# Patient Record
Sex: FEMALE | Race: BLACK OR AFRICAN AMERICAN | NOT HISPANIC OR LATINO | URBAN - METROPOLITAN AREA
[De-identification: names, ages, dates, MRNs, and addresses within clinical notes are randomized per-mention and may not be internally consistent; named-entity substitution may affect disease eponyms.]

---

## 2019-05-13 ENCOUNTER — EMERGENCY (EMERGENCY)
Facility: HOSPITAL | Age: 49
LOS: 1 days | Discharge: LEFT BEFORE DISCHARGE | End: 2019-05-13
Attending: EMERGENCY MEDICINE | Admitting: EMERGENCY MEDICINE
Payer: MEDICAID

## 2019-05-13 VITALS
HEART RATE: 92 BPM | TEMPERATURE: 99 F | DIASTOLIC BLOOD PRESSURE: 84 MMHG | OXYGEN SATURATION: 100 % | SYSTOLIC BLOOD PRESSURE: 134 MMHG | RESPIRATION RATE: 18 BRPM

## 2019-05-13 DIAGNOSIS — R10.9 UNSPECIFIED ABDOMINAL PAIN: ICD-10-CM

## 2019-05-13 DIAGNOSIS — Z88.8 ALLERGY STATUS TO OTHER DRUGS, MEDICAMENTS AND BIOLOGICAL SUBSTANCES: ICD-10-CM

## 2019-05-13 DIAGNOSIS — R10.13 EPIGASTRIC PAIN: ICD-10-CM

## 2019-05-13 LAB
ALBUMIN SERPL ELPH-MCNC: 3.3 G/DL — LOW (ref 3.4–5)
ALP SERPL-CCNC: 93 U/L — SIGNIFICANT CHANGE UP (ref 40–120)
ALT FLD-CCNC: SIGNIFICANT CHANGE UP U/L (ref 12–42)
ANION GAP SERPL CALC-SCNC: 17 MMOL/L — HIGH (ref 9–16)
APPEARANCE UR: CLEAR — SIGNIFICANT CHANGE UP
APTT BLD: 41.5 SEC — HIGH (ref 27.5–36.3)
AST SERPL-CCNC: SIGNIFICANT CHANGE UP U/L (ref 15–37)
BASOPHILS NFR BLD AUTO: 0.1 % — SIGNIFICANT CHANGE UP (ref 0–2)
BILIRUB SERPL-MCNC: 1 MG/DL — SIGNIFICANT CHANGE UP (ref 0.2–1.2)
BILIRUB UR-MCNC: NEGATIVE — SIGNIFICANT CHANGE UP
BUN SERPL-MCNC: 9 MG/DL — SIGNIFICANT CHANGE UP (ref 7–23)
CALCIUM SERPL-MCNC: 8.5 MG/DL — SIGNIFICANT CHANGE UP (ref 8.5–10.5)
CHLORIDE SERPL-SCNC: 100 MMOL/L — SIGNIFICANT CHANGE UP (ref 96–108)
CO2 SERPL-SCNC: 18 MMOL/L — LOW (ref 22–31)
COLOR SPEC: YELLOW — SIGNIFICANT CHANGE UP
CREAT SERPL-MCNC: 1.11 MG/DL — SIGNIFICANT CHANGE UP (ref 0.5–1.3)
DIFF PNL FLD: ABNORMAL
EOSINOPHIL NFR BLD AUTO: 1.3 % — SIGNIFICANT CHANGE UP (ref 0–6)
GLUCOSE SERPL-MCNC: 170 MG/DL — HIGH (ref 70–99)
GLUCOSE UR QL: NEGATIVE — SIGNIFICANT CHANGE UP
HCG SERPL-ACNC: 1 MIU/ML — SIGNIFICANT CHANGE UP
HCT VFR BLD CALC: 40.3 % — SIGNIFICANT CHANGE UP (ref 34.5–45)
HGB BLD-MCNC: 13.8 G/DL — SIGNIFICANT CHANGE UP (ref 11.5–15.5)
IMM GRANULOCYTES NFR BLD AUTO: 0.6 % — SIGNIFICANT CHANGE UP (ref 0–1.5)
INR BLD: 2.82 — HIGH (ref 0.88–1.16)
KETONES UR-MCNC: NEGATIVE — SIGNIFICANT CHANGE UP
LACTATE SERPL-SCNC: 0.6 MMOL/L — SIGNIFICANT CHANGE UP (ref 0.4–2)
LEUKOCYTE ESTERASE UR-ACNC: NEGATIVE — SIGNIFICANT CHANGE UP
LIDOCAIN IGE QN: 941 U/L — HIGH (ref 73–393)
LYMPHOCYTES # BLD AUTO: 12.5 % — LOW (ref 13–44)
MCHC RBC-ENTMCNC: 31 PG — SIGNIFICANT CHANGE UP (ref 27–34)
MCHC RBC-ENTMCNC: 34.2 G/DL — SIGNIFICANT CHANGE UP (ref 32–36)
MCV RBC AUTO: 90.6 FL — SIGNIFICANT CHANGE UP (ref 80–100)
MONOCYTES NFR BLD AUTO: 6.7 % — SIGNIFICANT CHANGE UP (ref 2–14)
NEUTROPHILS NFR BLD AUTO: 78.8 % — HIGH (ref 43–77)
NITRITE UR-MCNC: NEGATIVE — SIGNIFICANT CHANGE UP
PH UR: 6 — SIGNIFICANT CHANGE UP (ref 5–8)
PLATELET # BLD AUTO: 212 K/UL — SIGNIFICANT CHANGE UP (ref 150–400)
POTASSIUM SERPL-MCNC: 3.8 MMOL/L — SIGNIFICANT CHANGE UP (ref 3.5–5.3)
POTASSIUM SERPL-SCNC: 3.8 MMOL/L — SIGNIFICANT CHANGE UP (ref 3.5–5.3)
PROT SERPL-MCNC: 7.7 G/DL — SIGNIFICANT CHANGE UP (ref 6.4–8.2)
PROT UR-MCNC: ABNORMAL MG/DL
PROTHROM AB SERPL-ACNC: 32 SEC — HIGH (ref 10–12.9)
RBC # BLD: 4.45 M/UL — SIGNIFICANT CHANGE UP (ref 3.8–5.2)
RBC # FLD: 13.6 % — SIGNIFICANT CHANGE UP (ref 10.3–14.5)
SODIUM SERPL-SCNC: 135 MMOL/L — SIGNIFICANT CHANGE UP (ref 132–145)
SP GR SPEC: 1.02 — SIGNIFICANT CHANGE UP (ref 1–1.03)
UROBILINOGEN FLD QL: 0.2 E.U./DL — SIGNIFICANT CHANGE UP
WBC # BLD: 8.6 K/UL — SIGNIFICANT CHANGE UP (ref 3.8–10.5)
WBC # FLD AUTO: 8.6 K/UL — SIGNIFICANT CHANGE UP (ref 3.8–10.5)

## 2019-05-13 PROCEDURE — 93010 ELECTROCARDIOGRAM REPORT: CPT

## 2019-05-13 PROCEDURE — 99285 EMERGENCY DEPT VISIT HI MDM: CPT | Mod: 25

## 2019-05-13 RX ORDER — OXYCODONE HYDROCHLORIDE 5 MG/1
5 TABLET ORAL ONCE
Refills: 0 | Status: DISCONTINUED | OUTPATIENT
Start: 2019-05-13 | End: 2019-05-13

## 2019-05-13 RX ORDER — IOHEXOL 300 MG/ML
30 INJECTION, SOLUTION INTRAVENOUS ONCE
Refills: 0 | Status: COMPLETED | OUTPATIENT
Start: 2019-05-13 | End: 2019-05-13

## 2019-05-13 RX ORDER — FAMOTIDINE 10 MG/ML
20 INJECTION INTRAVENOUS ONCE
Refills: 0 | Status: COMPLETED | OUTPATIENT
Start: 2019-05-13 | End: 2019-05-13

## 2019-05-13 RX ORDER — MORPHINE SULFATE 50 MG/1
4 CAPSULE, EXTENDED RELEASE ORAL ONCE
Refills: 0 | Status: DISCONTINUED | OUTPATIENT
Start: 2019-05-13 | End: 2019-05-13

## 2019-05-13 RX ORDER — SODIUM CHLORIDE 9 MG/ML
1000 INJECTION INTRAMUSCULAR; INTRAVENOUS; SUBCUTANEOUS ONCE
Refills: 0 | Status: COMPLETED | OUTPATIENT
Start: 2019-05-13 | End: 2019-05-13

## 2019-05-13 RX ADMIN — SODIUM CHLORIDE 2000 MILLILITER(S): 9 INJECTION INTRAMUSCULAR; INTRAVENOUS; SUBCUTANEOUS at 21:53

## 2019-05-13 RX ADMIN — OXYCODONE HYDROCHLORIDE 5 MILLIGRAM(S): 5 TABLET ORAL at 21:53

## 2019-05-13 RX ADMIN — FAMOTIDINE 20 MILLIGRAM(S): 10 INJECTION INTRAVENOUS at 21:53

## 2019-05-13 RX ADMIN — Medication 30 MILLILITER(S): at 21:53

## 2019-05-13 RX ADMIN — IOHEXOL 30 MILLILITER(S): 300 INJECTION, SOLUTION INTRAVENOUS at 21:54

## 2019-05-13 RX ADMIN — MORPHINE SULFATE 4 MILLIGRAM(S): 50 CAPSULE, EXTENDED RELEASE ORAL at 23:02

## 2019-05-13 NOTE — ED PROVIDER NOTE - ATTENDING CONTRIBUTION TO CARE
48F hx of gastric sleeve (2014, [facility]), hx of abd hernia repair with a mesh (2018, [facility]), hx of colon resection due to recurrent diverticulitis (2018, [facility]) and hx of cholecystectomy presenting today with abd pain and acid reflex symptoms. Hx of daily etoh use.    afebrile, vss    awake/alert  lungs: clear  sclerae anicteric  cor: rrr s mcr  abd: soft, mild epigastric ttp, no pulsatile mass, equal femoral pulses, neg HSM, no r/r/g, nabs throughout.  neuro: aao x 3    data: lipase 700's  ct a/p oral/iv contrast pending    imp: gastritis/pancreatitis/internal hernia/sbo/ intussusception 48F hx of gastric sleeve (2014, [facility]), hx of abd hernia repair with a mesh (2018, [facility]), hx of colon resection due to recurrent diverticulitis (2018, [facility]) and hx of cholecystectomy presenting today with abd pain and acid reflex symptoms. Hx of daily etoh use.    pshx: as above, cholecystectomy    afebrile, vss    awake/alert  lungs: clear  sclerae anicteric  cor: rrr s mcr  abd: soft, mild epigastric ttp, no pulsatile mass, equal femoral pulses, neg HSM, no r/r/g, nabs throughout.  neuro: aao x 3    data: lipase 941  ct a/p oral/iv contrast pending    imp: gastritis/pancreatitis/internal hernia/sbo/ intussusception

## 2019-05-13 NOTE — ED PROVIDER NOTE - OBJECTIVE STATEMENT
48F hx of gastric sleeve (2014, [facility]), hx of abd hernia repair with a mesh (2018, [facility]), hx of colon resection due to recurrent diverticulitis (2018, [facility]) presenting today with abd pain and acid reflex symptoms. Pt was having BM today, normal, and felt that something popped in her abdomen, where the abd hernia was repaired. Pain is constant in nature and worse with eating. Pt endorsed to having abd pain x 3d, lower abd. No nausea, vomiting, diarrhea. Hx of acid reflex, on Omeprazole and Karafate. 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 48F hx of gastric sleeve (2014, [facility]), hx of abd hernia repair with a mesh (2018, [facility]), hx of colon resection due to recurrent diverticulitis (2018, [facility]) presenting today with abd pain and acid reflex symptoms. Pt was having BM today, normal, and felt that something popped in her abdomen, where the abd hernia was repaired. Pain is constant in nature and worse with eating. Pt endorsed to having abd pain x 3d, epigastric in nature. BM today, normal. No nausea, vomiting, diarrhea. Hx of acid reflex, on Omeprazole and Karafate. 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 48F hx of gastric sleeve (2014, [facility]), hx of abd hernia repair with a mesh (2018, [facility]), hx of colon resection due to recurrent diverticulitis (2018, [facility]) presenting today with abd pain and acid reflex symptoms. Pt was having BM today, normal, and felt that something popped in her abdomen, where the abd hernia was repaired. Pain is constant in nature for the past 3 days and worse with eating. Pain is located at the epigastric region. No nausea, vomiting, diarrhea. Hx of acid reflex, on Omeprazole and Carafate. Endorsed that she has "funny taste" in her mouth, with heart burn sensation. Hx of DVTs and PE on Xarelto. 48F hx of gastric sleeve (2014, [facility]), hx of abd hernia repair with a mesh (2018, [facility]), hx of colon resection due to recurrent diverticulitis (2018, [facility]) and hx of cholecystectomy presenting today with abd pain and acid reflex symptoms. Pt was having BM today, described as normal, and felt that something popped in her abdomen, where the abd hernia was repaired. Pain is constant in nature for the past 3 days and worse with eating. Pain is located at the epigastric region. No nausea, vomiting, diarrhea. Hx of acid reflex, on Omeprazole and Carafate. Endorsed that she has "funny taste" in her mouth, with heart burn sensation. Hx of DVTs and PE on Xarelto. Otherwise, no chest pain or back pain endorsed. Also denies any fever, chills, nausea or vomiting. 48F hx of gastric sleeve (2014, [facility]), hx of abd hernia repair with a mesh (2018, [facility]), hx of colon resection due to recurrent diverticulitis (2018, [facility]) and hx of cholecystectomy presenting today with abd pain and acid reflex symptoms x 4d. Pt was having BM today, described as normal, and felt that something popped in her abdomen, where the abd hernia was repaired. Pain is constant in nature following EtOH use at the party and now worse with eating. Pain is located at the epigastric region. No nausea, vomiting, diarrhea. Hx of acid reflex, on Omeprazole and Carafate. Endorsed that she has "funny taste" in her mouth, with heart burn sensation. Hx of DVTs and PE on Xarelto. Otherwise, no chest pain or back pain endorsed. Also denies any fever, chills, nausea or vomiting.

## 2019-05-13 NOTE — ED ADULT TRIAGE NOTE - CHIEF COMPLAINT QUOTE
Pt complaining of abdominal pain associated gastric reflux. Pt states that she has a history of gastric reflux and bariatric sleeve in 2014. Pt denies nausea, vomiting, chest pain, sob.

## 2019-05-13 NOTE — ED ADULT NURSE REASSESSMENT NOTE - NS ED NURSE REASSESS COMMENT FT1
pt comes to the nurses station expressing wishes to leave ed apt. unwilling to wait for ct results, although strongly advised to by MD and this writer. pt is alert and oriented x 3, ambulates in steady gait. leaving  ED with friend, in no distress.

## 2019-05-13 NOTE — ED ADULT NURSE NOTE - OBJECTIVE STATEMENT
Patient to ED with complaint of abd pain and acid reflex symptoms. Pt was having BM today, described as normal, and felt that something popped in her abdomen, where the abd hernia was repaired. Pain is constant in nature for the past 3 days and worse with eating. Pain is located at the epigastric region.

## 2019-05-13 NOTE — ED PROVIDER NOTE - PROGRESS NOTE DETAILS
pt does not wish to wait for final CT result, understands diagnosis and workup is not complete at this point and that we may miss potentially life threatening conditions.  pt is ao x 4, no apparent distress, and appears to have capacity to make medical decisions.  return precautions given but pt did not wait for written dc instructions

## 2019-05-13 NOTE — ED PROVIDER NOTE - CLINICAL SUMMARY MEDICAL DECISION MAKING FREE TEXT BOX
hx of multiple abd sx, c/o of abd pain, and worse with eating - will r/o sbo vs. internal hernia vs. colitis vs. appendicitis. Basics with lipase. IVF and pain control. hx of multiple abd sx, c/o of abd pain and exacerbation of acid reflex sxs, + normal BM, will r/o sbo vs. internal hernia vs. colitis vs. appendicitis. Basics with lipase. IVF and pain control. High BMI - will get CTAP with IV and PO C to r/o hernia, appendicitis, sbo or mass.

## 2019-05-13 NOTE — ED PROVIDER NOTE - PHYSICAL EXAMINATION
Abd pain, epigastric abd, with palpation.   No guarding or rebound tenderness Abd pain, epigastric abd, with palpation.   Palpable nodules on abdomen.   No guarding or rebound tenderness

## 2019-05-14 VITALS
OXYGEN SATURATION: 98 % | TEMPERATURE: 99 F | DIASTOLIC BLOOD PRESSURE: 93 MMHG | RESPIRATION RATE: 18 BRPM | HEART RATE: 79 BPM | SYSTOLIC BLOOD PRESSURE: 138 MMHG

## 2019-05-14 PROCEDURE — 74177 CT ABD & PELVIS W/CONTRAST: CPT | Mod: 26

## 2019-05-23 ENCOUNTER — APPOINTMENT (OUTPATIENT)
Dept: INTERNAL MEDICINE | Facility: CLINIC | Age: 49
End: 2019-05-23
Payer: MEDICAID

## 2019-05-23 VITALS
TEMPERATURE: 98.8 F | HEART RATE: 78 BPM | SYSTOLIC BLOOD PRESSURE: 144 MMHG | OXYGEN SATURATION: 99 % | BODY MASS INDEX: 32.43 KG/M2 | DIASTOLIC BLOOD PRESSURE: 86 MMHG | HEIGHT: 68 IN | WEIGHT: 214 LBS

## 2019-05-23 PROCEDURE — 99205 OFFICE O/P NEW HI 60 MIN: CPT

## 2019-05-24 ENCOUNTER — EMERGENCY (EMERGENCY)
Facility: HOSPITAL | Age: 49
LOS: 1 days | Discharge: HOME | End: 2019-05-24
Attending: EMERGENCY MEDICINE | Admitting: EMERGENCY MEDICINE
Payer: MEDICAID

## 2019-05-24 VITALS
HEART RATE: 75 BPM | TEMPERATURE: 98 F | SYSTOLIC BLOOD PRESSURE: 157 MMHG | RESPIRATION RATE: 18 BRPM | OXYGEN SATURATION: 99 % | DIASTOLIC BLOOD PRESSURE: 107 MMHG

## 2019-05-24 VITALS
HEART RATE: 77 BPM | SYSTOLIC BLOOD PRESSURE: 161 MMHG | TEMPERATURE: 97 F | RESPIRATION RATE: 20 BRPM | DIASTOLIC BLOOD PRESSURE: 92 MMHG | OXYGEN SATURATION: 99 %

## 2019-05-24 LAB
ALBUMIN SERPL ELPH-MCNC: 3.4 G/DL — SIGNIFICANT CHANGE UP (ref 3.4–5)
ALP SERPL-CCNC: 88 U/L — SIGNIFICANT CHANGE UP (ref 40–120)
ALT FLD-CCNC: 21 U/L — SIGNIFICANT CHANGE UP (ref 12–42)
ANION GAP SERPL CALC-SCNC: 8 MMOL/L — LOW (ref 9–16)
APPEARANCE UR: CLEAR — SIGNIFICANT CHANGE UP
AST SERPL-CCNC: 30 U/L — SIGNIFICANT CHANGE UP (ref 15–37)
BASOPHILS NFR BLD AUTO: 0.8 % — SIGNIFICANT CHANGE UP (ref 0–2)
BILIRUB SERPL-MCNC: 0.2 MG/DL — SIGNIFICANT CHANGE UP (ref 0.2–1.2)
BILIRUB UR-MCNC: NEGATIVE — SIGNIFICANT CHANGE UP
BUN SERPL-MCNC: 8 MG/DL — SIGNIFICANT CHANGE UP (ref 7–23)
CALCIUM SERPL-MCNC: 9.2 MG/DL — SIGNIFICANT CHANGE UP (ref 8.5–10.5)
CHLORIDE SERPL-SCNC: 105 MMOL/L — SIGNIFICANT CHANGE UP (ref 96–108)
CO2 SERPL-SCNC: 29 MMOL/L — SIGNIFICANT CHANGE UP (ref 22–31)
COLOR SPEC: YELLOW — SIGNIFICANT CHANGE UP
CREAT SERPL-MCNC: 1 MG/DL — SIGNIFICANT CHANGE UP (ref 0.5–1.3)
DIFF PNL FLD: ABNORMAL
EOSINOPHIL NFR BLD AUTO: 2.7 % — SIGNIFICANT CHANGE UP (ref 0–6)
GLUCOSE SERPL-MCNC: 118 MG/DL — HIGH (ref 70–99)
GLUCOSE UR QL: NEGATIVE — SIGNIFICANT CHANGE UP
HCG UR QL: NEGATIVE — SIGNIFICANT CHANGE UP
HCT VFR BLD CALC: 37 % — SIGNIFICANT CHANGE UP (ref 34.5–45)
HGB BLD-MCNC: 11.8 G/DL — SIGNIFICANT CHANGE UP (ref 11.5–15.5)
IMM GRANULOCYTES NFR BLD AUTO: 1.3 % — SIGNIFICANT CHANGE UP (ref 0–1.5)
KETONES UR-MCNC: NEGATIVE — SIGNIFICANT CHANGE UP
LEUKOCYTE ESTERASE UR-ACNC: NEGATIVE — SIGNIFICANT CHANGE UP
LIDOCAIN IGE QN: 438 U/L — HIGH (ref 73–393)
LYMPHOCYTES # BLD AUTO: 34.3 % — SIGNIFICANT CHANGE UP (ref 13–44)
MCHC RBC-ENTMCNC: 30.3 PG — SIGNIFICANT CHANGE UP (ref 27–34)
MCHC RBC-ENTMCNC: 31.9 G/DL — LOW (ref 32–36)
MCV RBC AUTO: 95.1 FL — SIGNIFICANT CHANGE UP (ref 80–100)
MONOCYTES NFR BLD AUTO: 11.2 % — SIGNIFICANT CHANGE UP (ref 2–14)
NEUTROPHILS NFR BLD AUTO: 49.7 % — SIGNIFICANT CHANGE UP (ref 43–77)
NITRITE UR-MCNC: NEGATIVE — SIGNIFICANT CHANGE UP
PH UR: 6 — SIGNIFICANT CHANGE UP (ref 5–8)
PLATELET # BLD AUTO: 303 K/UL — SIGNIFICANT CHANGE UP (ref 150–400)
POTASSIUM SERPL-MCNC: 3.5 MMOL/L — SIGNIFICANT CHANGE UP (ref 3.5–5.3)
POTASSIUM SERPL-SCNC: 3.5 MMOL/L — SIGNIFICANT CHANGE UP (ref 3.5–5.3)
PROT SERPL-MCNC: 8 G/DL — SIGNIFICANT CHANGE UP (ref 6.4–8.2)
PROT UR-MCNC: NEGATIVE MG/DL — SIGNIFICANT CHANGE UP
RBC # BLD: 3.89 M/UL — SIGNIFICANT CHANGE UP (ref 3.8–5.2)
RBC # FLD: 14 % — SIGNIFICANT CHANGE UP (ref 10.3–14.5)
SODIUM SERPL-SCNC: 142 MMOL/L — SIGNIFICANT CHANGE UP (ref 132–145)
SP GR SPEC: 1.02 — SIGNIFICANT CHANGE UP (ref 1–1.03)
UROBILINOGEN FLD QL: 0.2 E.U./DL — SIGNIFICANT CHANGE UP
WBC # BLD: 6.3 K/UL — SIGNIFICANT CHANGE UP (ref 3.8–10.5)
WBC # FLD AUTO: 6.3 K/UL — SIGNIFICANT CHANGE UP (ref 3.8–10.5)

## 2019-05-24 PROCEDURE — 99284 EMERGENCY DEPT VISIT MOD MDM: CPT

## 2019-05-24 PROCEDURE — 74177 CT ABD & PELVIS W/CONTRAST: CPT | Mod: 26

## 2019-05-24 NOTE — ED PROVIDER NOTE - NSFOLLOWUPINSTRUCTIONS_ED_ALL_ED_FT
follow up with Dr. Melissa next week    Return to ER for fever, vomiting. worsening abdominal pain or any other concern

## 2019-05-24 NOTE — ED PROVIDER NOTE - GASTROINTESTINAL, MLM
Abdomen soft, non-tender, no guarding. Abdomen soft. +Right sided abdominal tenderness. No rebound, no guarding. Abdomen soft. + mild - mod central abdominal tenderness. No rebound, no guarding. no distention

## 2019-05-24 NOTE — ED PROVIDER NOTE - CLINICAL SUMMARY MEDICAL DECISION MAKING FREE TEXT BOX
pt comfortable throughout ED stay. repeat abdominal exam unchanged. comparing ct from 1.5 weeks ago to today's ct show marked improvement of pancreatitis. CT findings dw Dr. Mckeon - no surgical intervention to drain fluid collection. As patient clinical condition is stable over last two weeks with improving labs and CT will Dc home to f/u with Dr. Melissa. lab, ct results and rationale for DC planning dw the patient who verbally expressed understanding. all questions answered    I called Dr. Melissa office at  [phone] - no answer

## 2019-05-24 NOTE — ED PROVIDER NOTE - OBJECTIVE STATEMENT
Nurse Triage Note: Pt c/o ruq pain x 3 weeks. Nauseated +. Was here last week - left ED before CT results came out.  Individuals present during HPI: Dr. Castorena, Dena Banuelos (Scribe), Yana Marcelino (Pt)  Vital Signs: HR 75, /107, Resp 18, Temp(F) 98.5, SpO2 99    49 y/o female with PMHx of HTN, gastric sleeve (2014, [facility]), hx of abd hernia repair with a mesh (2018, [facility]), hx of colon resection due to recurrent diverticulitis (2018, [facility]) hx of cholecystectomy, hx of DVT, and hx of PE (in 2002, taking Xarelto) presents to the ED with complaints of right sided abdominal pain and nausea x 3 weeks. Pt states she was seen in current ED 1.5 weeks ago for same complaint and had an abd CT scan done but left before she received the results. She reports seeing her PCP yesterday who suggested that she come back to ED as her CT scan results "showed something". (Pt is unaware of results). She endorses that pain has been constant, non-radiating, and non-worsening. Denies fever, chills, chest pain, SOB, vomiting, diarrhea. Nurse Triage Note: Pt c/o ruq pain x 3 weeks. Nauseated +. Was here last week - left ED before CT results came out.  Individuals present during HPI: Dr. Castorena, Dena Banuelos (Scribe), Yana Marcelino (Pt)  Vital Signs: HR 75, /107, Resp 18, Temp(F) 98.5, SpO2 99    47 y/o female with PMHx of HTN, gastric sleeve (2014, [facility]), hx of abd hernia repair with a mesh (2018, [facility]), hx of colon resection due to recurrent diverticulitis (2018, [facility]) hx of cholecystectomy, hx of DVT, and hx of PE (in 2002, taking Xarelto) presents to the ED with complaints of right sided abdominal pain and nausea x 3 weeks. Pt states she was seen in current ED 1.5 weeks ago for same complaint and had an abd CT scan done but left before she received the results. She reports seeing her PCP yesterday who suggested that she come back to ED as her CT scan results were abnormal (Pt is unaware of results). She endorses that pain has been constant, non-radiating, and non-worsening. Denies fever, chills, chest pain, SOB, vomiting, diarrhea. Nurse Triage Note: Pt c/o ruq pain x 3 weeks. Nauseated +. Was here last week - left ED before CT results came out.  Individuals present during HPI: Dr. Castorena, Dena Banuelos (Scribe), Yana Marcelino (Pt)  Vital Signs: HR 75, /107, Resp 18, Temp(F) 98.5, SpO2 99    47 y/o female with PMHx of HTN, gastric sleeve (2014, [facility]), hx of abd hernia repair with a mesh (2018, [facility]), hx of colon resection due to recurrent diverticulitis (2018, [facility]) hx of cholecystectomy, hx of DVT, and hx of PE (in 2002, taking Xarelto) presents to the ED with complaints of right sided abdominal pain and nausea x 3 weeks. Pt states she was seen in current ED 1.5 weeks ago for same complaint and had an abd CT scan done but left before she received the results. She reports seeing her PCP yesterday who suggested that she come back to ED as her CT scan results were abnormal (Pt is unaware of results). She endorses that pain has been constant, non-radiating, and non-worsening. Denies fever, chills, chest pain, SOB, vomiting, diarrhea.    PCP: Abbie Melissa [phone] Nurse Triage Note: Pt c/o ruq pain x 3 weeks. Nauseated +. Was here last week - left ED before CT results came out.  Individuals present during HPI: Dr. Castorena, Dena Banuelos (Scribe), Yana Marcelino (Pt)  Vital Signs: HR 75, /107, Resp 18, Temp(F) 98.5, SpO2 99    49 y/o female with PMHx of HTN, gastric sleeve (2014, [facility]), hx of abd hernia repair with a mesh (2018, [facility]), hx of colon resection due to recurrent diverticulitis (2018, [facility]) hx of cholecystectomy, hx of DVT, and hx of PE (in 2002, taking Xarelto) presents to the ED with complaints of right sided abdominal pain and nausea x 3 weeks. Pt states she was seen in current ED 1.5 weeks ago for same complaint and had an abd CT scan done but left before she received the results. She reports seeing her PCP yesterday who suggested that she come back to ED as her CT scan results were abnormal (Pt is unaware of results). She endorses that pain has been constant, non-radiating, and non-worsening. Denies fever, chills, chest pain, SOB, vomiting, diarrhea. eating and drinking without pain    PCP: Abbie Melissa [phone]

## 2019-05-28 DIAGNOSIS — R10.11 RIGHT UPPER QUADRANT PAIN: ICD-10-CM

## 2019-05-28 DIAGNOSIS — K85.90 ACUTE PANCREATITIS WITHOUT NECROSIS OR INFECTION, UNSPECIFIED: ICD-10-CM

## 2019-05-28 DIAGNOSIS — I10 ESSENTIAL (PRIMARY) HYPERTENSION: ICD-10-CM

## 2019-05-28 DIAGNOSIS — Z88.8 ALLERGY STATUS TO OTHER DRUGS, MEDICAMENTS AND BIOLOGICAL SUBSTANCES: ICD-10-CM

## 2019-05-28 PROBLEM — I26.99 OTHER PULMONARY EMBOLISM WITHOUT ACUTE COR PULMONALE: Chronic | Status: ACTIVE | Noted: 2019-05-24

## 2019-05-28 NOTE — REVIEW OF SYSTEMS
[Nausea] : nausea [Abdominal Pain] : abdominal pain [Negative] : Heme/Lymph [Heartburn] : no heartburn

## 2019-05-28 NOTE — PHYSICAL EXAM
[No Acute Distress] : no acute distress [Well Nourished] : well nourished [Well Developed] : well developed [Normal Sclera/Conjunctiva] : normal sclera/conjunctiva [Well-Appearing] : well-appearing [Normal Outer Ear/Nose] : the outer ears and nose were normal in appearance [Normal Oropharynx] : the oropharynx was normal [Supple] : supple [No JVD] : no jugular venous distention [Thyroid Normal, No Nodules] : the thyroid was normal and there were no nodules present [No Lymphadenopathy] : no lymphadenopathy [No Respiratory Distress] : no respiratory distress  [Clear to Auscultation] : lungs were clear to auscultation bilaterally [No Accessory Muscle Use] : no accessory muscle use [Normal Rate] : normal rate  [No Murmur] : no murmur heard [Normal S1, S2] : normal S1 and S2 [Regular Rhythm] : with a regular rhythm [No Edema] : there was no peripheral edema [No Extremity Clubbing/Cyanosis] : no extremity clubbing/cyanosis [Soft] : abdomen soft [Non-distended] : non-distended [No Masses] : no abdominal mass palpated [Normal Bowel Sounds] : normal bowel sounds [No HSM] : no HSM [Normal Anterior Cervical Nodes] : no anterior cervical lymphadenopathy [Normal Posterior Cervical Nodes] : no posterior cervical lymphadenopathy [No CVA Tenderness] : no CVA  tenderness [No Joint Swelling] : no joint swelling [No Spinal Tenderness] : no spinal tenderness [Grossly Normal Strength/Tone] : grossly normal strength/tone [No Rash] : no rash [Normal Gait] : normal gait [No Focal Deficits] : no focal deficits [Coordination Grossly Intact] : coordination grossly intact [Alert and Oriented x3] : oriented to person, place, and time [Normal Affect] : the affect was normal [de-identified] : RUQ tenderness [Normal Insight/Judgement] : insight and judgment were intact

## 2019-05-28 NOTE — HISTORY OF PRESENT ILLNESS
[FreeTextEntry1] : [facility], left [facility].  Still having RUQ pain.  Establish care.

## 2019-06-03 ENCOUNTER — APPOINTMENT (OUTPATIENT)
Dept: INTERNAL MEDICINE | Facility: CLINIC | Age: 49
End: 2019-06-03
Payer: MEDICAID

## 2019-06-03 VITALS
SYSTOLIC BLOOD PRESSURE: 117 MMHG | TEMPERATURE: 98.2 F | OXYGEN SATURATION: 98 % | BODY MASS INDEX: 32.28 KG/M2 | WEIGHT: 213 LBS | HEIGHT: 68 IN | HEART RATE: 81 BPM | DIASTOLIC BLOOD PRESSURE: 74 MMHG

## 2019-06-03 DIAGNOSIS — N28.89 OTHER SPECIFIED DISORDERS OF KIDNEY AND URETER: ICD-10-CM

## 2019-06-03 DIAGNOSIS — R09.89 OTHER SPECIFIED SYMPTOMS AND SIGNS INVOLVING THE CIRCULATORY AND RESPIRATORY SYSTEMS: ICD-10-CM

## 2019-06-03 DIAGNOSIS — R18.8 OTHER ASCITES: ICD-10-CM

## 2019-06-03 DIAGNOSIS — Z83.2 FAMILY HISTORY OF DISEASES OF THE BLOOD AND BLOOD-FORMING ORGANS AND CERTAIN DISORDERS INVOLVING THE IMMUNE MECHANISM: ICD-10-CM

## 2019-06-03 PROCEDURE — 99215 OFFICE O/P EST HI 40 MIN: CPT

## 2019-06-04 PROBLEM — R09.89 SENSATION OF FOREIGN BODY IN THROAT: Status: ACTIVE | Noted: 2019-06-04

## 2019-06-05 NOTE — PHYSICAL EXAM
[No Acute Distress] : no acute distress [Well Nourished] : well nourished [Well Developed] : well developed [Well-Appearing] : well-appearing [Normal Sclera/Conjunctiva] : normal sclera/conjunctiva [Normal Oropharynx] : the oropharynx was normal [Normal Outer Ear/Nose] : the outer ears and nose were normal in appearance [No JVD] : no jugular venous distention [Supple] : supple [No Lymphadenopathy] : no lymphadenopathy [Thyroid Normal, No Nodules] : the thyroid was normal and there were no nodules present [No Respiratory Distress] : no respiratory distress  [Clear to Auscultation] : lungs were clear to auscultation bilaterally [No Accessory Muscle Use] : no accessory muscle use [Normal Rate] : normal rate  [Normal S1, S2] : normal S1 and S2 [Regular Rhythm] : with a regular rhythm [No Murmur] : no murmur heard [No Edema] : there was no peripheral edema [No Extremity Clubbing/Cyanosis] : no extremity clubbing/cyanosis [Soft] : abdomen soft [Non-distended] : non-distended [No Masses] : no abdominal mass palpated [Normal Bowel Sounds] : normal bowel sounds [No HSM] : no HSM [Normal Posterior Cervical Nodes] : no posterior cervical lymphadenopathy [Normal Anterior Cervical Nodes] : no anterior cervical lymphadenopathy [No CVA Tenderness] : no CVA  tenderness [No Spinal Tenderness] : no spinal tenderness [No Joint Swelling] : no joint swelling [No Rash] : no rash [Grossly Normal Strength/Tone] : grossly normal strength/tone [Coordination Grossly Intact] : coordination grossly intact [Normal Gait] : normal gait [Normal Affect] : the affect was normal [No Focal Deficits] : no focal deficits [Normal Insight/Judgement] : insight and judgment were intact [Alert and Oriented x3] : oriented to person, place, and time [de-identified] : RUQ tenderness

## 2019-06-05 NOTE — REVIEW OF SYSTEMS
[Nausea] : nausea [Abdominal Pain] : abdominal pain [Heartburn] : no heartburn [Negative] : Heme/Lymph

## 2019-06-10 ENCOUNTER — APPOINTMENT (OUTPATIENT)
Dept: INTERNAL MEDICINE | Facility: CLINIC | Age: 49
End: 2019-06-10
Payer: MEDICAID

## 2019-06-10 DIAGNOSIS — G62.9 POLYNEUROPATHY, UNSPECIFIED: ICD-10-CM

## 2019-06-10 LAB
APPEARANCE: CLEAR
BACTERIA UR CULT: NORMAL
BACTERIA: NEGATIVE
BILIRUBIN URINE: NEGATIVE
BLOOD URINE: NEGATIVE
COLOR: NORMAL
GLUCOSE QUALITATIVE U: NEGATIVE
HYALINE CASTS: 2 /LPF
KETONES URINE: NEGATIVE
LEUKOCYTE ESTERASE URINE: NEGATIVE
MICROSCOPIC-UA: NORMAL
NITRITE URINE: NEGATIVE
PH URINE: 6
PROTEIN URINE: NEGATIVE
RED BLOOD CELLS URINE: 2 /HPF
SPECIFIC GRAVITY URINE: 1.02
SQUAMOUS EPITHELIAL CELLS: 1 /HPF
UROBILINOGEN URINE: NORMAL
WHITE BLOOD CELLS URINE: 0 /HPF

## 2019-06-10 PROCEDURE — 36415 COLL VENOUS BLD VENIPUNCTURE: CPT

## 2019-06-17 ENCOUNTER — TRANSCRIPTION ENCOUNTER (OUTPATIENT)
Age: 49
End: 2019-06-17

## 2019-06-25 ENCOUNTER — APPOINTMENT (OUTPATIENT)
Dept: VASCULAR SURGERY | Facility: CLINIC | Age: 49
End: 2019-06-25
Payer: MEDICAID

## 2019-06-25 PROBLEM — R18.8 ABDOMINAL FLUID COLLECTION: Status: RESOLVED | Noted: 2019-05-23 | Resolved: 2019-06-25

## 2019-06-25 LAB
25(OH)D3 SERPL-MCNC: 37.9 NG/ML
ALBUMIN SERPL ELPH-MCNC: 4.3 G/DL
ALP BLD-CCNC: 77 U/L
ALT SERPL-CCNC: 11 U/L
AMYLASE/CREAT SERPL: 72 U/L
APTT BLD: 31.2 SEC
AST SERPL-CCNC: 22 U/L
BASOPHILS # BLD AUTO: 0.04 K/UL
BASOPHILS NFR BLD AUTO: 0.7 %
BILIRUB DIRECT SERPL-MCNC: 0.1 MG/DL
BILIRUB INDIRECT SERPL-MCNC: 0.1 MG/DL
BILIRUB SERPL-MCNC: 0.2 MG/DL
CHOLEST SERPL-MCNC: 187 MG/DL
CHOLEST/HDLC SERPL: 2.8 RATIO
EOSINOPHIL # BLD AUTO: 0.13 K/UL
EOSINOPHIL NFR BLD AUTO: 2.2 %
ESTIMATED AVERAGE GLUCOSE: 134 MG/DL
FERRITIN SERPL-MCNC: 96 NG/ML
FOLATE SERPL-MCNC: 7.4 NG/ML
HBA1C MFR BLD HPLC: 6.3 %
HCT VFR BLD CALC: 40.1 %
HDLC SERPL-MCNC: 67 MG/DL
HGB BLD-MCNC: 12.3 G/DL
IMM GRANULOCYTES NFR BLD AUTO: 0.5 %
INR PPP: 0.99 RATIO
IRON SATN MFR SERPL: 39 %
IRON SERPL-MCNC: 131 UG/DL
LDLC SERPL CALC-MCNC: 47 MG/DL
LPL SERPL-CCNC: 52 U/L
LYMPHOCYTES # BLD AUTO: 1.36 K/UL
LYMPHOCYTES NFR BLD AUTO: 22.6 %
MAGNESIUM SERPL-MCNC: 1.4 MG/DL
MAN DIFF?: NORMAL
MCHC RBC-ENTMCNC: 30.4 PG
MCHC RBC-ENTMCNC: 30.7 GM/DL
MCV RBC AUTO: 99 FL
MEV IGG FLD QL IA: >300 AU/ML
MEV IGG+IGM SER-IMP: POSITIVE
MONOCYTES # BLD AUTO: 0.5 K/UL
MONOCYTES NFR BLD AUTO: 8.3 %
MUV AB SER-ACNC: POSITIVE
MUV IGG SER QL IA: 62.3 AU/ML
NEUTROPHILS # BLD AUTO: 3.97 K/UL
NEUTROPHILS NFR BLD AUTO: 65.7 %
PHOSPHATE SERPL-MCNC: 3.6 MG/DL
PLATELET # BLD AUTO: 273 K/UL
PROT SERPL-MCNC: 7.2 G/DL
PT BLD: 11.2 SEC
RBC # BLD: 4.05 M/UL
RBC # FLD: 14 %
RUBV IGG FLD-ACNC: 3.6 INDEX
RUBV IGG SER-IMP: POSITIVE
TIBC SERPL-MCNC: 340 UG/DL
TRIGL SERPL-MCNC: 365 MG/DL
TSH SERPL-ACNC: 0.6 UIU/ML
UIBC SERPL-MCNC: 209 UG/DL
VIT B12 SERPL-MCNC: 227 PG/ML
VZV AB TITR SER: POSITIVE
VZV IGG SER IF-ACNC: 589.8 INDEX
WBC # FLD AUTO: 6.03 K/UL

## 2019-06-25 PROCEDURE — 99203 OFFICE O/P NEW LOW 30 MIN: CPT

## 2019-06-26 NOTE — PHYSICAL EXAM
[Normal Breath Sounds] : Normal breath sounds [Normal Heart Sounds] : normal heart sounds [2+] : right 2+ [No Rash or Lesion] : No rash or lesion [Alert] : alert [Calm] : calm [Ankle Swelling (On Exam)] : not present [] : not present [Varicose Veins Of Lower Extremities] : not present [de-identified] : WN/WD, NAD [de-identified] : NC/AT [Abdomen Tenderness] : ~T ~M No abdominal tenderness [de-identified] : supple [de-identified] : +FROM b/l

## 2019-06-26 NOTE — HISTORY OF PRESENT ILLNESS
[FreeTextEntry1] : 50 yo F with PMHx of HTN, HLD, pre-diabetic, hx of bilateral LEs DVT/PE on long term anticoagulation with Xarelto was referred for initial evaluation. Patient was seen in ED a month ago due to abdominal pain. CT Scan was performed and it demonstrated fluid collection around pancreas and partial splenic vein thrombosis. Patient was diagnosed with pancreatitis and has been following with GI. She states that her symptoms improved and she has occasional postprandial RUQ discomfort.\par Patient was evaluated by a hematologist for coagulopathy in the past and pt states that work-up was negative.\par She is scheduled for EGD and she is doing well otherwise.

## 2019-06-26 NOTE — REVIEW OF SYSTEMS
[As Noted in HPI] : as noted in HPI [Abdominal Pain] : abdominal pain [Negative] : Heme/Lymph [Leg Claudication] : no intermittent leg claudication [Shortness Of Breath] : no shortness of breath [Lower Ext Edema] : no lower extremity edema

## 2019-06-26 NOTE — ASSESSMENT
[Arterial/Venous Disease] : arterial/venous disease [FreeTextEntry1] : 48 yo F with hx of DVT/PE on Xarelto was found to have splenic vein thrombosis during a recent work up for abdominal pain. Likely as a result of inflammation from peewee-pancreatic process.\par CT scan was reviewed - small area of partial splenic vein thrombus, non-occlusive.\par Patient was explained that no intervention is necessary at this time.\par All questions were answered.\par Patient was recommended to stay on Xarelto.\par Patient may interrupt AC for EGD at GI discretion.\par F/u prn.

## 2019-06-27 ENCOUNTER — APPOINTMENT (OUTPATIENT)
Dept: INTERNAL MEDICINE | Facility: CLINIC | Age: 49
End: 2019-06-27
Payer: MEDICAID

## 2019-06-27 VITALS
HEART RATE: 68 BPM | TEMPERATURE: 98.8 F | OXYGEN SATURATION: 99 % | HEIGHT: 68 IN | SYSTOLIC BLOOD PRESSURE: 139 MMHG | DIASTOLIC BLOOD PRESSURE: 83 MMHG | BODY MASS INDEX: 32.74 KG/M2 | WEIGHT: 216 LBS

## 2019-06-27 VITALS — SYSTOLIC BLOOD PRESSURE: 132 MMHG | DIASTOLIC BLOOD PRESSURE: 86 MMHG

## 2019-06-27 DIAGNOSIS — L70.9 ACNE, UNSPECIFIED: ICD-10-CM

## 2019-06-27 DIAGNOSIS — D49.0 NEOPLASM OF UNSPECIFIED BEHAVIOR OF DIGESTIVE SYSTEM: ICD-10-CM

## 2019-06-27 DIAGNOSIS — Z91.09 OTHER ALLERGY STATUS, OTHER THAN TO DRUGS AND BIOLOGICAL SUBSTANCES: ICD-10-CM

## 2019-06-27 DIAGNOSIS — Z87.891 PERSONAL HISTORY OF NICOTINE DEPENDENCE: ICD-10-CM

## 2019-06-27 PROCEDURE — 99214 OFFICE O/P EST MOD 30 MIN: CPT | Mod: 25

## 2019-06-27 PROCEDURE — 96372 THER/PROPH/DIAG INJ SC/IM: CPT

## 2019-06-27 RX ORDER — CYANOCOBALAMIN 1000 UG/ML
1000 INJECTION INTRAMUSCULAR; SUBCUTANEOUS
Qty: 0 | Refills: 0 | Status: COMPLETED | OUTPATIENT
Start: 2019-06-27

## 2019-06-27 RX ADMIN — CYANOCOBALAMIN 1000 MCG/ML: 1000 INJECTION, SOLUTION INTRAMUSCULAR at 00:00

## 2019-06-28 PROBLEM — Z87.891 FORMER SMOKER: Status: ACTIVE | Noted: 2019-06-28

## 2019-06-28 PROBLEM — Z91.09 ENVIRONMENTAL ALLERGIES: Status: ACTIVE | Noted: 2019-06-28

## 2019-06-28 RX ORDER — THIAMINE HCL 100 MG
100 TABLET ORAL DAILY
Qty: 90 | Refills: 1 | Status: COMPLETED | COMMUNITY
Start: 2019-06-04 | End: 2019-06-28

## 2019-06-29 PROBLEM — D49.0 IPMN (INTRADUCTAL PAPILLARY MUCINOUS NEOPLASM): Status: ACTIVE | Noted: 2019-06-25

## 2019-07-03 NOTE — HISTORY OF PRESENT ILLNESS
[FreeTextEntry1] : f/u portal vein thrombosis, pre-op for EGD with duodenal nodule biopsy.  B12 deficiency. [de-identified] : Have obtained some of patient's previous medical records including prior imaging.\par \par October 2018 MRI cholangiogram: 8 mm cystic lesion in pancreatic body c/w IPMN.  Normal caliber pancreatic duct.\par \par 11/2/18 EGD: duodenal submucosal lesion, 0.8cm in 2nd part of duodenum.  Planned EGD for resection of the nodule on 7/8/19 with Dr. Apodaca.\par \par She wishes to transfer all care including specialists from [facility].\par \par [facility] ER visit 5/14/19 for RUQ abdominal pain, CT showed large fluid collection surrounding pancreas, labs c/w acute pancreatitis.  She did not stay to complete medical care.\par \par Returned on 5/24/19, near complete resolution of fluid.  Improved lipase/amylase that normalized as of 6/10/19.  Pain also almost resolved.   New partial splenic vein thrombosis.  She has seen vascular surgery, no need to change anticoagulation (xarelto) and okay to hold for 3 days prior to biopsy.  Still needs to establish care with new hematologist, had trouble getting appointment before the EGD.

## 2019-07-03 NOTE — PHYSICAL EXAM
[No Acute Distress] : no acute distress [Well Nourished] : well nourished [Well Developed] : well developed [Well-Appearing] : well-appearing [Normal Sclera/Conjunctiva] : normal sclera/conjunctiva [Normal Outer Ear/Nose] : the outer ears and nose were normal in appearance [Normal Oropharynx] : the oropharynx was normal [No JVD] : no jugular venous distention [Supple] : supple [No Lymphadenopathy] : no lymphadenopathy [Thyroid Normal, No Nodules] : the thyroid was normal and there were no nodules present [No Respiratory Distress] : no respiratory distress  [Clear to Auscultation] : lungs were clear to auscultation bilaterally [No Accessory Muscle Use] : no accessory muscle use [Normal Rate] : normal rate  [Regular Rhythm] : with a regular rhythm [Normal S1, S2] : normal S1 and S2 [No Murmur] : no murmur heard [No Edema] : there was no peripheral edema [No Extremity Clubbing/Cyanosis] : no extremity clubbing/cyanosis [Soft] : abdomen soft [Non Tender] : non-tender [Non-distended] : non-distended [No Masses] : no abdominal mass palpated [No HSM] : no HSM [Normal Bowel Sounds] : normal bowel sounds [No CVA Tenderness] : no CVA  tenderness [No Spinal Tenderness] : no spinal tenderness [No Rash] : no rash [Normal Gait] : normal gait [Coordination Grossly Intact] : coordination grossly intact [No Focal Deficits] : no focal deficits [Normal Affect] : the affect was normal [Alert and Oriented x3] : oriented to person, place, and time [Normal Insight/Judgement] : insight and judgment were intact

## 2019-07-03 NOTE — REVIEW OF SYSTEMS
[Easy Bruising] : easy bruising [Negative] : Psychiatric [Itching] : Itching [Skin Rash] : no skin rash

## 2019-07-10 ENCOUNTER — APPOINTMENT (OUTPATIENT)
Dept: HEMATOLOGY ONCOLOGY | Facility: CLINIC | Age: 49
End: 2019-07-10

## 2019-07-11 ENCOUNTER — APPOINTMENT (OUTPATIENT)
Dept: HEMATOLOGY ONCOLOGY | Facility: CLINIC | Age: 49
End: 2019-07-11
Payer: MEDICAID

## 2019-07-11 ENCOUNTER — RX RENEWAL (OUTPATIENT)
Age: 49
End: 2019-07-11

## 2019-07-11 VITALS
OXYGEN SATURATION: 99 % | BODY MASS INDEX: 32.89 KG/M2 | HEIGHT: 68 IN | TEMPERATURE: 98.6 F | DIASTOLIC BLOOD PRESSURE: 74 MMHG | SYSTOLIC BLOOD PRESSURE: 116 MMHG | WEIGHT: 217 LBS | HEART RATE: 67 BPM

## 2019-07-11 DIAGNOSIS — Z87.19 PERSONAL HISTORY OF OTHER DISEASES OF THE DIGESTIVE SYSTEM: ICD-10-CM

## 2019-07-11 DIAGNOSIS — Z80.3 FAMILY HISTORY OF MALIGNANT NEOPLASM OF BREAST: ICD-10-CM

## 2019-07-11 DIAGNOSIS — Z80.0 FAMILY HISTORY OF MALIGNANT NEOPLASM OF DIGESTIVE ORGANS: ICD-10-CM

## 2019-07-11 DIAGNOSIS — Z80.49 FAMILY HISTORY OF MALIGNANT NEOPLASM OF OTHER GENITAL ORGANS: ICD-10-CM

## 2019-07-11 DIAGNOSIS — Z80.1 FAMILY HISTORY OF MALIGNANT NEOPLASM OF TRACHEA, BRONCHUS AND LUNG: ICD-10-CM

## 2019-07-11 LAB
BASOPHILS # BLD AUTO: 0.02 K/UL
BASOPHILS NFR BLD AUTO: 0.3 %
EOSINOPHIL # BLD AUTO: 0.2 K/UL
EOSINOPHIL NFR BLD AUTO: 3.5 %
HCT VFR BLD CALC: 39.8 %
HGB BLD-MCNC: 12.4 G/DL
IMM GRANULOCYTES NFR BLD AUTO: 0.3 %
LYMPHOCYTES # BLD AUTO: 1.88 K/UL
LYMPHOCYTES NFR BLD AUTO: 32.7 %
MAN DIFF?: NORMAL
MCHC RBC-ENTMCNC: 29.7 PG
MCHC RBC-ENTMCNC: 31.2 GM/DL
MCV RBC AUTO: 95.2 FL
MONOCYTES # BLD AUTO: 0.61 K/UL
MONOCYTES NFR BLD AUTO: 10.6 %
NEUTROPHILS # BLD AUTO: 3.02 K/UL
NEUTROPHILS NFR BLD AUTO: 52.6 %
PLATELET # BLD AUTO: 349 K/UL
RBC # BLD: 4.18 M/UL
RBC # FLD: 12.6 %
WBC # FLD AUTO: 5.75 K/UL

## 2019-07-11 PROCEDURE — 96372 THER/PROPH/DIAG INJ SC/IM: CPT

## 2019-07-11 PROCEDURE — 99205 OFFICE O/P NEW HI 60 MIN: CPT | Mod: 25

## 2019-07-11 PROCEDURE — 36415 COLL VENOUS BLD VENIPUNCTURE: CPT

## 2019-07-11 RX ORDER — TIZANIDINE 2 MG/1
2 TABLET ORAL 3 TIMES DAILY
Qty: 30 | Refills: 0 | Status: DISCONTINUED | COMMUNITY
Start: 2019-06-28 | End: 2019-07-11

## 2019-07-11 RX ORDER — CYANOCOBALAMIN 1000 UG/ML
1000 INJECTION INTRAMUSCULAR; SUBCUTANEOUS
Qty: 0 | Refills: 0 | Status: COMPLETED | OUTPATIENT
Start: 2019-07-11

## 2019-07-11 RX ADMIN — CYANOCOBALAMIN 0 MCG/ML: 1000 INJECTION INTRAMUSCULAR; SUBCUTANEOUS at 00:00

## 2019-07-11 NOTE — CONSULT LETTER
[Dear  ___] : Dear  [unfilled], [Consult Letter:] : I had the pleasure of evaluating your patient, [unfilled]. [( Thank you for referring [unfilled] for consultation for _____ )] : Thank you for referring [unfilled] for consultation for [unfilled] [Please see my note below.] : Please see my note below. [Consult Closing:] : Thank you very much for allowing me to participate in the care of this patient.  If you have any questions, please do not hesitate to contact me. [Sincerely,] : Sincerely, [FreeTextEntry3] : Monica Vigil

## 2019-07-11 NOTE — REASON FOR VISIT
[Initial Consultation] : an initial consultation for [FreeTextEntry2] : hypercoagulable state, recurrent DVT, Pulmonary Embolism, splenic vein thrombosis

## 2019-07-11 NOTE — ASSESSMENT
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  \par Because of the patient's extensive history of thrombotic events and significant family history of thrombotic events, life long anticoagulation would be warranted regardless of the results of thrombophilia studies. The fact that the patient developed splenic vein thrombosis while taking Xarelto 20mg daily may suggest that there is suboptimal absorption, possibly due to the fact that the patient had prior bariatric surgery.  Better options for anticoagulation would be Warfarin with monitoring and dose adjustment to maintain an INR within therapeutic range or Lovenox injections. This was discussed in detail with the patient and her PCP, Dr. Abbie Melissa, today.The patient will discuss further with her PCP and decide which treatment would be best for her.  \par  Have ordered CBC, prothrombin gene mutation, B12 and folate. Have administered 1,000 micrograms of B12 intramuscularly without incident. The patient will call office to discuss results and management going forward.

## 2019-07-11 NOTE — ADDENDUM
[FreeTextEntry1] : I, Radhika Barreto, acted solely as a scribe for Dr. Monica Vigil on 07/11/2019.

## 2019-07-11 NOTE — END OF VISIT
[FreeTextEntry3] : All medical record entries made by the Scribe were at my, Dr. Monica Vigil, direction and personally dictated by me on 07/11/2019. I have reviewed the chart and agree that the record accurately reflects my personal performance of the history, physical exam, assessment and plan. I have also personally directed, reviewed, and agreed with the chart.

## 2019-07-11 NOTE — HISTORY OF PRESENT ILLNESS
[de-identified] : Patient is a 49 year old female with a history of arthritis, pancreatitis, hypertension, diet controlled diabetes, intraductal papillary mucinous neoplasm (IPMN of the pancreas), gastric sleeve bariatric surgery, pulmonary embolism, recurrent DVT, and most recently, splenic vein thrombosis, who is referred for evaluation of  hypercoagulable state. The patient's pulmonary embolism was in 2002. She had a left DVT in 2012, and a splenic artery thrombosis that was discovered in May of 2019 while the patient was taking Xarelto.. The patient states that she has been evaluated at prior hospitals for thrombophilic state and the results were negative ([facility], [facility], and [facility]). \par She had been on Warfarin for 18 months after the PE; she was then on Warfarin for the DVT and then switched to Xarelto until January of 2018. She then discontinued anticoagulation in the spring of 2018. Later in 2018, she had recurrent DVT and went back on Xarelto until present. \par In January of 2019, the patient had a negative lupus anticoagulant, negative cardiolipin antibody panel, and a negative beta-2-glycoprotein 1 panel. The functional protein C, protein S activity, and antithrombin 3 level were all normal, and the factor V Leiden mutation was negative. In the spring of 2019, while being evaluated for abdominal pain and pancreatitis, she was found to have partial splenic vein thrombosis near the area of pancreatic inflammation. There is also an extensive family history of thrombotic events.\par Patient also has been B12 deficient, likely as a result of the bariatric surgery, and recently started replacement by injection. She complains of fatigue, abdominal pain after eating, and chronic pain in the outer side of her left lower left extremity. Denies fever, chills, or sweats.

## 2019-07-11 NOTE — PHYSICAL EXAM
[Obese] : obese [Normal] : affect appropriate [de-identified] : distal lower extremity edema [de-identified] : obese, normoactive bowel sounds, soft, nontender, without hepatosplenomegaly or masses, striae [de-identified] : ankle edema, right greater than left

## 2019-07-11 NOTE — REVIEW OF SYSTEMS
[Patient Intake Form Reviewed] : Patient intake form was reviewed [Fatigue] : fatigue [Negative] : Allergic/Immunologic [FreeTextEntry9] : chronic LLE pain distally and laterally

## 2019-07-12 ENCOUNTER — LABORATORY RESULT (OUTPATIENT)
Age: 49
End: 2019-07-12

## 2019-07-12 LAB
FOLATE SERPL-MCNC: >20 NG/ML
VIT B12 SERPL-MCNC: 554 PG/ML

## 2019-07-17 LAB
DNA PLOIDY SPEC FC-IMP: NORMAL
PTR INTERP: NORMAL

## 2019-07-25 ENCOUNTER — RX RENEWAL (OUTPATIENT)
Age: 49
End: 2019-07-25

## 2019-07-26 ENCOUNTER — APPOINTMENT (OUTPATIENT)
Dept: INTERNAL MEDICINE | Facility: CLINIC | Age: 49
End: 2019-07-26
Payer: MEDICAID

## 2019-07-26 VITALS
SYSTOLIC BLOOD PRESSURE: 136 MMHG | HEART RATE: 69 BPM | DIASTOLIC BLOOD PRESSURE: 86 MMHG | HEIGHT: 68 IN | TEMPERATURE: 99.1 F | WEIGHT: 217 LBS | OXYGEN SATURATION: 97 % | BODY MASS INDEX: 32.89 KG/M2

## 2019-07-26 DIAGNOSIS — Z87.19 PERSONAL HISTORY OF OTHER DISEASES OF THE DIGESTIVE SYSTEM: ICD-10-CM

## 2019-07-26 DIAGNOSIS — K31.89 OTHER DISEASES OF STOMACH AND DUODENUM: ICD-10-CM

## 2019-07-26 PROCEDURE — 96372 THER/PROPH/DIAG INJ SC/IM: CPT

## 2019-07-26 PROCEDURE — 99214 OFFICE O/P EST MOD 30 MIN: CPT | Mod: 25

## 2019-07-26 RX ORDER — CYANOCOBALAMIN 1000 UG/ML
1000 INJECTION INTRAMUSCULAR; SUBCUTANEOUS
Qty: 0 | Refills: 0 | Status: COMPLETED | OUTPATIENT
Start: 2019-07-26

## 2019-07-26 RX ADMIN — CYANOCOBALAMIN 1000 MCG/ML: 1000 INJECTION, SOLUTION INTRAMUSCULAR at 00:00

## 2019-07-27 PROBLEM — K31.89 DUODENAL NODULE: Status: ACTIVE | Noted: 2019-06-28

## 2019-07-27 PROBLEM — Z87.19 HISTORY OF ACUTE PANCREATITIS: Status: RESOLVED | Noted: 2019-05-26 | Resolved: 2019-07-27

## 2019-07-29 ENCOUNTER — FORM ENCOUNTER (OUTPATIENT)
Age: 49
End: 2019-07-29

## 2019-07-30 ENCOUNTER — APPOINTMENT (OUTPATIENT)
Dept: ORTHOPEDIC SURGERY | Facility: CLINIC | Age: 49
End: 2019-07-30
Payer: MEDICAID

## 2019-07-30 ENCOUNTER — APPOINTMENT (OUTPATIENT)
Dept: ORTHOPEDIC SURGERY | Facility: CLINIC | Age: 49
End: 2019-07-30

## 2019-07-30 ENCOUNTER — OUTPATIENT (OUTPATIENT)
Dept: OUTPATIENT SERVICES | Facility: HOSPITAL | Age: 49
LOS: 1 days | End: 2019-07-30
Payer: COMMERCIAL

## 2019-07-30 VITALS — WEIGHT: 218 LBS | HEIGHT: 68 IN | BODY MASS INDEX: 33.04 KG/M2

## 2019-07-30 PROCEDURE — 73521 X-RAY EXAM HIPS BI 2 VIEWS: CPT

## 2019-07-30 PROCEDURE — 99203 OFFICE O/P NEW LOW 30 MIN: CPT

## 2019-07-30 PROCEDURE — 73521 X-RAY EXAM HIPS BI 2 VIEWS: CPT | Mod: 26

## 2019-08-02 NOTE — REVIEW OF SYSTEMS
[Joint Stiffness] : joint stiffness [Joint Pain] : joint pain [Negative] : Heme/Lymph [FreeTextEntry9] : right hip

## 2019-08-02 NOTE — ASSESSMENT
[FreeTextEntry1] : plan:\par -proceed with right SANDY planning\par -confer with hematologist as to best plan to proceed with anticoagulant management\par \par \par All medical record entries made by the PA/Elsy/Fellow are at my, Dr. Bolivar Ballard's direction and personally dictated by me on 07/30/2019]. I have reviewed the chart and agree that the record accurately reflects my personal performance of the history, physical exam, assessment, and plan. I have also personally directed reviewed, and agreed with the chart.\par

## 2019-08-02 NOTE — PHYSICAL EXAM
[de-identified] : right hip:\par painful ROM\par 15 internal\par 45 external\par painful flexion\par 5/5 motor\par silt distally\par \par Left hip:\par painless ROM\par FROM\par 5/5 motor [de-identified] : right hip:\par severe OA with developing protrusio\par \par Left hip:\par mild joint space narrowing and mild lateral osteophytes

## 2019-08-02 NOTE — REASON FOR VISIT
[Initial Visit] : an initial visit for [Osteoarthritis, Hip] : osteoarthritis, hip [Hip Pain] : hip pain [FreeTextEntry2] : right hip

## 2019-08-02 NOTE — HISTORY OF PRESENT ILLNESS
[de-identified] : 49 female with >5 years of developing right hip pain that is a daily functional disability. she takes aleve when it gets worse with some relief. her left hip also causes mild pain rarely. she has a history of DVT and is currently being treated with lovenox for a splenic vein thrombosis. she would like to proceed with total hip when able.

## 2019-08-06 ENCOUNTER — RX RENEWAL (OUTPATIENT)
Age: 49
End: 2019-08-06

## 2019-08-07 ENCOUNTER — MEDICATION RENEWAL (OUTPATIENT)
Age: 49
End: 2019-08-07

## 2019-08-21 ENCOUNTER — APPOINTMENT (OUTPATIENT)
Dept: HEMATOLOGY ONCOLOGY | Facility: CLINIC | Age: 49
End: 2019-08-21
Payer: MEDICAID

## 2019-08-21 VITALS
WEIGHT: 218 LBS | HEIGHT: 68 IN | SYSTOLIC BLOOD PRESSURE: 118 MMHG | TEMPERATURE: 98.1 F | BODY MASS INDEX: 33.04 KG/M2 | OXYGEN SATURATION: 95 % | HEART RATE: 90 BPM | DIASTOLIC BLOOD PRESSURE: 80 MMHG

## 2019-08-21 LAB
APTT BLD: 34.7 SEC
BASOPHILS # BLD AUTO: 0.02 K/UL
BASOPHILS NFR BLD AUTO: 0.4 %
EOSINOPHIL # BLD AUTO: 0.12 K/UL
EOSINOPHIL NFR BLD AUTO: 2.2 %
HCT VFR BLD CALC: 38 %
HGB BLD-MCNC: 11.9 G/DL
IMM GRANULOCYTES NFR BLD AUTO: 0.4 %
INR PPP: 0.98
LYMPHOCYTES # BLD AUTO: 2.05 K/UL
LYMPHOCYTES NFR BLD AUTO: 37.1 %
MAN DIFF?: NORMAL
MCHC RBC-ENTMCNC: 29.2 PG
MCHC RBC-ENTMCNC: 31.3 GM/DL
MCV RBC AUTO: 93.1 FL
MONOCYTES # BLD AUTO: 0.7 K/UL
MONOCYTES NFR BLD AUTO: 12.7 %
NEUTROPHILS # BLD AUTO: 2.62 K/UL
NEUTROPHILS NFR BLD AUTO: 47.2 %
PLATELET # BLD AUTO: 275 K/UL
PT BLD: 11.1 SEC
RBC # BLD: 4.08 M/UL
RBC # FLD: 13.4 %
WBC # FLD AUTO: 5.53 K/UL

## 2019-08-21 PROCEDURE — 36415 COLL VENOUS BLD VENIPUNCTURE: CPT

## 2019-08-21 PROCEDURE — 99214 OFFICE O/P EST MOD 30 MIN: CPT | Mod: 25

## 2019-08-21 RX ORDER — CYANOCOBALAMIN (VITAMIN B-12) 1000 MCG
100 TABLET, EXTENDED RELEASE ORAL DAILY
Refills: 0 | Status: DISCONTINUED | COMMUNITY
Start: 2019-06-28 | End: 2019-08-21

## 2019-08-21 RX ORDER — RIVAROXABAN 20 MG/1
20 TABLET, FILM COATED ORAL
Qty: 90 | Refills: 1 | Status: DISCONTINUED | COMMUNITY
Start: 2019-06-03 | End: 2019-08-21

## 2019-08-22 LAB
ALBUMIN SERPL ELPH-MCNC: 3.9 G/DL
ALP BLD-CCNC: 66 U/L
ALT SERPL-CCNC: 11 U/L
ANION GAP SERPL CALC-SCNC: 14 MMOL/L
AST SERPL-CCNC: 14 U/L
BILIRUB SERPL-MCNC: <0.2 MG/DL
BUN SERPL-MCNC: 10 MG/DL
CALCIUM SERPL-MCNC: 9 MG/DL
CHLORIDE SERPL-SCNC: 100 MMOL/L
CO2 SERPL-SCNC: 23 MMOL/L
CREAT SERPL-MCNC: 0.9 MG/DL
FERRITIN SERPL-MCNC: 94 NG/ML
FOLATE SERPL-MCNC: >20 NG/ML
GLUCOSE SERPL-MCNC: 110 MG/DL
IRON SATN MFR SERPL: 13 %
IRON SERPL-MCNC: 43 UG/DL
POTASSIUM SERPL-SCNC: 4.3 MMOL/L
PROT SERPL-MCNC: 6.8 G/DL
SODIUM SERPL-SCNC: 137 MMOL/L
TIBC SERPL-MCNC: 326 UG/DL
UIBC SERPL-MCNC: 283 UG/DL
VIT B12 SERPL-MCNC: 333 PG/ML

## 2019-08-27 ENCOUNTER — APPOINTMENT (OUTPATIENT)
Dept: INTERNAL MEDICINE | Facility: CLINIC | Age: 49
End: 2019-08-27
Payer: MEDICAID

## 2019-08-27 VITALS
DIASTOLIC BLOOD PRESSURE: 77 MMHG | TEMPERATURE: 98.5 F | HEIGHT: 68 IN | WEIGHT: 216 LBS | BODY MASS INDEX: 32.74 KG/M2 | SYSTOLIC BLOOD PRESSURE: 118 MMHG | HEART RATE: 91 BPM | OXYGEN SATURATION: 96 %

## 2019-08-27 DIAGNOSIS — Z11.59 ENCOUNTER FOR SCREENING FOR OTHER VIRAL DISEASES: ICD-10-CM

## 2019-08-27 DIAGNOSIS — N95.1 MENOPAUSAL AND FEMALE CLIMACTERIC STATES: ICD-10-CM

## 2019-08-27 PROCEDURE — 81025 URINE PREGNANCY TEST: CPT

## 2019-08-27 PROCEDURE — 99214 OFFICE O/P EST MOD 30 MIN: CPT | Mod: 25

## 2019-08-27 PROCEDURE — 96372 THER/PROPH/DIAG INJ SC/IM: CPT

## 2019-08-27 RX ORDER — CYANOCOBALAMIN 1000 UG/ML
1000 INJECTION INTRAMUSCULAR; SUBCUTANEOUS
Qty: 0 | Refills: 0 | Status: COMPLETED | OUTPATIENT
Start: 2019-08-27

## 2019-08-27 RX ADMIN — CYANOCOBALAMIN 1000 MCG/ML: 1000 INJECTION, SOLUTION INTRAMUSCULAR at 00:00

## 2019-08-29 LAB
APPEARANCE: CLEAR
BACTERIA UR CULT: NORMAL
BACTERIA: NEGATIVE
BILIRUBIN URINE: NEGATIVE
BLOOD URINE: NEGATIVE
COLOR: NORMAL
GLUCOSE QUALITATIVE U: NEGATIVE
HCG UR QL: NEGATIVE
HYALINE CASTS: 1 /LPF
KETONES URINE: NEGATIVE
LEUKOCYTE ESTERASE URINE: NEGATIVE
MICROSCOPIC-UA: NORMAL
NITRITE URINE: NEGATIVE
PH URINE: 6
PROTEIN URINE: NEGATIVE
QUALITY CONTROL: YES
RED BLOOD CELLS URINE: 1 /HPF
SPECIFIC GRAVITY URINE: 1.02
SQUAMOUS EPITHELIAL CELLS: 0 /HPF
UROBILINOGEN URINE: NORMAL
WHITE BLOOD CELLS URINE: 0 /HPF

## 2019-09-03 ENCOUNTER — FORM ENCOUNTER (OUTPATIENT)
Age: 49
End: 2019-09-03

## 2019-09-03 ENCOUNTER — RX RENEWAL (OUTPATIENT)
Age: 49
End: 2019-09-03

## 2019-09-04 ENCOUNTER — OUTPATIENT (OUTPATIENT)
Dept: OUTPATIENT SERVICES | Facility: HOSPITAL | Age: 49
LOS: 1 days | End: 2019-09-04
Payer: COMMERCIAL

## 2019-09-04 ENCOUNTER — EMERGENCY (EMERGENCY)
Facility: HOSPITAL | Age: 49
LOS: 1 days | Discharge: HOME | End: 2019-09-04
Attending: EMERGENCY MEDICINE | Admitting: EMERGENCY MEDICINE
Payer: COMMERCIAL

## 2019-09-04 VITALS
HEART RATE: 92 BPM | WEIGHT: 215.17 LBS | RESPIRATION RATE: 16 BRPM | OXYGEN SATURATION: 100 % | SYSTOLIC BLOOD PRESSURE: 145 MMHG | TEMPERATURE: 99 F | DIASTOLIC BLOOD PRESSURE: 102 MMHG

## 2019-09-04 VITALS
OXYGEN SATURATION: 97 % | TEMPERATURE: 98 F | SYSTOLIC BLOOD PRESSURE: 136 MMHG | DIASTOLIC BLOOD PRESSURE: 90 MMHG | RESPIRATION RATE: 14 BRPM | HEART RATE: 88 BPM

## 2019-09-04 DIAGNOSIS — K46.9 UNSPECIFIED ABDOMINAL HERNIA WITHOUT OBSTRUCTION OR GANGRENE: Chronic | ICD-10-CM

## 2019-09-04 DIAGNOSIS — Z90.49 ACQUIRED ABSENCE OF OTHER SPECIFIED PARTS OF DIGESTIVE TRACT: Chronic | ICD-10-CM

## 2019-09-04 DIAGNOSIS — Z90.3 ACQUIRED ABSENCE OF STOMACH [PART OF]: Chronic | ICD-10-CM

## 2019-09-04 LAB
ALBUMIN SERPL ELPH-MCNC: 4 G/DL — SIGNIFICANT CHANGE UP (ref 3.3–5)
ALP SERPL-CCNC: 61 U/L — SIGNIFICANT CHANGE UP (ref 40–120)
ALT FLD-CCNC: 10 U/L — SIGNIFICANT CHANGE UP (ref 10–45)
ANION GAP SERPL CALC-SCNC: 11 MMOL/L — SIGNIFICANT CHANGE UP (ref 5–17)
APPEARANCE UR: CLEAR — SIGNIFICANT CHANGE UP
APTT BLD: 30.7 SEC — SIGNIFICANT CHANGE UP (ref 27.5–36.3)
AST SERPL-CCNC: 17 U/L — SIGNIFICANT CHANGE UP (ref 10–40)
BASOPHILS # BLD AUTO: 0.01 K/UL — SIGNIFICANT CHANGE UP (ref 0–0.2)
BASOPHILS NFR BLD AUTO: 0.2 % — SIGNIFICANT CHANGE UP (ref 0–2)
BILIRUB SERPL-MCNC: 0.5 MG/DL — SIGNIFICANT CHANGE UP (ref 0.2–1.2)
BILIRUB UR-MCNC: NEGATIVE — SIGNIFICANT CHANGE UP
BUN SERPL-MCNC: 9 MG/DL — SIGNIFICANT CHANGE UP (ref 7–23)
CALCIUM SERPL-MCNC: 9.2 MG/DL — SIGNIFICANT CHANGE UP (ref 8.4–10.5)
CHLORIDE SERPL-SCNC: 104 MMOL/L — SIGNIFICANT CHANGE UP (ref 96–108)
CO2 SERPL-SCNC: 23 MMOL/L — SIGNIFICANT CHANGE UP (ref 22–31)
COLOR SPEC: YELLOW — SIGNIFICANT CHANGE UP
CREAT SERPL-MCNC: 0.91 MG/DL — SIGNIFICANT CHANGE UP (ref 0.5–1.3)
DIFF PNL FLD: NEGATIVE — SIGNIFICANT CHANGE UP
EOSINOPHIL # BLD AUTO: 0.07 K/UL — SIGNIFICANT CHANGE UP (ref 0–0.5)
EOSINOPHIL NFR BLD AUTO: 1.5 % — SIGNIFICANT CHANGE UP (ref 0–6)
GLUCOSE SERPL-MCNC: 138 MG/DL — HIGH (ref 70–99)
GLUCOSE UR QL: NEGATIVE — SIGNIFICANT CHANGE UP
HCT VFR BLD CALC: 39.5 % — SIGNIFICANT CHANGE UP (ref 34.5–45)
HGB BLD-MCNC: 12.6 G/DL — SIGNIFICANT CHANGE UP (ref 11.5–15.5)
IMM GRANULOCYTES NFR BLD AUTO: 0.6 % — SIGNIFICANT CHANGE UP (ref 0–1.5)
INR BLD: 0.98 — SIGNIFICANT CHANGE UP (ref 0.88–1.16)
KETONES UR-MCNC: NEGATIVE — SIGNIFICANT CHANGE UP
LEUKOCYTE ESTERASE UR-ACNC: NEGATIVE — SIGNIFICANT CHANGE UP
LIDOCAIN IGE QN: 23 U/L — SIGNIFICANT CHANGE UP (ref 7–60)
LYMPHOCYTES # BLD AUTO: 1.61 K/UL — SIGNIFICANT CHANGE UP (ref 1–3.3)
LYMPHOCYTES # BLD AUTO: 33.5 % — SIGNIFICANT CHANGE UP (ref 13–44)
MCHC RBC-ENTMCNC: 28.7 PG — SIGNIFICANT CHANGE UP (ref 27–34)
MCHC RBC-ENTMCNC: 31.9 GM/DL — LOW (ref 32–36)
MCV RBC AUTO: 90 FL — SIGNIFICANT CHANGE UP (ref 80–100)
MONOCYTES # BLD AUTO: 0.5 K/UL — SIGNIFICANT CHANGE UP (ref 0–0.9)
MONOCYTES NFR BLD AUTO: 10.4 % — SIGNIFICANT CHANGE UP (ref 2–14)
NEUTROPHILS # BLD AUTO: 2.59 K/UL — SIGNIFICANT CHANGE UP (ref 1.8–7.4)
NEUTROPHILS NFR BLD AUTO: 53.8 % — SIGNIFICANT CHANGE UP (ref 43–77)
NITRITE UR-MCNC: NEGATIVE — SIGNIFICANT CHANGE UP
NRBC # BLD: 0 /100 WBCS — SIGNIFICANT CHANGE UP (ref 0–0)
PH UR: 6 — SIGNIFICANT CHANGE UP (ref 5–8)
PLATELET # BLD AUTO: 271 K/UL — SIGNIFICANT CHANGE UP (ref 150–400)
POTASSIUM SERPL-MCNC: 3.1 MMOL/L — LOW (ref 3.5–5.3)
POTASSIUM SERPL-SCNC: 3.1 MMOL/L — LOW (ref 3.5–5.3)
PROT SERPL-MCNC: 7.6 G/DL — SIGNIFICANT CHANGE UP (ref 6–8.3)
PROT UR-MCNC: NEGATIVE MG/DL — SIGNIFICANT CHANGE UP
PROTHROM AB SERPL-ACNC: 11.1 SEC — SIGNIFICANT CHANGE UP (ref 10–12.9)
RBC # BLD: 4.39 M/UL — SIGNIFICANT CHANGE UP (ref 3.8–5.2)
RBC # FLD: 13.2 % — SIGNIFICANT CHANGE UP (ref 10.3–14.5)
SODIUM SERPL-SCNC: 138 MMOL/L — SIGNIFICANT CHANGE UP (ref 135–145)
SP GR SPEC: 1.02 — SIGNIFICANT CHANGE UP (ref 1–1.03)
UROBILINOGEN FLD QL: 0.2 E.U./DL — SIGNIFICANT CHANGE UP
WBC # BLD: 4.81 K/UL — SIGNIFICANT CHANGE UP (ref 3.8–10.5)
WBC # FLD AUTO: 4.81 K/UL — SIGNIFICANT CHANGE UP (ref 3.8–10.5)

## 2019-09-04 PROCEDURE — 93005 ELECTROCARDIOGRAM TRACING: CPT

## 2019-09-04 PROCEDURE — 96361 HYDRATE IV INFUSION ADD-ON: CPT

## 2019-09-04 PROCEDURE — 96374 THER/PROPH/DIAG INJ IV PUSH: CPT | Mod: XU

## 2019-09-04 PROCEDURE — 71046 X-RAY EXAM CHEST 2 VIEWS: CPT | Mod: 26

## 2019-09-04 PROCEDURE — 93010 ELECTROCARDIOGRAM REPORT: CPT

## 2019-09-04 PROCEDURE — 85025 COMPLETE CBC W/AUTO DIFF WBC: CPT

## 2019-09-04 PROCEDURE — 85730 THROMBOPLASTIN TIME PARTIAL: CPT

## 2019-09-04 PROCEDURE — 99285 EMERGENCY DEPT VISIT HI MDM: CPT

## 2019-09-04 PROCEDURE — 99284 EMERGENCY DEPT VISIT MOD MDM: CPT | Mod: 25

## 2019-09-04 PROCEDURE — 80053 COMPREHEN METABOLIC PANEL: CPT

## 2019-09-04 PROCEDURE — 87086 URINE CULTURE/COLONY COUNT: CPT

## 2019-09-04 PROCEDURE — 74177 CT ABD & PELVIS W/CONTRAST: CPT | Mod: 26

## 2019-09-04 PROCEDURE — 74177 CT ABD & PELVIS W/CONTRAST: CPT

## 2019-09-04 PROCEDURE — 81003 URINALYSIS AUTO W/O SCOPE: CPT

## 2019-09-04 PROCEDURE — 36415 COLL VENOUS BLD VENIPUNCTURE: CPT

## 2019-09-04 PROCEDURE — 96375 TX/PRO/DX INJ NEW DRUG ADDON: CPT

## 2019-09-04 PROCEDURE — 83690 ASSAY OF LIPASE: CPT

## 2019-09-04 PROCEDURE — 71046 X-RAY EXAM CHEST 2 VIEWS: CPT

## 2019-09-04 PROCEDURE — 85610 PROTHROMBIN TIME: CPT

## 2019-09-04 RX ORDER — SODIUM CHLORIDE 9 MG/ML
1000 INJECTION INTRAMUSCULAR; INTRAVENOUS; SUBCUTANEOUS ONCE
Refills: 0 | Status: COMPLETED | OUTPATIENT
Start: 2019-09-04 | End: 2019-09-04

## 2019-09-04 RX ORDER — ONDANSETRON 8 MG/1
1 TABLET, FILM COATED ORAL
Qty: 12 | Refills: 0
Start: 2019-09-04 | End: 2019-09-06

## 2019-09-04 RX ORDER — ONDANSETRON 8 MG/1
4 TABLET, FILM COATED ORAL ONCE
Refills: 0 | Status: COMPLETED | OUTPATIENT
Start: 2019-09-04 | End: 2019-09-04

## 2019-09-04 RX ORDER — POTASSIUM CHLORIDE 20 MEQ
40 PACKET (EA) ORAL ONCE
Refills: 0 | Status: COMPLETED | OUTPATIENT
Start: 2019-09-04 | End: 2019-09-04

## 2019-09-04 RX ORDER — MORPHINE SULFATE 50 MG/1
4 CAPSULE, EXTENDED RELEASE ORAL ONCE
Refills: 0 | Status: DISCONTINUED | OUTPATIENT
Start: 2019-09-04 | End: 2019-09-04

## 2019-09-04 RX ORDER — IOHEXOL 300 MG/ML
30 INJECTION, SOLUTION INTRAVENOUS ONCE
Refills: 0 | Status: COMPLETED | OUTPATIENT
Start: 2019-09-04 | End: 2019-09-04

## 2019-09-04 RX ADMIN — SODIUM CHLORIDE 1000 MILLILITER(S): 9 INJECTION INTRAMUSCULAR; INTRAVENOUS; SUBCUTANEOUS at 11:37

## 2019-09-04 RX ADMIN — SODIUM CHLORIDE 1000 MILLILITER(S): 9 INJECTION INTRAMUSCULAR; INTRAVENOUS; SUBCUTANEOUS at 11:32

## 2019-09-04 RX ADMIN — Medication 40 MILLIEQUIVALENT(S): at 11:32

## 2019-09-04 RX ADMIN — SODIUM CHLORIDE 1000 MILLILITER(S): 9 INJECTION INTRAMUSCULAR; INTRAVENOUS; SUBCUTANEOUS at 10:23

## 2019-09-04 RX ADMIN — MORPHINE SULFATE 4 MILLIGRAM(S): 50 CAPSULE, EXTENDED RELEASE ORAL at 11:47

## 2019-09-04 RX ADMIN — MORPHINE SULFATE 4 MILLIGRAM(S): 50 CAPSULE, EXTENDED RELEASE ORAL at 11:00

## 2019-09-04 RX ADMIN — IOHEXOL 30 MILLILITER(S): 300 INJECTION, SOLUTION INTRAVENOUS at 12:13

## 2019-09-04 RX ADMIN — ONDANSETRON 4 MILLIGRAM(S): 8 TABLET, FILM COATED ORAL at 10:59

## 2019-09-04 NOTE — ED PROVIDER NOTE - OBJECTIVE STATEMENT
48yo hx of gastric sleeve 2014 at [facility], colonic resection 2/2 diverticulitis (rectosigmoid anastomosis), abdominal hernia repair ?umbilical, cholecystectomy, pancreatitis here earlier this year, DVT/PE on lovenox, here w/ 2 weeks watery diarrhea, 4 days of L sided abdominal pain radiating to L flank, worse w/ eating, movement, N/V. no melena, no brbpr, no hematemesis, or bile. no fever,

## 2019-09-04 NOTE — ED PROVIDER NOTE - NSFOLLOWUPINSTRUCTIONS_ED_ALL_ED_FT
Abdominal Pain - possible colitis. Please follow up with your PCP/ a gastroenterologist about your symptoms.    Many things can cause abdominal pain. Many times, abdominal pain is not caused by a disease and will improve without treatment. Your health care provider will do a physical exam to determine if there is a dangerous cause of your pain; blood tests and imaging may help determine the cause of your pain. However, in many cases, no cause may be found and you may need further testing as an outpatient. Monitor your abdominal pain for any changes.     SEEK IMMEDIATE MEDICAL CARE IF YOU HAVE ANY OF THE FOLLOWING SYMPTOMS: worsening abdominal pain, uncontrollable vomiting, profuse diarrhea, inability to have bowel movements or pass gas, black or bloody stools, fever accompanying chest pain or back pain, or fainting. These symptoms may represent a serious problem that is an emergency. Do not wait to see if the symptoms will go away. Get medical help right away. Call 911 and do not drive yourself to the hospital.

## 2019-09-04 NOTE — ED ADULT TRIAGE NOTE - CHIEF COMPLAINT QUOTE
Pt c/o L abdominal pain radiating to L flank, intermittent nausea/vomiting/diarrhea since Sunday. Denies chest pain, sob.

## 2019-09-04 NOTE — ED PROVIDER NOTE - PHYSICAL EXAMINATION
VITAL SIGNS: I have reviewed nursing notes and confirm.  CONSTITUTIONAL: Well-developed; well-nourished; in no acute distress.   SKIN:  warm and dry, no acute rash.   HEAD:  normocephalic, atraumatic.  EYES: EOM intact; conjunctiva and sclera clear.  ENT: No nasal discharge; airway clear.   NECK: Supple; non tender.  CARD: S1, S2 normal; no murmurs, gallops, or rubs. Regular rate and rhythm.   RESP:  Clear to auscultation b/l, no wheezes, rales or rhonchi.  ABD: Normal bowel sounds; soft; non-distended; bilateral upper abdominal tenderness L > R, +guarding, - rebound  Back : no CVA tenderness  EXT: Normal ROM. No clubbing, cyanosis or edema. 2+ pulses to b/l ue/le, no calf swelling or tenderness  NEURO: Alert, oriented, grossly unremarkable  PSYCH: Cooperative, mood and affect appropriate.

## 2019-09-04 NOTE — ED PROVIDER NOTE - PMH
DVT (deep venous thrombosis)    HTN (hypertension)    Pulmonary embolism DVT (deep venous thrombosis)    HTN (hypertension)    Neuropathy    Pancreatitis    Pulmonary embolism

## 2019-09-04 NOTE — ED PROVIDER NOTE - PSH
Abdominal hernia    History of cholecystectomy    History of colon resection    History of sleeve gastrectomy

## 2019-09-04 NOTE — ED PROVIDER NOTE - PATIENT PORTAL LINK FT
You can access the FollowMyHealth Patient Portal offered by [facility] by registering at the following website: [email or web address]. By joining "[employer]’s FollowMyHealth portal, you will also be able to view your health information using other applications (apps) compatible with our system.

## 2019-09-04 NOTE — ED ADULT NURSE NOTE - OBJECTIVE STATEMENT
48 y/o F a&ox4 walked in from front triage c/o LLQ pain radiating to L flank. 10/10 sharp stabbing abd pain.  reports + nausea and multiple episodes of vomiting since Sunday. no hemiparesis. no CVA tenderness, no itching, burning or frequency upon urination. EKG complete on triage. no pain upon palpation of abdomen. LMP March. PMH of clot in spleen 3 mo ago, 2 DVTs, PE 2002, HTN, diverticulitis, hernia repair 2018, diverticulitis. denies chest pain, SOB, fevers, chills, change in bowel patterns.

## 2019-09-04 NOTE — ED PROVIDER NOTE - ATTENDING CONTRIBUTION TO CARE
50yo hx of gastric sleeve 2014 at [facility], colonic resection 2/2 diverticulitis (rectosigmoid anastomosis), abdominal hernia repair ?umbilical, cholecystectomy, pancreatitis here earlier this year, DVT/PE on lovenox, here w/ 2 weeks watery diarrhea, L sided abdominal pain radiating to L flank, worse w/ eating, movement, N/V. no melena, no brbpr, no hematemesis, or bile. VSS, bilateral upper quadrant tenderness L>R, no CVA tenderness, no peritoneal signs. labs wnl except for hypokalemia. will plan for advanced imaging to further assess given pts significant surgical hx, at risk for complications, could also have recurrent diverticulitis. anticipate dc if workup neg.

## 2019-09-04 NOTE — ED PROVIDER NOTE - CLINICAL SUMMARY MEDICAL DECISION MAKING FREE TEXT BOX
49 year old patient with complicated medical and surgical history, presenting with diarrhea vomitting and upper abdominal pain. no BRBPR or melana, no hematemesis, no fevers. Hypertensive, Upper abdominal tenderness without rigidity or peritoneal signs, Hypokalemia consistent with vomitting and diarrhea, normal lipase, normal UA, labs otherwise unrevealing. Considering partial SBO due to hx of multiple abdominal surgeries vs recurrence of pancreatitis vs diverticulitis. CT A/P with oral and w IV contrast. Morphine 4mg IV for pain and Omeprazole for nausea 49 year old patient with complicated medical and surgical history, presenting with diarrhea vomitting and upper abdominal pain. no BRBPR or melana, no hematemesis, no fevers. Hypertensive, Upper abdominal tenderness without rigidity or peritoneal signs, Hypokalemia consistent with vomitting and diarrhea, normal lipase, normal UA, labs otherwise unrevealing. Considering partial SBO due to hx of multiple abdominal surgeries vs recurrence of pancreatitis vs diverticulitis. CT A/P with oral and w IV contrast. Morphine 4mg IV for pain and ondansetron 4mg for nausea

## 2019-09-05 LAB
CULTURE RESULTS: NO GROWTH — SIGNIFICANT CHANGE UP
SPECIMEN SOURCE: SIGNIFICANT CHANGE UP

## 2019-09-05 NOTE — REVIEW OF SYSTEMS
[Diarrhea] : diarrhea [Joint Pain] : joint pain [Joint Stiffness] : joint stiffness [Negative] : Allergic/Immunologic [FreeTextEntry9] : bilateral hip and extremity pain

## 2019-09-05 NOTE — PHYSICAL EXAM
[Obese] : obese [Normal] : affect appropriate [de-identified] : distal lower extremity edema [de-identified] : ankle edema, right greater than left [de-identified] : obese, normoactive bowel sounds, soft,  mildly tender in the RUQ, without hepatosplenomegaly or masses, striae, eccyhmoses of abdominal wall [de-identified] : ecchymoses at injection sites of abdominal wall

## 2019-09-05 NOTE — ASSESSMENT
[FreeTextEntry1] : \par Patient is a 49 year old female with a history of arthritis, pancreatitis, hypertension, diet controlled diabetes, intraductal papillary mucinous neoplasm (IPMN of the pancreas), gastric sleeve bariatric surgery, pulmonary embolism, recurrent DVT, and  splenic vein thrombosis, who was initially referred for evaluation of hypercoagulable state and is now planning hip replacement surgery.. An extensive thrombophilic workup at three different hospitals and at the time of the consultation was negative. The splenic vein thrombosis was discovered in May of 2019 while the patient was taking Xarelto, perhaps as a result of incomplete absorption after bariatric surgery.  There is also an extensive family history of thrombotic events. Based on this history, the patient was advised to continue on lifelong anticoagulation. She remains on Lovenox at therapeutic doses.\par Patient also has been B12 deficient, likely as a result of the bariatric surgery, and recently started replacement by injection. \par Have ordered CBC, INR/PTT, CMP, B12, folate, Iron panel and ferritin. Pre-operative hematologic assessment and recommendations pending the above results. Medical clearance to be provided by patient's  PCP, Dr. Melissa.\par \par \par

## 2019-09-05 NOTE — REASON FOR VISIT
[Follow-Up Visit] : a follow-up visit for [FreeTextEntry2] : History of DVT, History of Pulmonary embolism, History of Splenic Vein Thrombosis, hypercoagulable state. Hematology pre-operative recommendations.

## 2019-09-06 PROBLEM — G62.9 POLYNEUROPATHY, UNSPECIFIED: Chronic | Status: ACTIVE | Noted: 2019-09-04

## 2019-09-06 PROBLEM — K85.90 ACUTE PANCREATITIS WITHOUT NECROSIS OR INFECTION, UNSPECIFIED: Chronic | Status: ACTIVE | Noted: 2019-09-04

## 2019-09-09 ENCOUNTER — APPOINTMENT (OUTPATIENT)
Dept: INTERNAL MEDICINE | Facility: CLINIC | Age: 49
End: 2019-09-09
Payer: MEDICAID

## 2019-09-09 PROCEDURE — 36415 COLL VENOUS BLD VENIPUNCTURE: CPT

## 2019-09-10 DIAGNOSIS — R19.7 DIARRHEA, UNSPECIFIED: ICD-10-CM

## 2019-09-10 DIAGNOSIS — R10.9 UNSPECIFIED ABDOMINAL PAIN: ICD-10-CM

## 2019-09-10 DIAGNOSIS — R10.11 RIGHT UPPER QUADRANT PAIN: ICD-10-CM

## 2019-09-10 DIAGNOSIS — R11.2 NAUSEA WITH VOMITING, UNSPECIFIED: ICD-10-CM

## 2019-09-10 DIAGNOSIS — Z88.8 ALLERGY STATUS TO OTHER DRUGS, MEDICAMENTS AND BIOLOGICAL SUBSTANCES STATUS: ICD-10-CM

## 2019-09-10 DIAGNOSIS — Z79.899 OTHER LONG TERM (CURRENT) DRUG THERAPY: ICD-10-CM

## 2019-09-10 DIAGNOSIS — R10.12 LEFT UPPER QUADRANT PAIN: ICD-10-CM

## 2019-09-10 DIAGNOSIS — I10 ESSENTIAL (PRIMARY) HYPERTENSION: ICD-10-CM

## 2019-09-10 PROBLEM — Z11.59 MEASLES SCREENING: Status: RESOLVED | Noted: 2019-06-10 | Resolved: 2019-09-10

## 2019-09-10 LAB
ANION GAP SERPL CALC-SCNC: 17 MMOL/L
BUN SERPL-MCNC: 13 MG/DL
CALCIUM SERPL-MCNC: 9.8 MG/DL
CHLORIDE SERPL-SCNC: 99 MMOL/L
CO2 SERPL-SCNC: 22 MMOL/L
CREAT SERPL-MCNC: 1.07 MG/DL
GLUCOSE SERPL-MCNC: 149 MG/DL
MAGNESIUM SERPL-MCNC: 1.4 MG/DL
POTASSIUM SERPL-SCNC: 4.4 MMOL/L
SODIUM SERPL-SCNC: 138 MMOL/L

## 2019-09-10 NOTE — PHYSICAL EXAM
[Normal Sclera/Conjunctiva] : normal sclera/conjunctiva [No Edema] : there was no peripheral edema [No Extremity Clubbing/Cyanosis] : no extremity clubbing/cyanosis [Coordination Grossly Intact] : coordination grossly intact [No Focal Deficits] : no focal deficits [Normal Gait] : normal gait [Alert and Oriented x3] : oriented to person, place, and time [Normal] : affect was normal and insight and judgment were intact [No CVA Tenderness] : no CVA  tenderness [No Spinal Tenderness] : no spinal tenderness [de-identified] : multiple ecchymoses, subcutaneous nodules at lovenox injection sites [de-identified] : antalgic gait

## 2019-09-10 NOTE — REVIEW OF SYSTEMS
[Itching] : Itching [Easy Bruising] : easy bruising [Joint Pain] : joint pain [Joint Stiffness] : joint stiffness [Negative] : Neurological [Skin Rash] : no skin rash

## 2019-09-10 NOTE — ASSESSMENT
[High Risk Surgery - Intraperitoneal, Intrathoracic or Supringuinal Vascular Procedures] : High Risk Surgery - Intraperitoneal, Intrathoracic or Supringuinal Vascular Procedures - No (0) [Ischemic Heart Disease] : Ischemic Heart Disease - No (0) [Congestive Heart Failure] : Congestive Heart Failure - No (0) [Prior Cerebrovascular Accident or TIA] : Prior Cerebrovascular Accident or TIA - No (0) [Creatinine >= 2mg/dL (1 Point)] : Creatinine >= 2mg/dL - No (0) [Insulin-dependent Diabetic (1 Point)] : Insulin-dependent Diabetic - No (0) [0] : 0 , RCRI Class: I, Risk of Post-Op Cardiac Complications: 0.4%, Procedure Risk: Low-Risk [Patient Optimized for Surgery] : Patient optimized for surgery [No Further Testing Recommended] : no further testing recommended [Modify anticoagulant treatment prior to procedure] : Modify anticoagulant treatment prior to procedure [FreeTextEntry4] : 50 yo woman with htn, hypercoagulable state (hx of DVT/PE) on lovenox, pancreatic IPMN, chronic GERD, hip OA presenting for pre-operative evaluation prior to planned R total hip replacement on 9/12/19.  She had an ER visit on 9/4 and was treated for diarrhea, hypokalemia at that time but resolved on repeat.  She is at elevated cardiovascular risk for an intermediate risk procedure, but is medically optimized.  No further evaluation necessary prior to procedure.  She will continue anti-hypertensives peewee-operatively.  She will continue lovenox, the last dose will be the night before procedure and she will hold lovenox the morning of the procedure. [Modify medications prior to procedure] : Modify medications prior to procedure [FreeTextEntry7] : continue anti-hypertensives.  Hold all as needed medications. [FreeTextEntry5] : hold lovenox the morning of procedure.

## 2019-09-10 NOTE — RESULTS/DATA
[] : results reviewed [ECG Reviewed] : reviewed [NSR] : normal sinus rhythm [No Acute Ischemia] : No acute ischemia [LVH] : left ventricular hypertrophy [de-identified] : unremarkable [de-identified] : unremarkable [de-identified] : clear lungs [de-identified] : unremarkable.  9/4 ER labs: hypokalemia, resolved on repeat 9/9. [de-identified] : NSR, LVH

## 2019-09-10 NOTE — HISTORY OF PRESENT ILLNESS
[Chronic Anticoagulation] : chronic anticoagulation [(Patient denies any chest pain, claudication, dyspnea on exertion, orthopnea, palpitations or syncope)] : Patient denies any chest pain, claudication, dyspnea on exertion, orthopnea, palpitations or syncope [No Pertinent Pulmonary History] : no history of asthma, COPD, sleep apnea, or smoking [No Adverse Anesthesia Reaction] : no adverse anesthesia reaction in self or family member [Moderate (4-6 METs)] : Moderate (4-6 METs) [Aortic Stenosis] : no aortic stenosis [Atrial Fibrillation] : no atrial fibrillation [Coronary Artery Disease] : no coronary artery disease [Implantable Device/Pacemaker] : no implantable device/pacemaker [Recent Myocardial Infarction] : no recent myocardial infarction [Chronic Kidney Disease] : no chronic kidney disease [Diabetes] : no diabetes [FreeTextEntry1] : MEREDITH DLEGADO [FreeTextEntry2] : 9/12/19 [FreeTextEntry3] : Dr. Bolivar Ballard [FreeTextEntry4] : 50 yo woman with htn, hypercoagulable state (hx of DVT/PE) on lovenox, pancreatic IPMN, chronic GERD, hip OA presenting for pre-operative evaluation prior to planned R total hip replacement on 9/12/19.  Her exercise tolerance is 4 METS, limited by pain in hips, back, and legs.  No prior history of anesthesia reaction.  She was at [facility] ER on 9/4/19 for acute gastroenteritis and had hypokalemia to 3.1.  She was given oral potassium supplementation in the ER with resolution of diarrhea.  Repeat K normalized. [FreeTextEntry5] : Hypertension.  On twice daily lovenox for chronic anticoagulation. [FreeTextEntry8] : exercise tolerance limited by musculoskeletal issues, has to stop for pain at 1 block.

## 2019-09-11 RX ORDER — METOPROLOL TARTRATE 50 MG
0 TABLET ORAL
Qty: 0 | Refills: 0 | DISCHARGE

## 2019-09-11 RX ORDER — ENOXAPARIN SODIUM 100 MG/ML
0 INJECTION SUBCUTANEOUS
Qty: 0 | Refills: 0 | DISCHARGE

## 2019-09-12 ENCOUNTER — INPATIENT (INPATIENT)
Facility: HOSPITAL | Age: 49
LOS: 2 days | Discharge: HOME | DRG: 470 | End: 2019-09-15
Attending: ORTHOPAEDIC SURGERY | Admitting: ORTHOPAEDIC SURGERY
Payer: COMMERCIAL

## 2019-09-12 ENCOUNTER — APPOINTMENT (OUTPATIENT)
Dept: ORTHOPEDIC SURGERY | Facility: HOSPITAL | Age: 49
End: 2019-09-12
Payer: MEDICAID

## 2019-09-12 VITALS
WEIGHT: 207.9 LBS | OXYGEN SATURATION: 98 % | HEIGHT: 68 IN | DIASTOLIC BLOOD PRESSURE: 70 MMHG | TEMPERATURE: 98 F | SYSTOLIC BLOOD PRESSURE: 100 MMHG | RESPIRATION RATE: 18 BRPM | HEART RATE: 71 BPM

## 2019-09-12 DIAGNOSIS — Z90.49 ACQUIRED ABSENCE OF OTHER SPECIFIED PARTS OF DIGESTIVE TRACT: Chronic | ICD-10-CM

## 2019-09-12 DIAGNOSIS — M19.90 UNSPECIFIED OSTEOARTHRITIS, UNSPECIFIED SITE: ICD-10-CM

## 2019-09-12 DIAGNOSIS — I26.99 OTHER PULMONARY EMBOLISM WITHOUT ACUTE COR PULMONALE: ICD-10-CM

## 2019-09-12 DIAGNOSIS — Z90.3 ACQUIRED ABSENCE OF STOMACH [PART OF]: Chronic | ICD-10-CM

## 2019-09-12 DIAGNOSIS — I82.409 ACUTE EMBOLISM AND THROMBOSIS OF UNSPECIFIED DEEP VEINS OF UNSPECIFIED LOWER EXTREMITY: ICD-10-CM

## 2019-09-12 DIAGNOSIS — K46.9 UNSPECIFIED ABDOMINAL HERNIA WITHOUT OBSTRUCTION OR GANGRENE: Chronic | ICD-10-CM

## 2019-09-12 DIAGNOSIS — I10 ESSENTIAL (PRIMARY) HYPERTENSION: ICD-10-CM

## 2019-09-12 LAB
GLUCOSE BLDC GLUCOMTR-MCNC: 129 MG/DL — HIGH (ref 70–99)
MRSA PCR RESULT.: NEGATIVE — SIGNIFICANT CHANGE UP
S AUREUS DNA NOSE QL NAA+PROBE: POSITIVE

## 2019-09-12 PROCEDURE — 28130 REMOVAL OF ANKLE BONE: CPT | Mod: AS,RT

## 2019-09-12 PROCEDURE — 72170 X-RAY EXAM OF PELVIS: CPT | Mod: 26

## 2019-09-12 PROCEDURE — 20610 DRAIN/INJ JOINT/BURSA W/O US: CPT | Mod: 59,LT

## 2019-09-12 PROCEDURE — 28130 REMOVAL OF ANKLE BONE: CPT | Mod: RT

## 2019-09-12 RX ORDER — HYDROCHLOROTHIAZIDE 25 MG
12.5 TABLET ORAL DAILY
Refills: 0 | Status: DISCONTINUED | OUTPATIENT
Start: 2019-09-12 | End: 2019-09-15

## 2019-09-12 RX ORDER — ATORVASTATIN CALCIUM 80 MG/1
0 TABLET, FILM COATED ORAL
Qty: 0 | Refills: 0 | DISCHARGE

## 2019-09-12 RX ORDER — PROCHLORPERAZINE MALEATE 5 MG
5 TABLET ORAL ONCE
Refills: 0 | Status: DISCONTINUED | OUTPATIENT
Start: 2019-09-12 | End: 2019-09-15

## 2019-09-12 RX ORDER — POVIDONE-IODINE 5 %
1 AEROSOL (ML) TOPICAL ONCE
Refills: 0 | Status: DISCONTINUED | OUTPATIENT
Start: 2019-09-12 | End: 2019-09-15

## 2019-09-12 RX ORDER — SENNA PLUS 8.6 MG/1
2 TABLET ORAL AT BEDTIME
Refills: 0 | Status: DISCONTINUED | OUTPATIENT
Start: 2019-09-12 | End: 2019-09-15

## 2019-09-12 RX ORDER — CEFAZOLIN SODIUM 1 G
2000 VIAL (EA) INJECTION EVERY 8 HOURS
Refills: 0 | Status: DISCONTINUED | OUTPATIENT
Start: 2019-09-12 | End: 2019-09-12

## 2019-09-12 RX ORDER — OXYCODONE HYDROCHLORIDE 5 MG/1
5 TABLET ORAL EVERY 4 HOURS
Refills: 0 | Status: DISCONTINUED | OUTPATIENT
Start: 2019-09-12 | End: 2019-09-14

## 2019-09-12 RX ORDER — MORPHINE SULFATE 50 MG/1
2 CAPSULE, EXTENDED RELEASE ORAL
Refills: 0 | Status: DISCONTINUED | OUTPATIENT
Start: 2019-09-12 | End: 2019-09-12

## 2019-09-12 RX ORDER — OXYCODONE HYDROCHLORIDE 5 MG/1
10 TABLET ORAL EVERY 4 HOURS
Refills: 0 | Status: DISCONTINUED | OUTPATIENT
Start: 2019-09-12 | End: 2019-09-14

## 2019-09-12 RX ORDER — CEFAZOLIN SODIUM 1 G
2000 VIAL (EA) INJECTION EVERY 8 HOURS
Refills: 0 | Status: COMPLETED | OUTPATIENT
Start: 2019-09-12 | End: 2019-09-13

## 2019-09-12 RX ORDER — ENOXAPARIN SODIUM 100 MG/ML
100 INJECTION SUBCUTANEOUS
Refills: 0 | Status: DISCONTINUED | OUTPATIENT
Start: 2019-09-13 | End: 2019-09-15

## 2019-09-12 RX ORDER — ACETAMINOPHEN 500 MG
975 TABLET ORAL EVERY 8 HOURS
Refills: 0 | Status: DISCONTINUED | OUTPATIENT
Start: 2019-09-12 | End: 2019-09-15

## 2019-09-12 RX ORDER — MORPHINE SULFATE 50 MG/1
2 CAPSULE, EXTENDED RELEASE ORAL EVERY 4 HOURS
Refills: 0 | Status: DISCONTINUED | OUTPATIENT
Start: 2019-09-12 | End: 2019-09-12

## 2019-09-12 RX ORDER — SODIUM CHLORIDE 9 MG/ML
1000 INJECTION, SOLUTION INTRAVENOUS
Refills: 0 | Status: DISCONTINUED | OUTPATIENT
Start: 2019-09-12 | End: 2019-09-13

## 2019-09-12 RX ORDER — CELECOXIB 200 MG/1
200 CAPSULE ORAL
Refills: 0 | Status: DISCONTINUED | OUTPATIENT
Start: 2019-09-14 | End: 2019-09-15

## 2019-09-12 RX ORDER — CHLORHEXIDINE GLUCONATE 213 G/1000ML
1 SOLUTION TOPICAL ONCE
Refills: 0 | Status: DISCONTINUED | OUTPATIENT
Start: 2019-09-12 | End: 2019-09-15

## 2019-09-12 RX ORDER — LOSARTAN POTASSIUM 100 MG/1
50 TABLET, FILM COATED ORAL DAILY
Refills: 0 | Status: DISCONTINUED | OUTPATIENT
Start: 2019-09-12 | End: 2019-09-15

## 2019-09-12 RX ORDER — MAGNESIUM HYDROXIDE 400 MG/1
30 TABLET, CHEWABLE ORAL DAILY
Refills: 0 | Status: DISCONTINUED | OUTPATIENT
Start: 2019-09-12 | End: 2019-09-15

## 2019-09-12 RX ORDER — PANTOPRAZOLE SODIUM 20 MG/1
40 TABLET, DELAYED RELEASE ORAL
Refills: 0 | Status: DISCONTINUED | OUTPATIENT
Start: 2019-09-12 | End: 2019-09-15

## 2019-09-12 RX ORDER — HYDROMORPHONE HYDROCHLORIDE 2 MG/ML
0.5 INJECTION INTRAMUSCULAR; INTRAVENOUS; SUBCUTANEOUS EVERY 4 HOURS
Refills: 0 | Status: DISCONTINUED | OUTPATIENT
Start: 2019-09-12 | End: 2019-09-15

## 2019-09-12 RX ORDER — METOPROLOL TARTRATE 50 MG
50 TABLET ORAL
Refills: 0 | Status: DISCONTINUED | OUTPATIENT
Start: 2019-09-12 | End: 2019-09-15

## 2019-09-12 RX ORDER — HYDROMORPHONE HYDROCHLORIDE 2 MG/ML
0.5 INJECTION INTRAMUSCULAR; INTRAVENOUS; SUBCUTANEOUS
Refills: 0 | Status: DISCONTINUED | OUTPATIENT
Start: 2019-09-12 | End: 2019-09-14

## 2019-09-12 RX ORDER — AMLODIPINE BESYLATE 2.5 MG/1
2.5 TABLET ORAL DAILY
Refills: 0 | Status: DISCONTINUED | OUTPATIENT
Start: 2019-09-12 | End: 2019-09-15

## 2019-09-12 RX ORDER — KETOROLAC TROMETHAMINE 30 MG/ML
30 SYRINGE (ML) INJECTION ONCE
Refills: 0 | Status: DISCONTINUED | OUTPATIENT
Start: 2019-09-12 | End: 2019-09-15

## 2019-09-12 RX ADMIN — Medication 975 MILLIGRAM(S): at 21:02

## 2019-09-12 RX ADMIN — Medication 2000 MILLIGRAM(S): at 21:02

## 2019-09-12 RX ADMIN — MORPHINE SULFATE 2 MILLIGRAM(S): 50 CAPSULE, EXTENDED RELEASE ORAL at 15:50

## 2019-09-12 RX ADMIN — HYDROMORPHONE HYDROCHLORIDE 0.5 MILLIGRAM(S): 2 INJECTION INTRAMUSCULAR; INTRAVENOUS; SUBCUTANEOUS at 23:15

## 2019-09-12 RX ADMIN — OXYCODONE HYDROCHLORIDE 10 MILLIGRAM(S): 5 TABLET ORAL at 15:50

## 2019-09-12 RX ADMIN — HYDROMORPHONE HYDROCHLORIDE 0.5 MILLIGRAM(S): 2 INJECTION INTRAMUSCULAR; INTRAVENOUS; SUBCUTANEOUS at 17:45

## 2019-09-12 RX ADMIN — OXYCODONE HYDROCHLORIDE 5 MILLIGRAM(S): 5 TABLET ORAL at 21:02

## 2019-09-12 RX ADMIN — OXYCODONE HYDROCHLORIDE 5 MILLIGRAM(S): 5 TABLET ORAL at 22:00

## 2019-09-12 RX ADMIN — HYDROMORPHONE HYDROCHLORIDE 0.5 MILLIGRAM(S): 2 INJECTION INTRAMUSCULAR; INTRAVENOUS; SUBCUTANEOUS at 22:51

## 2019-09-12 RX ADMIN — Medication 975 MILLIGRAM(S): at 22:00

## 2019-09-12 RX ADMIN — HYDROMORPHONE HYDROCHLORIDE 0.5 MILLIGRAM(S): 2 INJECTION INTRAMUSCULAR; INTRAVENOUS; SUBCUTANEOUS at 16:34

## 2019-09-12 RX ADMIN — MORPHINE SULFATE 2 MILLIGRAM(S): 50 CAPSULE, EXTENDED RELEASE ORAL at 16:09

## 2019-09-12 NOTE — H&P ADULT - NSICDXPASTSURGICALHX_GEN_ALL_CORE_FT
PAST SURGICAL HISTORY:  Abdominal hernia     History of cholecystectomy     History of colon resection diverticulitis    History of sleeve gastrectomy

## 2019-09-12 NOTE — H&P ADULT - NSICDXPASTMEDICALHX_GEN_ALL_CORE_FT
PAST MEDICAL HISTORY:  DVT (deep venous thrombosis) left leg    HTN (hypertension)     Neuropathy     Pancreatitis     Pulmonary embolism

## 2019-09-12 NOTE — H&P ADULT - NSHPLABSRESULTS_GEN_ALL_CORE
preop cbc/bmp/coags/ua wnl per medical clearance (elevated coags pt. on lovenox)  EKG- NSR, possibly LAE, LVH  CXR wnl per medical clearance

## 2019-09-12 NOTE — H&P ADULT - PROBLEM SELECTOR PLAN 1
Admit to Orthopaedic Service.  Presents today for elective   Pt medically stable and cleared for procedure today by Dr. Vigil ([facility]) and Dr. Melissa.

## 2019-09-12 NOTE — H&P ADULT - HISTORY OF PRESENT ILLNESS
48yo f c/o right hip pain for over 10years. Pt. reports she started to have increased hip pain after each pregnancy (has 8 kids). Pt. c/o pain in right hip with radiation down leg. Pt. has been using a can x 1y with hx of falls. Pt. has been doing physical therapy for her low back pain, no injections. Pt. has increased pain with walking and laying down. Pt. will take Alleve for pain relief but doesn't really work. Presents today for elective RIGHT THR, LEFT HIP STEROID INJECTION.

## 2019-09-12 NOTE — PHYSICAL THERAPY INITIAL EVALUATION ADULT - PLANNED THERAPY INTERVENTIONS, PT EVAL
transfer training/balance training/bed mobility training/gait training/postural re-education/strengthening/neuromuscular re-education

## 2019-09-12 NOTE — H&P ADULT - NSHPPHYSICALEXAM_GEN_ALL_CORE
GENERAL: NAD, A&O x 3  PE: RIGHT LE skin intact, no erythema/ecchymosis/sts.  SLT INTACT, DP/PT 2+, EHL/TA/GS 5/5. Decreased ROM secondary to pain. Rest of PE per medical clearance.

## 2019-09-12 NOTE — PHYSICAL THERAPY INITIAL EVALUATION ADULT - CRITERIA FOR SKILLED THERAPEUTIC INTERVENTIONS
therapy frequency/anticipated equipment needs at discharge/predicted duration of therapy intervention/functional limitations in following categories/risk reduction/prevention/anticipated discharge recommendation/impairments found

## 2019-09-12 NOTE — BRIEF OPERATIVE NOTE - NSICDXBRIEFPROCEDURE_GEN_ALL_CORE_FT
PROCEDURES:  Injection, hip 12-Sep-2019 15:00:42 LEFT Andre Whyte  Total hip arthroplasty 12-Sep-2019 15:00:00 RIGHT Andre Whyte

## 2019-09-12 NOTE — PHYSICAL THERAPY INITIAL EVALUATION ADULT - GENERAL OBSERVATIONS, REHAB EVAL
Patient received semi-leyva in bed  in NAD on RA, +A-line, +Telemetry, +SCDs, +Ice pack, +IV, +O2. Cleared by FELICITY Cool . Agreeable to PT.

## 2019-09-13 ENCOUNTER — TRANSCRIPTION ENCOUNTER (OUTPATIENT)
Age: 49
End: 2019-09-13

## 2019-09-13 PROCEDURE — 99223 1ST HOSP IP/OBS HIGH 75: CPT

## 2019-09-13 RX ORDER — GABAPENTIN 400 MG/1
0 CAPSULE ORAL
Qty: 0 | Refills: 0 | DISCHARGE

## 2019-09-13 RX ORDER — POLYETHYLENE GLYCOL 3350 17 G/17G
17 POWDER, FOR SOLUTION ORAL DAILY
Refills: 0 | Status: DISCONTINUED | OUTPATIENT
Start: 2019-09-13 | End: 2019-09-15

## 2019-09-13 RX ORDER — GABAPENTIN 400 MG/1
300 CAPSULE ORAL DAILY
Refills: 0 | Status: DISCONTINUED | OUTPATIENT
Start: 2019-09-13 | End: 2019-09-15

## 2019-09-13 RX ORDER — CELECOXIB 200 MG/1
1 CAPSULE ORAL
Qty: 60 | Refills: 0
Start: 2019-09-13 | End: 2019-10-12

## 2019-09-13 RX ORDER — SENNA PLUS 8.6 MG/1
2 TABLET ORAL
Qty: 0 | Refills: 0 | DISCHARGE
Start: 2019-09-13

## 2019-09-13 RX ORDER — ENOXAPARIN SODIUM 100 MG/ML
0 INJECTION SUBCUTANEOUS
Qty: 0 | Refills: 0 | DISCHARGE

## 2019-09-13 RX ORDER — ONDANSETRON 8 MG/1
4 TABLET, FILM COATED ORAL EVERY 6 HOURS
Refills: 0 | Status: DISCONTINUED | OUTPATIENT
Start: 2019-09-13 | End: 2019-09-15

## 2019-09-13 RX ORDER — POLYETHYLENE GLYCOL 3350 17 G/17G
17 POWDER, FOR SOLUTION ORAL
Qty: 0 | Refills: 0 | DISCHARGE
Start: 2019-09-13

## 2019-09-13 RX ADMIN — OXYCODONE HYDROCHLORIDE 10 MILLIGRAM(S): 5 TABLET ORAL at 17:19

## 2019-09-13 RX ADMIN — Medication 50 MILLIGRAM(S): at 05:59

## 2019-09-13 RX ADMIN — ENOXAPARIN SODIUM 100 MILLIGRAM(S): 100 INJECTION SUBCUTANEOUS at 07:09

## 2019-09-13 RX ADMIN — HYDROMORPHONE HYDROCHLORIDE 0.5 MILLIGRAM(S): 2 INJECTION INTRAMUSCULAR; INTRAVENOUS; SUBCUTANEOUS at 18:52

## 2019-09-13 RX ADMIN — OXYCODONE HYDROCHLORIDE 10 MILLIGRAM(S): 5 TABLET ORAL at 16:19

## 2019-09-13 RX ADMIN — Medication 2000 MILLIGRAM(S): at 05:58

## 2019-09-13 RX ADMIN — Medication 50 MILLIGRAM(S): at 18:52

## 2019-09-13 RX ADMIN — Medication 975 MILLIGRAM(S): at 05:59

## 2019-09-13 RX ADMIN — HYDROMORPHONE HYDROCHLORIDE 0.5 MILLIGRAM(S): 2 INJECTION INTRAMUSCULAR; INTRAVENOUS; SUBCUTANEOUS at 19:52

## 2019-09-13 RX ADMIN — LOSARTAN POTASSIUM 50 MILLIGRAM(S): 100 TABLET, FILM COATED ORAL at 05:59

## 2019-09-13 RX ADMIN — HYDROMORPHONE HYDROCHLORIDE 0.5 MILLIGRAM(S): 2 INJECTION INTRAMUSCULAR; INTRAVENOUS; SUBCUTANEOUS at 09:34

## 2019-09-13 RX ADMIN — Medication 975 MILLIGRAM(S): at 06:58

## 2019-09-13 RX ADMIN — OXYCODONE HYDROCHLORIDE 10 MILLIGRAM(S): 5 TABLET ORAL at 07:25

## 2019-09-13 RX ADMIN — Medication 975 MILLIGRAM(S): at 21:20

## 2019-09-13 RX ADMIN — ONDANSETRON 4 MILLIGRAM(S): 8 TABLET, FILM COATED ORAL at 09:35

## 2019-09-13 RX ADMIN — OXYCODONE HYDROCHLORIDE 10 MILLIGRAM(S): 5 TABLET ORAL at 13:20

## 2019-09-13 RX ADMIN — Medication 975 MILLIGRAM(S): at 22:20

## 2019-09-13 RX ADMIN — HYDROMORPHONE HYDROCHLORIDE 0.5 MILLIGRAM(S): 2 INJECTION INTRAMUSCULAR; INTRAVENOUS; SUBCUTANEOUS at 10:34

## 2019-09-13 RX ADMIN — OXYCODONE HYDROCHLORIDE 10 MILLIGRAM(S): 5 TABLET ORAL at 08:30

## 2019-09-13 RX ADMIN — OXYCODONE HYDROCHLORIDE 10 MILLIGRAM(S): 5 TABLET ORAL at 21:20

## 2019-09-13 RX ADMIN — HYDROMORPHONE HYDROCHLORIDE 0.5 MILLIGRAM(S): 2 INJECTION INTRAMUSCULAR; INTRAVENOUS; SUBCUTANEOUS at 23:45

## 2019-09-13 RX ADMIN — GABAPENTIN 300 MILLIGRAM(S): 400 CAPSULE ORAL at 14:20

## 2019-09-13 RX ADMIN — AMLODIPINE BESYLATE 2.5 MILLIGRAM(S): 2.5 TABLET ORAL at 05:59

## 2019-09-13 RX ADMIN — ENOXAPARIN SODIUM 100 MILLIGRAM(S): 100 INJECTION SUBCUTANEOUS at 18:52

## 2019-09-13 RX ADMIN — Medication 12.5 MILLIGRAM(S): at 05:59

## 2019-09-13 RX ADMIN — OXYCODONE HYDROCHLORIDE 10 MILLIGRAM(S): 5 TABLET ORAL at 12:20

## 2019-09-13 RX ADMIN — PANTOPRAZOLE SODIUM 40 MILLIGRAM(S): 20 TABLET, DELAYED RELEASE ORAL at 06:00

## 2019-09-13 RX ADMIN — OXYCODONE HYDROCHLORIDE 10 MILLIGRAM(S): 5 TABLET ORAL at 20:45

## 2019-09-13 NOTE — DISCHARGE NOTE PROVIDER - CARE PROVIDER_API CALL
Bolivar Ballard)  Orthopaedic Surgery  [address]  [place], [place] [address]  Phone: [phone]  Fax: [phone]  Follow Up Time: Bolivar Ballard)  Orthopaedic Surgery  [address]  [place], [place] [address]  Phone: [phone]  Fax: [phone]  Follow Up Time: 2 weeks

## 2019-09-13 NOTE — DISCHARGE NOTE PROVIDER - NSDCFUSCHEDAPPT_GEN_ALL_CORE_FT
INDIRA VELÁSQUEZ ; 09/17/2019 ; [facility] [address] [facility] [place] [facility]  INDIRA VELÁSQUEZ ; 09/24/2019 ; NPP Intmed [address] INDIRA VELÁSQUEZ ; 09/17/2019 ; [facility] [address] [facility] [place]  INDIRA VELÁSQUEZ ; 09/24/2019 ; NPP Intmed [address] INDIRA VELÁSQUEZ ; 09/17/2019 ; [facility] [address] [facility]  INDIRA VELÁSQUEZ ; 09/24/2019 ; NPP Intmed [address] INDIRA VELÁSQUEZ ; 09/17/2019 ; [facility] [address] [facility]  INDIRA VELÁSQUEZ ; 09/24/2019 ; NPP Intmed [address]

## 2019-09-13 NOTE — PROGRESS NOTE ADULT - SUBJECTIVE AND OBJECTIVE BOX
ORTHO NOTE    [x ] Pt seen/examined.  [ ] Pt without any complaints/in NAD.    [x ] Pt complains of: nausea this morning      ROS: [ ] Fever  [ ] Chills  [ ] CP [ ] SOB [ ] Dysnea  [ ] Palpitations [ ] Cough [ ] N/V/C/D [ ] Paresthia [ ] Other     [x ] ROS  otherwise negative    .    PHYSICAL EXAM:    Vital Signs Last 24 Hrs  T(C): 36.4 (13 Sep 2019 08:28), Max: 36.9 (13 Sep 2019 00:01)  T(F): 97.6 (13 Sep 2019 08:28), Max: 98.4 (13 Sep 2019 00:01)  HR: 66 (13 Sep 2019 08:28) (56 - 82)  BP: 112/54 (13 Sep 2019 11:52) (96/50 - 150/69)  BP(mean): 77 (12 Sep 2019 18:05) (76 - 99)  RR: 17 (13 Sep 2019 08:28) (8 - 17)  SpO2: 96% (13 Sep 2019 08:28) (96% - 100%)    I&O's Detail    12 Sep 2019 07:01  -  13 Sep 2019 07:00  --------------------------------------------------------  IN:    lactated ringers.: 1080 mL  Total IN: 1080 mL    OUT:    Voided: 350 mL  Total OUT: 350 mL    Total NET: 730 mL           CAPILLARY BLOOD GLUCOSE                      Neuro: AAOx3    Lungs: nonlabored, Spo2 wnl on RA, IS demonstrated    CV:    ABD: soft, nontender    Ext:  Right anterior lateral hip gauze/abd pad tegaderm DSG C/D/I, left hip bandaid covering steroid injection site  b/l LE sensation: silt  b/l LE vascular: warm, Pulses: 2+ DP/PT  b/l LE   b/l Motor: EHL/FHL/TA/GS 5/5    LABS                                       [ ] Other Labs  [ ] None ordered            Please check or [place] when present:  •  Heart Failure:    [ ] Acute        [ ]  Acute on Chronic        [ ] Chronic         [ ] Diastolic     [ ]  Combined    •  GAUDENCIO:     [ ] ATN        [ ]  Renal medullary necrosis       [ ]  Renal cortical necrosis                  [ ] Other pathological Lesion:  •  CKD:  [ ] Stage I   [ ] Stage II  [ ] Stage III    [ ]Stage IV   [ ]  CKD V   [ ]  Other/Unspecified:    •  Abdominal Nutritional Status:   [ ] Malnutrition-See Nutrition note    [ ] Cachexia   [ ]  Other        [ ] Supplement ordered:            [ ] Morbid Obesity: BMI >=40         ASSESSMENT/PLAN:      STATUS POST: pod1 right total hip replacement (anterior lateral approach), left hip steroid injection  pain control- acetaminophen 975mg q 8 standing, celebrex 200mg po bid, oxycodone 5-10mg q 4 prn moderate to severe pain  nausea- prn antiemetics, encouraged to eat prior to administration of medication  bowel regimen  history of dvt/PE- continue lovenox 100mg bid, early ambulation     CONTINUE:          [ ] PT/OT- protective wb R LE with assistive device    [ ] DVT PPX- scd, toño stockings, lovenox 100mg subq bid    [ ] Pain Mgt- po meds    [ ] Dispo plan- home

## 2019-09-13 NOTE — CONSULT NOTE ADULT - SUBJECTIVE AND OBJECTIVE BOX
CC: No overnight events, no complaints.   Feeling well, pain is overall controlled.  Tolerates PO diet (+); Urination (+); Walked with PT (+)  Denies cp, sob, dizziness, HA, abdominal pain, n/v.  Rest of ROS negative.     Vital Signs Last 24 Hrs  T(C): 36.6 (13 Sep 2019 14:19), Max: 36.9 (13 Sep 2019 00:01)  T(F): 97.8 (13 Sep 2019 14:19), Max: 98.4 (13 Sep 2019 00:01)  HR: 68 (13 Sep 2019 14:19) (56 - 82)  BP: 94/59 (13 Sep 2019 14:19) (94/59 - 150/69)  BP(mean): 77 (12 Sep 2019 18:05) (76 - 99)  RR: 18 (13 Sep 2019 14:19) (8 - 18)  SpO2: 97% (13 Sep 2019 14:19) (96% - 100%)    PHYSICAL EXAMINATION  * General: Not in acute distress. Awake and alert. Lying comfortably in bed.  * Head: Normocephalic, atraumatic.  * HEENT: ears no discharge, eyes PERRLA, nose no discharge, throat no exudates, normal tonsils.  * Neck: no JVD, supple.  * Lungs: Clear to auscultation, no rales, no wheezes.  * Cardio: Regular rate and rhythm, no murmurs, no rubs, no gallops. Good peripheral pulses.  * Abdomen: Soft, non-tender, non-distended, tympanic to percussion, no rebound, no guarding, no rigidity. Bowel sounds present. No suprapubic or CVA tenderness.  * : Deferred.  * Extremities: Acyanotic, no edema.  * Skin: Warm and dry.  * Neuro: Alert and oriented x 3. No focal deficits. Motor strength is 5/5 throughout. Sensation intact. Cranial nerves II-XII grossly intact.     MEDICATIONS  (STANDING):  acetaminophen   Tablet .. 975 milliGRAM(s) Oral every 8 hours  amLODIPine   Tablet 2.5 milliGRAM(s) Oral daily  chlorhexidine 2% Cloths 1 Application(s) Topical once  enoxaparin Injectable 100 milliGRAM(s) SubCutaneous two times a day  gabapentin 300 milliGRAM(s) Oral daily  hydrochlorothiazide 12.5 milliGRAM(s) Oral daily  ketorolac   Injectable 30 milliGRAM(s) IV Push once  losartan 50 milliGRAM(s) Oral daily  metoprolol tartrate 50 milliGRAM(s) Oral two times a day  pantoprazole    Tablet 40 milliGRAM(s) Oral before breakfast  polyethylene glycol 3350 17 Gram(s) Oral daily  povidone iodine 5% Nasal Swab 1 Application(s) Both Nostrils once    MEDICATIONS  (PRN):  aluminum hydroxide/magnesium hydroxide/simethicone Suspension 30 milliLiter(s) Oral four times a day PRN Indigestion  bisacodyl 5 milliGRAM(s) Oral every 12 hours PRN Constipation  HYDROmorphone  Injectable 0.5 milliGRAM(s) IV Push every 4 hours PRN breakthrough pain  HYDROmorphone  Injectable 0.5 milliGRAM(s) IV Push every 15 minutes PRN breakthrough pain  magnesium hydroxide Suspension 30 milliLiter(s) Oral daily PRN Constipation  ondansetron Injectable 4 milliGRAM(s) IV Push every 6 hours PRN Nausea and/or Vomiting  oxyCODONE    IR 5 milliGRAM(s) Oral every 4 hours PRN Mild Pain (1 - 3)  oxyCODONE    IR 10 milliGRAM(s) Oral every 4 hours PRN Moderate Pain (4 - 6)  prochlorperazine   Injectable 5 milliGRAM(s) IV Push once PRN Nausea and/or Vomiting  senna 2 Tablet(s) Oral at bedtime PRN Constipation

## 2019-09-13 NOTE — OCCUPATIONAL THERAPY INITIAL EVALUATION ADULT - PERTINENT HX OF CURRENT PROBLEM, REHAB EVAL
48yo f c/o right hip pain for over 10years. Pt. reports she started to have increased hip pain after each pregnancy (has 8 kids). Pt. c/o pain in right hip with radiation down leg. Pt. has been using a can x 1y with hx of falls. Pt. has been doing physical therapy for her low back pain, no injections. Pt. has increased pain with walking and laying down. Pt. will take Alleve for pain relief but doesn't really work. RIGHT THR (), LEFT HIP STEROID INJECTION. 9/12/19.

## 2019-09-13 NOTE — CONSULT NOTE ADULT - ASSESSMENT
50yo F, PMH of HTN, PE, DVT, neuropathy. C/o right hip pain for over 10years. Pt. has been doing physical therapy for her low back pain, no injections. Admitted for elective RIGHT THR, LEFT HIP STEROID INJECTION with Dr. Ballard 9/12. Medicine consulted for comanagement.    1) Post-op state  * OOB-C  * Physical therapy evaluation  * Aggressive incentive spirometry  * Pain control and bowel regimen  * Mechanical LE ppx     2) HTN  * c/w home losartan 50mg po daily.   * c/w hctz 12.5 mg po daily.   * c/w amlodipine 2.5mg po daily.  * c/w metoprolol 50 mg bid     3) VTE ppx  * c/w Lovenox 100 bid.

## 2019-09-13 NOTE — CONSULT NOTE ADULT - CONSULT REASON
Co-management    50yo F, PMH of HTN, PE, DVT, neuropathy. C/o right hip pain for over 10years. Pt. has been doing physical therapy for her low back pain, no injections. Admitted for elective RIGHT THR, LEFT HIP STEROID INJECTION with Dr. Ballard 9/12. Medicine consulted for comanagement.    PAST MEDICAL & SURGICAL HISTORY:  Neuropathy  Pancreatitis  HTN (hypertension)  Pulmonary embolism  DVT (deep venous thrombosis): left leg  History of cholecystectomy  History of colon resection: diverticulitis  Abdominal hernia  History of sleeve gastrectomy    Social: Denies etoh, smoking, drugs    Allergies: lisinopril and verapamil    Home Medications:  amLODIPine 2.5 mg oral tablet: 1 tab(s) orally once a day (12 Sep 2019 11:11)  bisacodyl 10 mg rectal suppository: 1 suppository(ies) rectal once a day, As needed, If no bowel movement by postoperative day #2 (13 Sep 2019 13:15)  gabapentin 300 mg oral tablet: 1 tab(s) orally once a day (13 Sep 2019 14:50)  losartan-hydrochlorothiazide 50mg-12.5mg oral tablet: 1 tab(s) orally once a day (12 Sep 2019 11:11)  Lovenox 100 mg/mL injectable solution: injectable every 12 hours  prescription provided outpatient by your primary care provider (13 Sep 2019 14:50)  metoprolol tartrate 50 mg oral tablet: 1 tab(s) orally 2 times a day (12 Sep 2019 11:11)  omeprazole 40 mg oral delayed release capsule: 1 cap(s) orally once a day (12 Sep 2019 11:11)  polyethylene glycol 3350 oral powder for reconstitution: 17 gram(s) orally once a day (13 Sep 2019 14:50)  senna oral tablet: 2 tab(s) orally once a day (at bedtime), As needed, Constipation (13 Sep 2019 13:15)

## 2019-09-13 NOTE — DISCHARGE NOTE PROVIDER - NSDCFUADDINST_GEN_ALL_CORE_FT
50% protected weight on right leg using assistive device   Anterior hip precautions to prevent hip dislocation  No crossing your legs. Do not bend past your waist.  Use long-handled reach to pick things up if purchased.  Sit in a high chair.  If you do not have a high chair, use cushions on the chair  No strenuous activity, heavy lifting, driving or returning to work until cleared by MD.  Keep right hip dressing on until follow up appointment.  Dr. Ballard will remove dressing at the appointment  You may shower - dressing is water-resistant, no soaking in bathtubs.  Try to have regular bowel movements, take stool softener or laxative if necessary.  May take Pepcid or Zantac for upset stomach.  Swelling may travel all the way down leg to foot, this is normal and will subside in a few weeks.  Call to schedule an appt with Dr. Ballard for follow up.    Contact your doctor if you experience: fever greater than 101.5, chills, chest pain, difficulty breathing, redness or excessive drainage around the incision, other concerns.  Follow up with your primary care provider.

## 2019-09-13 NOTE — OCCUPATIONAL THERAPY INITIAL EVALUATION ADULT - GENERAL OBSERVATIONS, REHAB EVAL
Patient cleared for OT evaluation by FELICITY Terrell. Patient received ambulating with RN Rushain to bathroom, NAD, +IV heplock.

## 2019-09-13 NOTE — PROGRESS NOTE ADULT - SUBJECTIVE AND OBJECTIVE BOX
Ortho Post Op Check    Procedure: R SANDY and L hip injection   Surgeon: Elio    Pt comfortable without complaints, pain controlled  Denies CP, SOB, N/V, numbness/tingling     Vital Signs Last 24 Hrs  T(C): 36.9 (09-13-19 @ 00:01), Max: 36.9 (09-13-19 @ 00:01)  T(F): 98.4 (09-13-19 @ 00:01), Max: 98.4 (09-13-19 @ 00:01)  HR: 75 (09-13-19 @ 00:01) (65 - 75)  BP: 107/59 (09-13-19 @ 00:01) (99/60 - 120/58)  BP(mean): --  RR: 16 (09-13-19 @ 00:01) (16 - 17)  SpO2: 99% (09-13-19 @ 00:01) (97% - 99%)  AVSS    General: Pt Alert and oriented, NAD  R hip DSG C/D/I  Pulses: 2+ DP/PT  Sensation: s/s/sp/dp/t intact   Motor: EHL/FHL/TA/GS 5/5      Post-op X-Ray: Hardware in place with good alignment     A/P: 49yFemale POD#0 s/p R SANDY and L hip injection   - Stable  - Pain Control  - DVT ppx: Lovenox   - Post op abx: ancef periop  - PT, WBS: PWB  - dispo: pending PT eval     Ortho Pager [phone]

## 2019-09-13 NOTE — PROGRESS NOTE ADULT - SUBJECTIVE AND OBJECTIVE BOX
S: No events overnight    O:   Vital Signs Last 24 Hrs  T(C): 36.4 (13 Sep 2019 08:28), Max: 36.9 (13 Sep 2019 00:01)  T(F): 97.6 (13 Sep 2019 08:28), Max: 98.4 (13 Sep 2019 00:01)  HR: 66 (13 Sep 2019 10:30) (56 - 82)  BP: 112/54 (13 Sep 2019 11:52) (96/50 - 150/69)  BP(mean): 77 (12 Sep 2019 18:05) (76 - 99)  RR: 17 (13 Sep 2019 08:28) (8 - 17)  SpO2: 98% (13 Sep 2019 10:00) (96% - 100%)    09-12 @ 07:01  -  09-13 @ 07:00  --------------------------------------------------------  IN:    lactated ringers.: 1080 mL  Total IN: 1080 mL    OUT:    Voided: 350 mL  Total OUT: 350 mL    Total NET: 730 mL    PE:  RLE  DSG CDI  Sensation to light touch intact S/S/DP/SP/Tib, Strength EHL/FHL/TA/GS 5/5, DP 2+, Ext WWP

## 2019-09-13 NOTE — PROGRESS NOTE ADULT - ASSESSMENT
49y f s/p R SANDY + L steroid injection 9/12    -PT PWB 50%  -Pain control  -DVT PPX: LVX  -h/o DVT/PE/Splenic vein thrombosis  -Dispo Planning: Home

## 2019-09-13 NOTE — DISCHARGE NOTE PROVIDER - CARE PROVIDERS DIRECT ADDRESSES
[email or web address]

## 2019-09-13 NOTE — DISCHARGE NOTE PROVIDER - NSDCCPTREATMENT_GEN_ALL_CORE_FT
PRINCIPAL PROCEDURE  Procedure: Total hip arthroplasty  Findings and Treatment: RIGHT      SECONDARY PROCEDURE  Procedure: Injection, hip  Findings and Treatment: LEFT

## 2019-09-14 LAB
ANION GAP SERPL CALC-SCNC: 10 MMOL/L — SIGNIFICANT CHANGE UP (ref 5–17)
BUN SERPL-MCNC: 11 MG/DL — SIGNIFICANT CHANGE UP (ref 7–23)
CALCIUM SERPL-MCNC: 9.2 MG/DL — SIGNIFICANT CHANGE UP (ref 8.4–10.5)
CHLORIDE SERPL-SCNC: 101 MMOL/L — SIGNIFICANT CHANGE UP (ref 96–108)
CO2 SERPL-SCNC: 28 MMOL/L — SIGNIFICANT CHANGE UP (ref 22–31)
CREAT SERPL-MCNC: 0.88 MG/DL — SIGNIFICANT CHANGE UP (ref 0.5–1.3)
GLUCOSE SERPL-MCNC: 134 MG/DL — HIGH (ref 70–99)
HCT VFR BLD CALC: 34 % — LOW (ref 34.5–45)
HGB BLD-MCNC: 10.5 G/DL — LOW (ref 11.5–15.5)
MCHC RBC-ENTMCNC: 28.8 PG — SIGNIFICANT CHANGE UP (ref 27–34)
MCHC RBC-ENTMCNC: 30.9 GM/DL — LOW (ref 32–36)
MCV RBC AUTO: 93.2 FL — SIGNIFICANT CHANGE UP (ref 80–100)
NRBC # BLD: 0 /100 WBCS — SIGNIFICANT CHANGE UP (ref 0–0)
PLATELET # BLD AUTO: 229 K/UL — SIGNIFICANT CHANGE UP (ref 150–400)
POTASSIUM SERPL-MCNC: 3.9 MMOL/L — SIGNIFICANT CHANGE UP (ref 3.5–5.3)
POTASSIUM SERPL-SCNC: 3.9 MMOL/L — SIGNIFICANT CHANGE UP (ref 3.5–5.3)
RBC # BLD: 3.65 M/UL — LOW (ref 3.8–5.2)
RBC # FLD: 14.1 % — SIGNIFICANT CHANGE UP (ref 10.3–14.5)
SODIUM SERPL-SCNC: 139 MMOL/L — SIGNIFICANT CHANGE UP (ref 135–145)
WBC # BLD: 8.91 K/UL — SIGNIFICANT CHANGE UP (ref 3.8–10.5)
WBC # FLD AUTO: 8.91 K/UL — SIGNIFICANT CHANGE UP (ref 3.8–10.5)

## 2019-09-14 RX ORDER — OXYCODONE HYDROCHLORIDE 5 MG/1
5 TABLET ORAL EVERY 4 HOURS
Refills: 0 | Status: DISCONTINUED | OUTPATIENT
Start: 2019-09-14 | End: 2019-09-15

## 2019-09-14 RX ORDER — OXYCODONE HYDROCHLORIDE 5 MG/1
10 TABLET ORAL EVERY 4 HOURS
Refills: 0 | Status: DISCONTINUED | OUTPATIENT
Start: 2019-09-14 | End: 2019-09-15

## 2019-09-14 RX ORDER — METOCLOPRAMIDE HCL 10 MG
10 TABLET ORAL ONCE
Refills: 0 | Status: COMPLETED | OUTPATIENT
Start: 2019-09-14 | End: 2019-09-14

## 2019-09-14 RX ADMIN — CELECOXIB 200 MILLIGRAM(S): 200 CAPSULE ORAL at 18:00

## 2019-09-14 RX ADMIN — Medication 975 MILLIGRAM(S): at 14:00

## 2019-09-14 RX ADMIN — Medication 50 MILLIGRAM(S): at 06:22

## 2019-09-14 RX ADMIN — OXYCODONE HYDROCHLORIDE 10 MILLIGRAM(S): 5 TABLET ORAL at 00:45

## 2019-09-14 RX ADMIN — CELECOXIB 200 MILLIGRAM(S): 200 CAPSULE ORAL at 07:20

## 2019-09-14 RX ADMIN — Medication 975 MILLIGRAM(S): at 21:24

## 2019-09-14 RX ADMIN — CELECOXIB 200 MILLIGRAM(S): 200 CAPSULE ORAL at 19:00

## 2019-09-14 RX ADMIN — PANTOPRAZOLE SODIUM 40 MILLIGRAM(S): 20 TABLET, DELAYED RELEASE ORAL at 06:21

## 2019-09-14 RX ADMIN — OXYCODONE HYDROCHLORIDE 10 MILLIGRAM(S): 5 TABLET ORAL at 01:45

## 2019-09-14 RX ADMIN — Medication 10 MILLIGRAM(S): at 17:18

## 2019-09-14 RX ADMIN — OXYCODONE HYDROCHLORIDE 10 MILLIGRAM(S): 5 TABLET ORAL at 07:20

## 2019-09-14 RX ADMIN — Medication 975 MILLIGRAM(S): at 06:23

## 2019-09-14 RX ADMIN — AMLODIPINE BESYLATE 2.5 MILLIGRAM(S): 2.5 TABLET ORAL at 06:21

## 2019-09-14 RX ADMIN — ENOXAPARIN SODIUM 100 MILLIGRAM(S): 100 INJECTION SUBCUTANEOUS at 18:00

## 2019-09-14 RX ADMIN — OXYCODONE HYDROCHLORIDE 10 MILLIGRAM(S): 5 TABLET ORAL at 06:23

## 2019-09-14 RX ADMIN — LOSARTAN POTASSIUM 50 MILLIGRAM(S): 100 TABLET, FILM COATED ORAL at 06:22

## 2019-09-14 RX ADMIN — Medication 12.5 MILLIGRAM(S): at 06:21

## 2019-09-14 RX ADMIN — ONDANSETRON 4 MILLIGRAM(S): 8 TABLET, FILM COATED ORAL at 10:37

## 2019-09-14 RX ADMIN — Medication 975 MILLIGRAM(S): at 21:22

## 2019-09-14 RX ADMIN — CELECOXIB 200 MILLIGRAM(S): 200 CAPSULE ORAL at 06:23

## 2019-09-14 RX ADMIN — OXYCODONE HYDROCHLORIDE 10 MILLIGRAM(S): 5 TABLET ORAL at 11:58

## 2019-09-14 RX ADMIN — Medication 975 MILLIGRAM(S): at 14:51

## 2019-09-14 RX ADMIN — OXYCODONE HYDROCHLORIDE 10 MILLIGRAM(S): 5 TABLET ORAL at 12:55

## 2019-09-14 RX ADMIN — Medication 975 MILLIGRAM(S): at 07:20

## 2019-09-14 RX ADMIN — POLYETHYLENE GLYCOL 3350 17 GRAM(S): 17 POWDER, FOR SOLUTION ORAL at 12:01

## 2019-09-14 RX ADMIN — ENOXAPARIN SODIUM 100 MILLIGRAM(S): 100 INJECTION SUBCUTANEOUS at 06:22

## 2019-09-14 RX ADMIN — GABAPENTIN 300 MILLIGRAM(S): 400 CAPSULE ORAL at 11:58

## 2019-09-14 RX ADMIN — HYDROMORPHONE HYDROCHLORIDE 0.5 MILLIGRAM(S): 2 INJECTION INTRAMUSCULAR; INTRAVENOUS; SUBCUTANEOUS at 00:00

## 2019-09-14 NOTE — PROGRESS NOTE ADULT - ASSESSMENT
50yo F, PMH of HTN, PE, DVT, neuropathy. C/o right hip pain for over 10years. Pt. has been doing physical therapy for her low back pain, no injections. Admitted for elective RIGHT THR, LEFT HIP STEROID INJECTION with Dr. Ballard 9/12. Medicine consulted for comanagement.    1) Post-op state  * OOB-C  * Physical therapy evaluation  * Aggressive incentive spirometry  * Pain control and bowel regimen  * Mechanical LE ppx     2) HTN  * c/w home losartan 50mg po daily.   * c/w hctz 12.5 mg po daily.   * c/w amlodipine 2.5mg po daily.  * c/w metoprolol 50 mg bid     3) VTE ppx  * c/w Lovenox 100 bid that she is on for DVT/ PE hx.

## 2019-09-14 NOTE — PROGRESS NOTE ADULT - SUBJECTIVE AND OBJECTIVE BOX
Ortho AM check    S: No events overnight. Denies CP/N/V/SOB.     O:  Vital Signs Last 24 Hrs  T(C): 36.9 (14 Sep 2019 05:01), Max: 37.2 (13 Sep 2019 20:35)  T(F): 98.5 (14 Sep 2019 05:01), Max: 98.9 (13 Sep 2019 20:35)  HR: 59 (14 Sep 2019 05:01) (59 - 72)  BP: 104/70 (14 Sep 2019 05:01) (94/59 - 122/71)  BP(mean): --  RR: 15 (14 Sep 2019 05:01) (15 - 18)  SpO2: 99% (14 Sep 2019 05:01) (97% - 99%)    PE:  RLE  DSG CDI  Sensation to light touch intact S/S/DP/SP/Tib,  Strength EHL/FHL/TA/GS 5/5  Ext WWP 70 yo male PMH CAD c/b CABG, A fib (on coumadin), CKD stage 3, PAD s/p stents, PEG tube, in-dwelling Hoskins who presents to the nursing home for increased confusion and elevated WBC. History was taken from wife at bedside.	Per wife, patient had been fully functional up until November. He had a hypoglycemic episode, fell, and broke his hip in November. He was admitted to the hospital for surgery, which was uneventful and sent to rehab (in early December).   During the rehab stay, he developed numerous ulcers, including a decubitus ulcer. He was sent back to [facility] for sepsis from the decubitus ulcer. He was treated with abx and returned to rehab for a second time. This second hospital course was complicated by acute renal failure requiring dialysis, IV abx, and "surgery" to clean the ulcer. In the ED, he received Vanc/Zosyn. 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   CT chest shows no pneumonia, bilateral effusions. PEG feeds stopped working, now on hold. PEG site looks necrotic. Leakage around the site and redness around PEG sie. Chronic hoskins changed this admission. Agree with ID. Had bone biopsy done today which now shows GNR. CT of lumbar spine confirms sacral OM was started on vanco/ zosyn.   CKD III stable, creatinine 1.81. Renal sono confirms MRD. Free water replacement on hold without  PEG.   Had episodes of hypoglycemia off insulin. Stopped D5W in IVF given elevated blood sugars. Endo consult requested. PEG feeds on hold given leaking. NPH on hold. Glucose elevated 316. Discussed with palliative care, will stop   IVF. BS at goal 192, off NPH.          PEG feeds were on hold; family refused NGT as the pt was made comfort care. IV Abx and IVF were stopped as pt was awaiting palliative care placement.     Notified by RN for absent radial pulse and no chest rise. On examination pupils dilated non reactive to light, no chest rise observed; absent heart sounds, no palpable carotid pulses. No lung sounds heard on auscultation. Time of death 8:41pm. Re Castle (wife) was notified. Dr. Gilliland was notified.

## 2019-09-14 NOTE — PROGRESS NOTE ADULT - SUBJECTIVE AND OBJECTIVE BOX
Interval HPI  Patient seen and examined at bedside. Still with discomfort around her right hip but was able to participate in physical therapy yesterday (stairs). She still has some nausea which has suppressed her appetite, but that is improving per her.     HPI:  48yo f c/o right hip pain for over 10years. Pt. reports she started to have increased hip pain after each pregnancy (has 8 kids). Pt. c/o pain in right hip with radiation down leg. Pt. has been using a can x 1y with hx of falls. Pt. has been doing physical therapy for her low back pain, no injections. Pt. has increased pain with walking and laying down. Pt. will take Alleve for pain relief but doesn't really work. Presents today for elective RIGHT THR, LEFT HIP STEROID INJECTION. (12 Sep 2019 14:24)      REVIEW OF SYSTEMS:   Otherwise negative except as specified in HPI      MEDICATIONS:  MEDICATIONS  (STANDING):  acetaminophen   Tablet .. 975 milliGRAM(s) Oral every 8 hours  amLODIPine   Tablet 2.5 milliGRAM(s) Oral daily  celecoxib 200 milliGRAM(s) Oral two times a day  chlorhexidine 2% Cloths 1 Application(s) Topical once  enoxaparin Injectable 100 milliGRAM(s) SubCutaneous two times a day  gabapentin 300 milliGRAM(s) Oral daily  hydrochlorothiazide 12.5 milliGRAM(s) Oral daily  ketorolac   Injectable 30 milliGRAM(s) IV Push once  losartan 50 milliGRAM(s) Oral daily  metoprolol tartrate 50 milliGRAM(s) Oral two times a day  pantoprazole    Tablet 40 milliGRAM(s) Oral before breakfast  polyethylene glycol 3350 17 Gram(s) Oral daily  povidone iodine 5% Nasal Swab 1 Application(s) Both Nostrils once    MEDICATIONS  (PRN):  aluminum hydroxide/magnesium hydroxide/simethicone Suspension 30 milliLiter(s) Oral four times a day PRN Indigestion  bisacodyl 5 milliGRAM(s) Oral every 12 hours PRN Constipation  bisacodyl Suppository 10 milliGRAM(s) Rectal daily PRN If no bowel movement by postoperative day #2  HYDROmorphone  Injectable 0.5 milliGRAM(s) IV Push every 4 hours PRN breakthrough pain  magnesium hydroxide Suspension 30 milliLiter(s) Oral daily PRN Constipation  ondansetron Injectable 4 milliGRAM(s) IV Push every 6 hours PRN Nausea and/or Vomiting  oxyCODONE    IR 5 milliGRAM(s) Oral every 4 hours PRN Moderate Pain (4 - 6)  oxyCODONE    IR 10 milliGRAM(s) Oral every 4 hours PRN Severe Pain (7 - 10)  prochlorperazine   Injectable 5 milliGRAM(s) IV Push once PRN Nausea and/or Vomiting  senna 2 Tablet(s) Oral at bedtime PRN Constipation      ALLERGIES:  Allergies    lisinopril (Other)  verapamil (Other)    Intolerances        VITAL SIGNS:  Vital Signs Last 24 Hrs  T(C): 36.8 (14 Sep 2019 10:32), Max: 37.2 (13 Sep 2019 20:35)  T(F): 98.2 (14 Sep 2019 10:32), Max: 98.9 (13 Sep 2019 20:35)  HR: 62 (14 Sep 2019 10:32) (59 - 72)  BP: 96/59 (14 Sep 2019 10:32) (94/59 - 122/71)  BP(mean): --  RR: 14 (14 Sep 2019 10:32) (14 - 18)  SpO2: 98% (14 Sep 2019 10:32) (97% - 99%)      PHYSICAL EXAM:  Constitutional: WDWN resting comfortably in bed; NAD  Head: NC/AT  Eyes: PERRL, EOMI, anicteric sclera  ENT: no nasal discharge; uvula midline, no oropharyngeal erythema or exudates; MMM  Neck: supple; no JVD  Respiratory: CTA B/L; no W/R/R,  Cardiac: +S1/S2; RRR; no M/R/G; PMI non-displaced  Gastrointestinal: abdomen soft, NT/ND; no rebound or guarding; +BSx4  Genitourinary: deferred  Extremities: WWP, no clubbing or cyanosis; no peripheral edema; tenderness of the right hip around operative site  Musculoskeletal: NROM x4; no joint swelling, tenderness or erythema  Vascular: 2+ radial, femoral, DP/PT pulses B/L  Dermatologic: skin warm, dry and intact; no rashes, wounds, or scars  Neurologic: AAOx3; CNII-XII grossly intact; no focal deficits  Psychiatric: affect and characteristics of appearance, verbalizations, behaviors are appropriate    LABS:                        10.5   8.91  )-----------( 229      ( 14 Sep 2019 07:08 )             34.0     09-14    139  |  101  |  11  ----------------------------<  134<H>  3.9   |  28  |  0.88    Ca    9.2      14 Sep 2019 07:08              CAPILLARY BLOOD GLUCOSE              RADIOLOGY & ADDITIONAL TESTS: Reviewed.

## 2019-09-15 ENCOUNTER — TRANSCRIPTION ENCOUNTER (OUTPATIENT)
Age: 49
End: 2019-09-15

## 2019-09-15 VITALS
DIASTOLIC BLOOD PRESSURE: 59 MMHG | TEMPERATURE: 98 F | OXYGEN SATURATION: 95 % | SYSTOLIC BLOOD PRESSURE: 112 MMHG | RESPIRATION RATE: 16 BRPM

## 2019-09-15 LAB
ANION GAP SERPL CALC-SCNC: 7 MMOL/L — SIGNIFICANT CHANGE UP (ref 5–17)
BUN SERPL-MCNC: 13 MG/DL — SIGNIFICANT CHANGE UP (ref 7–23)
CALCIUM SERPL-MCNC: 9 MG/DL — SIGNIFICANT CHANGE UP (ref 8.4–10.5)
CHLORIDE SERPL-SCNC: 101 MMOL/L — SIGNIFICANT CHANGE UP (ref 96–108)
CO2 SERPL-SCNC: 29 MMOL/L — SIGNIFICANT CHANGE UP (ref 22–31)
CREAT SERPL-MCNC: 0.92 MG/DL — SIGNIFICANT CHANGE UP (ref 0.5–1.3)
GLUCOSE SERPL-MCNC: 120 MG/DL — HIGH (ref 70–99)
HCT VFR BLD CALC: 30.1 % — LOW (ref 34.5–45)
HGB BLD-MCNC: 9.3 G/DL — LOW (ref 11.5–15.5)
MCHC RBC-ENTMCNC: 28.4 PG — SIGNIFICANT CHANGE UP (ref 27–34)
MCHC RBC-ENTMCNC: 30.9 GM/DL — LOW (ref 32–36)
MCV RBC AUTO: 92 FL — SIGNIFICANT CHANGE UP (ref 80–100)
NRBC # BLD: 0 /100 WBCS — SIGNIFICANT CHANGE UP (ref 0–0)
PLATELET # BLD AUTO: 179 K/UL — SIGNIFICANT CHANGE UP (ref 150–400)
POTASSIUM SERPL-MCNC: 3.8 MMOL/L — SIGNIFICANT CHANGE UP (ref 3.5–5.3)
POTASSIUM SERPL-SCNC: 3.8 MMOL/L — SIGNIFICANT CHANGE UP (ref 3.5–5.3)
RBC # BLD: 3.27 M/UL — LOW (ref 3.8–5.2)
RBC # FLD: 13.8 % — SIGNIFICANT CHANGE UP (ref 10.3–14.5)
SODIUM SERPL-SCNC: 137 MMOL/L — SIGNIFICANT CHANGE UP (ref 135–145)
WBC # BLD: 7.97 K/UL — SIGNIFICANT CHANGE UP (ref 3.8–10.5)
WBC # FLD AUTO: 7.97 K/UL — SIGNIFICANT CHANGE UP (ref 3.8–10.5)

## 2019-09-15 RX ORDER — POTASSIUM CHLORIDE 20 MEQ
20 PACKET (EA) ORAL ONCE
Refills: 0 | Status: COMPLETED | OUTPATIENT
Start: 2019-09-15 | End: 2019-09-15

## 2019-09-15 RX ADMIN — CELECOXIB 200 MILLIGRAM(S): 200 CAPSULE ORAL at 07:24

## 2019-09-15 RX ADMIN — CELECOXIB 200 MILLIGRAM(S): 200 CAPSULE ORAL at 06:07

## 2019-09-15 RX ADMIN — Medication 975 MILLIGRAM(S): at 06:07

## 2019-09-15 RX ADMIN — LOSARTAN POTASSIUM 50 MILLIGRAM(S): 100 TABLET, FILM COATED ORAL at 06:08

## 2019-09-15 RX ADMIN — Medication 12.5 MILLIGRAM(S): at 06:07

## 2019-09-15 RX ADMIN — Medication 975 MILLIGRAM(S): at 07:24

## 2019-09-15 RX ADMIN — AMLODIPINE BESYLATE 2.5 MILLIGRAM(S): 2.5 TABLET ORAL at 06:07

## 2019-09-15 RX ADMIN — OXYCODONE HYDROCHLORIDE 5 MILLIGRAM(S): 5 TABLET ORAL at 07:24

## 2019-09-15 RX ADMIN — ENOXAPARIN SODIUM 100 MILLIGRAM(S): 100 INJECTION SUBCUTANEOUS at 06:07

## 2019-09-15 RX ADMIN — PANTOPRAZOLE SODIUM 40 MILLIGRAM(S): 20 TABLET, DELAYED RELEASE ORAL at 06:08

## 2019-09-15 RX ADMIN — OXYCODONE HYDROCHLORIDE 5 MILLIGRAM(S): 5 TABLET ORAL at 06:08

## 2019-09-15 RX ADMIN — Medication 50 MILLIGRAM(S): at 06:07

## 2019-09-15 NOTE — DISCHARGE NOTE NURSING/CASE MANAGEMENT/SOCIAL WORK - PATIENT PORTAL LINK FT
You can access the FollowMyHealth Patient Portal offered by  by registering at the following website: [email or web address]. By joining [employer]’s FollowMyHealth portal, you will also be able to view your health information using other applications (apps) compatible with our system.

## 2019-09-15 NOTE — PROGRESS NOTE ADULT - SUBJECTIVE AND OBJECTIVE BOX
Ortho AM check    S: No events overnight. Denies CP/N/V/SOB.     O:  Vital Signs Last 24 Hrs  T(C): 36.7 (15 Sep 2019 05:14), Max: 37.1 (14 Sep 2019 20:32)  T(F): 98 (15 Sep 2019 05:14), Max: 98.8 (14 Sep 2019 20:32)  HR: 59 (15 Sep 2019 05:14) (56 - 67)  BP: 134/85 (15 Sep 2019 05:14) (95/64 - 134/85)  BP(mean): --  RR: 15 (15 Sep 2019 05:14) (14 - 16)  SpO2: 98% (15 Sep 2019 05:14) (96% - 99%)    PE:  RLE  DSG CDI  Sensation to light touch intact S/S/DP/SP/Tib,  Strength EHL/FHL/TA/GS 5/5  Ext WWP

## 2019-09-16 PROBLEM — I82.409 ACUTE EMBOLISM AND THROMBOSIS OF UNSPECIFIED DEEP VEINS OF UNSPECIFIED LOWER EXTREMITY: Chronic | Status: ACTIVE | Noted: 2019-05-24

## 2019-09-20 PROCEDURE — 76000 FLUOROSCOPY <1 HR PHYS/QHP: CPT

## 2019-09-20 PROCEDURE — C1776: CPT

## 2019-09-20 PROCEDURE — 87641 MR-STAPH DNA AMP PROBE: CPT

## 2019-09-20 PROCEDURE — 85027 COMPLETE CBC AUTOMATED: CPT

## 2019-09-20 PROCEDURE — 72170 X-RAY EXAM OF PELVIS: CPT

## 2019-09-20 PROCEDURE — 97161 PT EVAL LOW COMPLEX 20 MIN: CPT

## 2019-09-20 PROCEDURE — 97530 THERAPEUTIC ACTIVITIES: CPT

## 2019-09-20 PROCEDURE — 80048 BASIC METABOLIC PNL TOTAL CA: CPT

## 2019-09-20 PROCEDURE — 36415 COLL VENOUS BLD VENIPUNCTURE: CPT

## 2019-09-20 PROCEDURE — 82962 GLUCOSE BLOOD TEST: CPT

## 2019-09-20 PROCEDURE — 97116 GAIT TRAINING THERAPY: CPT

## 2019-09-24 ENCOUNTER — RX RENEWAL (OUTPATIENT)
Age: 49
End: 2019-09-24

## 2019-09-24 DIAGNOSIS — M16.0 BILATERAL PRIMARY OSTEOARTHRITIS OF HIP: ICD-10-CM

## 2019-09-24 DIAGNOSIS — N32.81 OVERACTIVE BLADDER: ICD-10-CM

## 2019-09-24 DIAGNOSIS — Z79.01 LONG TERM (CURRENT) USE OF ANTICOAGULANTS: ICD-10-CM

## 2019-09-24 DIAGNOSIS — K21.9 GASTRO-ESOPHAGEAL REFLUX DISEASE WITHOUT ESOPHAGITIS: ICD-10-CM

## 2019-09-24 DIAGNOSIS — G62.9 POLYNEUROPATHY, UNSPECIFIED: ICD-10-CM

## 2019-09-24 DIAGNOSIS — E53.8 DEFICIENCY OF OTHER SPECIFIED B GROUP VITAMINS: ICD-10-CM

## 2019-09-24 DIAGNOSIS — Z90.49 ACQUIRED ABSENCE OF OTHER SPECIFIED PARTS OF DIGESTIVE TRACT: ICD-10-CM

## 2019-09-24 DIAGNOSIS — Z90.3 ACQUIRED ABSENCE OF STOMACH [PART OF]: ICD-10-CM

## 2019-09-24 DIAGNOSIS — Z86.718 PERSONAL HISTORY OF OTHER VENOUS THROMBOSIS AND EMBOLISM: ICD-10-CM

## 2019-09-24 DIAGNOSIS — I10 ESSENTIAL (PRIMARY) HYPERTENSION: ICD-10-CM

## 2019-09-30 ENCOUNTER — APPOINTMENT (OUTPATIENT)
Dept: INTERNAL MEDICINE | Facility: CLINIC | Age: 49
End: 2019-09-30
Payer: MEDICAID

## 2019-09-30 VITALS
WEIGHT: 221 LBS | HEART RATE: 91 BPM | DIASTOLIC BLOOD PRESSURE: 77 MMHG | SYSTOLIC BLOOD PRESSURE: 128 MMHG | TEMPERATURE: 98.5 F | OXYGEN SATURATION: 98 % | HEIGHT: 68 IN | BODY MASS INDEX: 33.49 KG/M2

## 2019-09-30 PROCEDURE — 96372 THER/PROPH/DIAG INJ SC/IM: CPT

## 2019-09-30 PROCEDURE — 36415 COLL VENOUS BLD VENIPUNCTURE: CPT

## 2019-09-30 PROCEDURE — 99495 TRANSJ CARE MGMT MOD F2F 14D: CPT | Mod: 25

## 2019-09-30 RX ORDER — CYANOCOBALAMIN 1000 UG/ML
1000 INJECTION INTRAMUSCULAR; SUBCUTANEOUS
Qty: 0 | Refills: 0 | Status: COMPLETED | OUTPATIENT
Start: 2019-09-30

## 2019-09-30 RX ADMIN — CYANOCOBALAMIN 1000 MCG/ML: 1000 INJECTION INTRAMUSCULAR; SUBCUTANEOUS at 00:00

## 2019-10-03 ENCOUNTER — FORM ENCOUNTER (OUTPATIENT)
Age: 49
End: 2019-10-03

## 2019-10-04 ENCOUNTER — OUTPATIENT (OUTPATIENT)
Dept: OUTPATIENT SERVICES | Facility: HOSPITAL | Age: 49
LOS: 1 days | End: 2019-10-04
Payer: COMMERCIAL

## 2019-10-04 ENCOUNTER — APPOINTMENT (OUTPATIENT)
Dept: ORTHOPEDIC SURGERY | Facility: CLINIC | Age: 49
End: 2019-10-04
Payer: MEDICAID

## 2019-10-04 DIAGNOSIS — Z90.49 ACQUIRED ABSENCE OF OTHER SPECIFIED PARTS OF DIGESTIVE TRACT: Chronic | ICD-10-CM

## 2019-10-04 DIAGNOSIS — Z90.3 ACQUIRED ABSENCE OF STOMACH [PART OF]: Chronic | ICD-10-CM

## 2019-10-04 DIAGNOSIS — K46.9 UNSPECIFIED ABDOMINAL HERNIA WITHOUT OBSTRUCTION OR GANGRENE: Chronic | ICD-10-CM

## 2019-10-04 PROCEDURE — 73503 X-RAY EXAM HIP UNI 4/> VIEWS: CPT | Mod: 26,RT

## 2019-10-04 PROCEDURE — 99024 POSTOP FOLLOW-UP VISIT: CPT

## 2019-10-04 PROCEDURE — 73503 X-RAY EXAM HIP UNI 4/> VIEWS: CPT

## 2019-10-17 ENCOUNTER — RX RENEWAL (OUTPATIENT)
Age: 49
End: 2019-10-17

## 2019-10-17 LAB
ANION GAP SERPL CALC-SCNC: 11 MMOL/L
BASOPHILS # BLD AUTO: 0.02 K/UL
BASOPHILS NFR BLD AUTO: 0.3 %
BUN SERPL-MCNC: 15 MG/DL
CALCIUM SERPL-MCNC: 9.5 MG/DL
CHLORIDE SERPL-SCNC: 107 MMOL/L
CO2 SERPL-SCNC: 24 MMOL/L
CREAT SERPL-MCNC: 1.01 MG/DL
EOSINOPHIL # BLD AUTO: 0.19 K/UL
EOSINOPHIL NFR BLD AUTO: 3 %
GLUCOSE SERPL-MCNC: 102 MG/DL
HCT VFR BLD CALC: 29.5 %
HGB BLD-MCNC: 8.4 G/DL
IMM GRANULOCYTES NFR BLD AUTO: 0.6 %
LYMPHOCYTES # BLD AUTO: 1.3 K/UL
LYMPHOCYTES NFR BLD AUTO: 20.6 %
MAN DIFF?: NORMAL
MCHC RBC-ENTMCNC: 28.5 GM/DL
MCHC RBC-ENTMCNC: 29.4 PG
MCV RBC AUTO: 103.1 FL
MONOCYTES # BLD AUTO: 0.58 K/UL
MONOCYTES NFR BLD AUTO: 9.2 %
NEUTROPHILS # BLD AUTO: 4.18 K/UL
NEUTROPHILS NFR BLD AUTO: 66.3 %
PLATELET # BLD AUTO: 340 K/UL
POTASSIUM SERPL-SCNC: 4.6 MMOL/L
RBC # BLD: 2.86 M/UL
RBC # FLD: 19.6 %
SODIUM SERPL-SCNC: 142 MMOL/L
WBC # FLD AUTO: 6.31 K/UL

## 2019-10-22 NOTE — HISTORY OF PRESENT ILLNESS
[de-identified] : Yana is a 49 year old female with a SHx of right SANDY on 09/12/2019 who presents today for post operative follow up. Patient is feeling great and has no complaints.

## 2019-10-22 NOTE — PHYSICAL EXAM
[de-identified] : General: Not in acute distress, dressed appropriately, sitting on examination table\par Skin: Warm and dry, normal turgor, no rashes\par Neurological: AOx3, Cranial nerves grossly in tact\par Psych: Mood and affect appropriate\par Right Hip: Well healed surgical incision. No swelling edema erythema redness or drainage. Nontender.. ROM: Not assessed. 5/5 strength. Normal gait.  [de-identified] : No imaging today.

## 2019-10-22 NOTE — ASSESSMENT
[FreeTextEntry1] : Assessment/Plan\par \par Right THR post op\par \par Refill Percocet. Post op PT in 2 weeks. F/U.\par \par All medical record entries made by the PA/Tiffanyiblona/Fellow are at my, Dr. Bolivar Ballard's direction and personally dictated by me on 10/04/2019]. I have reviewed the chart and agree that the record accurately reflects my personal performance of the history, physical exam, assessment, and plan. I have also personally directed reviewed, and agreed with the chart.\par

## 2019-10-24 ENCOUNTER — LABORATORY RESULT (OUTPATIENT)
Age: 49
End: 2019-10-24

## 2019-10-25 ENCOUNTER — APPOINTMENT (OUTPATIENT)
Dept: INTERNAL MEDICINE | Facility: CLINIC | Age: 49
End: 2019-10-25
Payer: MEDICAID

## 2019-10-25 VITALS
WEIGHT: 212 LBS | TEMPERATURE: 98.1 F | DIASTOLIC BLOOD PRESSURE: 85 MMHG | HEART RATE: 76 BPM | OXYGEN SATURATION: 97 % | BODY MASS INDEX: 32.13 KG/M2 | SYSTOLIC BLOOD PRESSURE: 138 MMHG | HEIGHT: 68 IN

## 2019-10-25 DIAGNOSIS — D50.0 IRON DEFICIENCY ANEMIA SECONDARY TO BLOOD LOSS (CHRONIC): ICD-10-CM

## 2019-10-25 DIAGNOSIS — M51.9 UNSPECIFIED THORACIC, THORACOLUMBAR AND LUMBOSACRAL INTERVERTEBRAL DISC DISORDER: ICD-10-CM

## 2019-10-25 DIAGNOSIS — R25.2 CRAMP AND SPASM: ICD-10-CM

## 2019-10-25 PROCEDURE — 96372 THER/PROPH/DIAG INJ SC/IM: CPT

## 2019-10-25 PROCEDURE — 99214 OFFICE O/P EST MOD 30 MIN: CPT | Mod: 25

## 2019-10-25 RX ORDER — CYANOCOBALAMIN 1000 UG/ML
1000 INJECTION INTRAMUSCULAR; SUBCUTANEOUS
Qty: 0 | Refills: 0 | Status: COMPLETED | OUTPATIENT
Start: 2019-10-25

## 2019-10-25 RX ADMIN — CYANOCOBALAMIN 1000 MCG/ML: 1000 INJECTION, SOLUTION INTRAMUSCULAR; SUBCUTANEOUS at 00:00

## 2019-10-29 NOTE — HISTORY OF PRESENT ILLNESS
[FreeTextEntry1] : further information for disability forms [de-identified] : R hip: home PT finished about 2 weeks ago.  Followed up with Dr. Ballard, told swelling is to be expected.  Still swollen and painful at incision sites.  Still feels heavy.  Starting to have some L sided hip pain because she feels she's transferring weight over.\par \par Lower back pain: now more on L>R.  March 2019: L spine degenerative disc disease with T11-12 spinal cord contact and L5-S1 face inflammation.  Before surgery would be able to walk 1 block before stopping due to pain.  Problem with large steps, can go a few small steps.  1 flight of stairs causes significant pain, has to stop.  If she bends down to pick something off the floor, unable to do more that day because of pain.  Was previously taking aleve without any relief.  Has a grabber now to get objects off the floor.\par \par Can sit for 1/2 hr, then has to get up because of pain.  \par \par Arms: no strength issues\par \par Hands: sometimes numb and clumsy.\par \par Abdominal pain: pain daily with nausea triggered every time she smells food \par \par

## 2019-11-11 ENCOUNTER — LABORATORY RESULT (OUTPATIENT)
Age: 49
End: 2019-11-11

## 2019-11-11 ENCOUNTER — APPOINTMENT (OUTPATIENT)
Dept: GASTROENTEROLOGY | Facility: CLINIC | Age: 49
End: 2019-11-11
Payer: MEDICAID

## 2019-11-11 VITALS
OXYGEN SATURATION: 96 % | SYSTOLIC BLOOD PRESSURE: 130 MMHG | WEIGHT: 212 LBS | DIASTOLIC BLOOD PRESSURE: 90 MMHG | HEART RATE: 83 BPM | RESPIRATION RATE: 14 BRPM | HEIGHT: 68 IN | BODY MASS INDEX: 32.13 KG/M2

## 2019-11-11 DIAGNOSIS — K86.2 CYST OF PANCREAS: ICD-10-CM

## 2019-11-11 PROCEDURE — 36415 COLL VENOUS BLD VENIPUNCTURE: CPT

## 2019-11-11 PROCEDURE — 99204 OFFICE O/P NEW MOD 45 MIN: CPT | Mod: 25

## 2019-11-13 PROBLEM — K86.2 PANCREAS CYST: Status: ACTIVE | Noted: 2019-11-13

## 2019-11-14 LAB
BARLEY IGE QN: <0.1 KUA/L
CHERRY IGE QN: <0.1 KUA/L
COW MILK IGE QN: <0.1 KUA/L
CRAB IGE QN: <0.1 KUA/L
DEPRECATED BARLEY IGE RAST QL: 0
DEPRECATED CHERRY IGE RAST QL: 0
DEPRECATED COW MILK IGE RAST QL: 0
DEPRECATED CRAB IGE RAST QL: 0
DEPRECATED EGG WHITE IGE RAST QL: 0
DEPRECATED OAT IGE RAST QL: 0
DEPRECATED PEANUT IGE RAST QL: 0
DEPRECATED RYE IGE RAST QL: 0
DEPRECATED SOYBEAN IGE RAST QL: 0
DEPRECATED WHEAT IGE RAST QL: 0
EGG WHITE IGE QN: <0.1 KUA/L
OAT IGE QN: <0.1 KUA/L
PEANUT IGE QN: <0.1 KUA/L
RYE IGE QN: <0.1 KUA/L
SOYBEAN IGE QN: <0.1 KUA/L
TOTAL IGE SMQN RAST: 59 KU/L
WHEAT IGE QN: <0.1 KUA/L

## 2019-11-21 ENCOUNTER — APPOINTMENT (OUTPATIENT)
Dept: RHEUMATOLOGY | Facility: CLINIC | Age: 49
End: 2019-11-21
Payer: MEDICAID

## 2019-11-21 VITALS
HEIGHT: 68 IN | HEART RATE: 80 BPM | BODY MASS INDEX: 32.04 KG/M2 | DIASTOLIC BLOOD PRESSURE: 73 MMHG | TEMPERATURE: 98.1 F | SYSTOLIC BLOOD PRESSURE: 102 MMHG | WEIGHT: 211.38 LBS | OXYGEN SATURATION: 97 %

## 2019-11-21 DIAGNOSIS — M06.00 RHEUMATOID ARTHRITIS W/OUT RHEUMATOID FACTOR, UNSPECIFIED SITE: ICD-10-CM

## 2019-11-21 DIAGNOSIS — Z00.00 ENCOUNTER FOR GENERAL ADULT MEDICAL EXAMINATION W/OUT ABNORMAL FINDINGS: ICD-10-CM

## 2019-11-21 DIAGNOSIS — M85.80 OTHER SPECIFIED DISORDERS OF BONE DENSITY AND STRUCTURE, UNSPECIFIED SITE: ICD-10-CM

## 2019-11-21 DIAGNOSIS — R05 COUGH: ICD-10-CM

## 2019-11-21 PROCEDURE — 36415 COLL VENOUS BLD VENIPUNCTURE: CPT

## 2019-11-21 PROCEDURE — 99204 OFFICE O/P NEW MOD 45 MIN: CPT | Mod: 25

## 2019-11-21 RX ORDER — ONDANSETRON 4 MG/1
4 TABLET ORAL EVERY 6 HOURS
Qty: 30 | Refills: 2 | Status: DISCONTINUED | COMMUNITY
Start: 2019-09-30 | End: 2019-11-21

## 2019-11-21 RX ORDER — HYDROXYZINE HYDROCHLORIDE 10 MG/1
10 TABLET ORAL
Qty: 60 | Refills: 1 | Status: DISCONTINUED | COMMUNITY
Start: 2019-06-27 | End: 2019-11-21

## 2019-11-21 NOTE — REVIEW OF SYSTEMS
[Feeling Tired] : feeling tired [Recent Weight Gain (___ Lbs)] : recent [unfilled] ~Ulb weight gain [Nosebleeds] : nosebleeds [Lower Ext Edema] : lower extremity edema [Abdominal Pain] : abdominal pain [Vomiting] : vomiting [Diarrhea] : diarrhea [Heartburn] : heartburn [Arthralgias] : arthralgias [Joint Pain] : joint pain [Joint Stiffness] : joint stiffness [Easy Bleeding] : a tendency for easy bleeding [Easy Bruising] : a tendency for easy bruising [Fever] : no fever [Chills] : no chills [Feeling Poorly] : not feeling poorly [Eye Pain] : no eye pain [Red Eyes] : eyes not red [Eyesight Problems] : no eyesight problems [Discharge From Eyes] : no purulent discharge from the eyes [Dry Eyes] : no dryness of the eyes [Eyes Itch] : no itching of the eyes [Earache] : no earache [Heart Rate Is Slow] : the heart rate was not slow [Chest Pain] : no chest pain [Leg Claudication] : no intermittent leg claudication [Shortness Of Breath] : no shortness of breath [Wheezing] : no wheezing [Cough] : no cough [SOB on Exertion] : no shortness of breath during exertion [Constipation] : no constipation [Joint Swelling] : no joint swelling [Dizziness] : no dizziness [Fainting] : no fainting [Anxiety] : no anxiety [Depression] : no depression [Swollen Glands] : no swollen glands [Swollen Glands In The Neck] : no swollen glands in the neck

## 2019-11-21 NOTE — PHYSICAL EXAM
[General Appearance - Alert] : alert [General Appearance - In No Acute Distress] : in no acute distress [General Appearance - Well Nourished] : well nourished [General Appearance - Well Developed] : well developed [Sclera] : the sclera and conjunctiva were normal [PERRL With Normal Accommodation] : pupils were equal in size, round, and reactive to light [Examination Of The Oral Cavity] : the lips and gums were normal [Outer Ear] : the ears and nose were normal in appearance [Oropharynx] : the oropharynx was normal [Neck Appearance] : the appearance of the neck was normal [Neck Cervical Mass (___cm)] : no neck mass was observed [Jugular Venous Distention Increased] : there was no jugular-venous distention [Thyroid Diffuse Enlargement] : the thyroid was not enlarged [Thyroid Nodule] : there were no palpable thyroid nodules [Auscultation Breath Sounds / Voice Sounds] : lungs were clear to auscultation bilaterally [Heart Rate And Rhythm] : heart rate was normal and rhythm regular [Heart Sounds] : normal S1 and S2 [Heart Sounds Gallop] : no gallops [Murmurs] : no murmurs [Heart Sounds Pericardial Friction Rub] : no pericardial rub [Edema] : there was no peripheral edema [Veins - Varicosity Changes] : there were no varicosital changes [Abdomen Soft] : soft [Abdomen Mass (___ Cm)] : no abdominal mass palpated [Cervical Lymph Nodes Enlarged Posterior Bilaterally] : posterior cervical [Cervical Lymph Nodes Enlarged Anterior Bilaterally] : anterior cervical [Supraclavicular Lymph Nodes Enlarged Bilaterally] : supraclavicular [Abnormal Walk] : normal gait [Nail Clubbing] : no clubbing  or cyanosis of the fingernails [Musculoskeletal - Swelling] : no joint swelling seen [] : no rash [Skin Lesions] : no skin lesions [Sensation] : the sensory exam was normal to light touch and pinprick [Motor Exam] : the motor exam was normal [Oriented To Time, Place, And Person] : oriented to person, place, and time [Impaired Insight] : insight and judgment were intact [Affect] : the affect was normal [Mood] : the mood was normal [FreeTextEntry1] : b/l SI joint tenderness, + Schober test at 13cm

## 2019-11-21 NOTE — HISTORY OF PRESENT ILLNESS
[Fatigue] : fatigue [Arthralgias] : arthralgias [Myalgias] : myalgias [Muscle Weakness] : muscle weakness [Muscle Cramping] : muscle cramping [FreeTextEntry1] : Referred by Dr. Melissa  for Rheumatology consultation \par \par 48y/o F with PMHx significant for Morbid Obesity s/p Sleeve gastrectomy 2014, IPMN , GERD, Duodenal nodule, Recurrent Diverticulitis leading to partial colon resection at [facility] 12/2017, Umbilical Hernia s/p repair 2018, HTN, PE/DVT was on xarelto and then developed splenic vein thrombus in setting of pancreatitis and was switched to lovenox, Overactive bladder, R hip replacement 09/23/19. \par Pt has abdominal pain and evaluated by GI Dr. Rivera for nause and vomiting, pending abd MRI. \par She was referred by her PCP Dr Melissa for evaluation of possible diagnoses of early RA. \par Pt states [facility]  [facility] Dr. Loree Martinez twice her majority of Rheum labs were negative and pt pulled labs from portal in front of me:  reviewed negative CCP/RF  \par SSa/SSb: WNL\par IZA and ds-DNA, normal  C3/C4\par ESR 44, normal CRP\par Nuno :negative\par RPR non reactive\par Negative Lupus AC:  cardiolipin IgG, IgA and IgM\par Beta 2GP  IgG and M negative, LA negative. \par hand Xray: erosions along the left pisiform bone, no other erosions, mild DJD right basal carpal joint. \par She was started sulfasalazine 500mg BID for possible early RA, no MSK US or hand MRI done. \par Pt reports since summer has progressive hand cramps, pain in her palmar aspect\par Has trouble opening the jars, drops things, does not think SSZ ever helped with her symptoms. \par Denies finger.wrist pain or swelling of any joints, no AM stiffness. \par Has knee OA and reports pain.   \par Low back pain: states started before age 39, wakes up with back pain around 4-6AM and pain progressively gets worse around lunch time.  Walking and exercise makes it worse. \par Reviewed  Spine MRI:  mild multi level disc disease without cord compression in Thoracic spine. \par  Lumbgar soine: trace grade I degenerative anteriolisthesis at L5-S1 with mild foraminal stenosis but no cental canal stenosis, b/l L5-S1 facet joint effusions with mild surrounding inflammatory changes. \par Large hepatic hemangioma and R sided hydronephrosis unchanged. \par Mother with OA\par nephew with Lupus \par No h/o morning stiffness, memory loss, patchy hair loss, sicca symptoms, photosensitivity, HA,  Raynaud's, oral ulcers, nasal ulcers. \par \par \par  [Anorexia] : no anorexia [Weight Loss] : no weight loss [Malaise] : no malaise [Fever] : no fever [Chills] : no chills [Depression] : no depression [Malar Facial Rash] : no malar facial rash [Skin Lesions] : no lesions [Skin Nodules] : no skin nodules [Oral Ulcers] : no oral ulcers [Dry Mouth] : no dry mouth [Dysphonia] : no dysphonia [Dysphagia] : no dysphagia [Shortness of Breath] : no shortness of breath [Chest Pain] : no chest pain [Joint Swelling] : no joint swelling [Joint Warmth] : no joint warmth [Joint Deformity] : no joint deformity [Morning Stiffness] : no morning stiffness [Falls] : no falls [Difficulty Walking] : no difficulty walking [Dyspnea] : no dyspnea [Visual Changes] : no visual changes [Eye Pain] : no eye pain [Eye Redness] : no eye redness [Dry Eyes] : no dry eyes

## 2019-11-21 NOTE — ASSESSMENT
[FreeTextEntry1] : 48y/o F with PMHx significant for Morbid Obesity s/p Sleeve gastrectomy 2014, IPMN , GERD, Duodenal nodule, Recurrent Diverticulitis leading to partial colon resection at [facility] 12/2017, Umbilical Hernia s/p repair 2018, HTN, PE/DVT was on xarelto and then developed splenic vein thrombus in setting of pancreatitis and was switched to lovenox, Overactive bladder, R hip replacement 09/23/19 by Dr. Ballard, walks with a cane. \par Had extensive Rheum work up in 01/19, negative labs including Lupus AC. \par She was diagnosed with early RA based perhaps based on single erosive change seen on wrist Xray ( have a reports from pt) and started SSZ 500mg BID. \par Pt also has chronic low back pain/somewhat inflammatory origin, but never had SI joint Xray or MRI. \par She continues to experience hand cramping without pain or swelling. \par \par \par 1. Repeat hand and wrist XRay and US for justification of previously seen erosions, and eveluate for progressive disease and active synovitis/tenosynovitis. \par 2. repeat RF/CCP, c/w sulfasalazine 500mng BID for now and if diagnoses of erosive RA will advance therapy. \par Obtain records from previous Rheumatologist  Dr. Loree Martinez\par Also check Hep panel and Tb quant\par hydronephrosis reported on MRI and with chronic abd/back pain: rule out IgG4 related diseases. \par 3. Low back pain: r/u SpA check HLA B27 and SI joint Xray. \par \par \par f/u in 1 month

## 2019-11-26 LAB
ALDOLASE SERPL-CCNC: 6.5 U/L
CCP AB SER IA-ACNC: <8 UNITS
CK SERPL-CCNC: 74 U/L
CRP SERPL-MCNC: 0.51 MG/DL
ERYTHROCYTE [SEDIMENTATION RATE] IN BLOOD BY WESTERGREN METHOD: 40 MM/HR
HBV CORE IGG+IGM SER QL: NONREACTIVE
HBV SURFACE AB SER QL: NONREACTIVE
HBV SURFACE AG SER QL: NONREACTIVE
HCV AB SER QL: NONREACTIVE
HCV S/CO RATIO: 0.12 S/CO
HLA-B27 RELATED AG QL: NORMAL
IGG4 SER-MCNC: 19.1 MG/DL
M TB IFN-G BLD-IMP: ABNORMAL
MAGNESIUM SERPL-MCNC: 1.7 MG/DL
QUANTIFERON TB PLUS MITOGEN MINUS NIL: 0.12 IU/ML
QUANTIFERON TB PLUS NIL: 0.01 IU/ML
QUANTIFERON TB PLUS TB1 MINUS NIL: 0.01 IU/ML
QUANTIFERON TB PLUS TB2 MINUS NIL: 0 IU/ML
RF+CCP IGG SER-IMP: NEGATIVE
RHEUMATOID FACT SER QL: 11 IU/ML

## 2019-11-27 ENCOUNTER — EMERGENCY (EMERGENCY)
Facility: HOSPITAL | Age: 49
LOS: 1 days | Discharge: HOME | End: 2019-11-27
Attending: EMERGENCY MEDICINE | Admitting: EMERGENCY MEDICINE
Payer: COMMERCIAL

## 2019-11-27 VITALS
HEART RATE: 82 BPM | TEMPERATURE: 97 F | WEIGHT: 214.07 LBS | SYSTOLIC BLOOD PRESSURE: 111 MMHG | OXYGEN SATURATION: 100 % | DIASTOLIC BLOOD PRESSURE: 76 MMHG | RESPIRATION RATE: 18 BRPM | HEIGHT: 68 IN

## 2019-11-27 VITALS
SYSTOLIC BLOOD PRESSURE: 118 MMHG | RESPIRATION RATE: 18 BRPM | HEART RATE: 78 BPM | DIASTOLIC BLOOD PRESSURE: 76 MMHG | TEMPERATURE: 98 F | OXYGEN SATURATION: 99 %

## 2019-11-27 DIAGNOSIS — Z90.49 ACQUIRED ABSENCE OF OTHER SPECIFIED PARTS OF DIGESTIVE TRACT: Chronic | ICD-10-CM

## 2019-11-27 DIAGNOSIS — Z79.899 OTHER LONG TERM (CURRENT) DRUG THERAPY: ICD-10-CM

## 2019-11-27 DIAGNOSIS — R10.32 LEFT LOWER QUADRANT PAIN: ICD-10-CM

## 2019-11-27 DIAGNOSIS — M79.81 NONTRAUMATIC HEMATOMA OF SOFT TISSUE: ICD-10-CM

## 2019-11-27 DIAGNOSIS — I10 ESSENTIAL (PRIMARY) HYPERTENSION: ICD-10-CM

## 2019-11-27 DIAGNOSIS — Z90.3 ACQUIRED ABSENCE OF STOMACH [PART OF]: Chronic | ICD-10-CM

## 2019-11-27 DIAGNOSIS — K46.9 UNSPECIFIED ABDOMINAL HERNIA WITHOUT OBSTRUCTION OR GANGRENE: Chronic | ICD-10-CM

## 2019-11-27 LAB
ALBUMIN SERPL ELPH-MCNC: 3 G/DL — LOW (ref 3.3–5)
ALBUMIN SERPL ELPH-MCNC: 3.3 G/DL — SIGNIFICANT CHANGE UP (ref 3.3–5)
ALP SERPL-CCNC: 65 U/L — SIGNIFICANT CHANGE UP (ref 40–120)
ALP SERPL-CCNC: 73 U/L — SIGNIFICANT CHANGE UP (ref 40–120)
ALT FLD-CCNC: 21 U/L — SIGNIFICANT CHANGE UP (ref 10–45)
ALT FLD-CCNC: SIGNIFICANT CHANGE UP U/L (ref 10–45)
ANION GAP SERPL CALC-SCNC: 10 MMOL/L — SIGNIFICANT CHANGE UP (ref 5–17)
ANION GAP SERPL CALC-SCNC: 10 MMOL/L — SIGNIFICANT CHANGE UP (ref 5–17)
APPEARANCE UR: CLEAR — SIGNIFICANT CHANGE UP
APTT BLD: 28.5 SEC — SIGNIFICANT CHANGE UP (ref 27.5–36.3)
AST SERPL-CCNC: 27 U/L — SIGNIFICANT CHANGE UP (ref 10–40)
AST SERPL-CCNC: SIGNIFICANT CHANGE UP U/L (ref 10–40)
BASOPHILS # BLD AUTO: 0.03 K/UL — SIGNIFICANT CHANGE UP (ref 0–0.2)
BASOPHILS NFR BLD AUTO: 0.4 % — SIGNIFICANT CHANGE UP (ref 0–2)
BILIRUB SERPL-MCNC: 0.2 MG/DL — SIGNIFICANT CHANGE UP (ref 0.2–1.2)
BILIRUB SERPL-MCNC: 0.3 MG/DL — SIGNIFICANT CHANGE UP (ref 0.2–1.2)
BILIRUB UR-MCNC: NEGATIVE — SIGNIFICANT CHANGE UP
BUN SERPL-MCNC: 5 MG/DL — LOW (ref 7–23)
BUN SERPL-MCNC: 5 MG/DL — LOW (ref 7–23)
CALCIUM SERPL-MCNC: 8.6 MG/DL — SIGNIFICANT CHANGE UP (ref 8.4–10.5)
CALCIUM SERPL-MCNC: 9.4 MG/DL — SIGNIFICANT CHANGE UP (ref 8.4–10.5)
CHLORIDE SERPL-SCNC: 104 MMOL/L — SIGNIFICANT CHANGE UP (ref 96–108)
CHLORIDE SERPL-SCNC: 105 MMOL/L — SIGNIFICANT CHANGE UP (ref 96–108)
CO2 SERPL-SCNC: 23 MMOL/L — SIGNIFICANT CHANGE UP (ref 22–31)
CO2 SERPL-SCNC: 24 MMOL/L — SIGNIFICANT CHANGE UP (ref 22–31)
COLOR SPEC: YELLOW — SIGNIFICANT CHANGE UP
CREAT SERPL-MCNC: 0.84 MG/DL — SIGNIFICANT CHANGE UP (ref 0.5–1.3)
CREAT SERPL-MCNC: 0.86 MG/DL — SIGNIFICANT CHANGE UP (ref 0.5–1.3)
DIFF PNL FLD: NEGATIVE — SIGNIFICANT CHANGE UP
EOSINOPHIL # BLD AUTO: 0.09 K/UL — SIGNIFICANT CHANGE UP (ref 0–0.5)
EOSINOPHIL NFR BLD AUTO: 1.2 % — SIGNIFICANT CHANGE UP (ref 0–6)
GLUCOSE SERPL-MCNC: 127 MG/DL — HIGH (ref 70–99)
GLUCOSE SERPL-MCNC: 152 MG/DL — HIGH (ref 70–99)
GLUCOSE UR QL: NEGATIVE — SIGNIFICANT CHANGE UP
HCT VFR BLD CALC: 38.2 % — SIGNIFICANT CHANGE UP (ref 34.5–45)
HGB BLD-MCNC: 12.2 G/DL — SIGNIFICANT CHANGE UP (ref 11.5–15.5)
IMM GRANULOCYTES NFR BLD AUTO: 1.1 % — SIGNIFICANT CHANGE UP (ref 0–1.5)
INR BLD: 1.01 — SIGNIFICANT CHANGE UP (ref 0.88–1.16)
KETONES UR-MCNC: NEGATIVE — SIGNIFICANT CHANGE UP
LEUKOCYTE ESTERASE UR-ACNC: NEGATIVE — SIGNIFICANT CHANGE UP
LIDOCAIN IGE QN: 24 U/L — SIGNIFICANT CHANGE UP (ref 7–60)
LYMPHOCYTES # BLD AUTO: 2.29 K/UL — SIGNIFICANT CHANGE UP (ref 1–3.3)
LYMPHOCYTES # BLD AUTO: 31 % — SIGNIFICANT CHANGE UP (ref 13–44)
MCHC RBC-ENTMCNC: 30 PG — SIGNIFICANT CHANGE UP (ref 27–34)
MCHC RBC-ENTMCNC: 31.9 GM/DL — LOW (ref 32–36)
MCV RBC AUTO: 93.9 FL — SIGNIFICANT CHANGE UP (ref 80–100)
MONOCYTES # BLD AUTO: 0.53 K/UL — SIGNIFICANT CHANGE UP (ref 0–0.9)
MONOCYTES NFR BLD AUTO: 7.2 % — SIGNIFICANT CHANGE UP (ref 2–14)
NEUTROPHILS # BLD AUTO: 4.36 K/UL — SIGNIFICANT CHANGE UP (ref 1.8–7.4)
NEUTROPHILS NFR BLD AUTO: 59.1 % — SIGNIFICANT CHANGE UP (ref 43–77)
NITRITE UR-MCNC: NEGATIVE — SIGNIFICANT CHANGE UP
NRBC # BLD: 0 /100 WBCS — SIGNIFICANT CHANGE UP (ref 0–0)
PH UR: 6.5 — SIGNIFICANT CHANGE UP (ref 5–8)
PLATELET # BLD AUTO: 302 K/UL — SIGNIFICANT CHANGE UP (ref 150–400)
POTASSIUM SERPL-MCNC: 3.4 MMOL/L — LOW (ref 3.5–5.3)
POTASSIUM SERPL-MCNC: 4.6 MMOL/L — SIGNIFICANT CHANGE UP (ref 3.5–5.3)
POTASSIUM SERPL-SCNC: 3.4 MMOL/L — LOW (ref 3.5–5.3)
POTASSIUM SERPL-SCNC: 4.6 MMOL/L — SIGNIFICANT CHANGE UP (ref 3.5–5.3)
PROT SERPL-MCNC: 6.1 G/DL — SIGNIFICANT CHANGE UP (ref 6–8.3)
PROT SERPL-MCNC: 6.9 G/DL — SIGNIFICANT CHANGE UP (ref 6–8.3)
PROT UR-MCNC: NEGATIVE MG/DL — SIGNIFICANT CHANGE UP
PROTHROM AB SERPL-ACNC: 11.4 SEC — SIGNIFICANT CHANGE UP (ref 10–12.9)
RBC # BLD: 4.07 M/UL — SIGNIFICANT CHANGE UP (ref 3.8–5.2)
RBC # FLD: 14.6 % — HIGH (ref 10.3–14.5)
SODIUM SERPL-SCNC: 137 MMOL/L — SIGNIFICANT CHANGE UP (ref 135–145)
SODIUM SERPL-SCNC: 139 MMOL/L — SIGNIFICANT CHANGE UP (ref 135–145)
SP GR SPEC: 1.02 — SIGNIFICANT CHANGE UP (ref 1–1.03)
UROBILINOGEN FLD QL: 0.2 E.U./DL — SIGNIFICANT CHANGE UP
WBC # BLD: 7.38 K/UL — SIGNIFICANT CHANGE UP (ref 3.8–10.5)
WBC # FLD AUTO: 7.38 K/UL — SIGNIFICANT CHANGE UP (ref 3.8–10.5)

## 2019-11-27 PROCEDURE — 96374 THER/PROPH/DIAG INJ IV PUSH: CPT | Mod: XU

## 2019-11-27 PROCEDURE — 80053 COMPREHEN METABOLIC PANEL: CPT

## 2019-11-27 PROCEDURE — 74177 CT ABD & PELVIS W/CONTRAST: CPT

## 2019-11-27 PROCEDURE — 99285 EMERGENCY DEPT VISIT HI MDM: CPT

## 2019-11-27 PROCEDURE — 85730 THROMBOPLASTIN TIME PARTIAL: CPT

## 2019-11-27 PROCEDURE — 96375 TX/PRO/DX INJ NEW DRUG ADDON: CPT

## 2019-11-27 PROCEDURE — 96361 HYDRATE IV INFUSION ADD-ON: CPT

## 2019-11-27 PROCEDURE — 85025 COMPLETE CBC W/AUTO DIFF WBC: CPT

## 2019-11-27 PROCEDURE — 99284 EMERGENCY DEPT VISIT MOD MDM: CPT | Mod: 25

## 2019-11-27 PROCEDURE — 85610 PROTHROMBIN TIME: CPT

## 2019-11-27 PROCEDURE — 81003 URINALYSIS AUTO W/O SCOPE: CPT

## 2019-11-27 PROCEDURE — 83690 ASSAY OF LIPASE: CPT

## 2019-11-27 PROCEDURE — 86140 C-REACTIVE PROTEIN: CPT

## 2019-11-27 PROCEDURE — 74177 CT ABD & PELVIS W/CONTRAST: CPT | Mod: 26

## 2019-11-27 PROCEDURE — 36415 COLL VENOUS BLD VENIPUNCTURE: CPT

## 2019-11-27 PROCEDURE — 85652 RBC SED RATE AUTOMATED: CPT

## 2019-11-27 RX ORDER — ONDANSETRON 8 MG/1
4 TABLET, FILM COATED ORAL ONCE
Refills: 0 | Status: COMPLETED | OUTPATIENT
Start: 2019-11-27 | End: 2019-11-27

## 2019-11-27 RX ORDER — SODIUM CHLORIDE 9 MG/ML
1000 INJECTION INTRAMUSCULAR; INTRAVENOUS; SUBCUTANEOUS ONCE
Refills: 0 | Status: COMPLETED | OUTPATIENT
Start: 2019-11-27 | End: 2019-11-27

## 2019-11-27 RX ORDER — MORPHINE SULFATE 50 MG/1
4 CAPSULE, EXTENDED RELEASE ORAL ONCE
Refills: 0 | Status: DISCONTINUED | OUTPATIENT
Start: 2019-11-27 | End: 2019-11-27

## 2019-11-27 RX ADMIN — SODIUM CHLORIDE 1000 MILLILITER(S): 9 INJECTION INTRAMUSCULAR; INTRAVENOUS; SUBCUTANEOUS at 21:34

## 2019-11-27 RX ADMIN — MORPHINE SULFATE 4 MILLIGRAM(S): 50 CAPSULE, EXTENDED RELEASE ORAL at 16:01

## 2019-11-27 RX ADMIN — ONDANSETRON 4 MILLIGRAM(S): 8 TABLET, FILM COATED ORAL at 16:01

## 2019-11-27 RX ADMIN — SODIUM CHLORIDE 1000 MILLILITER(S): 9 INJECTION INTRAMUSCULAR; INTRAVENOUS; SUBCUTANEOUS at 16:01

## 2019-11-27 RX ADMIN — MORPHINE SULFATE 4 MILLIGRAM(S): 50 CAPSULE, EXTENDED RELEASE ORAL at 17:00

## 2019-11-27 NOTE — ED PROVIDER NOTE - GASTROINTESTINAL, MLM
Extensive hematoma present to lower abdomen, more present in the LLQ with 4cm firm area, no erythema, no increased warmth, no streaking. Remainder of abdomen is soft, non-tender. + hematoma present to lower abdomen from LLQ  to midline with 4cm firm area to center, no erythema, no increased warmth, no streaking. Remainder of abdomen is soft, non-tender.

## 2019-11-27 NOTE — ED ADULT NURSE NOTE - CHPI ED NUR SYMPTOMS NEG
no vomiting/no chills/no rectal pain/no blood in mucus/no bleeding at site/no purulent drainage/no fever/no drainage

## 2019-11-27 NOTE — ED PROVIDER NOTE - PATIENT PORTAL LINK FT
You can access the FollowMyHealth Patient Portal offered by [facility] by registering at the following website: [email or web address]. By joining [facility] [employer]’s FollowMyHealth portal, you will also be able to view your health information using other applications (apps) compatible with our system.

## 2019-11-27 NOTE — CONSULT NOTE ADULT - SUBJECTIVE AND OBJECTIVE BOX
Pt Name: INDIRA VELÁSQUEZ  MRN: [id number]    The patient was seen and examined. 49F with R SANDY in 09/2019 by Dr. Ballard, evaluated in ED for abdominal pain. Workup was negative, however during CT scan incidental finding of fluid colletion in R hip noted by radiology. Ortho consulted for clinical correlation. Patient denies any worsening pain, able to ambulate on RLE. Uses cane after surgery. Denies fevers/chills. Patient last saw Dr. Richter in 1st post op appointment 2 weeks after surge[place].     ROS is otherwise negative.  PAST MEDICAL & SURGICAL HISTORY:  Neuropathy  Pancreatitis  HTN (hypertension)  Pulmonary embolism  DVT (deep venous thrombosis): left leg  History of cholecystectomy  History of colon resection: diverticulitis  Abdominal hernia  History of sleeve gastrectomy      Allergies: NKDA    Medications:      Social History:  Ambulation: Walking independently    PHYSICAL EXAM:    T(C): 36.2 (11-27-19 @ 13:57), Max: 36.2 (11-27-19 @ 13:57)  HR: 82 (11-27-19 @ 13:57) (82 - 82)  BP: 111/76 (11-27-19 @ 13:57) (111/76 - 111/76)  RR: 18 (11-27-19 @ 13:57) (18 - 18)  SpO2: 100% (11-27-19 @ 13:57) (100% - 100%)  Wt(kg): --    Gen: well developed, well nourished, comfortable  Affected extremity: RLE  hip incision well healed, no skin breakdown or dehiscence  Hip flexion 110  IR 20  ER 40       Motor: 5/5 GS/TA/EHL      Sensation: SILT sp/dp/carty/saph/t      wwp <2 sec cap refill     Labs:                        12.2   7.38  )-----------( 302      ( 27 Nov 2019 15:48 )             38.2     11-27    139  |  105  |  5<L>  ----------------------------<  127<H>  3.4<L>   |  24  |  0.84    Ca    8.6      27 Nov 2019 17:34    TPro  6.1  /  Alb  3.0<L>  /  TBili  0.2  /  DBili  x   /  AST  27  /  ALT  21  /  AlkPhos  65  11-27    < from: CT Abdomen and Pelvis w/ IV Cont (11.27.19 @ 18:08) >    Impression: 1. Since 9/4/2019, there has been right total hip   replacement. A fluid collection in the lateral right hip could be sterile   or infected postoperative seroma or hematoma.    2. There are 4.4 cm and 1.5 cm hematomas within subcutaneous fat of left   lower quadrant. Skin thickening and infiltration of the overlying fat in   the left lower abdominal wall could also represent hematoma and/or   cellulitis.    < end of copied text >      A/P  Pt is a 50yo Female s/p R SANDY 09/2019 presenting to ED for workup of abdominal pain, incidental finding of R hip fluid collection. Low suspiscion for septic joint  -WBAT RLE  -Please return to ED immediately if hip pain worsens, develop fevers/chills, signs of infection  -Please make follow up appointment with Dr. Ballard  d/w attending Dr. Ballard

## 2019-11-27 NOTE — ED PROVIDER NOTE - CLINICAL SUMMARY MEDICAL DECISION MAKING FREE TEXT BOX
lower abd pain likley hematoma from injection of lovenox. less likley abscess. no increase warmth or fever. labs noted. ct scan done and + hematoma to lower abd tissue. Also notes fluid to right hip. pt reports right hip pain s/p surgery in 9/2019 by Dr Ballard. will consult ortho.

## 2019-11-27 NOTE — ED PROVIDER NOTE - NSFOLLOWUPINSTRUCTIONS_ED_ALL_ED_FT
Please follow up with your Primary Care Physician on Friday.  Please follow up with Dr Ballard on Friday or after the weekend.  Continue your usual medications.  Return to the Emergency Department if you have any new or worsening symptoms, or if you have any concerns.  __________________________________________________________________    HEMATOMA - AfterCare(R) Instructions(ER/ED)     Hematoma    WHAT YOU NEED TO KNOW:    A hematoma is a collection of blood. A bruise is a type of hematoma. A hematoma may form in a muscle or in the tissues just under the skin. A hematoma that forms under the skin will feel like a bump or hard mass. Hematomas can happen anywhere in your body, including in your brain. Your body may break down and absorb a mild hematoma on its own. A more serious hematoma may need treatment.     DISCHARGE INSTRUCTIONS:    Medicines: You may need any of the following:     Prescription pain medicine may be given. Ask how to take this medicine safely.      NSAIDs, such as ibuprofen, help decrease swelling, pain, and fever. This medicine is available with or without a doctor's order. NSAIDs can cause stomach bleeding or kidney problems in certain people. If you take blood thinner medicine, always ask your healthcare provider if NSAIDs are safe for you. Always read the medicine label and follow directions.      Antibiotics prevent or treat a bacterial infection.      Take your medicine as directed. Contact your healthcare provider if you think your medicine is not helping or if you have side effects. Tell him of her if you are allergic to any medicine. Keep a list of the medicines, vitamins, and herbs you take. Include the amounts, and when and why you take them. Bring the list or the pill bottles to follow-up visits. Carry your medicine list with you in case of an emergency.    Return to the emergency department if:     You have new or worsening pain, or pain that does not get better with medicine.      You have a fever.      You have trouble moving the body part that has the hematoma.    Contact your healthcare provider if:     You have questions or concerns about your condition or care.        Follow up with your healthcare provider as directed: You may need to have surgery if your hematoma is severe. You may also need other tests to make sure there is no other damage that needs to be treated. Write down your questions so you remember to ask them during your visits.    Self-care:     Rest the area. Rest will help your body heal and will also help prevent more damage.      Apply ice as directed. Ice helps reduce swelling. Ice may also help prevent tissue damage. Use an ice pack, or put crushed ice in a bag. Cover it with a towel. Place it on your hematoma for 20 minutes every hour, or as directed. Ask how many times each day to apply ice, and for how many days.      Compress the injury if possible. Lightly wrap the injury with an elastic or soft bandage. This may help control swelling. Ask your healthcare provider how to wrap your injury properly.       Elevate the area as directed. If possible, raise the area above the level of your heart as often as you can. This will help decrease swelling.       Keep the hematoma covered with a bandage. This will help protect the area while it heals.

## 2019-11-27 NOTE — ED PROVIDER NOTE - PROGRESS NOTE DETAILS
Director - pt signed out to Dr Mello pending ct results.  KAVITA Daniel will cont to follow. [facility] signout from KAVITA Lainez; abdominal pain suspected to be due to small injection site hematoma, no evidence of infection - incidental finding of right hip fluid on CT scan; s/p THR and on LMWH.  Has had mild persistent right hip pain and swelling since the surgery, but no acute worsening; no  erythema/warmth/swelling on exam. and ROM is full.  CRP 0.47  Seen by ortho, cleared for DC to follow up with Dr Ballard in the office.

## 2019-11-27 NOTE — ED ADULT NURSE NOTE - OBJECTIVE STATEMENT
Patient aox3 and ambulatory. Patient c/o extensive bruising to lower abdomen where patient gives herself Lovenox injections x4days. Patient states "I have been on Lovenox for 4 months and have been on blood thinners since 2002." Patient states "I have DVTs and a PE since 2002." Patient states "the bruising got really bad and I feel like I have knots in my stomach too." PMH: htn, gastric bypass, knee replacement, DVT, PE  Patient denies CO, SOB, N/V/D, fall, LOC, dizziness, weakness. Patient has clear bilateral lung sounds. Bruising noted to lower abdomen with hard knots in medial lower area.

## 2019-11-27 NOTE — ED ADULT NURSE NOTE - PSH
Abdominal hernia    History of cholecystectomy    History of colon resection  diverticulitis  History of sleeve gastrectomy

## 2019-11-27 NOTE — ED PROVIDER NOTE - ATTENDING CONTRIBUTION TO CARE
50 yo female h/o HTN, gastric bypass surgery, DVT and PE (on Lovenox) c/o 3 days tender mass llq and increased bruising at prior lovenox injection sites.  + n w/o v/d, no other bleeding/bruising, no dysuria, no cp, palpitations, sob, dizziness/lh, weakness.  Well appearing, nad, nc/at, heart reg, abd soft, mild ttp over hematoma llq and extensive ecchymosis (old) L lower abd, ext no gross deformity, no gross neuro deficits   Suspect pt w hematoma rather than abscess or more sig intra-abd process; plan ct abd, labs.  Likely dc home.

## 2019-11-27 NOTE — ED PROVIDER NOTE - OBJECTIVE STATEMENT
50 y/o F with h/o HTN, gastric bypass surgery, DVT and PE (on Lovenox) presents to the ED complaining of 3 days of lower abdominal pain with increased bruising and masses. Pt reports injecting her lower abdomen with Lovenox for 4 months and has tried switching the site of injection, but mainly injects it to her lower abdomen. Pt endorses nausea, but denies fever, chills, diarrhea, constipation, urinary symptoms or back pain. 50 y/o F with h/o HTN, gastric bypass surgery, DVT and PE (on Lovenox) presents to the ED complaining of 3 days of lower abdominal pain with increased bruising and pt reports she feels a mass to area. Pt reports she has been injecting her lower abdomen with Lovenox for 4 months and has tried switching the site of injection, but mainly injects it to her lower abdomen. Pt endorses nausea, but denies fever, chills, diarrhea, constipation, urinary symptoms or back pain. no ha or dizziness. no weakness or fatigue. no cp ro sob.

## 2019-11-27 NOTE — ED ADULT NURSE NOTE - PMH
DVT (deep venous thrombosis)  left leg  HTN (hypertension)    Neuropathy    Pancreatitis    Pulmonary embolism

## 2019-12-01 ENCOUNTER — FORM ENCOUNTER (OUTPATIENT)
Age: 49
End: 2019-12-01

## 2019-12-02 ENCOUNTER — APPOINTMENT (OUTPATIENT)
Dept: RADIOLOGY | Facility: CLINIC | Age: 49
End: 2019-12-02
Payer: MEDICAID

## 2019-12-02 ENCOUNTER — APPOINTMENT (OUTPATIENT)
Dept: ULTRASOUND IMAGING | Facility: CLINIC | Age: 49
End: 2019-12-02
Payer: MEDICAID

## 2019-12-02 ENCOUNTER — APPOINTMENT (OUTPATIENT)
Dept: MRI IMAGING | Facility: CLINIC | Age: 49
End: 2019-12-02
Payer: MEDICAID

## 2019-12-02 ENCOUNTER — OUTPATIENT (OUTPATIENT)
Dept: OUTPATIENT SERVICES | Facility: HOSPITAL | Age: 49
LOS: 1 days | End: 2019-12-02

## 2019-12-02 DIAGNOSIS — Z90.3 ACQUIRED ABSENCE OF STOMACH [PART OF]: Chronic | ICD-10-CM

## 2019-12-02 DIAGNOSIS — Z90.49 ACQUIRED ABSENCE OF OTHER SPECIFIED PARTS OF DIGESTIVE TRACT: Chronic | ICD-10-CM

## 2019-12-02 DIAGNOSIS — K46.9 UNSPECIFIED ABDOMINAL HERNIA WITHOUT OBSTRUCTION OR GANGRENE: Chronic | ICD-10-CM

## 2019-12-02 LAB
BASOPHILS # BLD AUTO: 0.1 K/UL
BASOPHILS NFR BLD AUTO: 2.6 %
EOSINOPHIL # BLD AUTO: 0.03 K/UL
EOSINOPHIL NFR BLD AUTO: 0.9 %
FOLATE SERPL-MCNC: 12.5 NG/ML
HCT VFR BLD CALC: 37.8 %
HGB BLD-MCNC: 11.2 G/DL
LYMPHOCYTES # BLD AUTO: 1.37 K/UL
LYMPHOCYTES NFR BLD AUTO: 35.3 %
MAN DIFF?: NORMAL
MCHC RBC-ENTMCNC: 29.3 PG
MCHC RBC-ENTMCNC: 29.6 GM/DL
MCV RBC AUTO: 99 FL
MONOCYTES # BLD AUTO: 0.3 K/UL
MONOCYTES NFR BLD AUTO: 7.8 %
NEUTROPHILS # BLD AUTO: 2.01 K/UL
NEUTROPHILS NFR BLD AUTO: 51.7 %
PLATELET # BLD AUTO: 265 K/UL
RBC # BLD: 3.82 M/UL
RBC # FLD: 15.1 %
VIT B12 SERPL-MCNC: 390 PG/ML
WBC # FLD AUTO: 3.88 K/UL

## 2019-12-02 PROCEDURE — 93970 EXTREMITY STUDY: CPT | Mod: 26

## 2019-12-02 PROCEDURE — 72202 X-RAY EXAM SI JOINTS 3/> VWS: CPT | Mod: 26

## 2019-12-02 PROCEDURE — 73110 X-RAY EXAM OF WRIST: CPT | Mod: 26,LT,RT

## 2019-12-02 PROCEDURE — 74183 MRI ABD W/O CNTR FLWD CNTR: CPT | Mod: 26

## 2019-12-02 PROCEDURE — 73130 X-RAY EXAM OF HAND: CPT | Mod: 26,LT,RT

## 2019-12-02 PROCEDURE — 73130 X-RAY EXAM OF HAND: CPT | Mod: 26,RT

## 2019-12-02 PROCEDURE — 73110 X-RAY EXAM OF WRIST: CPT | Mod: 26,RT

## 2019-12-10 ENCOUNTER — RX RENEWAL (OUTPATIENT)
Age: 49
End: 2019-12-10

## 2019-12-10 ENCOUNTER — APPOINTMENT (OUTPATIENT)
Dept: ORTHOPEDIC SURGERY | Facility: CLINIC | Age: 49
End: 2019-12-10
Payer: MEDICAID

## 2019-12-10 PROCEDURE — 99024 POSTOP FOLLOW-UP VISIT: CPT

## 2019-12-17 ENCOUNTER — APPOINTMENT (OUTPATIENT)
Dept: NEUROSURGERY | Facility: CLINIC | Age: 49
End: 2019-12-17

## 2019-12-23 ENCOUNTER — APPOINTMENT (OUTPATIENT)
Dept: RHEUMATOLOGY | Facility: CLINIC | Age: 49
End: 2019-12-23

## 2019-12-24 ENCOUNTER — APPOINTMENT (OUTPATIENT)
Dept: INTERNAL MEDICINE | Facility: CLINIC | Age: 49
End: 2019-12-24
Payer: MEDICAID

## 2019-12-24 VITALS
OXYGEN SATURATION: 98 % | TEMPERATURE: 98.6 F | HEIGHT: 68 IN | BODY MASS INDEX: 33.04 KG/M2 | SYSTOLIC BLOOD PRESSURE: 136 MMHG | DIASTOLIC BLOOD PRESSURE: 83 MMHG | HEART RATE: 86 BPM | WEIGHT: 218 LBS

## 2019-12-24 DIAGNOSIS — M62.838 OTHER MUSCLE SPASM: ICD-10-CM

## 2019-12-24 DIAGNOSIS — R11.2 NAUSEA WITH VOMITING, UNSPECIFIED: ICD-10-CM

## 2019-12-24 PROCEDURE — 99215 OFFICE O/P EST HI 40 MIN: CPT | Mod: 25

## 2019-12-24 PROCEDURE — 96372 THER/PROPH/DIAG INJ SC/IM: CPT

## 2019-12-24 RX ORDER — CYANOCOBALAMIN 1000 UG/ML
1000 INJECTION INTRAMUSCULAR; SUBCUTANEOUS
Qty: 0 | Refills: 0 | Status: COMPLETED | OUTPATIENT
Start: 2019-12-24

## 2019-12-24 RX ORDER — OXYCODONE AND ACETAMINOPHEN 5; 325 MG/1; MG/1
5-325 TABLET ORAL
Qty: 40 | Refills: 0 | Status: COMPLETED | COMMUNITY
Start: 2019-10-04 | End: 2019-12-24

## 2019-12-24 RX ADMIN — CYANOCOBALAMIN 1000 MCG/ML: 1000 INJECTION, SOLUTION INTRAMUSCULAR; SUBCUTANEOUS at 00:00

## 2019-12-27 PROBLEM — R11.2 NAUSEA AND VOMITING: Status: ACTIVE | Noted: 2019-11-11

## 2019-12-27 RX ORDER — ROPINIROLE 0.5 MG/1
0.5 TABLET, FILM COATED ORAL
Qty: 60 | Refills: 0 | Status: COMPLETED | COMMUNITY
Start: 2019-12-21

## 2019-12-27 RX ORDER — PROMETHAZINE HYDROCHLORIDE 25 MG/1
25 TABLET ORAL 3 TIMES DAILY
Qty: 45 | Refills: 0 | Status: COMPLETED | COMMUNITY
Start: 2019-11-11 | End: 2019-12-27

## 2020-01-02 ENCOUNTER — APPOINTMENT (OUTPATIENT)
Dept: ULTRASOUND IMAGING | Facility: CLINIC | Age: 50
End: 2020-01-02

## 2020-01-14 NOTE — PHYSICAL EXAM
[General Appearance - Alert] : alert [General Appearance - In No Acute Distress] : in no acute distress [General Appearance - Well Nourished] : well nourished [General Appearance - Well-Appearing] : healthy appearing [Sclera] : the sclera and conjunctiva were normal [Outer Ear] : the ears and nose were normal in appearance [Neck Appearance] : the appearance of the neck was normal vinod jordan TIUP  A: cephalic   B: delivered footling breech    b/l WNL adnexa [Heart Sounds] : normal S1 and S2 [Abdomen Soft] : soft [Bowel Sounds] : normal bowel sounds [Abdomen Mass (___ Cm)] : no abdominal mass palpated [Abnormal Walk] : normal gait [Skin Color & Pigmentation] : normal skin color and pigmentation [] : no rash [Oriented To Time, Place, And Person] : oriented to person, place, and time [Affect] : the affect was normal [No Focal Deficits] : no focal deficits [FreeTextEntry1] : mild epigastric tenderness on deep palpation

## 2020-01-14 NOTE — HISTORY OF PRESENT ILLNESS
[Heartburn] : denies heartburn [Diarrhea] : stable diarrhea [Vomiting] : vomiting worsened [Nausea] : nausea worsened [Yellow Skin Or Eyes (Jaundice)] : denies jaundice [Abdominal Pain] : abdominal pain worsened [Constipation] : denies constipation [Rectal Pain] : denies rectal pain [Abdominal Swelling] : denies abdominal swelling [Wt Loss ___ Lbs] : recent [unfilled] ~Upound(s) weight loss [Wt Gain ___ Lbs] : no recent weight gain [de-identified] : 49yr old F with PMHx significant for MOrbid Obesity sp Sleeve gastrectomy 2014, IPMN , GERD, Duodenal nodule, Recurrent Diverticulitis leading to partial colon resection at [facility] [place] [facility] [place]/2017, Umbilical Hernia sp repair 2018, HTN, PE/DVT was on xarelto and then developed splenic vein thrombus in setting of pancreatitis and was switched to lovenox, Overactive bladder, R hip replacement 09/23/19, who was referred by her PCP Dr Melissa for evaluation of nausea, and abdominal pain.\par Patient reports abdominal pain for the last 2 months, in a paraumbilical distribution, dull, achy, constant, having a bowel movement alleviates the pain, and also sleeping sometimes helps, nil radiation, and no identified aggravating factors. She has also had associated nausea and intermittent emesis for the last 2 months. Nausea is persistent and occurs daily, emesis happens at least once a day, reports that she throws up solid food every time she eats, can only tolerate small amounts of broth), food smells trigger her nausea also, has lost about 10lbs over 1 month. Nausea and qre6teo are alleviated by zofran,\par Bowel movements have been Charlotte stool scale Type 6 and 7 and she reports having 3-4bowel movements/day, for most of her life. She also reports feeling bloated, but not able to tell about distention.\par Denies associated dysphagia, odynophagia, jaundice, pruritus, tenesmus, constipation, bleeding, fever, chills, sick contacts, recent travel\par \par FHx - Hx of pancreatic cancer in maternal aunt\par Denies esophageal/stomach/colon cancer, IBD, liver problems\par \par SHx - Denies toxic or illicit habits\par \par EGD - by Dr Apodaca 7/8/19 - small duodenal nodule sp biopsy\par EGD - 11/2018, gastritis, sleeve anatomy, duodenal nodule sp biopsy - sp biopsy - with path consistent with normal duodenal tissue\par Colonoscopy - 11/2018 - diverticulosis in sigmoid, \par Colonoscopy - 5/9/2017 - pan diverticulosis\par \par CT ABD/PELVIS - 5/24/19 - Near complete resolution of fluid/inflammatory stranding centered about the pancreatic head. Mild amount of fluid remains in this area. There has been an interval development of a 2.1cm complex collection abutting the posterior aspect of the pancreatic body, with partial spenic vein thrombosis. Hepatic hemangiomas measuring up to 8cm.\par MRI ABD/PELVIS - 10/24/18 - 8mm cystic lesion in body of pancreas compatible with side branch IPMN, with no worrisome MRI features, normal caliber PD, sp lap amador, no biliary ductal dilation or choledocholithiasis, severe geographic hepatic steatosis.

## 2020-01-14 NOTE — ASSESSMENT
[FreeTextEntry1] : 49yr old F with PMHx significant for Morbid Obesity sp Sleeve gastrectomy 2014, IPMN , GERD, Duodenal nodule, Recurrent Diverticulitis leading to partial colon resection at [facility] [place] [facility] 12/2017, Umbilical Hernia sp repair 2018, HTN, PE/DVT was on xarelto and then developed splenic vein thrombus in setting of pancreatitis and was switched to lovenox, Overactive bladder, R hip replacement 09/23/19, who was referred by her PCP Dr Melissa for evaluation of nausea, and abdominal pain.\par \par # Abdominal pain - \par - ddx - pancreatitis, PUD, functional stenosis at high pressure zone in sleeve, IBS-D\par - has some relief with defecation, but has been going on for 2 months so does not technically meet criteria for IBS-D (although she does endorse symptoms lingering since she had her sleeve gastrectomy)\par - will get MRI abd/pelvis pancreas protocol to evaluate this complex cystic collection in her pancreas \par - if any worrisome features will consider planning for an EUS with FNA\par - warm abdominal compress for pain\par - will get food allergy testing panel to evaluate for any food allergies as the triggers for her pain\par \par # Nausea -\par - persistent \par - might be related to functional obstruction due to sleeve gastrectomy\par - will switch her from Zofran to Phenergan 12.5mg TID PRN and can increase as needed, for symptom control\par - discussed possible side effects of sleepiness with phenergan\par - advised patient to take 30mins before meals to see if it helps with po intake\par \par # Screening colonoscopy - \par - last c-scope 11/2018\par - patient states that she had some colonic resection in the past - but imaging and c-scope reports not in keeping with this hx unclear what to make of it\par - next -c-scope if 5yr follow up will be in 2023\par \par - blood drawn in office \par RTC depending on MRI result and food allergy panel

## 2020-01-16 NOTE — DISCUSSION/SUMMARY
[de-identified] : - no restrictions\par - Celebrex ordered to help with pain\par - ice as needed\par - continue PT\par - follow up in three months\par \par \par All medical record entries made by the PA/Elsy/Fellow are at my, Dr. Bolivar Ballard's direction and personally dictated by me on 12/10/2019]. I have reviewed the chart and agree that the record accurately reflects my personal performance of the history, physical exam, assessment, and plan. I have also personally directed reviewed, and agreed with the chart.\par

## 2020-01-16 NOTE — HISTORY OF PRESENT ILLNESS
[de-identified] : 49 F presents for 3 mos follow up of her right SANDY. Patient says she is currently in PT. She is using a cane for long distances. Her pain is minimal and only when she "over does it" at the gym. She denies any fevers or chills. No complaints.

## 2020-01-16 NOTE — PHYSICAL EXAM
[de-identified] : NAD [de-identified] : RLE: Incision well healed. No signs of infection. No ttp. Smooth, pain free ROM. 5/5 strength with hip flexion. NVI distally

## 2020-01-23 ENCOUNTER — RESULT REVIEW (OUTPATIENT)
Age: 50
End: 2020-01-23

## 2020-01-23 LAB
MISCELLANEOUS TEST: NORMAL
PROC NAME: NORMAL

## 2020-01-24 ENCOUNTER — APPOINTMENT (OUTPATIENT)
Dept: INTERNAL MEDICINE | Facility: CLINIC | Age: 50
End: 2020-01-24
Payer: MEDICAID

## 2020-01-24 ENCOUNTER — LABORATORY RESULT (OUTPATIENT)
Age: 50
End: 2020-01-24

## 2020-01-24 VITALS
HEIGHT: 68 IN | TEMPERATURE: 99.1 F | DIASTOLIC BLOOD PRESSURE: 83 MMHG | BODY MASS INDEX: 34.25 KG/M2 | OXYGEN SATURATION: 96 % | HEART RATE: 86 BPM | SYSTOLIC BLOOD PRESSURE: 149 MMHG | WEIGHT: 226 LBS

## 2020-01-24 PROCEDURE — 93000 ELECTROCARDIOGRAM COMPLETE: CPT

## 2020-01-24 PROCEDURE — 99214 OFFICE O/P EST MOD 30 MIN: CPT | Mod: 25

## 2020-01-24 PROCEDURE — 36415 COLL VENOUS BLD VENIPUNCTURE: CPT

## 2020-01-24 PROCEDURE — 96372 THER/PROPH/DIAG INJ SC/IM: CPT

## 2020-01-24 RX ADMIN — CYANOCOBALAMIN 1000 MCG/ML: 1000 INJECTION INTRAMUSCULAR; SUBCUTANEOUS at 00:00

## 2020-01-25 RX ORDER — CYANOCOBALAMIN 1000 UG/ML
1000 INJECTION INTRAMUSCULAR; SUBCUTANEOUS
Qty: 0 | Refills: 0 | Status: COMPLETED | OUTPATIENT
Start: 2020-01-24

## 2020-02-01 ENCOUNTER — OUTPATIENT (OUTPATIENT)
Dept: OUTPATIENT SERVICES | Facility: HOSPITAL | Age: 50
LOS: 1 days | End: 2020-02-01
Payer: MEDICAID

## 2020-02-01 DIAGNOSIS — Z90.49 ACQUIRED ABSENCE OF OTHER SPECIFIED PARTS OF DIGESTIVE TRACT: Chronic | ICD-10-CM

## 2020-02-01 DIAGNOSIS — K46.9 UNSPECIFIED ABDOMINAL HERNIA WITHOUT OBSTRUCTION OR GANGRENE: Chronic | ICD-10-CM

## 2020-02-01 DIAGNOSIS — Z90.3 ACQUIRED ABSENCE OF STOMACH [PART OF]: Chronic | ICD-10-CM

## 2020-02-01 PROCEDURE — G9001: CPT

## 2020-02-02 ENCOUNTER — FORM ENCOUNTER (OUTPATIENT)
Age: 50
End: 2020-02-02

## 2020-02-03 ENCOUNTER — OUTPATIENT (OUTPATIENT)
Dept: OUTPATIENT SERVICES | Facility: HOSPITAL | Age: 50
LOS: 1 days | End: 2020-02-03
Payer: COMMERCIAL

## 2020-02-03 ENCOUNTER — APPOINTMENT (OUTPATIENT)
Dept: ORTHOPEDIC SURGERY | Facility: CLINIC | Age: 50
End: 2020-02-03
Payer: MEDICAID

## 2020-02-03 DIAGNOSIS — Z90.49 ACQUIRED ABSENCE OF OTHER SPECIFIED PARTS OF DIGESTIVE TRACT: Chronic | ICD-10-CM

## 2020-02-03 DIAGNOSIS — K46.9 UNSPECIFIED ABDOMINAL HERNIA WITHOUT OBSTRUCTION OR GANGRENE: Chronic | ICD-10-CM

## 2020-02-03 DIAGNOSIS — Z90.3 ACQUIRED ABSENCE OF STOMACH [PART OF]: Chronic | ICD-10-CM

## 2020-02-03 PROCEDURE — 73521 X-RAY EXAM HIPS BI 2 VIEWS: CPT

## 2020-02-03 PROCEDURE — 99213 OFFICE O/P EST LOW 20 MIN: CPT

## 2020-02-03 PROCEDURE — 73521 X-RAY EXAM HIPS BI 2 VIEWS: CPT | Mod: 26

## 2020-02-05 NOTE — ASSESSMENT
[FreeTextEntry1] : Assessment/Plan\par \par Aftercare of right THR\par \par Will prescribe Meloxicam for pain management\par \par MRI to eval for possible lesion noted with pelvis\par \par F/U for left SANDY on 02/20/20\par \par All medical record entries made by the PA/Tiffanyiblona/Fellow are at my, Dr. Bolivar Ballard's direction and personally dictated by me on 02/03/2020]. I have reviewed the chart and agree that the record accurately reflects my personal performance of the history, physical exam, assessment, and plan. I have also personally directed reviewed, and agreed with the chart.\par \par

## 2020-02-05 NOTE — HISTORY OF PRESENT ILLNESS
[de-identified] : Yana is a 49 year old female here for 4 month follow of right THR. Patient is experiencing right hip pain that started 2 weeks ago while walking for a long period of time. She is experiencing sharp pain, rated 8/10. Occasionally there is clicking sounds in the right hip during ambulation. Pain is better with rest and worse during ambulation. She continues to attend physical therapy for the right hip which is helping with the pain.

## 2020-02-05 NOTE — PHYSICAL EXAM
[de-identified] : General: Not in acute distress, dressed appropriately, sitting on examination table\par Skin: Warm and dry, normal turgor, no rashes\par Neurological: AOx3, Cranial nerves grossly in tact\par Psych: Mood and affect appropriate\par \par Right Hip: No swelling edema erythema redness or drainage. Non-Tender ROM: Hip flexion 120, abduction 30, int/ext rotation 45. Painful range of motion. 5/5 strength. Ambulates with a cane \par \par General: Not in acute distress, dressed appropriately, sitting on examination table\par Skin: Warm and dry, normal turgor, no rashes\par Neurological: AOx3, Cranial nerves grossly in tact\par Psych: Mood and affect appropriate\par \par Left Hip: No swelling edema erythema redness or drainage. Non-tender. ROM: Hip flexion 120, abduction 30, int/ext rotation 45. Painless range of motion. 5/5 strength. Normal gait. \par \par \par \par \par  [de-identified] : X-ray of bilateral hips: right hip shows normal Total hip arthroplasty; left hip shows severe bone on bone arthritis\par

## 2020-02-05 NOTE — END OF VISIT
[FreeTextEntry3] : By signing my name below, I, Rachelle Andersen, attest that this documentation has been prepared under the direction and in the presence of Greg Prince PA-C.\par \par \par

## 2020-02-12 DIAGNOSIS — Z71.89 OTHER SPECIFIED COUNSELING: ICD-10-CM

## 2020-02-13 ENCOUNTER — NON-APPOINTMENT (OUTPATIENT)
Age: 50
End: 2020-02-13

## 2020-02-13 LAB
25(OH)D3 SERPL-MCNC: 25.5 NG/ML
ALBUMIN SERPL ELPH-MCNC: 3.8 G/DL
ALP BLD-CCNC: 69 U/L
ALT SERPL-CCNC: 16 U/L
ANION GAP SERPL CALC-SCNC: 17 MMOL/L
APPEARANCE: CLEAR
APTT BLD: 37.6 SEC
AST SERPL-CCNC: 18 U/L
BACTERIA: NEGATIVE
BASOPHILS # BLD AUTO: 0 K/UL
BASOPHILS NFR BLD AUTO: 0 %
BILIRUB SERPL-MCNC: 0.2 MG/DL
BILIRUBIN URINE: NEGATIVE
BLOOD URINE: NEGATIVE
BUN SERPL-MCNC: 7 MG/DL
CALCIUM SERPL-MCNC: 9.3 MG/DL
CHLORIDE SERPL-SCNC: 101 MMOL/L
CO2 SERPL-SCNC: 22 MMOL/L
COLOR: NORMAL
CREAT SERPL-MCNC: 0.77 MG/DL
EOSINOPHIL # BLD AUTO: 0.14 K/UL
EOSINOPHIL NFR BLD AUTO: 2.6 %
GLUCOSE QUALITATIVE U: NEGATIVE
GLUCOSE SERPL-MCNC: 112 MG/DL
HCT VFR BLD CALC: 36.6 %
HGB BLD-MCNC: 11.2 G/DL
HYALINE CASTS: 2 /LPF
INR PPP: 1.11 RATIO
KETONES URINE: NEGATIVE
LEUKOCYTE ESTERASE URINE: NEGATIVE
LYMPHOCYTES # BLD AUTO: 1.42 K/UL
LYMPHOCYTES NFR BLD AUTO: 26.3 %
MAN DIFF?: NORMAL
MCHC RBC-ENTMCNC: 29.2 PG
MCHC RBC-ENTMCNC: 30.6 GM/DL
MCV RBC AUTO: 95.3 FL
MICROSCOPIC-UA: NORMAL
MONOCYTES # BLD AUTO: 0.34 K/UL
MONOCYTES NFR BLD AUTO: 6.2 %
NEUTROPHILS # BLD AUTO: 3.51 K/UL
NEUTROPHILS NFR BLD AUTO: 64.9 %
NITRITE URINE: NEGATIVE
PH URINE: 7
PLATELET # BLD AUTO: 271 K/UL
POTASSIUM SERPL-SCNC: 3.9 MMOL/L
PROT SERPL-MCNC: 6.7 G/DL
PROTEIN URINE: NEGATIVE
PT BLD: 12.8 SEC
RBC # BLD: 3.84 M/UL
RBC # FLD: 14.1 %
RED BLOOD CELLS URINE: 2 /HPF
SODIUM SERPL-SCNC: 140 MMOL/L
SPECIFIC GRAVITY URINE: 1.01
SQUAMOUS EPITHELIAL CELLS: 1 /HPF
UROBILINOGEN URINE: NORMAL
WBC # FLD AUTO: 5.41 K/UL
WHITE BLOOD CELLS URINE: 0 /HPF

## 2020-02-13 RX ORDER — CELECOXIB 200 MG/1
200 CAPSULE ORAL TWICE DAILY
Qty: 60 | Refills: 0 | Status: COMPLETED | COMMUNITY
Start: 2019-12-10 | End: 2020-02-13

## 2020-02-14 RX ORDER — METOCLOPRAMIDE HYDROCHLORIDE 5 MG/1
5 TABLET, ORALLY DISINTEGRATING ORAL EVERY 8 HOURS
Qty: 30 | Refills: 1 | Status: COMPLETED | COMMUNITY
Start: 2019-12-24 | End: 2020-02-14

## 2020-02-19 VITALS
DIASTOLIC BLOOD PRESSURE: 79 MMHG | HEIGHT: 68 IN | HEART RATE: 87 BPM | WEIGHT: 225.75 LBS | TEMPERATURE: 98 F | OXYGEN SATURATION: 98 % | SYSTOLIC BLOOD PRESSURE: 114 MMHG | RESPIRATION RATE: 16 BRPM

## 2020-02-19 RX ORDER — METOPROLOL TARTRATE 50 MG
1 TABLET ORAL
Qty: 0 | Refills: 0 | DISCHARGE

## 2020-02-19 RX ORDER — GABAPENTIN 400 MG/1
1 CAPSULE ORAL
Qty: 0 | Refills: 0 | DISCHARGE

## 2020-02-20 ENCOUNTER — APPOINTMENT (OUTPATIENT)
Dept: ORTHOPEDIC SURGERY | Facility: HOSPITAL | Age: 50
End: 2020-02-20
Payer: MEDICAID

## 2020-02-20 ENCOUNTER — INPATIENT (INPATIENT)
Facility: HOSPITAL | Age: 50
LOS: 4 days | Discharge: HOME | DRG: 470 | End: 2020-02-25
Payer: COMMERCIAL

## 2020-02-20 DIAGNOSIS — F32.9 MAJOR DEPRESSIVE DISORDER, SINGLE EPISODE, UNSPECIFIED: ICD-10-CM

## 2020-02-20 DIAGNOSIS — I82.409 ACUTE EMBOLISM AND THROMBOSIS OF UNSPECIFIED DEEP VEINS OF UNSPECIFIED LOWER EXTREMITY: ICD-10-CM

## 2020-02-20 DIAGNOSIS — G47.33 OBSTRUCTIVE SLEEP APNEA (ADULT) (PEDIATRIC): ICD-10-CM

## 2020-02-20 DIAGNOSIS — Z90.3 ACQUIRED ABSENCE OF STOMACH [PART OF]: Chronic | ICD-10-CM

## 2020-02-20 DIAGNOSIS — M06.9 RHEUMATOID ARTHRITIS, UNSPECIFIED: ICD-10-CM

## 2020-02-20 DIAGNOSIS — Z98.890 OTHER SPECIFIED POSTPROCEDURAL STATES: Chronic | ICD-10-CM

## 2020-02-20 DIAGNOSIS — M16.12 UNILATERAL PRIMARY OSTEOARTHRITIS, LEFT HIP: ICD-10-CM

## 2020-02-20 DIAGNOSIS — Z90.49 ACQUIRED ABSENCE OF OTHER SPECIFIED PARTS OF DIGESTIVE TRACT: Chronic | ICD-10-CM

## 2020-02-20 DIAGNOSIS — K21.9 GASTRO-ESOPHAGEAL REFLUX DISEASE WITHOUT ESOPHAGITIS: ICD-10-CM

## 2020-02-20 DIAGNOSIS — K46.9 UNSPECIFIED ABDOMINAL HERNIA WITHOUT OBSTRUCTION OR GANGRENE: Chronic | ICD-10-CM

## 2020-02-20 DIAGNOSIS — I10 ESSENTIAL (PRIMARY) HYPERTENSION: ICD-10-CM

## 2020-02-20 DIAGNOSIS — E11.9 TYPE 2 DIABETES MELLITUS WITHOUT COMPLICATIONS: ICD-10-CM

## 2020-02-20 LAB
GLUCOSE BLDC GLUCOMTR-MCNC: 125 MG/DL — HIGH (ref 70–99)
GLUCOSE BLDC GLUCOMTR-MCNC: 138 MG/DL — HIGH (ref 70–99)
GLUCOSE BLDC GLUCOMTR-MCNC: 220 MG/DL — HIGH (ref 70–99)
MRSA PCR RESULT.: NEGATIVE — SIGNIFICANT CHANGE UP
S AUREUS DNA NOSE QL NAA+PROBE: POSITIVE

## 2020-02-20 PROCEDURE — 72170 X-RAY EXAM OF PELVIS: CPT | Mod: 26

## 2020-02-20 PROCEDURE — 27130 TOTAL HIP ARTHROPLASTY: CPT | Mod: LT

## 2020-02-20 RX ORDER — LOSARTAN POTASSIUM 100 MG/1
50 TABLET, FILM COATED ORAL DAILY
Refills: 0 | Status: DISCONTINUED | OUTPATIENT
Start: 2020-02-20 | End: 2020-02-23

## 2020-02-20 RX ORDER — SODIUM CHLORIDE 9 MG/ML
1000 INJECTION, SOLUTION INTRAVENOUS
Refills: 0 | Status: DISCONTINUED | OUTPATIENT
Start: 2020-02-20 | End: 2020-02-25

## 2020-02-20 RX ORDER — POTASSIUM CHLORIDE 20 MEQ
10 PACKET (EA) ORAL DAILY
Refills: 0 | Status: DISCONTINUED | OUTPATIENT
Start: 2020-02-20 | End: 2020-02-20

## 2020-02-20 RX ORDER — SULFASALAZINE 500 MG
500 TABLET ORAL DAILY
Refills: 0 | Status: DISCONTINUED | OUTPATIENT
Start: 2020-02-20 | End: 2020-02-25

## 2020-02-20 RX ORDER — ROPINIROLE 8 MG/1
5 TABLET, FILM COATED, EXTENDED RELEASE ORAL AT BEDTIME
Refills: 0 | Status: DISCONTINUED | OUTPATIENT
Start: 2020-02-20 | End: 2020-02-25

## 2020-02-20 RX ORDER — ACETAMINOPHEN 500 MG
1000 TABLET ORAL EVERY 8 HOURS
Refills: 0 | Status: DISCONTINUED | OUTPATIENT
Start: 2020-02-20 | End: 2020-02-25

## 2020-02-20 RX ORDER — DEXTROSE 50 % IN WATER 50 %
15 SYRINGE (ML) INTRAVENOUS ONCE
Refills: 0 | Status: DISCONTINUED | OUTPATIENT
Start: 2020-02-20 | End: 2020-02-25

## 2020-02-20 RX ORDER — GLUCAGON INJECTION, SOLUTION 0.5 MG/.1ML
1 INJECTION, SOLUTION SUBCUTANEOUS ONCE
Refills: 0 | Status: DISCONTINUED | OUTPATIENT
Start: 2020-02-20 | End: 2020-02-25

## 2020-02-20 RX ORDER — FLUOXETINE HCL 10 MG
40 CAPSULE ORAL DAILY
Refills: 0 | Status: DISCONTINUED | OUTPATIENT
Start: 2020-02-20 | End: 2020-02-25

## 2020-02-20 RX ORDER — APIXABAN 2.5 MG/1
2.5 TABLET, FILM COATED ORAL
Refills: 0 | Status: DISCONTINUED | OUTPATIENT
Start: 2020-02-21 | End: 2020-02-25

## 2020-02-20 RX ORDER — OXYCODONE HYDROCHLORIDE 5 MG/1
10 TABLET ORAL EVERY 4 HOURS
Refills: 0 | Status: DISCONTINUED | OUTPATIENT
Start: 2020-02-20 | End: 2020-02-21

## 2020-02-20 RX ORDER — QUETIAPINE FUMARATE 200 MG/1
50 TABLET, FILM COATED ORAL AT BEDTIME
Refills: 0 | Status: DISCONTINUED | OUTPATIENT
Start: 2020-02-20 | End: 2020-02-25

## 2020-02-20 RX ORDER — LORATADINE 10 MG/1
10 TABLET ORAL DAILY
Refills: 0 | Status: DISCONTINUED | OUTPATIENT
Start: 2020-02-20 | End: 2020-02-25

## 2020-02-20 RX ORDER — SUCRALFATE 1 G
1 TABLET ORAL
Refills: 0 | Status: DISCONTINUED | OUTPATIENT
Start: 2020-02-20 | End: 2020-02-25

## 2020-02-20 RX ORDER — HYDROMORPHONE HYDROCHLORIDE 2 MG/ML
0.5 INJECTION INTRAMUSCULAR; INTRAVENOUS; SUBCUTANEOUS EVERY 4 HOURS
Refills: 0 | Status: DISCONTINUED | OUTPATIENT
Start: 2020-02-20 | End: 2020-02-25

## 2020-02-20 RX ORDER — CEFAZOLIN SODIUM 1 G
2000 VIAL (EA) INJECTION EVERY 8 HOURS
Refills: 0 | Status: DISCONTINUED | OUTPATIENT
Start: 2020-02-20 | End: 2020-02-20

## 2020-02-20 RX ORDER — DEXTROSE 50 % IN WATER 50 %
12.5 SYRINGE (ML) INTRAVENOUS ONCE
Refills: 0 | Status: DISCONTINUED | OUTPATIENT
Start: 2020-02-20 | End: 2020-02-25

## 2020-02-20 RX ORDER — ONDANSETRON 8 MG/1
4 TABLET, FILM COATED ORAL EVERY 6 HOURS
Refills: 0 | Status: DISCONTINUED | OUTPATIENT
Start: 2020-02-20 | End: 2020-02-25

## 2020-02-20 RX ORDER — KETOROLAC TROMETHAMINE 30 MG/ML
15 SYRINGE (ML) INJECTION EVERY 6 HOURS
Refills: 0 | Status: DISCONTINUED | OUTPATIENT
Start: 2020-02-20 | End: 2020-02-21

## 2020-02-20 RX ORDER — DEXTROSE 50 % IN WATER 50 %
25 SYRINGE (ML) INTRAVENOUS ONCE
Refills: 0 | Status: DISCONTINUED | OUTPATIENT
Start: 2020-02-20 | End: 2020-02-25

## 2020-02-20 RX ORDER — POVIDONE-IODINE 5 %
1 AEROSOL (ML) TOPICAL ONCE
Refills: 0 | Status: DISCONTINUED | OUTPATIENT
Start: 2020-02-20 | End: 2020-02-25

## 2020-02-20 RX ORDER — FOLIC ACID 0.8 MG
1 TABLET ORAL DAILY
Refills: 0 | Status: DISCONTINUED | OUTPATIENT
Start: 2020-02-20 | End: 2020-02-25

## 2020-02-20 RX ORDER — HYDROCHLOROTHIAZIDE 25 MG
12.5 TABLET ORAL DAILY
Refills: 0 | Status: DISCONTINUED | OUTPATIENT
Start: 2020-02-20 | End: 2020-02-23

## 2020-02-20 RX ORDER — CELECOXIB 200 MG/1
200 CAPSULE ORAL
Refills: 0 | Status: DISCONTINUED | OUTPATIENT
Start: 2020-02-21 | End: 2020-02-25

## 2020-02-20 RX ORDER — GABAPENTIN 400 MG/1
300 CAPSULE ORAL AT BEDTIME
Refills: 0 | Status: DISCONTINUED | OUTPATIENT
Start: 2020-02-20 | End: 2020-02-25

## 2020-02-20 RX ORDER — CHLORHEXIDINE GLUCONATE 213 G/1000ML
1 SOLUTION TOPICAL EVERY 12 HOURS
Refills: 0 | Status: COMPLETED | OUTPATIENT
Start: 2020-02-20 | End: 2020-02-21

## 2020-02-20 RX ORDER — METOPROLOL TARTRATE 50 MG
50 TABLET ORAL DAILY
Refills: 0 | Status: DISCONTINUED | OUTPATIENT
Start: 2020-02-20 | End: 2020-02-25

## 2020-02-20 RX ORDER — SENNA PLUS 8.6 MG/1
2 TABLET ORAL AT BEDTIME
Refills: 0 | Status: DISCONTINUED | OUTPATIENT
Start: 2020-02-20 | End: 2020-02-25

## 2020-02-20 RX ORDER — HYDROMORPHONE HYDROCHLORIDE 2 MG/ML
0.5 INJECTION INTRAMUSCULAR; INTRAVENOUS; SUBCUTANEOUS
Refills: 0 | Status: DISCONTINUED | OUTPATIENT
Start: 2020-02-20 | End: 2020-02-20

## 2020-02-20 RX ORDER — POTASSIUM CHLORIDE 20 MEQ
10 PACKET (EA) ORAL DAILY
Refills: 0 | Status: DISCONTINUED | OUTPATIENT
Start: 2020-02-20 | End: 2020-02-25

## 2020-02-20 RX ORDER — OXYCODONE HYDROCHLORIDE 5 MG/1
5 TABLET ORAL EVERY 4 HOURS
Refills: 0 | Status: DISCONTINUED | OUTPATIENT
Start: 2020-02-20 | End: 2020-02-21

## 2020-02-20 RX ORDER — PANTOPRAZOLE SODIUM 20 MG/1
40 TABLET, DELAYED RELEASE ORAL
Refills: 0 | Status: DISCONTINUED | OUTPATIENT
Start: 2020-02-20 | End: 2020-02-25

## 2020-02-20 RX ORDER — CEFAZOLIN SODIUM 1 G
2000 VIAL (EA) INJECTION EVERY 8 HOURS
Refills: 0 | Status: COMPLETED | OUTPATIENT
Start: 2020-02-20 | End: 2020-02-21

## 2020-02-20 RX ORDER — AMLODIPINE BESYLATE 2.5 MG/1
2.5 TABLET ORAL DAILY
Refills: 0 | Status: DISCONTINUED | OUTPATIENT
Start: 2020-02-20 | End: 2020-02-23

## 2020-02-20 RX ORDER — INSULIN LISPRO 100/ML
VIAL (ML) SUBCUTANEOUS
Refills: 0 | Status: DISCONTINUED | OUTPATIENT
Start: 2020-02-20 | End: 2020-02-25

## 2020-02-20 RX ORDER — POLYETHYLENE GLYCOL 3350 17 G/17G
17 POWDER, FOR SOLUTION ORAL DAILY
Refills: 0 | Status: DISCONTINUED | OUTPATIENT
Start: 2020-02-20 | End: 2020-02-25

## 2020-02-20 RX ADMIN — HYDROMORPHONE HYDROCHLORIDE 0.5 MILLIGRAM(S): 2 INJECTION INTRAMUSCULAR; INTRAVENOUS; SUBCUTANEOUS at 19:29

## 2020-02-20 RX ADMIN — HYDROMORPHONE HYDROCHLORIDE 0.5 MILLIGRAM(S): 2 INJECTION INTRAMUSCULAR; INTRAVENOUS; SUBCUTANEOUS at 18:21

## 2020-02-20 RX ADMIN — Medication 2000 MILLIGRAM(S): at 21:12

## 2020-02-20 RX ADMIN — Medication 15 MILLIGRAM(S): at 17:23

## 2020-02-20 RX ADMIN — HYDROMORPHONE HYDROCHLORIDE 0.5 MILLIGRAM(S): 2 INJECTION INTRAMUSCULAR; INTRAVENOUS; SUBCUTANEOUS at 17:23

## 2020-02-20 RX ADMIN — GABAPENTIN 300 MILLIGRAM(S): 400 CAPSULE ORAL at 21:13

## 2020-02-20 RX ADMIN — OXYCODONE HYDROCHLORIDE 10 MILLIGRAM(S): 5 TABLET ORAL at 21:13

## 2020-02-20 RX ADMIN — ROPINIROLE 5 MILLIGRAM(S): 8 TABLET, FILM COATED, EXTENDED RELEASE ORAL at 21:15

## 2020-02-20 RX ADMIN — Medication 1000 MILLIGRAM(S): at 21:12

## 2020-02-20 RX ADMIN — Medication 1000 MILLIGRAM(S): at 21:46

## 2020-02-20 RX ADMIN — Medication 15 MILLIGRAM(S): at 18:21

## 2020-02-20 RX ADMIN — OXYCODONE HYDROCHLORIDE 10 MILLIGRAM(S): 5 TABLET ORAL at 16:51

## 2020-02-20 RX ADMIN — OXYCODONE HYDROCHLORIDE 10 MILLIGRAM(S): 5 TABLET ORAL at 21:46

## 2020-02-20 RX ADMIN — Medication 4: at 22:58

## 2020-02-20 RX ADMIN — HYDROMORPHONE HYDROCHLORIDE 0.5 MILLIGRAM(S): 2 INJECTION INTRAMUSCULAR; INTRAVENOUS; SUBCUTANEOUS at 16:14

## 2020-02-20 RX ADMIN — QUETIAPINE FUMARATE 50 MILLIGRAM(S): 200 TABLET, FILM COATED ORAL at 21:16

## 2020-02-20 RX ADMIN — OXYCODONE HYDROCHLORIDE 10 MILLIGRAM(S): 5 TABLET ORAL at 17:23

## 2020-02-20 NOTE — H&P ADULT - NSHPLABSRESULTS_GEN_ALL_CORE
preop cbc/cmp/pt/inr/ptt/ua - within normal limits, reviewed by medical clearance   CXR: Within normal limits, per medical clearance. preop cbc/cmp/pt/inr/ptt/ua - within normal limits, reviewed by medical clearance   CXR: Within normal limits, per medical clearance.  Povidone iodine nasal swab to be given day of surgery   EKG - NSR, voltage criteria for LVH, reviewed by medical clearance

## 2020-02-20 NOTE — PHYSICAL THERAPY INITIAL EVALUATION ADULT - GAIT DEVIATIONS NOTED, PT EVAL
increased time in double stance/decreased velocity of limb motion/decreased stride length/decreased step length/decreased swing-to-stance ratio

## 2020-02-20 NOTE — PHYSICAL THERAPY INITIAL EVALUATION ADULT - PERTINENT HX OF CURRENT PROBLEM, REHAB EVAL
49F with left hip pain x chronic without preceding accident, injury or trauma. The patient complains of left hip pain without  numbness/tingling. Of note the patient had a right total hip replacement in september 2019 which healed without complication. Failed conservative therapies for her symptoms. The patient has a history of DVT/PE (unknown etiology) for which she is on lovenox; per patient, her last dose of lovenox was AM yesterday 2/19/20.

## 2020-02-20 NOTE — H&P ADULT - PROBLEM SELECTOR PLAN 1
Admit to Orthopaedic Service.  Presents today for elective left THR   Pt medically stable and cleared for procedure today by  Admit to Orthopaedic Service.  Presents today for elective left THR   Pt medically stable and cleared for procedure today by Dr. Melissa

## 2020-02-20 NOTE — PHYSICAL THERAPY INITIAL EVALUATION ADULT - PLANNED THERAPY INTERVENTIONS, PT EVAL
bed mobility training/strengthening/balance training/gait training/postural re-education/neuromuscular re-education/transfer training

## 2020-02-20 NOTE — H&P ADULT - HISTORY OF PRESENT ILLNESS
49F with left hip pain x   Presents for elective left total hip replacement 49F with left hip pain x chronic without preceding accident, injury or trauma. The patient complains of left hip pain without  numbness/tingling. Of note the patient had a right total hip replacement in september 2019 which healed without complication. Failed conservative therapies for her symptoms. The patient has a history of DVT/PE (unknown etiology) for which she is on lovenox; per patient, her last dose of lovenox was AM yesterday 2/19/20.   Presents for elective left total hip replacement

## 2020-02-20 NOTE — PHYSICAL THERAPY INITIAL EVALUATION ADULT - ADDITIONAL COMMENTS
Patient lives with 21 and 22 y.o son and daughter in apartment building with 1 flight of stairs to enter. Ambulates with SC prior to surgery. Owns RW. Denies recent Hx of falls since last admission.

## 2020-02-20 NOTE — H&P ADULT - NSICDXPASTSURGICALHX_GEN_ALL_CORE_FT
PAST SURGICAL HISTORY:  Abdominal hernia umbilical    History of cholecystectomy     History of colon resection diverticulitis    History of sleeve gastrectomy     History of surgery 2019 L thr

## 2020-02-20 NOTE — H&P ADULT - PROBLEM/PLAN-3
Addendum  created 07/29/19 1344 by Isreal Guadalupe, DO    Sign clinical note
DISPLAY PLAN FREE TEXT

## 2020-02-20 NOTE — H&P ADULT - NSICDXPASTMEDICALHX_GEN_ALL_CORE_FT
PAST MEDICAL HISTORY:  Chronic anticoagulation     Depression     Diabetes mellitus, type 2     Diverticulitis     DVT (deep venous thrombosis) left leg    GERD (gastroesophageal reflux disease)     HTN (hypertension)     Hypercoagulable state     Neuropathy     DARIO (obstructive sleep apnea)     Osteoarthritis     Pancreatitis     Pulmonary embolism     Restless leg     Rheumatoid arthritis

## 2020-02-20 NOTE — PROGRESS NOTE ADULT - SUBJECTIVE AND OBJECTIVE BOX
Ortho Post Op Check    Procedure: L SANDY  Surgeon: Elio    Pt comfortable without complaints, pain controlled  Denies CP, SOB, N/V, numbness/tingling     Vital Signs Last 24 Hrs  T(C): 36.5 (02-20-20 @ 15:41), Max: 36.5 (02-20-20 @ 15:41)  T(F): 97.7 (02-20-20 @ 15:41), Max: 97.7 (02-20-20 @ 15:41)  HR: 72 (02-20-20 @ 17:54) (60 - 90)  BP: 117/73 (02-20-20 @ 17:54) (108/63 - 133/62)  BP(mean): 89 (02-20-20 @ 17:54) (78 - 93)  RR: 19 (02-20-20 @ 17:54) (12 - 21)  SpO2: 97% (02-20-20 @ 17:54) (96% - 100%)    Physical Exam:  General: Resting comfortably in bed. AAOx3. NAD.  Extremities:        LLE: Dressing c/d/i. SILT L2-S1 distribution, symmetric. TA/EHL/FHL/GS motor intact. WWP, DP 2+       RLE: No gross deformity. SILT L2-S1 distribution, symmetric. TA/EHL/FHL/GS motor intact. WWP, DP 2+              Post-op X-Ray: well-fixed L hip prosthesis. No evidence of fracture    A/P: 49yFemale POD#0 s/p L SANDY  - Stable  - Pain Control  - DVT ppx: Eliquis  - Post op abx: Cefazolin  - PT, WBS: PWB    Ortho Pager [phone]

## 2020-02-20 NOTE — PHYSICAL THERAPY INITIAL EVALUATION ADULT - CRITERIA FOR SKILLED THERAPEUTIC INTERVENTIONS
impairments found/risk reduction/prevention/therapy frequency/anticipated discharge recommendation/functional limitations in following categories/predicted duration of therapy intervention

## 2020-02-20 NOTE — H&P ADULT - NSHPPHYSICALEXAM_GEN_ALL_CORE
MSK:  decreased ROM left hip 2/2 pain  Remainder of physical exam as per medical clearance note MSK:  Skin warm and well perfused. DP pulse palpable LLE. Sensation intact and equal bilateral lower extremities. EHL/TA/GS/FHL 5/5 bilateral lower extremities.   decreased ROM left hip 2/2 pain  Remainder of physical exam as per medical clearance note

## 2020-02-20 NOTE — PHYSICAL THERAPY INITIAL EVALUATION ADULT - GENERAL OBSERVATIONS, REHAB EVAL
Patient received semi-leyva in bed  in NAD on RA, +SCDs, +Heplock, +Telemetry. Cleared by FELICITY Ramos. Agreeable to PT.

## 2020-02-21 ENCOUNTER — TRANSCRIPTION ENCOUNTER (OUTPATIENT)
Age: 50
End: 2020-02-21

## 2020-02-21 LAB
ANION GAP SERPL CALC-SCNC: 13 MMOL/L — SIGNIFICANT CHANGE UP (ref 5–17)
BUN SERPL-MCNC: 11 MG/DL — SIGNIFICANT CHANGE UP (ref 7–23)
CALCIUM SERPL-MCNC: 8.8 MG/DL — SIGNIFICANT CHANGE UP (ref 8.4–10.5)
CHLORIDE SERPL-SCNC: 99 MMOL/L — SIGNIFICANT CHANGE UP (ref 96–108)
CO2 SERPL-SCNC: 21 MMOL/L — LOW (ref 22–31)
CREAT SERPL-MCNC: 0.76 MG/DL — SIGNIFICANT CHANGE UP (ref 0.5–1.3)
GLUCOSE BLDC GLUCOMTR-MCNC: 145 MG/DL — HIGH (ref 70–99)
GLUCOSE BLDC GLUCOMTR-MCNC: 166 MG/DL — HIGH (ref 70–99)
GLUCOSE BLDC GLUCOMTR-MCNC: 166 MG/DL — HIGH (ref 70–99)
GLUCOSE BLDC GLUCOMTR-MCNC: 184 MG/DL — HIGH (ref 70–99)
GLUCOSE SERPL-MCNC: 164 MG/DL — HIGH (ref 70–99)
HCT VFR BLD CALC: 33.7 % — LOW (ref 34.5–45)
HGB BLD-MCNC: 10.6 G/DL — LOW (ref 11.5–15.5)
MCHC RBC-ENTMCNC: 29.4 PG — SIGNIFICANT CHANGE UP (ref 27–34)
MCHC RBC-ENTMCNC: 31.5 GM/DL — LOW (ref 32–36)
MCV RBC AUTO: 93.4 FL — SIGNIFICANT CHANGE UP (ref 80–100)
NRBC # BLD: 0 /100 WBCS — SIGNIFICANT CHANGE UP (ref 0–0)
PLATELET # BLD AUTO: 226 K/UL — SIGNIFICANT CHANGE UP (ref 150–400)
POTASSIUM SERPL-MCNC: 4 MMOL/L — SIGNIFICANT CHANGE UP (ref 3.5–5.3)
POTASSIUM SERPL-SCNC: 4 MMOL/L — SIGNIFICANT CHANGE UP (ref 3.5–5.3)
RBC # BLD: 3.61 M/UL — LOW (ref 3.8–5.2)
RBC # FLD: 14.3 % — SIGNIFICANT CHANGE UP (ref 10.3–14.5)
SODIUM SERPL-SCNC: 133 MMOL/L — LOW (ref 135–145)
WBC # BLD: 11.2 K/UL — HIGH (ref 3.8–10.5)
WBC # FLD AUTO: 11.2 K/UL — HIGH (ref 3.8–10.5)

## 2020-02-21 PROCEDURE — 99223 1ST HOSP IP/OBS HIGH 75: CPT

## 2020-02-21 RX ORDER — HYDROMORPHONE HYDROCHLORIDE 2 MG/ML
4 INJECTION INTRAMUSCULAR; INTRAVENOUS; SUBCUTANEOUS EVERY 4 HOURS
Refills: 0 | Status: DISCONTINUED | OUTPATIENT
Start: 2020-02-21 | End: 2020-02-24

## 2020-02-21 RX ORDER — HYDROMORPHONE HYDROCHLORIDE 2 MG/ML
2 INJECTION INTRAMUSCULAR; INTRAVENOUS; SUBCUTANEOUS EVERY 4 HOURS
Refills: 0 | Status: DISCONTINUED | OUTPATIENT
Start: 2020-02-21 | End: 2020-02-24

## 2020-02-21 RX ORDER — KETOROLAC TROMETHAMINE 30 MG/ML
15 SYRINGE (ML) INJECTION ONCE
Refills: 0 | Status: DISCONTINUED | OUTPATIENT
Start: 2020-02-21 | End: 2020-02-21

## 2020-02-21 RX ADMIN — ROPINIROLE 5 MILLIGRAM(S): 8 TABLET, FILM COATED, EXTENDED RELEASE ORAL at 21:38

## 2020-02-21 RX ADMIN — PANTOPRAZOLE SODIUM 40 MILLIGRAM(S): 20 TABLET, DELAYED RELEASE ORAL at 06:18

## 2020-02-21 RX ADMIN — CELECOXIB 200 MILLIGRAM(S): 200 CAPSULE ORAL at 06:58

## 2020-02-21 RX ADMIN — Medication 40 MILLIGRAM(S): at 12:24

## 2020-02-21 RX ADMIN — CELECOXIB 200 MILLIGRAM(S): 200 CAPSULE ORAL at 17:39

## 2020-02-21 RX ADMIN — HYDROMORPHONE HYDROCHLORIDE 2 MILLIGRAM(S): 2 INJECTION INTRAMUSCULAR; INTRAVENOUS; SUBCUTANEOUS at 18:37

## 2020-02-21 RX ADMIN — Medication 2: at 07:07

## 2020-02-21 RX ADMIN — HYDROMORPHONE HYDROCHLORIDE 4 MILLIGRAM(S): 2 INJECTION INTRAMUSCULAR; INTRAVENOUS; SUBCUTANEOUS at 22:03

## 2020-02-21 RX ADMIN — Medication 2000 MILLIGRAM(S): at 06:18

## 2020-02-21 RX ADMIN — Medication 1 MILLIGRAM(S): at 12:24

## 2020-02-21 RX ADMIN — OXYCODONE HYDROCHLORIDE 10 MILLIGRAM(S): 5 TABLET ORAL at 12:47

## 2020-02-21 RX ADMIN — Medication 1 GRAM(S): at 10:14

## 2020-02-21 RX ADMIN — GABAPENTIN 300 MILLIGRAM(S): 400 CAPSULE ORAL at 21:37

## 2020-02-21 RX ADMIN — HYDROMORPHONE HYDROCHLORIDE 2 MILLIGRAM(S): 2 INJECTION INTRAMUSCULAR; INTRAVENOUS; SUBCUTANEOUS at 17:39

## 2020-02-21 RX ADMIN — APIXABAN 2.5 MILLIGRAM(S): 2.5 TABLET, FILM COATED ORAL at 07:05

## 2020-02-21 RX ADMIN — LORATADINE 10 MILLIGRAM(S): 10 TABLET ORAL at 12:24

## 2020-02-21 RX ADMIN — Medication 15 MILLIGRAM(S): at 13:44

## 2020-02-21 RX ADMIN — Medication 12.5 MILLIGRAM(S): at 06:18

## 2020-02-21 RX ADMIN — OXYCODONE HYDROCHLORIDE 10 MILLIGRAM(S): 5 TABLET ORAL at 07:39

## 2020-02-21 RX ADMIN — POLYETHYLENE GLYCOL 3350 17 GRAM(S): 17 POWDER, FOR SOLUTION ORAL at 12:24

## 2020-02-21 RX ADMIN — OXYCODONE HYDROCHLORIDE 10 MILLIGRAM(S): 5 TABLET ORAL at 07:06

## 2020-02-21 RX ADMIN — Medication 1000 MILLIGRAM(S): at 21:40

## 2020-02-21 RX ADMIN — Medication 15 MILLIGRAM(S): at 06:58

## 2020-02-21 RX ADMIN — Medication 1000 MILLIGRAM(S): at 06:58

## 2020-02-21 RX ADMIN — APIXABAN 2.5 MILLIGRAM(S): 2.5 TABLET, FILM COATED ORAL at 17:39

## 2020-02-21 RX ADMIN — Medication 500 MILLIGRAM(S): at 12:24

## 2020-02-21 RX ADMIN — OXYCODONE HYDROCHLORIDE 10 MILLIGRAM(S): 5 TABLET ORAL at 12:09

## 2020-02-21 RX ADMIN — ONDANSETRON 4 MILLIGRAM(S): 8 TABLET, FILM COATED ORAL at 06:20

## 2020-02-21 RX ADMIN — Medication 2: at 17:39

## 2020-02-21 RX ADMIN — Medication 15 MILLIGRAM(S): at 00:01

## 2020-02-21 RX ADMIN — HYDROMORPHONE HYDROCHLORIDE 4 MILLIGRAM(S): 2 INJECTION INTRAMUSCULAR; INTRAVENOUS; SUBCUTANEOUS at 21:38

## 2020-02-21 RX ADMIN — Medication 15 MILLIGRAM(S): at 14:40

## 2020-02-21 RX ADMIN — Medication 2: at 22:10

## 2020-02-21 RX ADMIN — CELECOXIB 200 MILLIGRAM(S): 200 CAPSULE ORAL at 18:37

## 2020-02-21 RX ADMIN — Medication 10 MILLIEQUIVALENT(S): at 12:24

## 2020-02-21 RX ADMIN — Medication 1000 MILLIGRAM(S): at 13:30

## 2020-02-21 RX ADMIN — CELECOXIB 200 MILLIGRAM(S): 200 CAPSULE ORAL at 06:18

## 2020-02-21 RX ADMIN — QUETIAPINE FUMARATE 50 MILLIGRAM(S): 200 TABLET, FILM COATED ORAL at 21:41

## 2020-02-21 RX ADMIN — Medication 1000 MILLIGRAM(S): at 22:02

## 2020-02-21 RX ADMIN — Medication 15 MILLIGRAM(S): at 06:18

## 2020-02-21 RX ADMIN — Medication 1000 MILLIGRAM(S): at 06:18

## 2020-02-21 RX ADMIN — Medication 1000 MILLIGRAM(S): at 14:39

## 2020-02-21 RX ADMIN — Medication 15 MILLIGRAM(S): at 00:34

## 2020-02-21 NOTE — CONSULT NOTE ADULT - ASSESSMENT
49F w/ hypercoagulable state (h/o PE/DVT/Splenic vein thrombosis) on lovenox, DM, GERD, HTN, DARIO, here for elective LEFT THR on 2/20 now POD1    #Post op state - continue optimizing pain regimen. c/w bowel regimen, c/w IS. PT pending  #Hypercoaguable state - currently on apixaban 2.5 BID  #DM - BGs have been at target  #Leukocytosis - elevated likely 2/2 post-op  #Normocytic anemia - 10.6 from 12.2 at baseline  #HTN - c/w home regimen  #Hyponatremia - likely post-op SIADH.     Recommendations   - Switch to NS in setting of hyponatremia   - would hold HCTZ in setting of hyponatremia   - BMP w Mg in AM   - Can c/w apixaban 2.5 BID and switch to 100mg BID SQ lovenox on DC    Dispo - PT recommends home PT

## 2020-02-21 NOTE — DISCHARGE NOTE PROVIDER - NSDCMRMEDTOKEN_GEN_ALL_CORE_FT
amLODIPine 2.5 mg oral tablet: 1 tab(s) orally once a day  Carafate 1 g/10 mL oral suspension: 10 milliliter(s) orally once a day  CeleBREX 200 mg oral capsule: 1 cap(s) orally 2 times a day. Take after breakfast and dinner   cetirizine 10 mg oral tablet: 1 tab(s) orally once a day  cyanocobalamin 1000 mcg/mL injectable solution: injectable once a month  folic acid 1 mg oral tablet: 1 tab(s) orally once a day  gabapentin 300 mg oral tablet: 1 tab(s) orally once a day (at bedtime)  Klor-Con M10 oral tablet, extended release: 1 tab(s) orally once a day  losartan-hydrochlorothiazide 50mg-12.5mg oral tablet: 1 tab(s) orally once a day  Lovenox 100 mg/mL injectable solution: injectable every 12 hours  prescription provided outpatient by your primary care provider  metoprolol tartrate 50 mg oral tablet: 1 tab(s) orally once a day  omeprazole 40 mg oral delayed release capsule: 1 cap(s) orally once a day  PROzac 40 mg oral capsule: 1 cap(s) orally once a day  rOPINIRole 5 mg oral tablet: 1 tab(s) orally once a day (at bedtime)  SEROquel 50 mg oral tablet: 1 tab(s) orally once a day (at bedtime)  sulfaSALAzine 500 mg oral tablet: 1 tab(s) orally once a day amLODIPine 2.5 mg oral tablet: 1 tab(s) orally once a day  Carafate 1 g/10 mL oral suspension: 10 milliliter(s) orally once a day  CeleBREX 200 mg oral capsule: 1 cap(s) orally 2 times a day. Take after breakfast and dinner   cetirizine 10 mg oral tablet: 1 tab(s) orally once a day  cyanocobalamin 1000 mcg/mL injectable solution: injectable once a month  folic acid 1 mg oral tablet: 1 tab(s) orally once a day  gabapentin 300 mg oral tablet: 1 tab(s) orally once a day (at bedtime)  Klor-Con M10 oral tablet, extended release: 1 tab(s) orally once a day  losartan-hydrochlorothiazide 50mg-12.5mg oral tablet: 1 tab(s) orally once a day  Lovenox 100 mg/mL injectable solution: injectable every 12 hours  prescription provided outpatient by your primary care provider  metoprolol tartrate 50 mg oral tablet: 1 tab(s) orally once a day  omeprazole 40 mg oral delayed release capsule: 1 cap(s) orally once a day  Physical Therapy : Outpatient Physical Therapy     s/p Left Total Hip Replacement for left hip osteoarthritis     -Protected weight bearing LLE with walker  -Anterior hip precautions    3x weekly x4 weeks    polyethylene glycol 3350 oral powder for reconstitution: 17 gram(s) orally once a day  PROzac 40 mg oral capsule: 1 cap(s) orally once a day  rOPINIRole 5 mg oral tablet: 1 tab(s) orally once a day (at bedtime)  senna oral tablet: 2 tab(s) orally once a day (at bedtime), As needed, Constipation  SEROquel 50 mg oral tablet: 1 tab(s) orally once a day (at bedtime)  sulfaSALAzine 500 mg oral tablet: 1 tab(s) orally once a day amLODIPine 2.5 mg oral tablet: 1 tab(s) orally once a day  Carafate 1 g/10 mL oral suspension: 10 milliliter(s) orally once a day  cetirizine 10 mg oral tablet: 1 tab(s) orally once a day  cyanocobalamin 1000 mcg/mL injectable solution: injectable once a month  cyclobenzaprine 5 mg oral tablet: 1 tab(s) orally 3 times a day, As needed, Muscle Spasm  folic acid 1 mg oral tablet: 1 tab(s) orally once a day  gabapentin 300 mg oral tablet: 1 tab(s) orally once a day (at bedtime)  Klor-Con M10 oral tablet, extended release: 1 tab(s) orally once a day  losartan-hydrochlorothiazide 50mg-12.5mg oral tablet: 1 tab(s) orally once a day  Lovenox 100 mg/mL injectable solution: injectable every 12 hours  prescription provided outpatient by your primary care provider  metoprolol tartrate 50 mg oral tablet: 1 tab(s) orally once a day  omeprazole 40 mg oral delayed release capsule: 1 cap(s) orally once a day  oxyCODONE 5 mg oral tablet: 1-2 tab(s) orally every 4-6 hours, as needed for moderate to severe pain MDD:6  Physical Therapy : Outpatient Physical Therapy     s/p Left Total Hip Replacement for left hip osteoarthritis     -Protected weight bearing LLE with walker  -Anterior hip precautions    3x weekly x4 weeks    polyethylene glycol 3350 oral powder for reconstitution: 17 gram(s) orally once a day  PROzac 40 mg oral capsule: 1 cap(s) orally once a day  rOPINIRole 5 mg oral tablet: 1 tab(s) orally once a day (at bedtime)  senna oral tablet: 2 tab(s) orally once a day (at bedtime), As needed, Constipation  SEROquel 50 mg oral tablet: 1 tab(s) orally once a day (at bedtime)  sulfaSALAzine 500 mg oral tablet: 1 tab(s) orally once a day

## 2020-02-21 NOTE — DISCHARGE NOTE PROVIDER - CARE PROVIDER_API CALL
Bolivar Ballard)  Orthopaedic Surgery  [address]  [place], [place] [address]  Phone: [phone]  Fax: [phone]  Follow Up Time: Bolivar Ballard)  Orthopaedic Surgery  [address]  [place], [place] [address]  Phone: [phone]  Fax: [phone]  Follow Up Time: 2 weeks

## 2020-02-21 NOTE — DISCHARGE NOTE PROVIDER - NSDCFUADDINST_GEN_ALL_CORE_FT
Protected weightbearing on operated hip using a rolling walker  Anterior hip precautions to prevent hip dislocation  No crossing your legs. Do not bend past your waist.  Use long-handled reach to pick things up if purchased.  Sit in a high chair.  If you do not have a high chair, use cushions on the chair  No strenuous activity, heavy lifting, driving or returning to work until cleared by MD.  You may shower - dressing is water-resistant, no soaking in bathtubs.  Keep dressing on your hip until the follow up appointment.  Try to have regular bowel movements, take stool softener or laxative if necessary.  May take Pepcid or Prilosec for upset stomach.  Swelling may travel all the way down leg to foot, this is normal and will subside in a few weeks.  Call to schedule an appt with Dr. Ballard for follow up.    Contact your doctor if you experience: fever greater than 101.5, chills, chest pain, difficulty breathing, redness or excessive drainage around the incision, other concerns.  Follow up with your primary care provider. Resume your home lovenox dosing.  upon discharge Protected weightbearing on operated hip using a rolling walker  Anterior hip precautions to prevent hip dislocation  No crossing your legs. Do not bend past your waist.  Use long-handled reach to pick things up if purchased.  Sit in a high chair.  If you do not have a high chair, use cushions on the chair  No strenuous activity, heavy lifting, driving or returning to work until cleared by MD.  You may shower - dressing is water-resistant, no soaking in bathtubs.  Keep dressing on your hip until the follow up appointment.  Try to have regular bowel movements, take stool softener or laxative if necessary.  May take Pepcid or Prilosec for upset stomach.  Swelling may travel all the way down leg to foot, this is normal and will subside in a few weeks.  Call to schedule an appt with Dr. Ballard for follow up.    Contact your doctor if you experience: fever greater than 101.5, chills, chest pain, difficulty breathing, redness or excessive drainage around the incision, other concerns.  Follow up with your primary care provider.

## 2020-02-21 NOTE — PROGRESS NOTE ADULT - SUBJECTIVE AND OBJECTIVE BOX
SUBJECTIVE: Patient seen and examined. Pain not well controlled.  Pt did well o/n  No f/c/n/v/cp/sob.     OBJECTIVE:  NAD  Vital Signs Last 24 Hrs  T(C): 36.4 (21 Feb 2020 06:16), Max: 36.6 (20 Feb 2020 20:58)  T(F): 97.6 (21 Feb 2020 06:16), Max: 97.8 (20 Feb 2020 20:58)  HR: 83 (21 Feb 2020 06:16) (60 - 90)  BP: 109/69 (21 Feb 2020 06:16) (98/59 - 133/62)  BP(mean): 89 (20 Feb 2020 17:54) (78 - 93)  RR: 15 (21 Feb 2020 06:16) (12 - 21)  SpO2: 100% (21 Feb 2020 06:16) (94% - 100%)    Physical Exam:  General: Resting comfortably in bed, AAOx3, NAD  LLE:          Dressing L hip c/d/i         Sensation: SILT symmetrical L2-S1         Motor exam: 5/5 TA/GS/EHL b/l         warm well perfused; capillary refill <3 seconds                           10.6   11.20 )-----------( 226      ( 21 Feb 2020 07:04 )             33.7     02-21    133<L>  |  99  |  11  ----------------------------<  164<H>  4.0   |  21<L>  |  0.76    Ca    8.8      21 Feb 2020 07:04      A/P :  Pt is a 48yo Female s/p  L SANDY  -    Pain control  -    DVT ppx: SCD     -    Weight bearing status: WBAT   -    Physical Therapy  -    PM consult  -    Dispo: Home w HPT

## 2020-02-21 NOTE — DISCHARGE NOTE PROVIDER - HOSPITAL COURSE
Admitted    Surgery - left total hip replacement    Sofia-op Antibiotics    Pain control    DVT prophylaxis    OOB/Physical Therapy Admitted to Orthopedic Service     Attending: Dr. Ballard     Surgery - left total hip replacement 2/20/20    Sofia-op Antibiotics    Pain control    DVT prophylaxis    Medicine Consult     OOB/Physical Therapy

## 2020-02-21 NOTE — PROGRESS NOTE ADULT - SUBJECTIVE AND OBJECTIVE BOX
Ortho Note    Pt comfortable without complaints, pain controlled  Denies CP, SOB, N/V, numbness/tingling     Vital Signs Last 24 Hrs  T(C): --  T(F): --  HR: 89 (02-21-20 @ 12:07) (83 - 89)  BP: 111/78 (02-21-20 @ 12:07) (111/78 - 118/92)  BP(mean): --  RR: 15 (02-21-20 @ 12:07) (15 - 18)  SpO2: 98% (02-21-20 @ 09:12) (98% - 98%)  AVSS    focused exam:  General: Pt Alert and oriented, NAD  DSG C/D/I  Pulses: +2DP, WWP feet  Sensation: SILT BLE  Motor: 5/5 EHL/FHL/TA/GS                          10.6   11.20 )-----------( 226      ( 21 Feb 2020 07:04 )             33.7   21 Feb 2020 07:04    133    |  99     |  11     ----------------------------<  164    4.0     |  21     |  0.76     Ca    8.8        21 Feb 2020 07:04        A/P: 49yFemale POD# s/p   - Stable  - Pain Control  - DVT ppx: eliquis 2.5mg bid in-house, to be d/c'd on home dose of lovenox 100mg bid (h/o DVT/PE)  - PT, WBS: protected weight-bearing LLE  - OOB for meals, I/S  - continue bowel regimen  - dispo: home with HPT, pending PT clearance    Ortho Pager [phone]

## 2020-02-21 NOTE — DISCHARGE NOTE PROVIDER - NSDCFUSCHEDAPPT_GEN_ALL_CORE_FT
INDIRA VELÁSQUEZ ; 03/06/2020 ; [facility] [address]  INDIRA VELÁSQUEZ ; 04/02/2020 ; [facility] [address]

## 2020-02-21 NOTE — CONSULT NOTE ADULT - SUBJECTIVE AND OBJECTIVE BOX
49F w/ hypercoagulable state (h/o PE/DVT/Splenic vein thrombosis) on lovenox, DM, GERD, HTN, DARIO, here for elective LEFT THR on 2/20 now POD1    Pt states pain persists in area of incision that is non-radiating. No fever, chest pain, dyspnea, cough. No nausea or abd pain. +BM. Regarding hypercoagulable state, she is currently on lovenox. Pt states plan w hematologist is to eventually transition to coumadin.   FHx: uncle, sister daughter all w/ hypercoagulable state  SHx: quit smoking 8y ago. no EtOH    PAST MEDICAL & SURGICAL HISTORY:  Depression  Rheumatoid arthritis  Osteoarthritis  Restless leg  DARIO (obstructive sleep apnea)  GERD (gastroesophageal reflux disease)  Diverticulitis  Diabetes mellitus, type 2  Chronic anticoagulation  Hypercoagulable state  Neuropathy  Pancreatitis  HTN (hypertension)  Pulmonary embolism  DVT (deep venous thrombosis): left leg  History of surgery: 2019 L thr  History of cholecystectomy  History of colon resection: diverticulitis  Abdominal hernia: umbilical  History of sleeve gastrectomy    Home Meds: Home Medications:  amLODIPine 2.5 mg oral tablet: 1 tab(s) orally once a day (20 Feb 2020 10:42)  Carafate 1 g/10 mL oral suspension: 10 milliliter(s) orally once a day (20 Feb 2020 10:42)  cetirizine 10 mg oral tablet: 1 tab(s) orally once a day (20 Feb 2020 10:42)  cyanocobalamin 1000 mcg/mL injectable solution: injectable once a month (20 Feb 2020 10:42)  folic acid 1 mg oral tablet: 1 tab(s) orally once a day (20 Feb 2020 10:42)  gabapentin 300 mg oral tablet: 1 tab(s) orally once a day (at bedtime) (20 Feb 2020 10:42)  Klor-Con M10 oral tablet, extended release: 1 tab(s) orally once a day (20 Feb 2020 10:42)  losartan-hydrochlorothiazide 50mg-12.5mg oral tablet: 1 tab(s) orally once a day (20 Feb 2020 10:42)  Lovenox 100 mg/mL injectable solution: injectable every 12 hours  prescription provided outpatient by your primary care provider (20 Feb 2020 10:42)  metoprolol tartrate 50 mg oral tablet: 1 tab(s) orally once a day (20 Feb 2020 10:42)  omeprazole 40 mg oral delayed release capsule: 1 cap(s) orally once a day (20 Feb 2020 10:42)  PROzac 40 mg oral capsule: 1 cap(s) orally once a day (20 Feb 2020 10:42)  rOPINIRole 5 mg oral tablet: 1 tab(s) orally once a day (at bedtime) (20 Feb 2020 10:42)  SEROquel 50 mg oral tablet: 1 tab(s) orally once a day (at bedtime) (20 Feb 2020 10:42)  sulfaSALAzine 500 mg oral tablet: 1 tab(s) orally once a day (20 Feb 2020 10:42)    Allergies: Allergies    lisinopril (Other)  verapamil (Other)    Intolerances      Soc:   Advanced Directives: Presumed Full Code     CURRENT MEDICATIONS:   --------------------------------------------------------------------------------------  Neurologic Medications  acetaminophen   Tablet .. 1000 milliGRAM(s) Oral every 8 hours  celecoxib 200 milliGRAM(s) Oral two times a day  FLUoxetine 40 milliGRAM(s) Oral daily  gabapentin 300 milliGRAM(s) Oral at bedtime  HYDROmorphone   Tablet 2 milliGRAM(s) Oral every 4 hours PRN Moderate Pain (4 - 6)  HYDROmorphone   Tablet 4 milliGRAM(s) Oral every 4 hours PRN Severe Pain (7 - 10)  HYDROmorphone  Injectable 0.5 milliGRAM(s) IV Push every 4 hours PRN breakthrough pain  ondansetron Injectable 4 milliGRAM(s) IV Push every 6 hours PRN Nausea and/or Vomiting  QUEtiapine 50 milliGRAM(s) Oral at bedtime  rOPINIRole 5 milliGRAM(s) Oral at bedtime    Respiratory Medications  loratadine 10 milliGRAM(s) Oral daily    Cardiovascular Medications  amLODIPine   Tablet 2.5 milliGRAM(s) Oral daily  hydrochlorothiazide 12.5 milliGRAM(s) Oral daily  losartan 50 milliGRAM(s) Oral daily  metoprolol tartrate 50 milliGRAM(s) Oral daily    Gastrointestinal Medications  aluminum hydroxide/magnesium hydroxide/simethicone Suspension 30 milliLiter(s) Oral four times a day PRN Indigestion  dextrose 5%. 1000 milliLiter(s) IV Continuous <Continuous>  folic acid 1 milliGRAM(s) Oral daily  lactated ringers. 1000 milliLiter(s) IV Continuous <Continuous>  pantoprazole    Tablet 40 milliGRAM(s) Oral before breakfast  polyethylene glycol 3350 17 Gram(s) Oral daily  potassium chloride    Tablet ER 10 milliEquivalent(s) Oral daily  senna 2 Tablet(s) Oral at bedtime PRN Constipation  sucralfate 1 Gram(s) Oral <User Schedule>  sulfaSALAzine 500 milliGRAM(s) Oral daily    Genitourinary Medications    Hematologic/Oncologic Medications  apixaban 2.5 milliGRAM(s) Oral two times a day    Antimicrobial/Immunologic Medications    Endocrine/Metabolic Medications  dextrose 40% Gel 15 Gram(s) Oral once PRN Blood Glucose LESS THAN 70 milliGRAM(s)/deciliter  dextrose 50% Injectable 12.5 Gram(s) IV Push once  dextrose 50% Injectable 25 Gram(s) IV Push once  dextrose 50% Injectable 25 Gram(s) IV Push once  glucagon  Injectable 1 milliGRAM(s) IntraMuscular once PRN Glucose LESS THAN 70 milligrams/deciliter  insulin lispro (HumaLOG) corrective regimen sliding scale   SubCutaneous Before meals and at bedtime    Topical/Other Medications  povidone iodine 5% Nasal Swab 1 Application(s) Both Nostrils once    --------------------------------------------------------------------------------------    VITAL SIGNS, INS/OUTS (last 24 hours):  --------------------------------------------------------------------------------------  ICU Vital Signs Last 24 Hrs  T(C): 36.3 (21 Feb 2020 16:05), Max: 37.2 (21 Feb 2020 13:11)  T(F): 97.4 (21 Feb 2020 16:05), Max: 98.9 (21 Feb 2020 13:11)  HR: 78 (21 Feb 2020 16:05) (78 - 89)  BP: 127/78 (21 Feb 2020 16:05) (98/59 - 127/78)  BP(mean): --  ABP: --  ABP(mean): --  RR: 16 (21 Feb 2020 16:05) (12 - 18)  SpO2: 98% (21 Feb 2020 16:05) (94% - 100%)    I&O's Summary    20 Feb 2020 07:01  -  21 Feb 2020 07:00  --------------------------------------------------------  IN: 250 mL / OUT: 200 mL / NET: 50 mL    21 Feb 2020 07:01  -  21 Feb 2020 18:11  --------------------------------------------------------  IN: 120 mL / OUT: 0 mL / NET: 120 mL      --------------------------------------------------------------------------------------    EXAM:  GEN: female in NAD in chair  HEENT: NC/AT, MMM, EOMI  NECK: Supple  CV: RRR, nml S1S2, no LE edema, SCDs in place  PULM: nml effort, CTAB  ABD: soft, non-distended, NABS, non-tender  NEURO: alert, conversant  motor: 4/5 on LLE, otherwise 5/5  Sensory intact b/l  Incision w/ dressing c/d/i    LABS  --------------------------------------------------------------------------------------  Labs:  CAPILLARY BLOOD GLUCOSE      POCT Blood Glucose.: 184 mg/dL (21 Feb 2020 17:27)  POCT Blood Glucose.: 145 mg/dL (21 Feb 2020 11:46)  POCT Blood Glucose.: 166 mg/dL (21 Feb 2020 06:40)  POCT Blood Glucose.: 220 mg/dL (20 Feb 2020 22:51)                          10.6   11.20 )-----------( 226      ( 21 Feb 2020 07:04 )             33.7         02-21    133<L>  |  99  |  11  ----------------------------<  164<H>  4.0   |  21<L>  |  0.76      Calcium, Total Serum: 8.8 mg/dL (02-21-20 @ 07:04)      LFTs:         Coags:                  --------------------------------------------------------------------------------------    OTHER LABS    IMAGING RESULTS  ****************      ASSESSMENT/ PLAN:  49y Female ***      Attending aware and agrees with plan

## 2020-02-22 LAB
ANION GAP SERPL CALC-SCNC: 11 MMOL/L — SIGNIFICANT CHANGE UP (ref 5–17)
BUN SERPL-MCNC: 11 MG/DL — SIGNIFICANT CHANGE UP (ref 7–23)
CALCIUM SERPL-MCNC: 8.6 MG/DL — SIGNIFICANT CHANGE UP (ref 8.4–10.5)
CHLORIDE SERPL-SCNC: 102 MMOL/L — SIGNIFICANT CHANGE UP (ref 96–108)
CO2 SERPL-SCNC: 24 MMOL/L — SIGNIFICANT CHANGE UP (ref 22–31)
CREAT SERPL-MCNC: 0.74 MG/DL — SIGNIFICANT CHANGE UP (ref 0.5–1.3)
GLUCOSE BLDC GLUCOMTR-MCNC: 118 MG/DL — HIGH (ref 70–99)
GLUCOSE BLDC GLUCOMTR-MCNC: 119 MG/DL — HIGH (ref 70–99)
GLUCOSE BLDC GLUCOMTR-MCNC: 134 MG/DL — HIGH (ref 70–99)
GLUCOSE SERPL-MCNC: 127 MG/DL — HIGH (ref 70–99)
HCT VFR BLD CALC: 29.6 % — LOW (ref 34.5–45)
HGB BLD-MCNC: 9.2 G/DL — LOW (ref 11.5–15.5)
MCHC RBC-ENTMCNC: 30.2 PG — SIGNIFICANT CHANGE UP (ref 27–34)
MCHC RBC-ENTMCNC: 31.1 GM/DL — LOW (ref 32–36)
MCV RBC AUTO: 97 FL — SIGNIFICANT CHANGE UP (ref 80–100)
NRBC # BLD: 0 /100 WBCS — SIGNIFICANT CHANGE UP (ref 0–0)
PLATELET # BLD AUTO: 172 K/UL — SIGNIFICANT CHANGE UP (ref 150–400)
POTASSIUM SERPL-MCNC: 3.2 MMOL/L — LOW (ref 3.5–5.3)
POTASSIUM SERPL-SCNC: 3.2 MMOL/L — LOW (ref 3.5–5.3)
RBC # BLD: 3.05 M/UL — LOW (ref 3.8–5.2)
RBC # FLD: 14.6 % — HIGH (ref 10.3–14.5)
SODIUM SERPL-SCNC: 137 MMOL/L — SIGNIFICANT CHANGE UP (ref 135–145)
WBC # BLD: 7.13 K/UL — SIGNIFICANT CHANGE UP (ref 3.8–10.5)
WBC # FLD AUTO: 7.13 K/UL — SIGNIFICANT CHANGE UP (ref 3.8–10.5)

## 2020-02-22 PROCEDURE — 99233 SBSQ HOSP IP/OBS HIGH 50: CPT

## 2020-02-22 RX ORDER — METOCLOPRAMIDE HCL 10 MG
10 TABLET ORAL ONCE
Refills: 0 | Status: COMPLETED | OUTPATIENT
Start: 2020-02-22 | End: 2020-02-23

## 2020-02-22 RX ORDER — POTASSIUM CHLORIDE 20 MEQ
40 PACKET (EA) ORAL EVERY 4 HOURS
Refills: 0 | Status: COMPLETED | OUTPATIENT
Start: 2020-02-22 | End: 2020-02-22

## 2020-02-22 RX ADMIN — Medication 1000 MILLIGRAM(S): at 06:33

## 2020-02-22 RX ADMIN — AMLODIPINE BESYLATE 2.5 MILLIGRAM(S): 2.5 TABLET ORAL at 06:34

## 2020-02-22 RX ADMIN — APIXABAN 2.5 MILLIGRAM(S): 2.5 TABLET, FILM COATED ORAL at 16:30

## 2020-02-22 RX ADMIN — HYDROMORPHONE HYDROCHLORIDE 4 MILLIGRAM(S): 2 INJECTION INTRAMUSCULAR; INTRAVENOUS; SUBCUTANEOUS at 18:56

## 2020-02-22 RX ADMIN — Medication 30 MILLILITER(S): at 21:43

## 2020-02-22 RX ADMIN — Medication 12.5 MILLIGRAM(S): at 06:34

## 2020-02-22 RX ADMIN — CELECOXIB 200 MILLIGRAM(S): 200 CAPSULE ORAL at 16:31

## 2020-02-22 RX ADMIN — HYDROMORPHONE HYDROCHLORIDE 4 MILLIGRAM(S): 2 INJECTION INTRAMUSCULAR; INTRAVENOUS; SUBCUTANEOUS at 23:04

## 2020-02-22 RX ADMIN — HYDROMORPHONE HYDROCHLORIDE 4 MILLIGRAM(S): 2 INJECTION INTRAMUSCULAR; INTRAVENOUS; SUBCUTANEOUS at 10:21

## 2020-02-22 RX ADMIN — POLYETHYLENE GLYCOL 3350 17 GRAM(S): 17 POWDER, FOR SOLUTION ORAL at 10:10

## 2020-02-22 RX ADMIN — Medication 1000 MILLIGRAM(S): at 21:32

## 2020-02-22 RX ADMIN — Medication 40 MILLIGRAM(S): at 10:08

## 2020-02-22 RX ADMIN — Medication 1000 MILLIGRAM(S): at 13:48

## 2020-02-22 RX ADMIN — Medication 40 MILLIEQUIVALENT(S): at 13:53

## 2020-02-22 RX ADMIN — HYDROMORPHONE HYDROCHLORIDE 4 MILLIGRAM(S): 2 INJECTION INTRAMUSCULAR; INTRAVENOUS; SUBCUTANEOUS at 13:49

## 2020-02-22 RX ADMIN — HYDROMORPHONE HYDROCHLORIDE 4 MILLIGRAM(S): 2 INJECTION INTRAMUSCULAR; INTRAVENOUS; SUBCUTANEOUS at 06:58

## 2020-02-22 RX ADMIN — CELECOXIB 200 MILLIGRAM(S): 200 CAPSULE ORAL at 06:34

## 2020-02-22 RX ADMIN — Medication 50 MILLIGRAM(S): at 06:34

## 2020-02-22 RX ADMIN — ONDANSETRON 4 MILLIGRAM(S): 8 TABLET, FILM COATED ORAL at 06:06

## 2020-02-22 RX ADMIN — LOSARTAN POTASSIUM 50 MILLIGRAM(S): 100 TABLET, FILM COATED ORAL at 06:34

## 2020-02-22 RX ADMIN — ONDANSETRON 4 MILLIGRAM(S): 8 TABLET, FILM COATED ORAL at 22:51

## 2020-02-22 RX ADMIN — Medication 1000 MILLIGRAM(S): at 06:58

## 2020-02-22 RX ADMIN — Medication 500 MILLIGRAM(S): at 10:10

## 2020-02-22 RX ADMIN — APIXABAN 2.5 MILLIGRAM(S): 2.5 TABLET, FILM COATED ORAL at 06:34

## 2020-02-22 RX ADMIN — Medication 20 MILLIEQUIVALENT(S): at 10:09

## 2020-02-22 RX ADMIN — LORATADINE 10 MILLIGRAM(S): 10 TABLET ORAL at 10:09

## 2020-02-22 RX ADMIN — HYDROMORPHONE HYDROCHLORIDE 4 MILLIGRAM(S): 2 INJECTION INTRAMUSCULAR; INTRAVENOUS; SUBCUTANEOUS at 06:34

## 2020-02-22 RX ADMIN — Medication 1000 MILLIGRAM(S): at 22:00

## 2020-02-22 RX ADMIN — Medication 1 MILLIGRAM(S): at 10:08

## 2020-02-22 RX ADMIN — ROPINIROLE 5 MILLIGRAM(S): 8 TABLET, FILM COATED, EXTENDED RELEASE ORAL at 21:33

## 2020-02-22 RX ADMIN — CELECOXIB 200 MILLIGRAM(S): 200 CAPSULE ORAL at 06:58

## 2020-02-22 RX ADMIN — PANTOPRAZOLE SODIUM 40 MILLIGRAM(S): 20 TABLET, DELAYED RELEASE ORAL at 06:34

## 2020-02-22 RX ADMIN — HYDROMORPHONE HYDROCHLORIDE 0.5 MILLIGRAM(S): 2 INJECTION INTRAMUSCULAR; INTRAVENOUS; SUBCUTANEOUS at 21:45

## 2020-02-22 RX ADMIN — QUETIAPINE FUMARATE 50 MILLIGRAM(S): 200 TABLET, FILM COATED ORAL at 21:33

## 2020-02-22 RX ADMIN — Medication 10 MILLIEQUIVALENT(S): at 10:10

## 2020-02-22 RX ADMIN — GABAPENTIN 300 MILLIGRAM(S): 400 CAPSULE ORAL at 21:32

## 2020-02-22 RX ADMIN — HYDROMORPHONE HYDROCHLORIDE 4 MILLIGRAM(S): 2 INJECTION INTRAMUSCULAR; INTRAVENOUS; SUBCUTANEOUS at 23:40

## 2020-02-22 RX ADMIN — HYDROMORPHONE HYDROCHLORIDE 0.5 MILLIGRAM(S): 2 INJECTION INTRAMUSCULAR; INTRAVENOUS; SUBCUTANEOUS at 21:33

## 2020-02-22 RX ADMIN — ONDANSETRON 4 MILLIGRAM(S): 8 TABLET, FILM COATED ORAL at 17:26

## 2020-02-22 RX ADMIN — Medication 1 GRAM(S): at 10:08

## 2020-02-22 NOTE — PROGRESS NOTE ADULT - SUBJECTIVE AND OBJECTIVE BOX
INTERVAL HPI/OVERNIGHT EVENTS: RIVAS O/N    SUBJECTIVE: Patient seen and examined at bedside.   Pt states pain is improved. States it is localized over incision site and there's a tight tearing sensation whenever she moves. Worse before and after PT. Denies any numbness, chest pain, nausea, abd pain. +BMs. No dysuria.    ROS: 12 point ROS reviewed and otherwise negative    OBJECTIVE:    VITAL SIGNS:  ICU Vital Signs Last 24 Hrs  T(C): 36.6 (22 Feb 2020 08:37), Max: 36.7 (21 Feb 2020 20:28)  T(F): 97.8 (22 Feb 2020 08:37), Max: 98 (21 Feb 2020 20:28)  HR: 78 (22 Feb 2020 13:45) (78 - 83)  BP: 100/58 (22 Feb 2020 13:45) (100/58 - 130/77)  BP(mean): --  ABP: --  ABP(mean): --  RR: 18 (22 Feb 2020 13:45) (12 - 18)  SpO2: 100% (22 Feb 2020 13:45) (93% - 100%)        02-21 @ 07:01  -  02-22 @ 07:00  --------------------------------------------------------  IN: 720 mL / OUT: 0 mL / NET: 720 mL    02-22 @ 07:01  - 02-22 @ 16:20  --------------------------------------------------------  IN: 0 mL / OUT: 300 mL / NET: -300 mL      CAPILLARY BLOOD GLUCOSE      POCT Blood Glucose.: 119 mg/dL (22 Feb 2020 06:51)      PHYSICAL EXAM:  GEN: female in NAD in chair  HEENT: NC/AT, MMM, EOMI  NECK: Supple  CV: RRR, nml S1S2, no LE edema, SCDs in place  PULM: nml effort, CTAB  ABD: soft, non-distended, NABS, non-tender  NEURO: alert, conversant  motor: 4/5 on LLE, otherwise 5/5  Sensory intact b/l  Incision w/ dressing c/d/i    MEDICATIONS:  MEDICATIONS  (STANDING):  acetaminophen   Tablet .. 1000 milliGRAM(s) Oral every 8 hours  amLODIPine   Tablet 2.5 milliGRAM(s) Oral daily  apixaban 2.5 milliGRAM(s) Oral two times a day  celecoxib 200 milliGRAM(s) Oral two times a day  dextrose 5%. 1000 milliLiter(s) (50 mL/Hr) IV Continuous <Continuous>  dextrose 50% Injectable 12.5 Gram(s) IV Push once  dextrose 50% Injectable 25 Gram(s) IV Push once  dextrose 50% Injectable 25 Gram(s) IV Push once  FLUoxetine 40 milliGRAM(s) Oral daily  folic acid 1 milliGRAM(s) Oral daily  gabapentin 300 milliGRAM(s) Oral at bedtime  hydrochlorothiazide 12.5 milliGRAM(s) Oral daily  insulin lispro (HumaLOG) corrective regimen sliding scale   SubCutaneous Before meals and at bedtime  lactated ringers. 1000 milliLiter(s) (125 mL/Hr) IV Continuous <Continuous>  loratadine 10 milliGRAM(s) Oral daily  losartan 50 milliGRAM(s) Oral daily  metoprolol tartrate 50 milliGRAM(s) Oral daily  pantoprazole    Tablet 40 milliGRAM(s) Oral before breakfast  polyethylene glycol 3350 17 Gram(s) Oral daily  potassium chloride    Tablet ER 10 milliEquivalent(s) Oral daily  povidone iodine 5% Nasal Swab 1 Application(s) Both Nostrils once  QUEtiapine 50 milliGRAM(s) Oral at bedtime  rOPINIRole 5 milliGRAM(s) Oral at bedtime  sucralfate 1 Gram(s) Oral <User Schedule>  sulfaSALAzine 500 milliGRAM(s) Oral daily    MEDICATIONS  (PRN):  aluminum hydroxide/magnesium hydroxide/simethicone Suspension 30 milliLiter(s) Oral four times a day PRN Indigestion  bisacodyl Suppository 10 milliGRAM(s) Rectal daily PRN If no bowel movement by POD#2  dextrose 40% Gel 15 Gram(s) Oral once PRN Blood Glucose LESS THAN 70 milliGRAM(s)/deciliter  glucagon  Injectable 1 milliGRAM(s) IntraMuscular once PRN Glucose LESS THAN 70 milligrams/deciliter  HYDROmorphone   Tablet 2 milliGRAM(s) Oral every 4 hours PRN Moderate Pain (4 - 6)  HYDROmorphone   Tablet 4 milliGRAM(s) Oral every 4 hours PRN Severe Pain (7 - 10)  HYDROmorphone  Injectable 0.5 milliGRAM(s) IV Push every 4 hours PRN breakthrough pain  ondansetron Injectable 4 milliGRAM(s) IV Push every 6 hours PRN Nausea and/or Vomiting  senna 2 Tablet(s) Oral at bedtime PRN Constipation      ALLERGIES:  Allergies    lisinopril (Other)  verapamil (Other)    Intolerances        LABS:                        9.2    7.13  )-----------( 172      ( 22 Feb 2020 06:28 )             29.6     02-22    137  |  102  |  11  ----------------------------<  127<H>  3.2<L>   |  24  |  0.74    Ca    8.6      22 Feb 2020 06:28            RADIOLOGY & ADDITIONAL TESTS: Reviewed.

## 2020-02-22 NOTE — PROGRESS NOTE ADULT - ASSESSMENT
49F w/ hypercoagulable state (h/o PE/DVT/Splenic vein thrombosis) on lovenox, DM, GERD, HTN, DARIO, here for elective LEFT THR on 2/20 now POD1    #Post op state - continue optimizing pain regimen. c/w bowel regimen, c/w IS. PT pending  #Hypercoaguable state - currently on apixaban 2.5 BID  #DM - BGs have been at target  #Leukocytosis - Resolved. elevated likely 2/2 post-op  #Normocytic anemia - 9.2 from 10.6 from 12.2 at baseline. Pt states PO iron complicated by constipation previously -- had been taking it once weekly in the past  #HTN - holding HCTZ in setting of nml BPs and hyponatremia  #Hyponatremia - resolved    Recommendations   - Would obtain Iron Panel w ferritin add on in AM   - would hold HCTZ in setting of hyponatremia   - BMP w Mg in AM   - Can c/w apixaban 2.5 BID and switch to 100mg BID SQ lovenox on DC    Dispo - PT recommends home PT

## 2020-02-22 NOTE — PROGRESS NOTE ADULT - SUBJECTIVE AND OBJECTIVE BOX
SUBJECTIVE: Patient seen and examined. Pain controlled.     OBJECTIVE:  NAD  Vital Signs Last 24 Hrs  T(C): 36.6 (22 Feb 2020 06:09), Max: 37.2 (21 Feb 2020 13:11)  T(F): 97.8 (22 Feb 2020 06:09), Max: 98.9 (21 Feb 2020 13:11)  HR: 82 (22 Feb 2020 08:37) (78 - 89)  BP: 121/58 (22 Feb 2020 08:37) (100/69 - 130/77)  BP(mean): --  RR: 18 (22 Feb 2020 08:37) (12 - 18)  SpO2: 98% (22 Feb 2020 08:37) (93% - 100%)    Physical Exam:  General: Resting comfortably in bed, AAOx3, NAD  LLE:          Dressing L hip c/d/i         Sensation: SILT symmetrical L2-S1         Motor exam: 5/5 TA/GS/EHL b/l         warm well perfused; capillary refill <3 seconds                                      9.2    7.13  )-----------( 172      ( 22 Feb 2020 06:28 )             29.6       A/P :  Pt is a 50yo Female s/p  L SANDY  -    Pain control  -    DVT ppx: SCD     -    Weight bearing status: WBAT   -    Physical Therapy  -    PM consult  -    Dispo: Home w HPT

## 2020-02-23 LAB
ANION GAP SERPL CALC-SCNC: 11 MMOL/L — SIGNIFICANT CHANGE UP (ref 5–17)
BUN SERPL-MCNC: 9 MG/DL — SIGNIFICANT CHANGE UP (ref 7–23)
CALCIUM SERPL-MCNC: 8.6 MG/DL — SIGNIFICANT CHANGE UP (ref 8.4–10.5)
CHLORIDE SERPL-SCNC: 102 MMOL/L — SIGNIFICANT CHANGE UP (ref 96–108)
CO2 SERPL-SCNC: 25 MMOL/L — SIGNIFICANT CHANGE UP (ref 22–31)
CREAT SERPL-MCNC: 0.68 MG/DL — SIGNIFICANT CHANGE UP (ref 0.5–1.3)
FERRITIN SERPL-MCNC: 101 NG/ML — SIGNIFICANT CHANGE UP (ref 15–150)
GLUCOSE BLDC GLUCOMTR-MCNC: 107 MG/DL — HIGH (ref 70–99)
GLUCOSE BLDC GLUCOMTR-MCNC: 107 MG/DL — HIGH (ref 70–99)
GLUCOSE BLDC GLUCOMTR-MCNC: 118 MG/DL — HIGH (ref 70–99)
GLUCOSE BLDC GLUCOMTR-MCNC: 141 MG/DL — HIGH (ref 70–99)
GLUCOSE SERPL-MCNC: 131 MG/DL — HIGH (ref 70–99)
HCT VFR BLD CALC: 27.5 % — LOW (ref 34.5–45)
HGB BLD-MCNC: 8.7 G/DL — LOW (ref 11.5–15.5)
IRON SATN MFR SERPL: 16 UG/DL — LOW (ref 30–160)
IRON SATN MFR SERPL: 7 % — LOW (ref 14–50)
MAGNESIUM SERPL-MCNC: 1.7 MG/DL — SIGNIFICANT CHANGE UP (ref 1.6–2.6)
MCHC RBC-ENTMCNC: 30.4 PG — SIGNIFICANT CHANGE UP (ref 27–34)
MCHC RBC-ENTMCNC: 31.6 GM/DL — LOW (ref 32–36)
MCV RBC AUTO: 96.2 FL — SIGNIFICANT CHANGE UP (ref 80–100)
NRBC # BLD: 0 /100 WBCS — SIGNIFICANT CHANGE UP (ref 0–0)
PLATELET # BLD AUTO: 185 K/UL — SIGNIFICANT CHANGE UP (ref 150–400)
POTASSIUM SERPL-MCNC: 4.1 MMOL/L — SIGNIFICANT CHANGE UP (ref 3.5–5.3)
POTASSIUM SERPL-SCNC: 4.1 MMOL/L — SIGNIFICANT CHANGE UP (ref 3.5–5.3)
RBC # BLD: 2.86 M/UL — LOW (ref 3.8–5.2)
RBC # FLD: 14.6 % — HIGH (ref 10.3–14.5)
SODIUM SERPL-SCNC: 138 MMOL/L — SIGNIFICANT CHANGE UP (ref 135–145)
TIBC SERPL-MCNC: 234 UG/DL — SIGNIFICANT CHANGE UP (ref 220–430)
UIBC SERPL-MCNC: 218 UG/DL — SIGNIFICANT CHANGE UP (ref 110–370)
WBC # BLD: 6.87 K/UL — SIGNIFICANT CHANGE UP (ref 3.8–10.5)
WBC # FLD AUTO: 6.87 K/UL — SIGNIFICANT CHANGE UP (ref 3.8–10.5)

## 2020-02-23 PROCEDURE — 99232 SBSQ HOSP IP/OBS MODERATE 35: CPT

## 2020-02-23 RX ORDER — KETOROLAC TROMETHAMINE 30 MG/ML
15 SYRINGE (ML) INJECTION ONCE
Refills: 0 | Status: DISCONTINUED | OUTPATIENT
Start: 2020-02-23 | End: 2020-02-23

## 2020-02-23 RX ORDER — METOCLOPRAMIDE HCL 10 MG
10 TABLET ORAL ONCE
Refills: 0 | Status: COMPLETED | OUTPATIENT
Start: 2020-02-23 | End: 2020-02-23

## 2020-02-23 RX ORDER — FERROUS SULFATE 325(65) MG
325 TABLET ORAL DAILY
Refills: 0 | Status: DISCONTINUED | OUTPATIENT
Start: 2020-02-23 | End: 2020-02-25

## 2020-02-23 RX ADMIN — HYDROMORPHONE HYDROCHLORIDE 4 MILLIGRAM(S): 2 INJECTION INTRAMUSCULAR; INTRAVENOUS; SUBCUTANEOUS at 10:08

## 2020-02-23 RX ADMIN — Medication 1000 MILLIGRAM(S): at 22:20

## 2020-02-23 RX ADMIN — APIXABAN 2.5 MILLIGRAM(S): 2.5 TABLET, FILM COATED ORAL at 05:26

## 2020-02-23 RX ADMIN — CELECOXIB 200 MILLIGRAM(S): 200 CAPSULE ORAL at 18:07

## 2020-02-23 RX ADMIN — Medication 1000 MILLIGRAM(S): at 06:00

## 2020-02-23 RX ADMIN — CELECOXIB 200 MILLIGRAM(S): 200 CAPSULE ORAL at 05:25

## 2020-02-23 RX ADMIN — Medication 1 MILLIGRAM(S): at 12:29

## 2020-02-23 RX ADMIN — Medication 1000 MILLIGRAM(S): at 21:20

## 2020-02-23 RX ADMIN — PANTOPRAZOLE SODIUM 40 MILLIGRAM(S): 20 TABLET, DELAYED RELEASE ORAL at 05:26

## 2020-02-23 RX ADMIN — Medication 10 MILLIEQUIVALENT(S): at 12:28

## 2020-02-23 RX ADMIN — Medication 500 MILLIGRAM(S): at 12:29

## 2020-02-23 RX ADMIN — Medication 10 MILLIGRAM(S): at 00:12

## 2020-02-23 RX ADMIN — Medication 1 GRAM(S): at 09:38

## 2020-02-23 RX ADMIN — HYDROMORPHONE HYDROCHLORIDE 4 MILLIGRAM(S): 2 INJECTION INTRAMUSCULAR; INTRAVENOUS; SUBCUTANEOUS at 09:38

## 2020-02-23 RX ADMIN — AMLODIPINE BESYLATE 2.5 MILLIGRAM(S): 2.5 TABLET ORAL at 05:26

## 2020-02-23 RX ADMIN — CELECOXIB 200 MILLIGRAM(S): 200 CAPSULE ORAL at 17:37

## 2020-02-23 RX ADMIN — Medication 50 MILLIGRAM(S): at 05:26

## 2020-02-23 RX ADMIN — Medication 15 MILLIGRAM(S): at 21:19

## 2020-02-23 RX ADMIN — Medication 1000 MILLIGRAM(S): at 13:20

## 2020-02-23 RX ADMIN — ROPINIROLE 5 MILLIGRAM(S): 8 TABLET, FILM COATED, EXTENDED RELEASE ORAL at 21:19

## 2020-02-23 RX ADMIN — POLYETHYLENE GLYCOL 3350 17 GRAM(S): 17 POWDER, FOR SOLUTION ORAL at 12:29

## 2020-02-23 RX ADMIN — CELECOXIB 200 MILLIGRAM(S): 200 CAPSULE ORAL at 06:00

## 2020-02-23 RX ADMIN — Medication 40 MILLIGRAM(S): at 12:30

## 2020-02-23 RX ADMIN — QUETIAPINE FUMARATE 50 MILLIGRAM(S): 200 TABLET, FILM COATED ORAL at 21:19

## 2020-02-23 RX ADMIN — HYDROMORPHONE HYDROCHLORIDE 4 MILLIGRAM(S): 2 INJECTION INTRAMUSCULAR; INTRAVENOUS; SUBCUTANEOUS at 17:38

## 2020-02-23 RX ADMIN — LORATADINE 10 MILLIGRAM(S): 10 TABLET ORAL at 12:29

## 2020-02-23 RX ADMIN — Medication 15 MILLIGRAM(S): at 22:20

## 2020-02-23 RX ADMIN — Medication 10 MILLIGRAM(S): at 09:38

## 2020-02-23 RX ADMIN — GABAPENTIN 300 MILLIGRAM(S): 400 CAPSULE ORAL at 21:19

## 2020-02-23 RX ADMIN — Medication 1000 MILLIGRAM(S): at 13:16

## 2020-02-23 RX ADMIN — HYDROMORPHONE HYDROCHLORIDE 4 MILLIGRAM(S): 2 INJECTION INTRAMUSCULAR; INTRAVENOUS; SUBCUTANEOUS at 18:08

## 2020-02-23 RX ADMIN — Medication 1000 MILLIGRAM(S): at 05:25

## 2020-02-23 RX ADMIN — APIXABAN 2.5 MILLIGRAM(S): 2.5 TABLET, FILM COATED ORAL at 17:38

## 2020-02-23 RX ADMIN — Medication 325 MILLIGRAM(S): at 17:38

## 2020-02-23 NOTE — PROGRESS NOTE ADULT - SUBJECTIVE AND OBJECTIVE BOX
INTERVAL HPI/OVERNIGHT EVENTS: RIVAS O/N    SUBJECTIVE: Patient seen and examined at bedside.   Pt states pain is somewhat improved. Has been receiving 4mg PO dilaudid q4h. States pain still over incision site w/ stretching feeling when she ambulates. Denies any chest pain, lightheadedness, dyspnea when she walks. No nausea or abd pain. +BMs    ROS: 12 point ROS reviewed and otherwise negative    OBJECTIVE:    VITAL SIGNS:  ICU Vital Signs Last 24 Hrs  T(C): 37.1 (23 Feb 2020 12:21), Max: 37.1 (23 Feb 2020 12:21)  T(F): 98.8 (23 Feb 2020 12:21), Max: 98.8 (23 Feb 2020 12:21)  HR: 81 (23 Feb 2020 12:21) (73 - 91)  BP: 101/56 (23 Feb 2020 12:21) (92/61 - 110/75)  BP(mean): --  ABP: --  ABP(mean): --  RR: 15 (23 Feb 2020 12:21) (15 - 18)  SpO2: 99% (23 Feb 2020 12:21) (97% - 100%)        02-22 @ 07:01  -  02-23 @ 07:00  --------------------------------------------------------  IN: 0 mL / OUT: 900 mL / NET: -900 mL      CAPILLARY BLOOD GLUCOSE      POCT Blood Glucose.: 107 mg/dL (23 Feb 2020 12:01)      PHYSICAL EXAM:  GEN: female in NAD in chair  HEENT: NC/AT, MMM, EOMI  NECK: Supple  CV: RRR, nml S1S2, no LE edema, SCDs in place  PULM: nml effort, CTAB  ABD: soft, non-distended, NABS, non-tender  NEURO: alert, conversant  motor: 4/5 on LLE, otherwise 5/5  Sensory intact b/l  Incision w/ dressing c/d/i    MEDICATIONS:  MEDICATIONS  (STANDING):  acetaminophen   Tablet .. 1000 milliGRAM(s) Oral every 8 hours  amLODIPine   Tablet 2.5 milliGRAM(s) Oral daily  apixaban 2.5 milliGRAM(s) Oral two times a day  celecoxib 200 milliGRAM(s) Oral two times a day  dextrose 5%. 1000 milliLiter(s) (50 mL/Hr) IV Continuous <Continuous>  dextrose 50% Injectable 12.5 Gram(s) IV Push once  dextrose 50% Injectable 25 Gram(s) IV Push once  dextrose 50% Injectable 25 Gram(s) IV Push once  FLUoxetine 40 milliGRAM(s) Oral daily  folic acid 1 milliGRAM(s) Oral daily  gabapentin 300 milliGRAM(s) Oral at bedtime  hydrochlorothiazide 12.5 milliGRAM(s) Oral daily  insulin lispro (HumaLOG) corrective regimen sliding scale   SubCutaneous Before meals and at bedtime  lactated ringers. 1000 milliLiter(s) (125 mL/Hr) IV Continuous <Continuous>  loratadine 10 milliGRAM(s) Oral daily  losartan 50 milliGRAM(s) Oral daily  metoprolol tartrate 50 milliGRAM(s) Oral daily  pantoprazole    Tablet 40 milliGRAM(s) Oral before breakfast  polyethylene glycol 3350 17 Gram(s) Oral daily  potassium chloride    Tablet ER 10 milliEquivalent(s) Oral daily  povidone iodine 5% Nasal Swab 1 Application(s) Both Nostrils once  QUEtiapine 50 milliGRAM(s) Oral at bedtime  rOPINIRole 5 milliGRAM(s) Oral at bedtime  sucralfate 1 Gram(s) Oral <User Schedule>  sulfaSALAzine 500 milliGRAM(s) Oral daily    MEDICATIONS  (PRN):  aluminum hydroxide/magnesium hydroxide/simethicone Suspension 30 milliLiter(s) Oral four times a day PRN Indigestion  bisacodyl Suppository 10 milliGRAM(s) Rectal daily PRN If no bowel movement by POD#2  dextrose 40% Gel 15 Gram(s) Oral once PRN Blood Glucose LESS THAN 70 milliGRAM(s)/deciliter  glucagon  Injectable 1 milliGRAM(s) IntraMuscular once PRN Glucose LESS THAN 70 milligrams/deciliter  HYDROmorphone   Tablet 2 milliGRAM(s) Oral every 4 hours PRN Moderate Pain (4 - 6)  HYDROmorphone   Tablet 4 milliGRAM(s) Oral every 4 hours PRN Severe Pain (7 - 10)  HYDROmorphone  Injectable 0.5 milliGRAM(s) IV Push every 4 hours PRN breakthrough pain  ondansetron Injectable 4 milliGRAM(s) IV Push every 6 hours PRN Nausea and/or Vomiting  senna 2 Tablet(s) Oral at bedtime PRN Constipation      ALLERGIES:  Allergies    lisinopril (Other)  verapamil (Other)    Intolerances        LABS:                        8.7    6.87  )-----------( 185      ( 23 Feb 2020 06:49 )             27.5     02-23    138  |  102  |  9   ----------------------------<  131<H>  4.1   |  25  |  0.68    Ca    8.6      23 Feb 2020 06:49  Mg     1.7     02-23            RADIOLOGY & ADDITIONAL TESTS: Reviewed.

## 2020-02-23 NOTE — PROGRESS NOTE ADULT - SUBJECTIVE AND OBJECTIVE BOX
SUBJECTIVE: Patient seen and examined. Pain controlled.     OBJECTIVE:  NAD  Vital Signs Last 24 Hrs  T(C): 36.7 (23 Feb 2020 09:05), Max: 36.9 (22 Feb 2020 20:51)  T(F): 98.1 (23 Feb 2020 09:05), Max: 98.4 (22 Feb 2020 20:51)  HR: 79 (23 Feb 2020 09:05) (73 - 91)  BP: 92/61 (23 Feb 2020 09:05) (92/61 - 110/75)  BP(mean): --  RR: 17 (23 Feb 2020 09:05) (16 - 18)  SpO2: 100% (23 Feb 2020 09:05) (97% - 100%)    Physical Exam:  General: Resting comfortably in bed, AAOx3, NAD  LLE:          Dressing L hip c/d/i         Sensation: SILT symmetrical L2-S1         Motor exam: 5/5 TA/GS/EHL b/l         warm well perfused; capillary refill <3 seconds                                      8.7    6.87  )-----------( 185      ( 23 Feb 2020 06:49 )             27.5       A/P :  Pt is a 50yo Female s/p  L SANDY  -    Pain control  -    DVT ppx: SCD     -    Weight bearing status: WBAT   -    Physical Therapy  -    PM consult  -    Dispo: Home w HPT

## 2020-02-23 NOTE — PROGRESS NOTE ADULT - ASSESSMENT
49F w/ hypercoagulable state (h/o PE/DVT/Splenic vein thrombosis) on lovenox, DM, GERD, HTN, DARIO, here for elective LEFT THR on 2/20 now POD3    #Post op state - continue optimizing pain regimen. c/w bowel regimen, c/w IS. PT pending  #Hypercoagulable state - currently on apixaban 2.5 BID  #DM - BGs have been at target  #Leukocytosis - Resolved. elevated likely 2/2 post-op  #Normocytic anemia - c/w iron deficiency as Transferrin sat 7. Ferritin nml but may be elevated in setting of post-op/inflammatory state. Asymptomatic. 8.7 from 9.2 from 10.6 from 12.2 at baseline. Pt states PO iron complicated by constipation previously  #HTN - soft - likely complicated by pain medications and receiving amlodipine and metoprolol  #Hyponatremia - resolved    Recommendations  - DC amlodipine; also stop HCTZ and losartan (pt has not been receiving these due to low BP)   - Can continue metoprolol with hold parameter (Hold if BP <100; HR<50.   - Add PO ferrous sulfate 325 daily due to low iron -- pt reporting daily BMs. If constipated, can switch to other day dosing   - Can c/w apixaban 2.5 BID and switch to 100mg BID SQ lovenox on DC

## 2020-02-24 LAB
ANION GAP SERPL CALC-SCNC: 10 MMOL/L — SIGNIFICANT CHANGE UP (ref 5–17)
BUN SERPL-MCNC: 9 MG/DL — SIGNIFICANT CHANGE UP (ref 7–23)
CALCIUM SERPL-MCNC: 8.4 MG/DL — SIGNIFICANT CHANGE UP (ref 8.4–10.5)
CHLORIDE SERPL-SCNC: 104 MMOL/L — SIGNIFICANT CHANGE UP (ref 96–108)
CO2 SERPL-SCNC: 25 MMOL/L — SIGNIFICANT CHANGE UP (ref 22–31)
CREAT SERPL-MCNC: 0.72 MG/DL — SIGNIFICANT CHANGE UP (ref 0.5–1.3)
GLUCOSE BLDC GLUCOMTR-MCNC: 105 MG/DL — HIGH (ref 70–99)
GLUCOSE BLDC GLUCOMTR-MCNC: 108 MG/DL — HIGH (ref 70–99)
GLUCOSE BLDC GLUCOMTR-MCNC: 122 MG/DL — HIGH (ref 70–99)
GLUCOSE BLDC GLUCOMTR-MCNC: 99 MG/DL — SIGNIFICANT CHANGE UP (ref 70–99)
GLUCOSE SERPL-MCNC: 99 MG/DL — SIGNIFICANT CHANGE UP (ref 70–99)
HCT VFR BLD CALC: 26.8 % — LOW (ref 34.5–45)
HGB BLD-MCNC: 8.5 G/DL — LOW (ref 11.5–15.5)
MAGNESIUM SERPL-MCNC: 1.7 MG/DL — SIGNIFICANT CHANGE UP (ref 1.6–2.6)
MCHC RBC-ENTMCNC: 30.6 PG — SIGNIFICANT CHANGE UP (ref 27–34)
MCHC RBC-ENTMCNC: 31.7 GM/DL — LOW (ref 32–36)
MCV RBC AUTO: 96.4 FL — SIGNIFICANT CHANGE UP (ref 80–100)
NRBC # BLD: 0 /100 WBCS — SIGNIFICANT CHANGE UP (ref 0–0)
PHOSPHATE SERPL-MCNC: 3.3 MG/DL — SIGNIFICANT CHANGE UP (ref 2.5–4.5)
PLATELET # BLD AUTO: 201 K/UL — SIGNIFICANT CHANGE UP (ref 150–400)
POTASSIUM SERPL-MCNC: 3.6 MMOL/L — SIGNIFICANT CHANGE UP (ref 3.5–5.3)
POTASSIUM SERPL-SCNC: 3.6 MMOL/L — SIGNIFICANT CHANGE UP (ref 3.5–5.3)
RBC # BLD: 2.78 M/UL — LOW (ref 3.8–5.2)
RBC # FLD: 14.3 % — SIGNIFICANT CHANGE UP (ref 10.3–14.5)
SODIUM SERPL-SCNC: 139 MMOL/L — SIGNIFICANT CHANGE UP (ref 135–145)
WBC # BLD: 6.41 K/UL — SIGNIFICANT CHANGE UP (ref 3.8–10.5)
WBC # FLD AUTO: 6.41 K/UL — SIGNIFICANT CHANGE UP (ref 3.8–10.5)

## 2020-02-24 PROCEDURE — 99232 SBSQ HOSP IP/OBS MODERATE 35: CPT

## 2020-02-24 RX ORDER — OXYCODONE HYDROCHLORIDE 5 MG/1
10 TABLET ORAL EVERY 4 HOURS
Refills: 0 | Status: DISCONTINUED | OUTPATIENT
Start: 2020-02-24 | End: 2020-02-25

## 2020-02-24 RX ORDER — OXYCODONE HYDROCHLORIDE 5 MG/1
5 TABLET ORAL EVERY 4 HOURS
Refills: 0 | Status: DISCONTINUED | OUTPATIENT
Start: 2020-02-24 | End: 2020-02-25

## 2020-02-24 RX ORDER — KETOROLAC TROMETHAMINE 30 MG/ML
15 SYRINGE (ML) INJECTION ONCE
Refills: 0 | Status: DISCONTINUED | OUTPATIENT
Start: 2020-02-24 | End: 2020-02-24

## 2020-02-24 RX ORDER — SODIUM CHLORIDE 9 MG/ML
1000 INJECTION INTRAMUSCULAR; INTRAVENOUS; SUBCUTANEOUS ONCE
Refills: 0 | Status: COMPLETED | OUTPATIENT
Start: 2020-02-24 | End: 2020-02-24

## 2020-02-24 RX ORDER — SENNA PLUS 8.6 MG/1
2 TABLET ORAL
Qty: 0 | Refills: 0 | DISCHARGE
Start: 2020-02-24

## 2020-02-24 RX ORDER — CYCLOBENZAPRINE HYDROCHLORIDE 10 MG/1
5 TABLET, FILM COATED ORAL THREE TIMES A DAY
Refills: 0 | Status: DISCONTINUED | OUTPATIENT
Start: 2020-02-24 | End: 2020-02-25

## 2020-02-24 RX ORDER — POLYETHYLENE GLYCOL 3350 17 G/17G
17 POWDER, FOR SOLUTION ORAL
Qty: 0 | Refills: 0 | DISCHARGE
Start: 2020-02-24

## 2020-02-24 RX ORDER — IRON SUCROSE 20 MG/ML
100 INJECTION, SOLUTION INTRAVENOUS ONCE
Refills: 0 | Status: COMPLETED | OUTPATIENT
Start: 2020-02-24 | End: 2020-02-24

## 2020-02-24 RX ADMIN — Medication 325 MILLIGRAM(S): at 11:55

## 2020-02-24 RX ADMIN — SODIUM CHLORIDE 500 MILLILITER(S): 9 INJECTION INTRAMUSCULAR; INTRAVENOUS; SUBCUTANEOUS at 10:47

## 2020-02-24 RX ADMIN — Medication 1000 MILLIGRAM(S): at 06:10

## 2020-02-24 RX ADMIN — Medication 1000 MILLIGRAM(S): at 13:20

## 2020-02-24 RX ADMIN — ROPINIROLE 5 MILLIGRAM(S): 8 TABLET, FILM COATED, EXTENDED RELEASE ORAL at 21:19

## 2020-02-24 RX ADMIN — Medication 1000 MILLIGRAM(S): at 21:18

## 2020-02-24 RX ADMIN — Medication 1000 MILLIGRAM(S): at 07:10

## 2020-02-24 RX ADMIN — CELECOXIB 200 MILLIGRAM(S): 200 CAPSULE ORAL at 18:41

## 2020-02-24 RX ADMIN — HYDROMORPHONE HYDROCHLORIDE 4 MILLIGRAM(S): 2 INJECTION INTRAMUSCULAR; INTRAVENOUS; SUBCUTANEOUS at 06:10

## 2020-02-24 RX ADMIN — Medication 1000 MILLIGRAM(S): at 22:18

## 2020-02-24 RX ADMIN — GABAPENTIN 300 MILLIGRAM(S): 400 CAPSULE ORAL at 21:19

## 2020-02-24 RX ADMIN — APIXABAN 2.5 MILLIGRAM(S): 2.5 TABLET, FILM COATED ORAL at 17:41

## 2020-02-24 RX ADMIN — CYCLOBENZAPRINE HYDROCHLORIDE 5 MILLIGRAM(S): 10 TABLET, FILM COATED ORAL at 13:42

## 2020-02-24 RX ADMIN — POLYETHYLENE GLYCOL 3350 17 GRAM(S): 17 POWDER, FOR SOLUTION ORAL at 11:55

## 2020-02-24 RX ADMIN — CELECOXIB 200 MILLIGRAM(S): 200 CAPSULE ORAL at 17:41

## 2020-02-24 RX ADMIN — OXYCODONE HYDROCHLORIDE 5 MILLIGRAM(S): 5 TABLET ORAL at 14:25

## 2020-02-24 RX ADMIN — Medication 15 MILLIGRAM(S): at 10:47

## 2020-02-24 RX ADMIN — Medication 50 MILLIGRAM(S): at 06:11

## 2020-02-24 RX ADMIN — CELECOXIB 200 MILLIGRAM(S): 200 CAPSULE ORAL at 06:11

## 2020-02-24 RX ADMIN — OXYCODONE HYDROCHLORIDE 10 MILLIGRAM(S): 5 TABLET ORAL at 17:41

## 2020-02-24 RX ADMIN — ONDANSETRON 4 MILLIGRAM(S): 8 TABLET, FILM COATED ORAL at 08:32

## 2020-02-24 RX ADMIN — LORATADINE 10 MILLIGRAM(S): 10 TABLET ORAL at 11:55

## 2020-02-24 RX ADMIN — OXYCODONE HYDROCHLORIDE 10 MILLIGRAM(S): 5 TABLET ORAL at 18:44

## 2020-02-24 RX ADMIN — PANTOPRAZOLE SODIUM 40 MILLIGRAM(S): 20 TABLET, DELAYED RELEASE ORAL at 06:10

## 2020-02-24 RX ADMIN — Medication 1 GRAM(S): at 08:32

## 2020-02-24 RX ADMIN — OXYCODONE HYDROCHLORIDE 5 MILLIGRAM(S): 5 TABLET ORAL at 13:21

## 2020-02-24 RX ADMIN — Medication 1 MILLIGRAM(S): at 11:57

## 2020-02-24 RX ADMIN — Medication 10 MILLIEQUIVALENT(S): at 11:55

## 2020-02-24 RX ADMIN — Medication 1000 MILLIGRAM(S): at 14:25

## 2020-02-24 RX ADMIN — APIXABAN 2.5 MILLIGRAM(S): 2.5 TABLET, FILM COATED ORAL at 06:10

## 2020-02-24 RX ADMIN — Medication 500 MILLIGRAM(S): at 11:55

## 2020-02-24 RX ADMIN — CELECOXIB 200 MILLIGRAM(S): 200 CAPSULE ORAL at 07:10

## 2020-02-24 RX ADMIN — QUETIAPINE FUMARATE 50 MILLIGRAM(S): 200 TABLET, FILM COATED ORAL at 21:19

## 2020-02-24 RX ADMIN — IRON SUCROSE 210 MILLIGRAM(S): 20 INJECTION, SOLUTION INTRAVENOUS at 13:36

## 2020-02-24 RX ADMIN — CYCLOBENZAPRINE HYDROCHLORIDE 5 MILLIGRAM(S): 10 TABLET, FILM COATED ORAL at 21:19

## 2020-02-24 RX ADMIN — Medication 15 MILLIGRAM(S): at 11:50

## 2020-02-24 RX ADMIN — Medication 40 MILLIGRAM(S): at 11:55

## 2020-02-24 RX ADMIN — HYDROMORPHONE HYDROCHLORIDE 4 MILLIGRAM(S): 2 INJECTION INTRAMUSCULAR; INTRAVENOUS; SUBCUTANEOUS at 07:10

## 2020-02-24 NOTE — DIETITIAN INITIAL EVALUATION ADULT. - PROBLEM SELECTOR PLAN 1
Admit to Orthopaedic Service.  Presents today for elective left THR   Pt medically stable and cleared for procedure today by Dr. Melissa

## 2020-02-24 NOTE — DIETITIAN INITIAL EVALUATION ADULT. - ADD RECOMMEND
1. Cont with CST CHO diet 2. Cont to encourage adequate Po intake 3. Recommend new A1C 4. Cont with Biweekly weights

## 2020-02-24 NOTE — DIETITIAN INITIAL EVALUATION ADULT. - PERTINENT MEDS FT
Senna  Pain regime  Miralax  Folic acid  Neurontin  Lopressor  Eliquis  Insulin  Toradol  Ferrous sulfate   Dulcolax

## 2020-02-24 NOTE — PROGRESS NOTE ADULT - SUBJECTIVE AND OBJECTIVE BOX
INTERVAL HPI/OVERNIGHT EVENTS: RIVAS O/N    SUBJECTIVE: Patient seen and examined at bedside.   Pt endorsing nausea w/ dilaudid. States pain not really changed by dilaudid. No abd pain, +BM, no chest pain, dyspnea, or fever.    ROS: 12 point ROS reviewed and otherwise negative    OBJECTIVE:    VITAL SIGNS:  ICU Vital Signs Last 24 Hrs  T(C): 36.9 (24 Feb 2020 08:41), Max: 37.3 (23 Feb 2020 20:20)  T(F): 98.5 (24 Feb 2020 08:41), Max: 99.2 (23 Feb 2020 20:20)  HR: 78 (24 Feb 2020 08:41) (78 - 100)  BP: 101/68 (24 Feb 2020 10:11) (94/53 - 113/57)  BP(mean): --  ABP: --  ABP(mean): --  RR: 17 (24 Feb 2020 08:41) (16 - 19)  SpO2: 96% (24 Feb 2020 08:41) (96% - 100%)        02-23 @ 07:01  -  02-24 @ 07:00  --------------------------------------------------------  IN: 0 mL / OUT: 300 mL / NET: -300 mL      CAPILLARY BLOOD GLUCOSE      POCT Blood Glucose.: 105 mg/dL (24 Feb 2020 11:55)      PHYSICAL EXAM:  GEN: female in NAD in chair  HEENT: NC/AT, MMM, EOMI  NECK: Supple  CV: RRR, nml S1S2, no LE edema, SCDs in place  PULM: nml effort, CTAB  ABD: soft, non-distended, NABS, non-tender  NEURO: alert, conversant  motor: 4/5 on LLE, otherwise 5/5  Sensory intact b/l  Incision w/ dressing c/d/i    MEDICATIONS:  MEDICATIONS  (STANDING):  acetaminophen   Tablet .. 1000 milliGRAM(s) Oral every 8 hours  apixaban 2.5 milliGRAM(s) Oral two times a day  celecoxib 200 milliGRAM(s) Oral two times a day  dextrose 5%. 1000 milliLiter(s) (50 mL/Hr) IV Continuous <Continuous>  dextrose 50% Injectable 12.5 Gram(s) IV Push once  dextrose 50% Injectable 25 Gram(s) IV Push once  dextrose 50% Injectable 25 Gram(s) IV Push once  ferrous    sulfate 325 milliGRAM(s) Oral daily  FLUoxetine 40 milliGRAM(s) Oral daily  folic acid 1 milliGRAM(s) Oral daily  gabapentin 300 milliGRAM(s) Oral at bedtime  insulin lispro (HumaLOG) corrective regimen sliding scale   SubCutaneous Before meals and at bedtime  lactated ringers. 1000 milliLiter(s) (125 mL/Hr) IV Continuous <Continuous>  loratadine 10 milliGRAM(s) Oral daily  metoprolol tartrate 50 milliGRAM(s) Oral daily  pantoprazole    Tablet 40 milliGRAM(s) Oral before breakfast  polyethylene glycol 3350 17 Gram(s) Oral daily  potassium chloride    Tablet ER 10 milliEquivalent(s) Oral daily  povidone iodine 5% Nasal Swab 1 Application(s) Both Nostrils once  QUEtiapine 50 milliGRAM(s) Oral at bedtime  rOPINIRole 5 milliGRAM(s) Oral at bedtime  sucralfate 1 Gram(s) Oral <User Schedule>  sulfaSALAzine 500 milliGRAM(s) Oral daily    MEDICATIONS  (PRN):  aluminum hydroxide/magnesium hydroxide/simethicone Suspension 30 milliLiter(s) Oral four times a day PRN Indigestion  bisacodyl Suppository 10 milliGRAM(s) Rectal daily PRN If no bowel movement by POD#2  cyclobenzaprine 5 milliGRAM(s) Oral three times a day PRN Muscle Spasm  dextrose 40% Gel 15 Gram(s) Oral once PRN Blood Glucose LESS THAN 70 milliGRAM(s)/deciliter  glucagon  Injectable 1 milliGRAM(s) IntraMuscular once PRN Glucose LESS THAN 70 milligrams/deciliter  HYDROmorphone  Injectable 0.5 milliGRAM(s) IV Push every 4 hours PRN breakthrough pain  ondansetron Injectable 4 milliGRAM(s) IV Push every 6 hours PRN Nausea and/or Vomiting  oxyCODONE    IR 5 milliGRAM(s) Oral every 4 hours PRN Moderate Pain (4 - 6)  oxyCODONE    IR 10 milliGRAM(s) Oral every 4 hours PRN Severe Pain (7 - 10)  senna 2 Tablet(s) Oral at bedtime PRN Constipation      ALLERGIES:  Allergies    lisinopril (Other)  verapamil (Other)    Intolerances        LABS:                        8.5    6.41  )-----------( 201      ( 24 Feb 2020 07:02 )             26.8     02-24    139  |  104  |  9   ----------------------------<  99  3.6   |  25  |  0.72    Ca    8.4      24 Feb 2020 07:02  Phos  3.3     02-24  Mg     1.7     02-24            RADIOLOGY & ADDITIONAL TESTS: Reviewed.

## 2020-02-24 NOTE — DIETITIAN INITIAL EVALUATION ADULT. - ENERGY NEEDS
Height: " Weight: lbs, IBW lbs+/-10%, %IBW %, BMI,  IBW used for calculations as pt >120% of IBW. Please use upper ranges for wound healing.

## 2020-02-24 NOTE — PROGRESS NOTE ADULT - SUBJECTIVE AND OBJECTIVE BOX
S: Patient seen and examined. Doing well this AM. Pain reported but controlled. No acute events overnight. Comfortable    O:   Vital Signs Last 24 Hrs  T(C): 36.9 (24 Feb 2020 08:41), Max: 37.3 (23 Feb 2020 20:20)  T(F): 98.5 (24 Feb 2020 08:41), Max: 99.2 (23 Feb 2020 20:20)  HR: 78 (24 Feb 2020 08:41) (78 - 100)  BP: 101/68 (24 Feb 2020 08:41) (94/53 - 113/57)  BP(mean): --  RR: 17 (24 Feb 2020 08:41) (15 - 19)  SpO2: 96% (24 Feb 2020 08:41) (96% - 100%)    Motor: 5/5 GS/TA/EHL/FHL BL   SILT to patients baseline BL  WWP DP PT BL  Dressing C/D/I

## 2020-02-24 NOTE — PROGRESS NOTE ADULT - ASSESSMENT
49F w/ hypercoagulable state (h/o PE/DVT/Splenic vein thrombosis) on lovenox, DM, GERD, HTN, DARIO, here for elective LEFT THR on 2/20 now POD4    #Post op state - Dilaudid suboptimal . c/w bowel regimen, c/w IS. Recommending HPT  #Hypercoagulable state - currently on apixaban 2.5 BID  #DM - BGs have been at target  #Leukocytosis - Resolved. elevated likely 2/2 post-op  #Normocytic anemia - c/w iron deficiency as Transferrin sat 7. Ferritin nml but may be elevated in setting of post-op/inflammatory state. Asymptomatic. 8.7 from 9.2 from 10.6 from 12.2 at baseline. Pt states PO iron complicated by constipation previously  #HTN - soft - likely complicated by pain medications  #Hyponatremia - resolved    Recommendations   - 1L NS bolus   - Encourage PO intake   - Continue holding amlodipine, losartan, HCTZ   - Continue PO iron daily; also give 1 dose IV iron   - Agree w switching to oxycodone from dilaudid in setting of nausea, adding flexeril    Dispo: Home w home PT. However home services complicated by bedbugs in patient's home.

## 2020-02-24 NOTE — DIETITIAN INITIAL EVALUATION ADULT. - OTHER INFO
49F w/ hx of hypercoagulable state (h/o PE/DVT/Splenic vein thrombosis) on Lovenox, DM, GERD, HTN, DARIO presenting for LEFT THR on 2/20 now POD4. Hypercoagulable state - currently on apixaban 2.5 BID. Normocytic anemia noted likely 2/2 Fe deficiency, being supplemented Ferrous sulfate. Remains on bowel regime. Pending PT clearance for d/c- plan for home with home PT services. On assessment, pt resting in bed. Currently on CST CHO diet tolerating PO well. Appetite improving since surgery. Cont to encourage adequate PO intake. Noted good appetite PTA. Follows CST CHO diet at home- please obtain new A1C to assess compliance to diet and medication at home. BS remain well controlled during admission. PARMJIT. CA MELTONL, last BM 2/22. Skin: surgical incision, alfred score 20. Pain: Declines at this time- well controlled. Please see nutrition recommendations below. RD to follow up per protocol.

## 2020-02-24 NOTE — PROGRESS NOTE ADULT - ASSESSMENT
A/P :  Pt is a 48yo Female s/p  L SANDY  -    Pain control  -    DVT ppx: SCD     -    Weight bearing status: WBAT   -    Physical Therapy  -    PM consult  -    Dispo: Home w HPT

## 2020-02-24 NOTE — PROGRESS NOTE ADULT - SUBJECTIVE AND OBJECTIVE BOX
Ortho Note    Surgery: Left total hip replacement POD #4 with Dr. Ballard    Patient seen and examined at bedside  c/o pain at the right hip, limited relief with current regimen   hypotensive over the weekend into 90s, 101/68 post PT today   Denies CP, SOB, N/V, numbness/tingling   voiding without difficulty   +BM post op     Vital Signs Last 24 Hrs  T(C): 36.9 (02-24-20 @ 08:41), Max: 36.9 (02-24-20 @ 05:38)  T(F): 98.5 (02-24-20 @ 08:41), Max: 98.5 (02-24-20 @ 05:38)  HR: 78 (02-24-20 @ 08:41) (78 - 81)  BP: 101/68 (02-24-20 @ 10:11) (101/68 - 113/57)  BP(mean): --  RR: 17 (02-24-20 @ 08:41) (16 - 17)  SpO2: 96% (02-24-20 @ 08:41) (96% - 97%)  AVSS    General: Pt Alert and oriented, NAD  Dressing C/D/I: abd/tegaderm at the left hip   Pulses: 2+ DP pulses LLE   Sensation: intact to light touch bilateral LE   Motor: EHL/FHL/TA/GS firing bilaterally                           8.5    6.41  )-----------( 201      ( 24 Feb 2020 07:02 )             26.8   24 Feb 2020 07:02    139    |  104    |  9      ----------------------------<  99     3.6     |  25     |  0.72     Ca    8.4        24 Feb 2020 07:02  Phos  3.3       24 Feb 2020 07:02  Mg     1.7       24 Feb 2020 07:02        A/P: 49yFemale POD#4 s/p left total hip replacement    - bolus IVF 1000L today for hypotension  - toradol 15mg x1 now  - d/c dilaudid- suspect affecting BP without great pain releif, resume oxycodone   - Stable; labs stable   - DVT ppx: Eliquis BID while in patient, home resume lovenox 100mg BID  - PT, WBS: WBAT, plan for home with outpatient PT   - dispo: pending PT clearance     Ortho Pager [phone]

## 2020-02-25 ENCOUNTER — TRANSCRIPTION ENCOUNTER (OUTPATIENT)
Age: 50
End: 2020-02-25

## 2020-02-25 VITALS
OXYGEN SATURATION: 95 % | DIASTOLIC BLOOD PRESSURE: 67 MMHG | RESPIRATION RATE: 17 BRPM | SYSTOLIC BLOOD PRESSURE: 96 MMHG | TEMPERATURE: 98 F | HEART RATE: 79 BPM

## 2020-02-25 LAB
ANION GAP SERPL CALC-SCNC: 7 MMOL/L — SIGNIFICANT CHANGE UP (ref 5–17)
BUN SERPL-MCNC: 9 MG/DL — SIGNIFICANT CHANGE UP (ref 7–23)
CALCIUM SERPL-MCNC: 8.7 MG/DL — SIGNIFICANT CHANGE UP (ref 8.4–10.5)
CHLORIDE SERPL-SCNC: 104 MMOL/L — SIGNIFICANT CHANGE UP (ref 96–108)
CO2 SERPL-SCNC: 25 MMOL/L — SIGNIFICANT CHANGE UP (ref 22–31)
CREAT SERPL-MCNC: 0.68 MG/DL — SIGNIFICANT CHANGE UP (ref 0.5–1.3)
GLUCOSE BLDC GLUCOMTR-MCNC: 126 MG/DL — HIGH (ref 70–99)
GLUCOSE BLDC GLUCOMTR-MCNC: 142 MG/DL — HIGH (ref 70–99)
GLUCOSE SERPL-MCNC: 117 MG/DL — HIGH (ref 70–99)
HCT VFR BLD CALC: 27.7 % — LOW (ref 34.5–45)
HGB BLD-MCNC: 8.5 G/DL — LOW (ref 11.5–15.5)
MCHC RBC-ENTMCNC: 29.9 PG — SIGNIFICANT CHANGE UP (ref 27–34)
MCHC RBC-ENTMCNC: 30.7 GM/DL — LOW (ref 32–36)
MCV RBC AUTO: 97.5 FL — SIGNIFICANT CHANGE UP (ref 80–100)
NRBC # BLD: 0 /100 WBCS — SIGNIFICANT CHANGE UP (ref 0–0)
PLATELET # BLD AUTO: 228 K/UL — SIGNIFICANT CHANGE UP (ref 150–400)
POTASSIUM SERPL-MCNC: 4.2 MMOL/L — SIGNIFICANT CHANGE UP (ref 3.5–5.3)
POTASSIUM SERPL-SCNC: 4.2 MMOL/L — SIGNIFICANT CHANGE UP (ref 3.5–5.3)
RBC # BLD: 2.84 M/UL — LOW (ref 3.8–5.2)
RBC # FLD: 14.2 % — SIGNIFICANT CHANGE UP (ref 10.3–14.5)
SODIUM SERPL-SCNC: 136 MMOL/L — SIGNIFICANT CHANGE UP (ref 135–145)
WBC # BLD: 6.68 K/UL — SIGNIFICANT CHANGE UP (ref 3.8–10.5)
WBC # FLD AUTO: 6.68 K/UL — SIGNIFICANT CHANGE UP (ref 3.8–10.5)

## 2020-02-25 PROCEDURE — 99232 SBSQ HOSP IP/OBS MODERATE 35: CPT

## 2020-02-25 RX ORDER — OXYCODONE HYDROCHLORIDE 5 MG/1
1 TABLET ORAL
Qty: 42 | Refills: 0
Start: 2020-02-25 | End: 2020-03-02

## 2020-02-25 RX ORDER — CYCLOBENZAPRINE HYDROCHLORIDE 10 MG/1
1 TABLET, FILM COATED ORAL
Qty: 21 | Refills: 0
Start: 2020-02-25 | End: 2020-03-02

## 2020-02-25 RX ADMIN — OXYCODONE HYDROCHLORIDE 10 MILLIGRAM(S): 5 TABLET ORAL at 16:00

## 2020-02-25 RX ADMIN — APIXABAN 2.5 MILLIGRAM(S): 2.5 TABLET, FILM COATED ORAL at 05:37

## 2020-02-25 RX ADMIN — OXYCODONE HYDROCHLORIDE 10 MILLIGRAM(S): 5 TABLET ORAL at 01:00

## 2020-02-25 RX ADMIN — CYCLOBENZAPRINE HYDROCHLORIDE 5 MILLIGRAM(S): 10 TABLET, FILM COATED ORAL at 05:37

## 2020-02-25 RX ADMIN — CELECOXIB 200 MILLIGRAM(S): 200 CAPSULE ORAL at 06:38

## 2020-02-25 RX ADMIN — ONDANSETRON 4 MILLIGRAM(S): 8 TABLET, FILM COATED ORAL at 09:19

## 2020-02-25 RX ADMIN — Medication 1 GRAM(S): at 09:13

## 2020-02-25 RX ADMIN — Medication 40 MILLIGRAM(S): at 13:07

## 2020-02-25 RX ADMIN — Medication 1000 MILLIGRAM(S): at 13:07

## 2020-02-25 RX ADMIN — Medication 1000 MILLIGRAM(S): at 05:38

## 2020-02-25 RX ADMIN — OXYCODONE HYDROCHLORIDE 10 MILLIGRAM(S): 5 TABLET ORAL at 15:30

## 2020-02-25 RX ADMIN — Medication 10 MILLIEQUIVALENT(S): at 11:50

## 2020-02-25 RX ADMIN — Medication 50 MILLIGRAM(S): at 05:37

## 2020-02-25 RX ADMIN — Medication 1 MILLIGRAM(S): at 11:49

## 2020-02-25 RX ADMIN — OXYCODONE HYDROCHLORIDE 10 MILLIGRAM(S): 5 TABLET ORAL at 05:38

## 2020-02-25 RX ADMIN — OXYCODONE HYDROCHLORIDE 10 MILLIGRAM(S): 5 TABLET ORAL at 10:10

## 2020-02-25 RX ADMIN — OXYCODONE HYDROCHLORIDE 10 MILLIGRAM(S): 5 TABLET ORAL at 06:38

## 2020-02-25 RX ADMIN — Medication 1000 MILLIGRAM(S): at 14:00

## 2020-02-25 RX ADMIN — OXYCODONE HYDROCHLORIDE 10 MILLIGRAM(S): 5 TABLET ORAL at 00:08

## 2020-02-25 RX ADMIN — Medication 500 MILLIGRAM(S): at 11:50

## 2020-02-25 RX ADMIN — Medication 325 MILLIGRAM(S): at 11:50

## 2020-02-25 RX ADMIN — PANTOPRAZOLE SODIUM 40 MILLIGRAM(S): 20 TABLET, DELAYED RELEASE ORAL at 05:37

## 2020-02-25 RX ADMIN — Medication 1000 MILLIGRAM(S): at 06:38

## 2020-02-25 RX ADMIN — POLYETHYLENE GLYCOL 3350 17 GRAM(S): 17 POWDER, FOR SOLUTION ORAL at 11:49

## 2020-02-25 RX ADMIN — CELECOXIB 200 MILLIGRAM(S): 200 CAPSULE ORAL at 05:37

## 2020-02-25 RX ADMIN — OXYCODONE HYDROCHLORIDE 10 MILLIGRAM(S): 5 TABLET ORAL at 11:00

## 2020-02-25 RX ADMIN — CYCLOBENZAPRINE HYDROCHLORIDE 5 MILLIGRAM(S): 10 TABLET, FILM COATED ORAL at 09:19

## 2020-02-25 RX ADMIN — LORATADINE 10 MILLIGRAM(S): 10 TABLET ORAL at 11:51

## 2020-02-25 NOTE — PROGRESS NOTE ADULT - SUBJECTIVE AND OBJECTIVE BOX
INTERVAL HPI/OVERNIGHT EVENTS:    SUBJECTIVE: Patient seen and examined at bedside.   Pt endorses no further nausea since switching to oxycodone. States pain improves to 7/10 w/ oxycodone and relieves the sharpness so she can move better and transition to bathroom and walk. Denies any numbness. Still feels there is some swelling distal to the dressing/incision. Denies any fever, chest pain, dyspnea. Tolerating food wo Nausea or abd pain. +BMs. No dysuria. No lightheadedness. Pt states she feels ready to return home today    ROS: 12 point ros reviewed and otherwise negative    OBJECTIVE:    VITAL SIGNS:  ICU Vital Signs Last 24 Hrs  T(C): 36.8 (25 Feb 2020 12:06), Max: 37.1 (24 Feb 2020 16:58)  T(F): 98.2 (25 Feb 2020 12:06), Max: 98.7 (24 Feb 2020 16:58)  HR: 79 (25 Feb 2020 12:06) (73 - 85)  BP: 96/67 (25 Feb 2020 12:06) (96/67 - 133/79)  BP(mean): --  ABP: --  ABP(mean): --  RR: 17 (25 Feb 2020 12:06) (16 - 18)  SpO2: 95% (25 Feb 2020 12:06) (94% - 98%)        02-24 @ 07:01  -  02-25 @ 07:00  --------------------------------------------------------  IN: 1750 mL / OUT: 0 mL / NET: 1750 mL      CAPILLARY BLOOD GLUCOSE      POCT Blood Glucose.: 126 mg/dL (25 Feb 2020 11:45)      PHYSICAL EXAM:  GEN: female in NAD in chair  HEENT: NC/AT, MMM, EOMI  NECK: Supple  CV: RRR, nml S1S2, no LE edema, SCDs in place  PULM: nml effort, CTAB  ABD: soft, non-distended, NABS, non-tender  NEURO: alert, conversant  motor: 4/5 on LLE, otherwise 5/5  Sensory intact b/l  Incision w/ dressing c/d/i. There is some minimal edema distal to dressing. No fluctuance or increased warmth. No erythema    MEDICATIONS:  MEDICATIONS  (STANDING):  acetaminophen   Tablet .. 1000 milliGRAM(s) Oral every 8 hours  apixaban 2.5 milliGRAM(s) Oral two times a day  celecoxib 200 milliGRAM(s) Oral two times a day  dextrose 5%. 1000 milliLiter(s) (50 mL/Hr) IV Continuous <Continuous>  dextrose 50% Injectable 12.5 Gram(s) IV Push once  dextrose 50% Injectable 25 Gram(s) IV Push once  dextrose 50% Injectable 25 Gram(s) IV Push once  ferrous    sulfate 325 milliGRAM(s) Oral daily  FLUoxetine 40 milliGRAM(s) Oral daily  folic acid 1 milliGRAM(s) Oral daily  gabapentin 300 milliGRAM(s) Oral at bedtime  insulin lispro (HumaLOG) corrective regimen sliding scale   SubCutaneous Before meals and at bedtime  lactated ringers. 1000 milliLiter(s) (125 mL/Hr) IV Continuous <Continuous>  loratadine 10 milliGRAM(s) Oral daily  metoprolol tartrate 50 milliGRAM(s) Oral daily  pantoprazole    Tablet 40 milliGRAM(s) Oral before breakfast  polyethylene glycol 3350 17 Gram(s) Oral daily  potassium chloride    Tablet ER 10 milliEquivalent(s) Oral daily  povidone iodine 5% Nasal Swab 1 Application(s) Both Nostrils once  QUEtiapine 50 milliGRAM(s) Oral at bedtime  rOPINIRole 5 milliGRAM(s) Oral at bedtime  sucralfate 1 Gram(s) Oral <User Schedule>  sulfaSALAzine 500 milliGRAM(s) Oral daily    MEDICATIONS  (PRN):  aluminum hydroxide/magnesium hydroxide/simethicone Suspension 30 milliLiter(s) Oral four times a day PRN Indigestion  bisacodyl Suppository 10 milliGRAM(s) Rectal daily PRN If no bowel movement by POD#2  cyclobenzaprine 5 milliGRAM(s) Oral three times a day PRN Muscle Spasm  dextrose 40% Gel 15 Gram(s) Oral once PRN Blood Glucose LESS THAN 70 milliGRAM(s)/deciliter  glucagon  Injectable 1 milliGRAM(s) IntraMuscular once PRN Glucose LESS THAN 70 milligrams/deciliter  HYDROmorphone  Injectable 0.5 milliGRAM(s) IV Push every 4 hours PRN breakthrough pain  ondansetron Injectable 4 milliGRAM(s) IV Push every 6 hours PRN Nausea and/or Vomiting  oxyCODONE    IR 5 milliGRAM(s) Oral every 4 hours PRN Moderate Pain (4 - 6)  oxyCODONE    IR 10 milliGRAM(s) Oral every 4 hours PRN Severe Pain (7 - 10)  senna 2 Tablet(s) Oral at bedtime PRN Constipation      ALLERGIES:  Allergies    lisinopril (Other)  verapamil (Other)    Intolerances        LABS:                        8.5    6.68  )-----------( 228      ( 25 Feb 2020 10:35 )             27.7     02-25    136  |  104  |  9   ----------------------------<  117<H>  4.2   |  25  |  0.68    Ca    8.7      25 Feb 2020 10:35  Phos  3.3     02-24  Mg     1.7     02-24            RADIOLOGY & ADDITIONAL TESTS: Reviewed.

## 2020-02-25 NOTE — PROGRESS NOTE ADULT - ASSESSMENT
A/P :  Pt is a 48yo Female s/p  L SANDY  -    Pain control  -    DVT ppx: SCD     -    Weight bearing status: WBAT   -    Physical Therapy  -    PM consult  -    Dispo: Home w HPT  -    discussed with attending

## 2020-02-25 NOTE — DISCHARGE NOTE NURSING/CASE MANAGEMENT/SOCIAL WORK - PATIENT PORTAL LINK FT
You can access the FollowMyHealth Patient Portal offered by [facility] by registering at the following website: [email or web address]. By joining [facility]’s FollowMyHealth portal, you will also be able to view your health information using other applications (apps) compatible with our system.

## 2020-02-25 NOTE — PROGRESS NOTE ADULT - REASON FOR ADMISSION
left hip pain/surgery

## 2020-02-25 NOTE — PROGRESS NOTE ADULT - SUBJECTIVE AND OBJECTIVE BOX
S: Patient seen and examined. Doing well this AM. Pain reported but controlled. No acute events overnight. Comfortable    O:   Vital Signs Last 24 Hrs  T(C): 36.8 (25 Feb 2020 05:24), Max: 37.1 (24 Feb 2020 16:58)  T(F): 98.2 (25 Feb 2020 05:24), Max: 98.7 (24 Feb 2020 16:58)  HR: 74 (25 Feb 2020 05:24) (73 - 85)  BP: 118/57 (25 Feb 2020 05:24) (101/68 - 133/79)  BP(mean): --  RR: 16 (25 Feb 2020 05:24) (16 - 18)  SpO2: 97% (25 Feb 2020 05:24) (94% - 98%)    Motor: 5/5 GS/TA/EHL/FHL BL   SILT to patients baseline BL  WWP DP PT BL  Dressing C/D/I

## 2020-02-25 NOTE — PROGRESS NOTE ADULT - ASSESSMENT
49F w/ hypercoagulable state (h/o PE/DVT/Splenic vein thrombosis) on lovenox, DM, GERD, HTN, DARIO, here for elective LEFT THR on 2/20 now POD5    #Post op state - Dilaudid suboptimal . c/w bowel regimen, c/w IS. Recommending HPT  #Hypercoagulable state - currently on apixaban 2.5 BID  #DM - BGs have been at target  #Leukocytosis - Resolved. elevated likely 2/2 post-op  #Normocytic anemia - Stable 8.5. s/p iron sucrose x1 dose. c/w iron deficiency as Transferrin sat 7. Ferritin nml but may be elevated in setting of post-op/inflammatory state. Does not appear symptomatic  #HTN -improved. c/b pain medications. Anti-hypertensves held  #Hyponatremia - resolved    Recommendations   - Continue holding amlodipine, losartan, HCTZ   - Continue PO iron daily   - Agree w switching to oxycodone from dilaudid in setting of nausea, adding flexeril    DC instructions:   #HTN - check BP daily. Follow-up with your primary care doctor regarding your blood pressures. If your blood pressures are elevated >140, call your doctor before resuming blood pressure medication.  #Normocytic anemia - Follow-up with your primary care doctor regarding your anemia after surgery. You were started on oral iron and also received 1 dose of IV iron.     Dispo: Home w home PT. However home services complicated by bedbugs in patient's home.

## 2020-02-26 PROBLEM — G47.33 OBSTRUCTIVE SLEEP APNEA (ADULT) (PEDIATRIC): Chronic | Status: ACTIVE | Noted: 2020-02-19

## 2020-02-26 PROBLEM — K21.9 GASTRO-ESOPHAGEAL REFLUX DISEASE WITHOUT ESOPHAGITIS: Chronic | Status: ACTIVE | Noted: 2020-02-19

## 2020-02-26 PROBLEM — K57.92 DIVERTICULITIS OF INTESTINE, PART UNSPECIFIED, WITHOUT PERFORATION OR ABSCESS WITHOUT BLEEDING: Chronic | Status: ACTIVE | Noted: 2020-02-19

## 2020-02-26 PROBLEM — M06.9 RHEUMATOID ARTHRITIS, UNSPECIFIED: Chronic | Status: ACTIVE | Noted: 2020-02-19

## 2020-02-26 PROBLEM — F32.9 MAJOR DEPRESSIVE DISORDER, SINGLE EPISODE, UNSPECIFIED: Chronic | Status: ACTIVE | Noted: 2020-02-19

## 2020-02-26 PROBLEM — G25.81 RESTLESS LEGS SYNDROME: Chronic | Status: ACTIVE | Noted: 2020-02-19

## 2020-02-26 PROBLEM — M19.90 UNSPECIFIED OSTEOARTHRITIS, UNSPECIFIED SITE: Chronic | Status: ACTIVE | Noted: 2020-02-19

## 2020-02-26 PROBLEM — E11.9 TYPE 2 DIABETES MELLITUS WITHOUT COMPLICATIONS: Chronic | Status: ACTIVE | Noted: 2020-02-19

## 2020-02-26 PROBLEM — D68.59 OTHER PRIMARY THROMBOPHILIA: Chronic | Status: ACTIVE | Noted: 2020-02-19

## 2020-03-02 DIAGNOSIS — D50.9 IRON DEFICIENCY ANEMIA, UNSPECIFIED: ICD-10-CM

## 2020-03-02 DIAGNOSIS — D68.59 OTHER PRIMARY THROMBOPHILIA: ICD-10-CM

## 2020-03-02 DIAGNOSIS — M16.12 UNILATERAL PRIMARY OSTEOARTHRITIS, LEFT HIP: ICD-10-CM

## 2020-03-02 DIAGNOSIS — Z86.718 PERSONAL HISTORY OF OTHER VENOUS THROMBOSIS AND EMBOLISM: ICD-10-CM

## 2020-03-02 DIAGNOSIS — G25.81 RESTLESS LEGS SYNDROME: ICD-10-CM

## 2020-03-02 DIAGNOSIS — E22.2 SYNDROME OF INAPPROPRIATE SECRETION OF ANTIDIURETIC HORMONE: ICD-10-CM

## 2020-03-02 DIAGNOSIS — I95.9 HYPOTENSION, UNSPECIFIED: ICD-10-CM

## 2020-03-02 DIAGNOSIS — I10 ESSENTIAL (PRIMARY) HYPERTENSION: ICD-10-CM

## 2020-03-02 DIAGNOSIS — Z90.49 ACQUIRED ABSENCE OF OTHER SPECIFIED PARTS OF DIGESTIVE TRACT: ICD-10-CM

## 2020-03-02 DIAGNOSIS — Z87.891 PERSONAL HISTORY OF NICOTINE DEPENDENCE: ICD-10-CM

## 2020-03-02 DIAGNOSIS — M06.9 RHEUMATOID ARTHRITIS, UNSPECIFIED: ICD-10-CM

## 2020-03-02 DIAGNOSIS — K21.9 GASTRO-ESOPHAGEAL REFLUX DISEASE WITHOUT ESOPHAGITIS: ICD-10-CM

## 2020-03-02 DIAGNOSIS — G47.33 OBSTRUCTIVE SLEEP APNEA (ADULT) (PEDIATRIC): ICD-10-CM

## 2020-03-02 DIAGNOSIS — Z79.01 LONG TERM (CURRENT) USE OF ANTICOAGULANTS: ICD-10-CM

## 2020-03-02 DIAGNOSIS — Z86.711 PERSONAL HISTORY OF PULMONARY EMBOLISM: ICD-10-CM

## 2020-03-02 DIAGNOSIS — Z98.84 BARIATRIC SURGERY STATUS: ICD-10-CM

## 2020-03-02 DIAGNOSIS — E11.40 TYPE 2 DIABETES MELLITUS WITH DIABETIC NEUROPATHY, UNSPECIFIED: ICD-10-CM

## 2020-03-06 ENCOUNTER — APPOINTMENT (OUTPATIENT)
Dept: INTERNAL MEDICINE | Facility: CLINIC | Age: 50
End: 2020-03-06
Payer: MEDICAID

## 2020-03-06 VITALS
HEIGHT: 68 IN | HEART RATE: 84 BPM | SYSTOLIC BLOOD PRESSURE: 134 MMHG | TEMPERATURE: 97.9 F | BODY MASS INDEX: 34.25 KG/M2 | DIASTOLIC BLOOD PRESSURE: 90 MMHG | WEIGHT: 226 LBS | OXYGEN SATURATION: 96 %

## 2020-03-06 DIAGNOSIS — R35.0 FREQUENCY OF MICTURITION: ICD-10-CM

## 2020-03-06 LAB
INR PPP: 0.9 RATIO
POCT-PROTHROMBIN TIME: 10.8 SECS

## 2020-03-06 PROCEDURE — 85610 PROTHROMBIN TIME: CPT | Mod: QW

## 2020-03-06 PROCEDURE — 96372 THER/PROPH/DIAG INJ SC/IM: CPT

## 2020-03-06 PROCEDURE — 99214 OFFICE O/P EST MOD 30 MIN: CPT | Mod: 25

## 2020-03-06 RX ORDER — CYANOCOBALAMIN 1000 UG/ML
1000 INJECTION INTRAMUSCULAR; SUBCUTANEOUS
Qty: 0 | Refills: 0 | Status: COMPLETED | OUTPATIENT
Start: 2020-03-06

## 2020-03-06 RX ADMIN — CYANOCOBALAMIN 1000 MCG/ML: 1000 INJECTION, SOLUTION INTRAMUSCULAR; SUBCUTANEOUS at 00:00

## 2020-03-09 ENCOUNTER — APPOINTMENT (OUTPATIENT)
Dept: ORTHOPEDIC SURGERY | Facility: CLINIC | Age: 50
End: 2020-03-09
Payer: MEDICAID

## 2020-03-09 PROCEDURE — 99024 POSTOP FOLLOW-UP VISIT: CPT

## 2020-03-10 LAB
APPEARANCE: CLEAR
BACTERIA UR CULT: NORMAL
BACTERIA: NEGATIVE
BILIRUBIN URINE: NEGATIVE
BLOOD URINE: NEGATIVE
COLOR: NORMAL
GLUCOSE QUALITATIVE U: NEGATIVE
HYALINE CASTS: 0 /LPF
KETONES URINE: NEGATIVE
LEUKOCYTE ESTERASE URINE: NEGATIVE
MICROSCOPIC-UA: NORMAL
NITRITE URINE: NEGATIVE
PH URINE: 6.5
PROTEIN URINE: NEGATIVE
RED BLOOD CELLS URINE: 2 /HPF
SPECIFIC GRAVITY URINE: 1.02
SQUAMOUS EPITHELIAL CELLS: 2 /HPF
UROBILINOGEN URINE: NORMAL
WHITE BLOOD CELLS URINE: 0 /HPF

## 2020-03-10 NOTE — ASSESSMENT
[FreeTextEntry1] : Assessment/plan \par Status post left total hip, continue weightbearing and hip precautions until 5 weeks, weightbearing as tolerated after that and restrictions lifted, begin physical therapy at 5 weeks - prescription given, follow up in 10 week anniversary with x-rays, all questions answered. \par \par All medical record entries made by the PA/Scribe/Fellow are at my, Dr. Bolivar Ballard's direction and personally dictated by me on 03/09/2020]. I have reviewed the chart and agree that the record accurately reflects my personal performance of the history, physical exam, assessment, and plan. I have also personally directed reviewed, and agreed with the chart.\par

## 2020-03-10 NOTE — PHYSICAL EXAM
[de-identified] : Not in acute distress, dressed appropriately, sitting on examination table \par Skin: Warm and dry, normal turgor, no rashes \par Neurological: AOx3, Cranial nerves grossly in tact \par Psych: Mood and affect appropriate \par Left Hip: Well healed surgical incision. No swelling edema erythema redness or drainage. Nontender.. ROM: Not assessed. 5/5 strength. Normal gait.\par \par  [de-identified] : none today

## 2020-03-10 NOTE — HISTORY OF PRESENT ILLNESS
[de-identified] : 48 yo female here s/p left total hip arthroplasty on 02/20/2020. She reports her pain is managed with celebrex and tylenol. She reports her ambulation and mobility is great. She reports no other complaints at this time.

## 2020-03-11 ENCOUNTER — APPOINTMENT (OUTPATIENT)
Dept: INTERNAL MEDICINE | Facility: CLINIC | Age: 50
End: 2020-03-11
Payer: MEDICAID

## 2020-03-11 PROCEDURE — 85610 PROTHROMBIN TIME: CPT | Mod: QW

## 2020-03-16 ENCOUNTER — RX RENEWAL (OUTPATIENT)
Age: 50
End: 2020-03-16

## 2020-03-18 ENCOUNTER — TRANSCRIPTION ENCOUNTER (OUTPATIENT)
Age: 50
End: 2020-03-18

## 2020-03-26 ENCOUNTER — TRANSCRIPTION ENCOUNTER (OUTPATIENT)
Age: 50
End: 2020-03-26

## 2020-04-02 ENCOUNTER — APPOINTMENT (OUTPATIENT)
Dept: RHEUMATOLOGY | Facility: CLINIC | Age: 50
End: 2020-04-02
Payer: MEDICAID

## 2020-04-02 DIAGNOSIS — M19.049 PRIMARY OSTEOARTHRITIS, UNSPECIFIED HAND: ICD-10-CM

## 2020-04-02 DIAGNOSIS — R50.9 FEVER, UNSPECIFIED: ICD-10-CM

## 2020-04-02 PROCEDURE — 99443: CPT

## 2020-04-02 NOTE — ASSESSMENT
[FreeTextEntry1] : 50y/o F with PMHx significant for Morbid Obesity s/p Sleeve gastrectomy 2014, IPMN , GERD, Duodenal nodule, Recurrent Diverticulitis leading to partial colon resection at [facility] 12/2017, Umbilical Hernia s/p repair 2018, HTN, PE/DVT was on xarelto and then developed splenic vein thrombus in setting of pancreatitis and was switched to lovenox, Overactive bladder, R hip replacement 09/23/19 by Dr. Ballard, walks with a cane. \par Recent left total hip replacement on 2/20/20\par Had extensive Rheum work up in 01/19, negative labs including Lupus AC. Repeat Rheum labs during her inintial visit also negative, has negative RF, CCP. \par She was diagnosed with early RA by her first Rheumatologist  based perhaps based on single erosive change seen on wrist Xray ( have a reports from pt) and started SSZ 500mg BID. \par She continues to experience hand cramping without pain or swelling.  Re[facility] Xray at [facility] suggestive of b/l CMC OA and no evidence of erosions or inflammatory changes. \par Pt developed spikes F 104 2 weeks post op and since then she is experiencing weekly fevers, last one yesterday 103 and usually last for 1-2 days. \par \par \par 1. Pending US for justification of previously seen erosions ?, and evaluate for active synovitis/tenosynovitis. \par 2.  given recurrent fevers I recommended pt  hold off  sulfasalazine 500mng BID for now and if diagnoses of RA confirmed will advance therapy, for now have low suspcious for RA as physical exam, labs and xrays not confirms it so far, but additional work up pending. \par Pt symptoms are wrist/hand cramps and denies pain or swelling, US also to look at Median nerve and rule out CTS. \par consider EMG \par Tb quant indeterminate and will repeat on next visit. \par 2. hydronephrosis reported on MRI and with chronic abd/back pain:  IgG4 normal, unlikely IGG4 disease \par 3. Low back pain: negative for Spa/SI on Xray, likely DJD \par 4. Fevers: first episode develop  2 weeks post op, recommended to contact Dr. Ballard immediately and report fevers , last fever episode yesterday 103 which resolved today, usually last for 1-2 days. \par Infectious of prosthetic joint or other etiology need to be rule out. \par Asked pt to contact me once gets in touch with Dr. Ballard. \par \par f/u in 1 month \par \par 25 minutes spend on telephone encounter.

## 2020-04-02 NOTE — REVIEW OF SYSTEMS
[Feeling Tired] : feeling tired [Recent Weight Gain (___ Lbs)] : recent [unfilled] ~Ulb weight gain [Lower Ext Edema] : lower extremity edema [Abdominal Pain] : abdominal pain [Vomiting] : vomiting [Heartburn] : heartburn [Arthralgias] : arthralgias [Joint Pain] : joint pain [Joint Stiffness] : joint stiffness [Easy Bleeding] : a tendency for easy bleeding [Easy Bruising] : a tendency for easy bruising [Fever] : no fever [Chills] : no chills [Feeling Poorly] : not feeling poorly [Eye Pain] : no eye pain [Red Eyes] : eyes not red [Eyesight Problems] : no eyesight problems [Discharge From Eyes] : no purulent discharge from the eyes [Dry Eyes] : no dryness of the eyes [Eyes Itch] : no itching of the eyes [Earache] : no earache [Nosebleeds] : no nosebleeds [Heart Rate Is Slow] : the heart rate was not slow [Chest Pain] : no chest pain [Leg Claudication] : no intermittent leg claudication [Shortness Of Breath] : no shortness of breath [Wheezing] : no wheezing [Cough] : no cough [SOB on Exertion] : no shortness of breath during exertion [Constipation] : no constipation [Diarrhea] : no diarrhea [Joint Swelling] : no joint swelling [Dizziness] : no dizziness [Fainting] : no fainting [Anxiety] : no anxiety [Depression] : no depression [Swollen Glands] : no swollen glands [Swollen Glands In The Neck] : no swollen glands in the neck

## 2020-04-02 NOTE — PHYSICAL EXAM
[] : no respiratory distress [Murmurs] : no murmurs [Heart Sounds Pericardial Friction Rub] : no pericardial rub [Edema] : there was no peripheral edema [Veins - Varicosity Changes] : there were no varicosital changes [Supraclavicular Lymph Nodes Enlarged Bilaterally] : supraclavicular [Oriented To Time, Place, And Person] : oriented to person, place, and time [Impaired Insight] : insight and judgment were intact [Affect] : the affect was normal [General Appearance - Alert] : alert [General Appearance - In No Acute Distress] : in no acute distress

## 2020-04-02 NOTE — HISTORY OF PRESENT ILLNESS
[Home] : at home, [unfilled] , at the time of the visit. [Medical Office: ([facility])___] : at ~his/her~ medical office located in [place] [Patient] : the patient [___ Month(s) Ago] : [unfilled] month(s) ago [Fatigue] : fatigue [Arthralgias] : arthralgias [Myalgias] : myalgias [Muscle Weakness] : muscle weakness [Fever] : fever [FreeTextEntry1] : Telephonic visit: \par Pt seen last on 11/21/19 \par No follow up since. \par \par ? early RA diagnosed by other Rheumatologist, but she has negative RF, CCP,  repeat Xray no evidence of erosions, noted b/l CMC arthritis (outside Xray report single erosive change seen on wrist Xray), she was treated with SSZ 500mg BID. \par Has mildly elevated inf markers both ESR and CRP. \par Had b/l hip OA: had left Total hip replacement done by Dr. Ballard on 02/20/20 , since surgery pt is experiencing intermittent fevers 103-104F, happens every other week. \par Most recent Fever yesterday 103 with chills, denies any other symptoms such as cough, sore throat, SOB. Fever usually last for 1-2 day and usually resolves on Tylenol. \par Pt not done hand US, continues to experience hand cramps without joint swelling or tenderness. \par \par \par \par Referred by Dr. Melissa  for Rheumatology consultation \par \par 48y/o F with PMHx significant for Morbid Obesity s/p Sleeve gastrectomy 2014, IPMN , GERD, Duodenal nodule, Recurrent Diverticulitis leading to partial colon resection at [facility] 12/2017, Umbilical Hernia s/p repair 2018, HTN, PE/DVT was on xarelto and then developed splenic vein thrombus in setting of pancreatitis and was switched to lovenox, Overactive bladder, R hip replacement 09/23/19. \par Pt has abdominal pain and evaluated by GI Dr. Rivera for nause and vomiting, pending abd MRI. \par She was referred by her PCP Dr Melissa for evaluation of possible diagnoses of early RA. \par Pt states saw  Rheumatologist early 2019 at [facility] Dr. Loree Martinez twice her majority of Rheum labs were negative and pt pulled labs from portal in front of me:  reviewed negative CCP/RF  \par SSa/SSb: WNL\par IZA and ds-DNA, normal  C3/C4\par ESR 44, normal CRP\par Nuno :negative\par RPR non reactive\par Negative Lupus AC:  cardiolipin IgG, IgA and IgM\par Beta 2GP  IgG and M negative, LA negative. \par hand Xray: erosions along the left pisiform bone, no other erosions, mild DJD right basal carpal joint. \par She was started sulfasalazine 500mg BID for possible early RA, no MSK US or hand MRI done. \par Pt reports since summer has progressive hand cramps, pain in her palmar aspect\par Has trouble opening the jars, drops things, does not think SSZ ever helped with her symptoms. \par Denies finger.wrist pain or swelling of any joints, no AM stiffness. \par Has knee OA and reports pain.   \par Low back pain: states started before age 39, wakes up with back pain around 4-6AM and pain progressively gets worse around lunch time.  Walking and exercise makes it worse. \par Reviewed  Spine MRI:  mild multi level disc disease without cord compression in Thoracic spine. \par  Lumbgar soine: trace grade I degenerative anteriolisthesis at L5-S1 with mild foraminal stenosis but no cental canal stenosis, b/l L5-S1 facet joint effusions with mild surrounding inflammatory changes. \par Large hepatic hemangioma and R sided hydronephrosis unchanged. \par Mother with OA\par nephew with Lupus \par No h/o morning stiffness, memory loss, patchy hair loss, sicca symptoms, photosensitivity, HA,  Raynaud's, oral ulcers, nasal ulcers. \par \par \par  [Anorexia] : no anorexia [Weight Loss] : no weight loss [Malaise] : no malaise [Chills] : no chills [Depression] : no depression [Malar Facial Rash] : no malar facial rash [Skin Lesions] : no lesions [Skin Nodules] : no skin nodules [Oral Ulcers] : no oral ulcers [Dry Mouth] : no dry mouth [Dysphonia] : no dysphonia [Dysphagia] : no dysphagia [Shortness of Breath] : no shortness of breath [Chest Pain] : no chest pain [Joint Swelling] : no joint swelling [Joint Warmth] : no joint warmth [Joint Deformity] : no joint deformity [Morning Stiffness] : no morning stiffness [Falls] : no falls [Difficulty Walking] : no difficulty walking [Dyspnea] : no dyspnea [Muscle Cramping] : no muscle cramping [Visual Changes] : no visual changes [Eye Pain] : no eye pain [Eye Redness] : no eye redness [Dry Eyes] : no dry eyes

## 2020-04-02 NOTE — DATA REVIEWED
[FreeTextEntry1] : \par \par EXAM: XR HAND MIN 3 VIEWS BI \par EXAM: XR WRIST COMP MIN 3 VIEWS BI \par \par PROCEDURE DATE: 12/02/2019 \par \par \par \par \par INTERPRETATION: XR WRIST COMPLETE 3 VIEWS BILATERAL, XR HAND 3 VIEWS \par BILATERAL dated 12/2/2019 1:22 PM \par \par INDICATION: Bilateral wrist bone erosion evaluation. \par \par COMPARISON: None. \par \par 3 views of the bilateral wrists and 3 views of the bilateral hands have been \par submitted. \par \par LEFT: Mild degenerative changes at the first CMC joint. The \par interphalangeal joint spaces are preserved. No bony erosions are identified. \par There is no fracture or dislocation. The soft tissues are unremarkable. \par \par RIGHT: Mild degenerative changes at the first CMC joint. The \par interphalangeal joint spaces are preserved. No bony erosions are identified. \par There is no fracture or dislocation. Soft tissues are unremarkable. \par \par \par IMPRESSION: No evidence of bony erosion or soft tissue swelling. Mild \par degenerative changes at the bilateral first CMC joints.

## 2020-04-06 ENCOUNTER — RX RENEWAL (OUTPATIENT)
Age: 50
End: 2020-04-06

## 2020-04-14 ENCOUNTER — APPOINTMENT (OUTPATIENT)
Dept: INTERNAL MEDICINE | Facility: CLINIC | Age: 50
End: 2020-04-14
Payer: MEDICAID

## 2020-04-14 PROCEDURE — 99441: CPT

## 2020-04-14 RX ORDER — OXYCODONE 10 MG/1
10 TABLET ORAL
Qty: 40 | Refills: 0 | Status: COMPLETED | COMMUNITY
Start: 2020-03-04 | End: 2020-04-14

## 2020-04-14 RX ORDER — PROMETHAZINE HYDROCHLORIDE AND DEXTROMETHORPHAN HYDROBROMIDE ORAL SOLUTION 15; 6.25 MG/5ML; MG/5ML
6.25-15 SOLUTION ORAL
Qty: 120 | Refills: 0 | Status: COMPLETED | COMMUNITY
Start: 2020-03-18 | End: 2020-04-14

## 2020-04-22 ENCOUNTER — RX RENEWAL (OUTPATIENT)
Age: 50
End: 2020-04-22

## 2020-04-29 ENCOUNTER — RX RENEWAL (OUTPATIENT)
Age: 50
End: 2020-04-29

## 2020-04-29 ENCOUNTER — TRANSCRIPTION ENCOUNTER (OUTPATIENT)
Age: 50
End: 2020-04-29

## 2020-05-04 ENCOUNTER — APPOINTMENT (OUTPATIENT)
Dept: ORTHOPEDIC SURGERY | Facility: CLINIC | Age: 50
End: 2020-05-04

## 2020-05-06 ENCOUNTER — TRANSCRIPTION ENCOUNTER (OUTPATIENT)
Age: 50
End: 2020-05-06

## 2020-05-16 ENCOUNTER — EMERGENCY (EMERGENCY)
Facility: HOSPITAL | Age: 50
LOS: 1 days | Discharge: HOME | End: 2020-05-16
Admitting: EMERGENCY MEDICINE
Payer: MEDICAID

## 2020-05-16 VITALS
HEART RATE: 90 BPM | TEMPERATURE: 98 F | OXYGEN SATURATION: 99 % | RESPIRATION RATE: 18 BRPM | HEIGHT: 68 IN | SYSTOLIC BLOOD PRESSURE: 142 MMHG | WEIGHT: 225.97 LBS | DIASTOLIC BLOOD PRESSURE: 93 MMHG

## 2020-05-16 VITALS
OXYGEN SATURATION: 98 % | TEMPERATURE: 99 F | RESPIRATION RATE: 16 BRPM | SYSTOLIC BLOOD PRESSURE: 119 MMHG | HEART RATE: 82 BPM | DIASTOLIC BLOOD PRESSURE: 80 MMHG

## 2020-05-16 DIAGNOSIS — Z90.3 ACQUIRED ABSENCE OF STOMACH [PART OF]: Chronic | ICD-10-CM

## 2020-05-16 DIAGNOSIS — Z98.890 OTHER SPECIFIED POSTPROCEDURAL STATES: Chronic | ICD-10-CM

## 2020-05-16 DIAGNOSIS — Z90.49 ACQUIRED ABSENCE OF OTHER SPECIFIED PARTS OF DIGESTIVE TRACT: Chronic | ICD-10-CM

## 2020-05-16 DIAGNOSIS — K46.9 UNSPECIFIED ABDOMINAL HERNIA WITHOUT OBSTRUCTION OR GANGRENE: Chronic | ICD-10-CM

## 2020-05-16 LAB
ALBUMIN SERPL ELPH-MCNC: 3.4 G/DL — SIGNIFICANT CHANGE UP (ref 3.4–5)
ALP SERPL-CCNC: 75 U/L — SIGNIFICANT CHANGE UP (ref 40–120)
ALT FLD-CCNC: 10 U/L — LOW (ref 12–42)
ANION GAP SERPL CALC-SCNC: 9 MMOL/L — SIGNIFICANT CHANGE UP (ref 9–16)
APPEARANCE UR: CLEAR — SIGNIFICANT CHANGE UP
AST SERPL-CCNC: 29 U/L — SIGNIFICANT CHANGE UP (ref 15–37)
BASOPHILS # BLD AUTO: 0.02 K/UL — SIGNIFICANT CHANGE UP (ref 0–0.2)
BASOPHILS NFR BLD AUTO: 0.4 % — SIGNIFICANT CHANGE UP (ref 0–2)
BILIRUB SERPL-MCNC: 0.3 MG/DL — SIGNIFICANT CHANGE UP (ref 0.2–1.2)
BILIRUB UR-MCNC: NEGATIVE — SIGNIFICANT CHANGE UP
BUN SERPL-MCNC: 11 MG/DL — SIGNIFICANT CHANGE UP (ref 7–23)
CALCIUM SERPL-MCNC: 8.6 MG/DL — SIGNIFICANT CHANGE UP (ref 8.5–10.5)
CHLORIDE SERPL-SCNC: 102 MMOL/L — SIGNIFICANT CHANGE UP (ref 96–108)
CO2 SERPL-SCNC: 28 MMOL/L — SIGNIFICANT CHANGE UP (ref 22–31)
COLOR SPEC: YELLOW — SIGNIFICANT CHANGE UP
CREAT SERPL-MCNC: 1.01 MG/DL — SIGNIFICANT CHANGE UP (ref 0.5–1.3)
CRP SERPL-MCNC: 0.7 MG/DL — SIGNIFICANT CHANGE UP (ref 0–0.9)
DIFF PNL FLD: NEGATIVE — SIGNIFICANT CHANGE UP
EOSINOPHIL # BLD AUTO: 0.18 K/UL — SIGNIFICANT CHANGE UP (ref 0–0.5)
EOSINOPHIL NFR BLD AUTO: 3.3 % — SIGNIFICANT CHANGE UP (ref 0–6)
GLUCOSE SERPL-MCNC: 152 MG/DL — HIGH (ref 70–99)
GLUCOSE UR QL: NEGATIVE — SIGNIFICANT CHANGE UP
HCT VFR BLD CALC: 34.7 % — SIGNIFICANT CHANGE UP (ref 34.5–45)
HGB BLD-MCNC: 11.4 G/DL — LOW (ref 11.5–15.5)
IMM GRANULOCYTES NFR BLD AUTO: 0.7 % — SIGNIFICANT CHANGE UP (ref 0–1.5)
KETONES UR-MCNC: NEGATIVE — SIGNIFICANT CHANGE UP
LEUKOCYTE ESTERASE UR-ACNC: NEGATIVE — SIGNIFICANT CHANGE UP
LIDOCAIN IGE QN: 207 U/L — SIGNIFICANT CHANGE UP (ref 73–393)
LYMPHOCYTES # BLD AUTO: 1.72 K/UL — SIGNIFICANT CHANGE UP (ref 1–3.3)
LYMPHOCYTES # BLD AUTO: 31.6 % — SIGNIFICANT CHANGE UP (ref 13–44)
MCHC RBC-ENTMCNC: 28.4 PG — SIGNIFICANT CHANGE UP (ref 27–34)
MCHC RBC-ENTMCNC: 32.9 GM/DL — SIGNIFICANT CHANGE UP (ref 32–36)
MCV RBC AUTO: 86.5 FL — SIGNIFICANT CHANGE UP (ref 80–100)
MONOCYTES # BLD AUTO: 0.56 K/UL — SIGNIFICANT CHANGE UP (ref 0–0.9)
MONOCYTES NFR BLD AUTO: 10.3 % — SIGNIFICANT CHANGE UP (ref 2–14)
NEUTROPHILS # BLD AUTO: 2.93 K/UL — SIGNIFICANT CHANGE UP (ref 1.8–7.4)
NEUTROPHILS NFR BLD AUTO: 53.7 % — SIGNIFICANT CHANGE UP (ref 43–77)
NITRITE UR-MCNC: NEGATIVE — SIGNIFICANT CHANGE UP
NRBC # BLD: 0 /100 WBCS — SIGNIFICANT CHANGE UP (ref 0–0)
PH UR: 6 — SIGNIFICANT CHANGE UP (ref 5–8)
PLATELET # BLD AUTO: 269 K/UL — SIGNIFICANT CHANGE UP (ref 150–400)
POTASSIUM SERPL-MCNC: 3 MMOL/L — LOW (ref 3.5–5.3)
POTASSIUM SERPL-SCNC: 3 MMOL/L — LOW (ref 3.5–5.3)
PROT SERPL-MCNC: 7.4 G/DL — SIGNIFICANT CHANGE UP (ref 6.4–8.2)
PROT UR-MCNC: NEGATIVE MG/DL — SIGNIFICANT CHANGE UP
RBC # BLD: 4.01 M/UL — SIGNIFICANT CHANGE UP (ref 3.8–5.2)
RBC # FLD: 16.9 % — HIGH (ref 10.3–14.5)
SODIUM SERPL-SCNC: 139 MMOL/L — SIGNIFICANT CHANGE UP (ref 132–145)
SP GR SPEC: 1.02 — SIGNIFICANT CHANGE UP (ref 1–1.03)
UROBILINOGEN FLD QL: 0.2 E.U./DL — SIGNIFICANT CHANGE UP
WBC # BLD: 5.45 K/UL — SIGNIFICANT CHANGE UP (ref 3.8–10.5)
WBC # FLD AUTO: 5.45 K/UL — SIGNIFICANT CHANGE UP (ref 3.8–10.5)

## 2020-05-16 PROCEDURE — 74177 CT ABD & PELVIS W/CONTRAST: CPT | Mod: 26

## 2020-05-16 PROCEDURE — 99285 EMERGENCY DEPT VISIT HI MDM: CPT

## 2020-05-16 PROCEDURE — 93010 ELECTROCARDIOGRAM REPORT: CPT

## 2020-05-16 RX ORDER — POTASSIUM CHLORIDE 20 MEQ
40 PACKET (EA) ORAL ONCE
Refills: 0 | Status: COMPLETED | OUTPATIENT
Start: 2020-05-16 | End: 2020-05-16

## 2020-05-16 RX ORDER — MORPHINE SULFATE 50 MG/1
4 CAPSULE, EXTENDED RELEASE ORAL ONCE
Refills: 0 | Status: DISCONTINUED | OUTPATIENT
Start: 2020-05-16 | End: 2020-05-16

## 2020-05-16 RX ORDER — SODIUM CHLORIDE 9 MG/ML
1000 INJECTION INTRAMUSCULAR; INTRAVENOUS; SUBCUTANEOUS ONCE
Refills: 0 | Status: COMPLETED | OUTPATIENT
Start: 2020-05-16 | End: 2020-05-16

## 2020-05-16 RX ORDER — ONDANSETRON 8 MG/1
4 TABLET, FILM COATED ORAL ONCE
Refills: 0 | Status: COMPLETED | OUTPATIENT
Start: 2020-05-16 | End: 2020-05-16

## 2020-05-16 RX ORDER — IOHEXOL 300 MG/ML
30 INJECTION, SOLUTION INTRAVENOUS ONCE
Refills: 0 | Status: COMPLETED | OUTPATIENT
Start: 2020-05-16 | End: 2020-05-16

## 2020-05-16 RX ADMIN — SODIUM CHLORIDE 1000 MILLILITER(S): 9 INJECTION INTRAMUSCULAR; INTRAVENOUS; SUBCUTANEOUS at 18:01

## 2020-05-16 RX ADMIN — ONDANSETRON 4 MILLIGRAM(S): 8 TABLET, FILM COATED ORAL at 18:01

## 2020-05-16 RX ADMIN — MORPHINE SULFATE 4 MILLIGRAM(S): 50 CAPSULE, EXTENDED RELEASE ORAL at 20:24

## 2020-05-16 RX ADMIN — Medication 40 MILLIEQUIVALENT(S): at 18:53

## 2020-05-16 RX ADMIN — IOHEXOL 30 MILLILITER(S): 300 INJECTION, SOLUTION INTRAVENOUS at 18:01

## 2020-05-16 NOTE — ED PROVIDER NOTE - NSFOLLOWUPINSTRUCTIONS_ED_ALL_ED_FT
Hypokalemia    WHAT YOU NEED TO KNOW:    Hypokalemia is a low level of potassium in your blood. Potassium helps control how your muscles, heart, and digestive system work. Hypokalemia occurs when your body loses too much potassium or does not absorb enough from food.     DISCHARGE INSTRUCTIONS:    Return to the emergency department if:     You cannot move your arm or leg.      You have a fast or irregular heartbeat.      You are too tired or weak to stand up.    Contact your healthcare provider if:     You are vomiting, or you have diarrhea.      You have numbness or tingling in your arms or legs.      Your symptoms do not go away or they get worse.      You have questions or concerns about your condition or care.    Medicines:     Potassium will be given to bring your potassium levels back to normal.      Take your medicine as directed. Contact your healthcare provider if you think your medicine is not helping or if you have side effects. Tell him of her if you are allergic to any medicine. Keep a list of the medicines, vitamins, and herbs you take. Include the amounts, and when and why you take them. Bring the list or the pill bottles to follow-up visits. Carry your medicine list with you in case of an emergency.    Eat foods that are high in potassium: Foods that are high in potassium include bananas, oranges, tomatoes, potatoes, and avocado. [place] beans, turkey, salmon, lean beef, yogurt, and milk are also high in potassium. Ask your healthcare provider or dietitian for more information about foods that are high in potassium.     Follow up with your healthcare provider as directed: Write down your questions so you remember to ask them during your visits.

## 2020-05-16 NOTE — ED PROVIDER NOTE - PMH
Chronic anticoagulation    Depression    Diabetes mellitus, type 2    Diverticulitis    DVT (deep venous thrombosis)  left leg  GERD (gastroesophageal reflux disease)    HTN (hypertension)    Hypercoagulable state    Neuropathy    DARIO (obstructive sleep apnea)    Osteoarthritis    Pancreatitis    Pulmonary embolism    Restless leg    Rheumatoid arthritis

## 2020-05-16 NOTE — ED PROVIDER NOTE - PROGRESS NOTE DETAILS
Ct abd/pelvis po/iv contrast no acute bad etiology, incidentally 14 cm enhancing fluid collection in subcutaneous tissue concerning for possible abscess. case discussed with ortho on call based on images, exam,  labs, low clinical suspicion for abscess , likely seroma. pt. rt hip replacement done in 9/19, lt hip[ 2/20/19 no post-op complications. rec adding CRP/ESR, blood culture, no indication for further imaging now, no abx needed. called made to answering service of DR. Ballard ([phone]) pt. ortho, dr. menjivar will also contact Dr. dominguez. PT. scheduled to see Dr. Ballard on Monday . pt. advised to return if develop worsening sx.

## 2020-05-16 NOTE — ED PROVIDER NOTE - CLINICAL SUMMARY MEDICAL DECISION MAKING FREE TEXT BOX
Pt. with abd pain X 1 mts. vss, labs ct r/o appy/ colitis Pt. with abd pain X 1 mts. vss, labs ct r/o appy/ colitis. Pt. with abd pain X 1 mts. vss, labs ct r/o appy/ colitis. see progress note

## 2020-05-16 NOTE — ED PROVIDER NOTE - CARE PROVIDER_API CALL
Bolivar Ballard)  Orthopaedic Surgery  [address]  [place], [place] [address]  Phone: [phone]  Fax: [phone]  Follow Up Time:

## 2020-05-16 NOTE — ED PROVIDER NOTE - PSH
Abdominal hernia  umbilical  History of cholecystectomy    History of colon resection  diverticulitis  History of sleeve gastrectomy    History of surgery  2019 L thr

## 2020-05-16 NOTE — ED PROVIDER NOTE - CPE EDP CARDIAC NORM
normal... Xerosis Aggressive Treatment: I recommended application of Cetaphil or CeraVe numerous times a day and before going to bed to all dry areas. I also prescribed a topical steroid for twice daily use.

## 2020-05-16 NOTE — ED PROVIDER NOTE - OBJECTIVE STATEMENT
49 y o female with PMHx HTN, PE/ DVT (Lovenox) and hx of b/l hip surgery and gastric sleeve surgery (2015) presents with intermittent lower abd cramping pain x1 month with some associated nausea and decreased appetite. Pt also c/o right sided headache and some dizziness, like the room is spinning, when she has abd pain. Pt recently completed Macrobid for presumed UTI. Otherwise patient denies fever, chills, vomiting, chest pain, SOB, weakness, vision changes, or other sx.

## 2020-05-16 NOTE — ED PROVIDER NOTE - PATIENT PORTAL LINK FT
You can access the FollowMyHealth Patient Portal offered by [facility] by registering at the following website: [email or web address]. By joining [facility]’s FollowMyHealth portal, you will also be able to view your health information using other applications (apps) compatible with our system.

## 2020-05-16 NOTE — ED PROVIDER NOTE - CARE PLAN
Principal Discharge DX:	Abdominal pain Principal Discharge DX:	Abdominal pain  Secondary Diagnosis:	Hip pain Principal Discharge DX:	Abdominal pain  Secondary Diagnosis:	Hip pain  Secondary Diagnosis:	Hypokalemia

## 2020-05-16 NOTE — ED ADULT TRIAGE NOTE - CHIEF COMPLAINT QUOTE
states that she has generalized abd pain accompanied with intermittient diarrhea and decreased po intake as well dizziness and headache x3days. ambulates with cane at baseline r/t b hip surgery. hx dm/htn.

## 2020-05-16 NOTE — ED ADULT NURSE NOTE - EXTENSIONS OF SELF_ADULT
-Patient had his home Januvia and metformin, withheld in the hospital, due to concerns of lactic acidosis, and decreased organ perfusion, and because of contrast imaging study.  - will discharge on home medications.   Cane

## 2020-05-16 NOTE — ED ADULT NURSE NOTE - NSIMPLEMENTINTERV_GEN_ALL_ED
Implemented All Universal Safety Interventions:  [place] to call system. Call bell, personal items and telephone within reach. Instruct patient to call for assistance. Room bathroom lighting operational. Non-slip footwear when patient is off stretcher. Physically safe environment: no spills, clutter or unnecessary equipment. Stretcher in lowest position, wheels locked, appropriate side rails in place.

## 2020-05-17 LAB
CULTURE RESULTS: SIGNIFICANT CHANGE UP
ERYTHROCYTE [SEDIMENTATION RATE] IN BLOOD: 49 MM/HR — HIGH (ref 0–20)
SPECIMEN SOURCE: SIGNIFICANT CHANGE UP

## 2020-05-18 ENCOUNTER — OUTPATIENT (OUTPATIENT)
Dept: OUTPATIENT SERVICES | Facility: HOSPITAL | Age: 50
LOS: 1 days | End: 2020-05-18
Payer: COMMERCIAL

## 2020-05-18 ENCOUNTER — APPOINTMENT (OUTPATIENT)
Dept: ORTHOPEDIC SURGERY | Facility: CLINIC | Age: 50
End: 2020-05-18
Payer: MEDICAID

## 2020-05-18 ENCOUNTER — RESULT REVIEW (OUTPATIENT)
Age: 50
End: 2020-05-18

## 2020-05-18 VITALS — TEMPERATURE: 97.1 F

## 2020-05-18 DIAGNOSIS — Z90.49 ACQUIRED ABSENCE OF OTHER SPECIFIED PARTS OF DIGESTIVE TRACT: Chronic | ICD-10-CM

## 2020-05-18 DIAGNOSIS — Z98.890 OTHER SPECIFIED POSTPROCEDURAL STATES: Chronic | ICD-10-CM

## 2020-05-18 DIAGNOSIS — K46.9 UNSPECIFIED ABDOMINAL HERNIA WITHOUT OBSTRUCTION OR GANGRENE: Chronic | ICD-10-CM

## 2020-05-18 DIAGNOSIS — Z90.3 ACQUIRED ABSENCE OF STOMACH [PART OF]: Chronic | ICD-10-CM

## 2020-05-18 LAB
B PERT IGG+IGM PNL SER: SIGNIFICANT CHANGE UP
COLOR FLD: SIGNIFICANT CHANGE UP
FLUID INTAKE SUBSTANCE CLASS: SIGNIFICANT CHANGE UP
FLUID SEGMENTED GRANULOCYTES: 76 % — SIGNIFICANT CHANGE UP
GRAM STN FLD: SIGNIFICANT CHANGE UP
LYMPHOCYTES # FLD: 16 % — SIGNIFICANT CHANGE UP
MONOS+MACROS # FLD: 8 % — SIGNIFICANT CHANGE UP
RCV VOL RI: HIGH /UL (ref 0–0)
SPECIMEN SOURCE FLD: SIGNIFICANT CHANGE UP
SPECIMEN SOURCE: SIGNIFICANT CHANGE UP
SYNOVIAL CRYSTALS CLARITY: ABNORMAL
SYNOVIAL CRYSTALS COLOR: ABNORMAL
SYNOVIAL CRYSTALS ID: SIGNIFICANT CHANGE UP
SYNOVIAL CRYSTALS TUBE: SIGNIFICANT CHANGE UP
TOTAL NUCLEATED CELL COUNT, BODY FLUID: 222 /UL — SIGNIFICANT CHANGE UP
TUBE TYPE: SIGNIFICANT CHANGE UP

## 2020-05-18 PROCEDURE — 89051 BODY FLUID CELL COUNT: CPT

## 2020-05-18 PROCEDURE — 89060 EXAM SYNOVIAL FLUID CRYSTALS: CPT

## 2020-05-18 PROCEDURE — 87205 SMEAR GRAM STAIN: CPT

## 2020-05-18 PROCEDURE — 87070 CULTURE OTHR SPECIMN AEROBIC: CPT

## 2020-05-18 PROCEDURE — 73502 X-RAY EXAM HIP UNI 2-3 VIEWS: CPT

## 2020-05-18 PROCEDURE — 87075 CULTR BACTERIA EXCEPT BLOOD: CPT

## 2020-05-18 PROCEDURE — 99024 POSTOP FOLLOW-UP VISIT: CPT

## 2020-05-18 PROCEDURE — 73502 X-RAY EXAM HIP UNI 2-3 VIEWS: CPT | Mod: 26,LT

## 2020-05-19 ENCOUNTER — RX RENEWAL (OUTPATIENT)
Age: 50
End: 2020-05-19

## 2020-05-19 NOTE — PHYSICAL EXAM
[de-identified] : Not in acute distress, dressed appropriately, sitting on examination table \par Skin: Warm and dry, normal turgor, no rashes \par Neurological: AOx3, Cranial nerves grossly in tact \par Psych: Mood and affect appropriate \par \par Right Hip: No swelling, edema, erythema, redness or drainage. Non-tender to palpation. ROM: Hip flexion 0-120, abduction 30, internal ext rotation 45 with painless ROM. 5/5 strength. Normal gait\par \par Left Hip: Mild superficial lateral swelling; No edema, erythema, redness or drainage. laterally tender to palpation. ROM: Hip flexion 0-120, abduction 30, internal ext rotation 45 with painless ROM. 5/5 strength. Normal gait [de-identified] : Xray shows normal bilateral total hip arthroplasty \par \par \par 05/16/20 CT Abdomen\par \par IMPRESSION: \par \par 1. 14 cm peripherally enhancing fluid collection in the subcutaneous \par tissues of the lateral left hip superficial to the muscular fascia \par suspicious for abscess, new since 11/27/2019. \par 2. Other ancillary findings as above, unchanged.

## 2020-05-19 NOTE — PROCEDURE
[de-identified] : After obtaining verbal consent and after clearly explaining the risks, benefits, complications, and alternatives. Under the normal sterile conditions the area was sterile prepped and the left hip superficial fluid effussion was aspirated with a syringe. I aspirated 30 cc of clear serosanguineous fluid.

## 2020-05-19 NOTE — ASSESSMENT
[FreeTextEntry1] : Assessment/Plan:\par 49 yo female here co bilateral hip pain L>R and left lateral hip superficial fluid collection outside of the joint space.\par \par Plan:\par 1) Aspiration of fluid collection of left lateral superficial hip \par 2) Sent aspiration for cell count and culture, will fu\par 3) Sent Tramadol\par 4) Fu with lab results\par \par All medical record entries made by the PA/Scriblona/Fellow are at my, Dr. Bolivar Ballard's direction and personally dictated by me on 05/18/2020]. I have reviewed the chart and agree that the record accurately reflects my personal performance of the history, physical exam, assessment, and plan. I have also personally directed reviewed, and agreed with the chart.\par

## 2020-05-20 DIAGNOSIS — R11.0 NAUSEA: ICD-10-CM

## 2020-05-20 DIAGNOSIS — R10.30 LOWER ABDOMINAL PAIN, UNSPECIFIED: ICD-10-CM

## 2020-05-20 DIAGNOSIS — M25.559 PAIN IN UNSPECIFIED HIP: ICD-10-CM

## 2020-05-20 DIAGNOSIS — E87.6 HYPOKALEMIA: ICD-10-CM

## 2020-05-21 ENCOUNTER — APPOINTMENT (OUTPATIENT)
Dept: INTERNAL MEDICINE | Facility: CLINIC | Age: 50
End: 2020-05-21
Payer: MEDICAID

## 2020-05-21 VITALS
OXYGEN SATURATION: 98 % | WEIGHT: 225 LBS | HEART RATE: 98 BPM | SYSTOLIC BLOOD PRESSURE: 140 MMHG | BODY MASS INDEX: 34.21 KG/M2 | DIASTOLIC BLOOD PRESSURE: 88 MMHG | TEMPERATURE: 98.1 F

## 2020-05-21 DIAGNOSIS — E87.6 HYPOKALEMIA: ICD-10-CM

## 2020-05-21 DIAGNOSIS — S70.02XA CONTUSION OF LEFT HIP, INITIAL ENCOUNTER: ICD-10-CM

## 2020-05-21 DIAGNOSIS — S16.1XXA STRAIN OF MUSCLE, FASCIA AND TENDON AT NECK LEVEL, INITIAL ENCOUNTER: ICD-10-CM

## 2020-05-21 DIAGNOSIS — L29.9 PRURITUS, UNSPECIFIED: ICD-10-CM

## 2020-05-21 PROCEDURE — 36415 COLL VENOUS BLD VENIPUNCTURE: CPT

## 2020-05-21 PROCEDURE — 99214 OFFICE O/P EST MOD 30 MIN: CPT | Mod: 25

## 2020-05-21 RX ORDER — NITROFURANTOIN (MONOHYDRATE/MACROCRYSTALS) 25; 75 MG/1; MG/1
100 CAPSULE ORAL
Qty: 10 | Refills: 0 | Status: COMPLETED | COMMUNITY
Start: 2020-04-14 | End: 2020-05-21

## 2020-05-22 LAB
CULTURE RESULTS: SIGNIFICANT CHANGE UP
CULTURE RESULTS: SIGNIFICANT CHANGE UP
SPECIMEN SOURCE: SIGNIFICANT CHANGE UP
SPECIMEN SOURCE: SIGNIFICANT CHANGE UP

## 2020-05-23 LAB
CULTURE RESULTS: NO GROWTH — SIGNIFICANT CHANGE UP
SPECIMEN SOURCE: SIGNIFICANT CHANGE UP

## 2020-05-26 ENCOUNTER — TRANSCRIPTION ENCOUNTER (OUTPATIENT)
Age: 50
End: 2020-05-26

## 2020-05-26 ENCOUNTER — RX RENEWAL (OUTPATIENT)
Age: 50
End: 2020-05-26

## 2020-05-26 LAB
ALBUMIN SERPL ELPH-MCNC: 4.1 G/DL
ALP BLD-CCNC: 66 U/L
ALT SERPL-CCNC: 13 U/L
ANION GAP SERPL CALC-SCNC: 16 MMOL/L
AST SERPL-CCNC: 26 U/L
BASOPHILS # BLD AUTO: 0.01 K/UL
BASOPHILS NFR BLD AUTO: 0.2 %
BILIRUB SERPL-MCNC: 0.3 MG/DL
BUN SERPL-MCNC: 9 MG/DL
CALCIUM SERPL-MCNC: 9.6 MG/DL
CHLORIDE SERPL-SCNC: 100 MMOL/L
CO2 SERPL-SCNC: 25 MMOL/L
CREAT SERPL-MCNC: 0.84 MG/DL
EOSINOPHIL # BLD AUTO: 0.14 K/UL
EOSINOPHIL NFR BLD AUTO: 2.7 %
FOLATE SERPL-MCNC: 9.5 NG/ML
GLUCOSE SERPL-MCNC: 139 MG/DL
HCT VFR BLD CALC: 38.7 %
HGB BLD-MCNC: 11 G/DL
IMM GRANULOCYTES NFR BLD AUTO: 1.2 %
LYMPHOCYTES # BLD AUTO: 1.62 K/UL
LYMPHOCYTES NFR BLD AUTO: 31.6 %
MAGNESIUM SERPL-MCNC: 1.6 MG/DL
MAN DIFF?: NORMAL
MCHC RBC-ENTMCNC: 26.6 PG
MCHC RBC-ENTMCNC: 28.4 GM/DL
MCV RBC AUTO: 93.7 FL
MONOCYTES # BLD AUTO: 0.63 K/UL
MONOCYTES NFR BLD AUTO: 12.3 %
NEUTROPHILS # BLD AUTO: 2.66 K/UL
NEUTROPHILS NFR BLD AUTO: 52 %
PLATELET # BLD AUTO: 262 K/UL
POTASSIUM SERPL-SCNC: 3.4 MMOL/L
PROT SERPL-MCNC: 7.1 G/DL
RBC # BLD: 4.13 M/UL
RBC # FLD: 16.9 %
SARS-COV-2 IGG SERPL IA-ACNC: <0.1 INDEX
SARS-COV-2 IGG SERPL QL IA: NEGATIVE
SODIUM SERPL-SCNC: 141 MMOL/L
VIT B12 SERPL-MCNC: 425 PG/ML
WBC # FLD AUTO: 5.12 K/UL

## 2020-05-26 NOTE — HISTORY OF PRESENT ILLNESS
[FreeTextEntry8] : Diarrhea for 1 month: watery, malodorous, up to 10episodes/day. No blood in stool. No fevers.\par \par In ER this weekend imaging showed fluid collection in L hip, drained at Dr. Ballard's office, results reviewed on [place] and all blood. Discussed that we should postpone transition from lovenox to warfarin until next visit because of this.

## 2020-05-26 NOTE — PHYSICAL EXAM
[Soft] : abdomen soft [Non Tender] : non-tender [Normal Bowel Sounds] : normal bowel sounds [Alert and Oriented x3] : oriented to person, place, and time [Normal] : affect was normal and insight and judgment were intact [de-identified] : somewhat distended [de-identified] : R neck paraspinal muscular tenderness, also pain of R SCM with head movements. [de-identified] : somewhat antalgic gait

## 2020-05-26 NOTE — REVIEW OF SYSTEMS
[Diarrhea] : diarrhea [Joint Pain] : joint pain [Negative] : Cardiovascular [Nausea] : no nausea [Vomiting] : no vomiting [Melena] : no melena

## 2020-05-26 NOTE — PLAN
[FreeTextEntry1] : Diarrhea: possibly Cdiff. Discussed that I can either empirically treat her or she can do stool studies first. Probably in the interests of alleviating symptoms more quickly, empiric treatment is more appropriate. If no improvement after 3-4 days on treatment she should bring in stool samples.\par \par R neck: exam c/w muscle strain, in particular of R SCM. Discussed stretching, exercises, heat, topical OTC meds.

## 2020-06-01 ENCOUNTER — APPOINTMENT (OUTPATIENT)
Dept: ORTHOPEDIC SURGERY | Facility: CLINIC | Age: 50
End: 2020-06-01
Payer: MEDICAID

## 2020-06-01 PROCEDURE — 99213 OFFICE O/P EST LOW 20 MIN: CPT

## 2020-06-05 NOTE — PHYSICAL EXAM
[de-identified] : Not in acute distress, dressed appropriately, sitting on examination table \par Skin: Warm and dry, normal turgor, no rashes \par Neurological: AOx3, Cranial nerves grossly in tact \par Psych: Mood and affect appropriate \par \par Right Hip: No swelling, edema, erythema, redness or drainage. Non-tender to palpation. Painful ROM: Hip flexion 0-120, abduction 30, internal ext rotation 45. 5/5 strength. Normal gait\par \par Left Hip: No swelling, edema, erythema, redness or drainage. Non-tender to palpation. Painful ROM: Hip flexion 0-120, abduction 30, internal ext rotation 45. 5/5 strength. Normal gait [de-identified] : xray shows bilateral total hip arthroplasty

## 2020-06-05 NOTE — HISTORY OF PRESENT ILLNESS
[de-identified] : 49 yo female s/p Left total hip arthroplasty on 02/20/20. She reports intermittent, dull/achy, frontal, 8/10, bilateral hip pain since surgery. She reports her pain is worse with standing, walking, sit to stand, stairs; sitting makes her pain worse.\par \par Current Medication: tramadol; Needs outpatient physical therapy; No assist device

## 2020-06-05 NOTE — ASSESSMENT
[FreeTextEntry1] : Assessment/Plan:\par \par 51 yo female here for bilateral hip pain since surgery, she has bilateral SANDY\par \par Plan:\par 1) Continue PT and home exercises\par 2) Prescribed Percocept 5mg-325mg\par 2) Weight loss\par 3) FU in 4 weeks\par \par All medical record entries made by the PA/Scribe/Fellow are at my, Dr. Bolivar Ballard's direction and personally dictated by me on 06/01/2020]. I have reviewed the chart and agree that the record accurately reflects my personal performance of the history, physical exam, assessment, and plan. I have also personally directed reviewed, and agreed with the chart.\par

## 2020-06-15 ENCOUNTER — RX RENEWAL (OUTPATIENT)
Age: 50
End: 2020-06-15

## 2020-06-16 ENCOUNTER — TRANSCRIPTION ENCOUNTER (OUTPATIENT)
Age: 50
End: 2020-06-16

## 2020-06-22 ENCOUNTER — RX RENEWAL (OUTPATIENT)
Age: 50
End: 2020-06-22

## 2020-06-25 ENCOUNTER — APPOINTMENT (OUTPATIENT)
Dept: INTERNAL MEDICINE | Facility: CLINIC | Age: 50
End: 2020-06-25
Payer: MEDICAID

## 2020-06-25 VITALS
HEIGHT: 68 IN | TEMPERATURE: 99.2 F | DIASTOLIC BLOOD PRESSURE: 90 MMHG | OXYGEN SATURATION: 99 % | WEIGHT: 235 LBS | HEART RATE: 97 BPM | BODY MASS INDEX: 35.61 KG/M2 | SYSTOLIC BLOOD PRESSURE: 140 MMHG

## 2020-06-25 DIAGNOSIS — R20.0 ANESTHESIA OF SKIN: ICD-10-CM

## 2020-06-25 DIAGNOSIS — K21.9 GASTRO-ESOPHAGEAL REFLUX DISEASE W/OUT ESOPHAGITIS: ICD-10-CM

## 2020-06-25 DIAGNOSIS — Z20.828 CONTACT WITH AND (SUSPECTED) EXPOSURE TO OTHER VIRAL COMMUNICABLE DISEASES: ICD-10-CM

## 2020-06-25 PROCEDURE — 96372 THER/PROPH/DIAG INJ SC/IM: CPT

## 2020-06-25 PROCEDURE — 99214 OFFICE O/P EST MOD 30 MIN: CPT | Mod: 25

## 2020-06-25 RX ORDER — METRONIDAZOLE 500 MG/1
500 TABLET ORAL 3 TIMES DAILY
Qty: 30 | Refills: 0 | Status: COMPLETED | COMMUNITY
Start: 2020-05-21 | End: 2020-06-25

## 2020-06-28 PROBLEM — Z20.828 EXPOSURE TO COVID-19 VIRUS: Status: RESOLVED | Noted: 2020-05-21 | Resolved: 2020-06-28

## 2020-06-28 RX ORDER — CYANOCOBALAMIN 1000 UG/ML
1000 INJECTION INTRAMUSCULAR; SUBCUTANEOUS
Qty: 0 | Refills: 0 | Status: COMPLETED | OUTPATIENT
Start: 2020-06-28

## 2020-06-28 RX ADMIN — CYANOCOBALAMIN 1000 MCG/ML: 1000 INJECTION INTRAMUSCULAR; SUBCUTANEOUS at 00:00

## 2020-06-28 NOTE — HISTORY OF PRESENT ILLNESS
[FreeTextEntry1] : f/u diarrhea, heartburn. Transition from lovenox to warfarin. [de-identified] : worsening heartburn: waking up at night even if she takes famotidine, barking cough. Has only taken omeprazole, no other PPIs\par \par Diarrhea stopped after taking flagyl.\par \par B/l hip pain still, R>L. Discussed with Dr. Ballard possibly exploratory surgery to look for source of pain.

## 2020-06-28 NOTE — REVIEW OF SYSTEMS
[Constipation] : no constipation [Diarrhea] : diarrhea [Heartburn] : heartburn [Joint Pain] : joint pain [Joint Stiffness] : joint stiffness [Negative] : Respiratory

## 2020-06-28 NOTE — PHYSICAL EXAM
[Normal Sclera/Conjunctiva] : normal sclera/conjunctiva [Alert and Oriented x3] : oriented to person, place, and time [Normal] : normal rate, regular rhythm, normal S1 and S2 and no murmur heard [de-identified] : antalgic gait

## 2020-06-29 ENCOUNTER — APPOINTMENT (OUTPATIENT)
Dept: ORTHOPEDIC SURGERY | Facility: CLINIC | Age: 50
End: 2020-06-29

## 2020-07-01 ENCOUNTER — APPOINTMENT (OUTPATIENT)
Dept: INTERNAL MEDICINE | Facility: CLINIC | Age: 50
End: 2020-07-01

## 2020-07-07 ENCOUNTER — RX RENEWAL (OUTPATIENT)
Age: 50
End: 2020-07-07

## 2020-07-10 ENCOUNTER — RX RENEWAL (OUTPATIENT)
Age: 50
End: 2020-07-10

## 2020-07-13 ENCOUNTER — APPOINTMENT (OUTPATIENT)
Dept: ORTHOPEDIC SURGERY | Facility: CLINIC | Age: 50
End: 2020-07-13
Payer: MEDICAID

## 2020-07-13 PROCEDURE — 99214 OFFICE O/P EST MOD 30 MIN: CPT

## 2020-07-19 NOTE — PHYSICAL EXAM
[de-identified] : Not in acute distress, dressed appropriately, sitting on examination table \par Skin: Warm and dry, normal turgor, no rashes \par Neurological: AOx3, Cranial nerves grossly in tact \par Psych: Mood and affect appropriate \par \par Right Hip: No swelling, edema, erythema, redness or drainage.n Tender anteriorly Painful ROM: Hip flexion 0-120, abduction 30, internal ext rotation 45. 5/5 strength. Normal gait\par \par Left Hip: No swelling, edema, erythema, redness or drainage. Non-tender to palpation. NON Painful ROM: Hip flexion 0-120, abduction 30, internal ext rotation 45. 5/5 strength. Normal gait \par  [de-identified] : xray of right hip shows possibly loose acetabular component, femur looks well fixed and well placed.

## 2020-07-19 NOTE — HISTORY OF PRESENT ILLNESS
[de-identified] : Follow up for bilateral hips. Left was done more recently and feels great, the right was done about a year ago and continues to bother her. 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 She has anterior groin pain that is moderate at rest and can be severe with activity, present throughout most of the ADLs and she feels as if the right hip is limiting her in her life.

## 2020-07-20 ENCOUNTER — APPOINTMENT (OUTPATIENT)
Dept: INTERNAL MEDICINE | Facility: CLINIC | Age: 50
End: 2020-07-20

## 2020-07-21 ENCOUNTER — RX RENEWAL (OUTPATIENT)
Age: 50
End: 2020-07-21

## 2020-07-22 ENCOUNTER — RX RENEWAL (OUTPATIENT)
Age: 50
End: 2020-07-22

## 2020-07-22 ENCOUNTER — NON-APPOINTMENT (OUTPATIENT)
Age: 50
End: 2020-07-22

## 2020-07-22 ENCOUNTER — APPOINTMENT (OUTPATIENT)
Dept: INTERNAL MEDICINE | Facility: CLINIC | Age: 50
End: 2020-07-22
Payer: MEDICAID

## 2020-07-22 VITALS
DIASTOLIC BLOOD PRESSURE: 91 MMHG | HEART RATE: 87 BPM | HEIGHT: 68 IN | OXYGEN SATURATION: 95 % | WEIGHT: 235 LBS | BODY MASS INDEX: 35.61 KG/M2 | SYSTOLIC BLOOD PRESSURE: 139 MMHG | TEMPERATURE: 98.3 F

## 2020-07-22 DIAGNOSIS — F32.9 ANXIETY DISORDER, UNSPECIFIED: ICD-10-CM

## 2020-07-22 DIAGNOSIS — H53.8 OTHER VISUAL DISTURBANCES: ICD-10-CM

## 2020-07-22 DIAGNOSIS — F41.9 ANXIETY DISORDER, UNSPECIFIED: ICD-10-CM

## 2020-07-22 PROCEDURE — 99214 OFFICE O/P EST MOD 30 MIN: CPT | Mod: 25

## 2020-07-22 PROCEDURE — 93000 ELECTROCARDIOGRAM COMPLETE: CPT

## 2020-07-22 PROCEDURE — 36415 COLL VENOUS BLD VENIPUNCTURE: CPT

## 2020-07-22 PROCEDURE — 96372 THER/PROPH/DIAG INJ SC/IM: CPT

## 2020-07-22 RX ORDER — WARFARIN 5 MG/1
5 TABLET ORAL DAILY
Qty: 45 | Refills: 1 | Status: COMPLETED | COMMUNITY
Start: 2020-03-06 | End: 2020-07-22

## 2020-07-22 RX ORDER — QUETIAPINE FUMARATE 50 MG/1
50 TABLET ORAL
Qty: 30 | Refills: 0 | Status: COMPLETED | COMMUNITY
Start: 2020-02-04

## 2020-07-22 RX ORDER — OXYCODONE 5 MG/1
5 TABLET ORAL
Qty: 42 | Refills: 0 | Status: COMPLETED | COMMUNITY
Start: 2020-02-25

## 2020-07-22 RX ORDER — DICLOFENAC SODIUM 10 MG/G
1 GEL TOPICAL
Qty: 100 | Refills: 0 | Status: COMPLETED | COMMUNITY
Start: 2020-06-16

## 2020-07-22 RX ORDER — CYANOCOBALAMIN 1000 UG/ML
1000 INJECTION INTRAMUSCULAR; SUBCUTANEOUS
Refills: 0 | Status: COMPLETED | COMMUNITY
End: 2020-07-22

## 2020-07-22 RX ADMIN — CYANOCOBALAMIN 1000 MCG/ML: 1000 INJECTION, SOLUTION INTRAMUSCULAR at 00:00

## 2020-07-23 ENCOUNTER — MED ADMIN CHARGE (OUTPATIENT)
Age: 50
End: 2020-07-23

## 2020-07-27 ENCOUNTER — LABORATORY RESULT (OUTPATIENT)
Age: 50
End: 2020-07-27

## 2020-07-27 RX ORDER — ROPINIROLE 8 MG/1
1 TABLET, FILM COATED, EXTENDED RELEASE ORAL
Qty: 0 | Refills: 0 | DISCHARGE

## 2020-07-27 RX ORDER — CYANOCOBALAMIN 1000 UG/ML
1000 INJECTION INTRAMUSCULAR; SUBCUTANEOUS
Qty: 0 | Refills: 0 | Status: COMPLETED | OUTPATIENT
Start: 2020-07-22

## 2020-07-27 RX ORDER — POTASSIUM CHLORIDE 1.5 G/1.58G
20 POWDER, FOR SOLUTION ORAL DAILY
Qty: 30 | Refills: 1 | Status: COMPLETED | COMMUNITY
Start: 2020-06-15 | End: 2020-07-27

## 2020-07-27 RX ORDER — AMLODIPINE BESYLATE 2.5 MG/1
1 TABLET ORAL
Qty: 0 | Refills: 0 | DISCHARGE

## 2020-07-28 ENCOUNTER — INPATIENT (INPATIENT)
Facility: HOSPITAL | Age: 50
LOS: 0 days | Discharge: HOME | End: 2020-07-28
Attending: ORTHOPAEDIC SURGERY | Admitting: ORTHOPAEDIC SURGERY

## 2020-07-28 DIAGNOSIS — I10 ESSENTIAL (PRIMARY) HYPERTENSION: ICD-10-CM

## 2020-07-28 DIAGNOSIS — Z90.3 ACQUIRED ABSENCE OF STOMACH [PART OF]: Chronic | ICD-10-CM

## 2020-07-28 DIAGNOSIS — Z90.49 ACQUIRED ABSENCE OF OTHER SPECIFIED PARTS OF DIGESTIVE TRACT: Chronic | ICD-10-CM

## 2020-07-28 DIAGNOSIS — M16.11 UNILATERAL PRIMARY OSTEOARTHRITIS, RIGHT HIP: ICD-10-CM

## 2020-07-28 DIAGNOSIS — G47.33 OBSTRUCTIVE SLEEP APNEA (ADULT) (PEDIATRIC): ICD-10-CM

## 2020-07-28 DIAGNOSIS — Z98.890 OTHER SPECIFIED POSTPROCEDURAL STATES: Chronic | ICD-10-CM

## 2020-07-28 DIAGNOSIS — E11.9 TYPE 2 DIABETES MELLITUS WITHOUT COMPLICATIONS: ICD-10-CM

## 2020-07-28 DIAGNOSIS — M06.9 RHEUMATOID ARTHRITIS, UNSPECIFIED: ICD-10-CM

## 2020-07-28 DIAGNOSIS — K46.9 UNSPECIFIED ABDOMINAL HERNIA WITHOUT OBSTRUCTION OR GANGRENE: Chronic | ICD-10-CM

## 2020-07-28 DIAGNOSIS — K21.9 GASTRO-ESOPHAGEAL REFLUX DISEASE WITHOUT ESOPHAGITIS: ICD-10-CM

## 2020-07-28 NOTE — H&P ADULT - NSICDXPASTSURGICALHX_GEN_ALL_CORE_FT
PAST SURGICAL HISTORY:  Abdominal hernia umbilical    History of cholecystectomy     History of colon resection diverticulitis    History of sleeve gastrectomy     History of surgery 2019 L thr    History of surgery right total hip replacement 9/12/2019

## 2020-07-28 NOTE — H&P ADULT - PROBLEM SELECTOR PLAN 1
Admit to Orthopaedic Service.  Presents today for elective right revision THR   Pt medically stable and cleared for procedure today by Dr. Melissa

## 2020-07-28 NOTE — H&P ADULT - HISTORY OF PRESENT ILLNESS
50F c/o right hip pain x     Pt had index right hip replacement on ____     Present for right hip revision arthroplasty today

## 2020-07-28 NOTE — H&P ADULT - NSHPLABSRESULTS_GEN_ALL_CORE
Preop BMP, PT/PTT/INR, UA - WNL per medical clearance   CBC DOS   Preop EKG - sinus rhythm -  WNL per medical clearance   Covid Swab Pending

## 2020-07-28 NOTE — H&P ADULT - NSHPPHYSICALEXAM_GEN_ALL_CORE
MSK: + decreased ROM 2/2 pain, right hip  Skin warm and well perfused, no visible wounds/erythema/ecchymoses  EHL/FHL/TA/GS   SLT   DP/PT pulses     Remainder of exam per medical clearance note

## 2020-07-29 ENCOUNTER — RX RENEWAL (OUTPATIENT)
Age: 50
End: 2020-07-29

## 2020-07-29 ENCOUNTER — TRANSCRIPTION ENCOUNTER (OUTPATIENT)
Age: 50
End: 2020-07-29

## 2020-07-29 LAB
ANION GAP SERPL CALC-SCNC: 17 MMOL/L
APPEARANCE: CLEAR
APTT BLD: 26.4 SEC
BACTERIA: NEGATIVE
BASOPHILS # BLD AUTO: 0.03 K/UL
BASOPHILS NFR BLD AUTO: 0.3 %
BILIRUBIN URINE: NEGATIVE
BLOOD URINE: NEGATIVE
BUN SERPL-MCNC: 15 MG/DL
CALCIUM SERPL-MCNC: 10 MG/DL
CHLORIDE SERPL-SCNC: 103 MMOL/L
CO2 SERPL-SCNC: 23 MMOL/L
COLOR: YELLOW
CREAT SERPL-MCNC: 0.9 MG/DL
EOSINOPHIL # BLD AUTO: 0.07 K/UL
EOSINOPHIL NFR BLD AUTO: 0.8 %
GLUCOSE QUALITATIVE U: NEGATIVE
GLUCOSE SERPL-MCNC: 185 MG/DL
HCT VFR BLD CALC: 37.4 %
HGB BLD-MCNC: 11.3 G/DL
HYALINE CASTS: 1 /LPF
IMM GRANULOCYTES NFR BLD AUTO: 1 %
INR PPP: 0.89 RATIO
KETONES URINE: NEGATIVE
LEUKOCYTE ESTERASE URINE: NEGATIVE
LYMPHOCYTES # BLD AUTO: 1.82 K/UL
LYMPHOCYTES NFR BLD AUTO: 21.1 %
MAN DIFF?: NORMAL
MCHC RBC-ENTMCNC: 27.8 PG
MCHC RBC-ENTMCNC: 30.2 GM/DL
MCV RBC AUTO: 92.1 FL
MICROSCOPIC-UA: NORMAL
MONOCYTES # BLD AUTO: 0.8 K/UL
MONOCYTES NFR BLD AUTO: 9.3 %
NEUTROPHILS # BLD AUTO: 5.82 K/UL
NEUTROPHILS NFR BLD AUTO: 67.5 %
NITRITE URINE: NEGATIVE
PH URINE: 5.5
PLATELET # BLD AUTO: 301 K/UL
POTASSIUM SERPL-SCNC: 3.7 MMOL/L
PROTEIN URINE: NORMAL
PT BLD: 10.7 SEC
RBC # BLD: 4.06 M/UL
RBC # FLD: 17 %
RED BLOOD CELLS URINE: 1 /HPF
SODIUM SERPL-SCNC: 143 MMOL/L
SPECIFIC GRAVITY URINE: 1.02
SQUAMOUS EPITHELIAL CELLS: 1 /HPF
UROBILINOGEN URINE: NORMAL
WBC # FLD AUTO: 8.63 K/UL
WHITE BLOOD CELLS URINE: 2 /HPF

## 2020-07-30 ENCOUNTER — TRANSCRIPTION ENCOUNTER (OUTPATIENT)
Age: 50
End: 2020-07-30

## 2020-07-30 ENCOUNTER — APPOINTMENT (OUTPATIENT)
Dept: ORTHOPEDIC SURGERY | Facility: HOSPITAL | Age: 50
End: 2020-07-30
Payer: MEDICAID

## 2020-07-30 ENCOUNTER — INPATIENT (INPATIENT)
Facility: HOSPITAL | Age: 50
LOS: 17 days | Discharge: HOME | DRG: 467 | End: 2020-08-17
Attending: ORTHOPAEDIC SURGERY | Admitting: ORTHOPAEDIC SURGERY
Payer: COMMERCIAL

## 2020-07-30 VITALS
RESPIRATION RATE: 18 BRPM | OXYGEN SATURATION: 100 % | HEART RATE: 90 BPM | WEIGHT: 224.43 LBS | DIASTOLIC BLOOD PRESSURE: 67 MMHG | SYSTOLIC BLOOD PRESSURE: 91 MMHG | TEMPERATURE: 97 F | HEIGHT: 68 IN

## 2020-07-30 DIAGNOSIS — D68.59 OTHER PRIMARY THROMBOPHILIA: ICD-10-CM

## 2020-07-30 DIAGNOSIS — Z90.49 ACQUIRED ABSENCE OF OTHER SPECIFIED PARTS OF DIGESTIVE TRACT: Chronic | ICD-10-CM

## 2020-07-30 DIAGNOSIS — R09.89 OTHER SPECIFIED SYMPTOMS AND SIGNS INVOLVING THE CIRCULATORY AND RESPIRATORY SYSTEMS: ICD-10-CM

## 2020-07-30 DIAGNOSIS — T84.84XA PAIN DUE TO INTERNAL ORTHOPEDIC PROSTHETIC DEVICES, IMPLANTS AND GRAFTS, INITIAL ENCOUNTER: ICD-10-CM

## 2020-07-30 DIAGNOSIS — Z79.84 LONG TERM (CURRENT) USE OF ORAL HYPOGLYCEMIC DRUGS: ICD-10-CM

## 2020-07-30 DIAGNOSIS — K46.9 UNSPECIFIED ABDOMINAL HERNIA WITHOUT OBSTRUCTION OR GANGRENE: Chronic | ICD-10-CM

## 2020-07-30 DIAGNOSIS — Z90.49 ACQUIRED ABSENCE OF OTHER SPECIFIED PARTS OF DIGESTIVE TRACT: ICD-10-CM
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
## 2020-07-30 DIAGNOSIS — Z98.890 OTHER SPECIFIED POSTPROCEDURAL STATES: Chronic | ICD-10-CM

## 2020-07-30 DIAGNOSIS — R06.82 TACHYPNEA, NOT ELSEWHERE CLASSIFIED: ICD-10-CM

## 2020-07-30 DIAGNOSIS — D62 ACUTE POSTHEMORRHAGIC ANEMIA: ICD-10-CM

## 2020-07-30 DIAGNOSIS — Z86.711 PERSONAL HISTORY OF PULMONARY EMBOLISM: ICD-10-CM

## 2020-07-30 DIAGNOSIS — T84.018A BROKEN INTERNAL JOINT PROSTHESIS, OTHER SITE, INITIAL ENCOUNTER: ICD-10-CM

## 2020-07-30 DIAGNOSIS — R51 HEADACHE: ICD-10-CM

## 2020-07-30 DIAGNOSIS — K21.9 GASTRO-ESOPHAGEAL REFLUX DISEASE WITHOUT ESOPHAGITIS: ICD-10-CM

## 2020-07-30 DIAGNOSIS — K51.90 ULCERATIVE COLITIS, UNSPECIFIED, WITHOUT COMPLICATIONS: ICD-10-CM

## 2020-07-30 DIAGNOSIS — I10 ESSENTIAL (PRIMARY) HYPERTENSION: ICD-10-CM

## 2020-07-30 DIAGNOSIS — R00.0 TACHYCARDIA, UNSPECIFIED: ICD-10-CM

## 2020-07-30 DIAGNOSIS — Z86.718 PERSONAL HISTORY OF OTHER VENOUS THROMBOSIS AND EMBOLISM: ICD-10-CM

## 2020-07-30 DIAGNOSIS — Z90.3 ACQUIRED ABSENCE OF STOMACH [PART OF]: Chronic | ICD-10-CM

## 2020-07-30 DIAGNOSIS — E11.40 TYPE 2 DIABETES MELLITUS WITH DIABETIC NEUROPATHY, UNSPECIFIED: ICD-10-CM

## 2020-07-30 DIAGNOSIS — G47.33 OBSTRUCTIVE SLEEP APNEA (ADULT) (PEDIATRIC): ICD-10-CM

## 2020-07-30 DIAGNOSIS — M06.9 RHEUMATOID ARTHRITIS, UNSPECIFIED: ICD-10-CM

## 2020-07-30 DIAGNOSIS — I95.9 HYPOTENSION, UNSPECIFIED: ICD-10-CM

## 2020-07-30 DIAGNOSIS — T84.090A OTHER MECHANICAL COMPLICATION OF INTERNAL RIGHT HIP PROSTHESIS, INITIAL ENCOUNTER: ICD-10-CM

## 2020-07-30 DIAGNOSIS — E66.9 OBESITY, UNSPECIFIED: ICD-10-CM

## 2020-07-30 LAB
GLUCOSE BLDC GLUCOMTR-MCNC: 138 MG/DL — HIGH (ref 70–99)
HCT VFR BLD CALC: 32.8 % — LOW (ref 34.5–45)
HGB BLD-MCNC: 10.1 G/DL — LOW (ref 11.5–15.5)
MCHC RBC-ENTMCNC: 27 PG — SIGNIFICANT CHANGE UP (ref 27–34)
MCHC RBC-ENTMCNC: 30.8 GM/DL — LOW (ref 32–36)
MCV RBC AUTO: 87.7 FL — SIGNIFICANT CHANGE UP (ref 80–100)
NRBC # BLD: 0 /100 WBCS — SIGNIFICANT CHANGE UP (ref 0–0)
PLATELET # BLD AUTO: 233 K/UL — SIGNIFICANT CHANGE UP (ref 150–400)
RBC # BLD: 3.74 M/UL — LOW (ref 3.8–5.2)
RBC # FLD: 16.6 % — HIGH (ref 10.3–14.5)
WBC # BLD: 10.58 K/UL — HIGH (ref 3.8–10.5)
WBC # FLD AUTO: 10.58 K/UL — HIGH (ref 3.8–10.5)

## 2020-07-30 PROCEDURE — 86850 RBC ANTIBODY SCREEN: CPT

## 2020-07-30 PROCEDURE — 97110 THERAPEUTIC EXERCISES: CPT

## 2020-07-30 PROCEDURE — 72170 X-RAY EXAM OF PELVIS: CPT | Mod: 26

## 2020-07-30 PROCEDURE — 86901 BLOOD TYPING SEROLOGIC RH(D): CPT

## 2020-07-30 PROCEDURE — 36415 COLL VENOUS BLD VENIPUNCTURE: CPT

## 2020-07-30 PROCEDURE — 83540 ASSAY OF IRON: CPT

## 2020-07-30 PROCEDURE — 72170 X-RAY EXAM OF PELVIS: CPT

## 2020-07-30 PROCEDURE — 99233 SBSQ HOSP IP/OBS HIGH 50: CPT | Mod: GC

## 2020-07-30 PROCEDURE — C1776: CPT

## 2020-07-30 PROCEDURE — 83550 IRON BINDING TEST: CPT

## 2020-07-30 PROCEDURE — 97116 GAIT TRAINING THERAPY: CPT

## 2020-07-30 PROCEDURE — 87641 MR-STAPH DNA AMP PROBE: CPT

## 2020-07-30 PROCEDURE — C1713: CPT

## 2020-07-30 PROCEDURE — 27134 REVISE HIP JOINT REPLACEMENT: CPT | Mod: 52,RT

## 2020-07-30 PROCEDURE — 97530 THERAPEUTIC ACTIVITIES: CPT

## 2020-07-30 PROCEDURE — 80048 BASIC METABOLIC PNL TOTAL CA: CPT

## 2020-07-30 PROCEDURE — 84100 ASSAY OF PHOSPHORUS: CPT

## 2020-07-30 PROCEDURE — 83735 ASSAY OF MAGNESIUM: CPT

## 2020-07-30 PROCEDURE — 94660 CPAP INITIATION&MGMT: CPT

## 2020-07-30 PROCEDURE — 97161 PT EVAL LOW COMPLEX 20 MIN: CPT

## 2020-07-30 PROCEDURE — 82728 ASSAY OF FERRITIN: CPT

## 2020-07-30 PROCEDURE — 85027 COMPLETE CBC AUTOMATED: CPT

## 2020-07-30 PROCEDURE — 82962 GLUCOSE BLOOD TEST: CPT

## 2020-07-30 RX ORDER — SENNA PLUS 8.6 MG/1
2 TABLET ORAL AT BEDTIME
Refills: 0 | Status: DISCONTINUED | OUTPATIENT
Start: 2020-07-30 | End: 2020-08-17

## 2020-07-30 RX ORDER — METOPROLOL TARTRATE 50 MG
50 TABLET ORAL DAILY
Refills: 0 | Status: DISCONTINUED | OUTPATIENT
Start: 2020-07-30 | End: 2020-07-30

## 2020-07-30 RX ORDER — POLYETHYLENE GLYCOL 3350 17 G/17G
17 POWDER, FOR SOLUTION ORAL DAILY
Refills: 0 | Status: DISCONTINUED | OUTPATIENT
Start: 2020-07-30 | End: 2020-08-02

## 2020-07-30 RX ORDER — GABAPENTIN 400 MG/1
300 CAPSULE ORAL AT BEDTIME
Refills: 0 | Status: DISCONTINUED | OUTPATIENT
Start: 2020-07-30 | End: 2020-08-17

## 2020-07-30 RX ORDER — ACETAMINOPHEN 500 MG
650 TABLET ORAL EVERY 6 HOURS
Refills: 0 | Status: COMPLETED | OUTPATIENT
Start: 2020-07-30 | End: 2020-08-02

## 2020-07-30 RX ORDER — LOSARTAN POTASSIUM 100 MG/1
50 TABLET, FILM COATED ORAL DAILY
Refills: 0 | Status: DISCONTINUED | OUTPATIENT
Start: 2020-07-30 | End: 2020-08-02

## 2020-07-30 RX ORDER — AMLODIPINE BESYLATE 2.5 MG/1
5 TABLET ORAL DAILY
Refills: 0 | Status: DISCONTINUED | OUTPATIENT
Start: 2020-07-30 | End: 2020-07-31

## 2020-07-30 RX ORDER — FLUOXETINE HCL 10 MG
40 CAPSULE ORAL DAILY
Refills: 0 | Status: DISCONTINUED | OUTPATIENT
Start: 2020-07-30 | End: 2020-08-17

## 2020-07-30 RX ORDER — ROPINIROLE 8 MG/1
10 TABLET, FILM COATED, EXTENDED RELEASE ORAL AT BEDTIME
Refills: 0 | Status: DISCONTINUED | OUTPATIENT
Start: 2020-07-30 | End: 2020-08-03

## 2020-07-30 RX ORDER — OXYCODONE HYDROCHLORIDE 5 MG/1
10 TABLET ORAL EVERY 4 HOURS
Refills: 0 | Status: DISCONTINUED | OUTPATIENT
Start: 2020-07-30 | End: 2020-08-05

## 2020-07-30 RX ORDER — POVIDONE-IODINE 5 %
1 AEROSOL (ML) TOPICAL ONCE
Refills: 0 | Status: COMPLETED | OUTPATIENT
Start: 2020-07-30 | End: 2020-07-30

## 2020-07-30 RX ORDER — QUETIAPINE FUMARATE 200 MG/1
50 TABLET, FILM COATED ORAL AT BEDTIME
Refills: 0 | Status: DISCONTINUED | OUTPATIENT
Start: 2020-07-30 | End: 2020-08-17

## 2020-07-30 RX ORDER — CEFAZOLIN SODIUM 1 G
2000 VIAL (EA) INJECTION EVERY 8 HOURS
Refills: 0 | Status: COMPLETED | OUTPATIENT
Start: 2020-07-30 | End: 2020-07-31

## 2020-07-30 RX ORDER — SULFASALAZINE 500 MG
500 TABLET ORAL DAILY
Refills: 0 | Status: DISCONTINUED | OUTPATIENT
Start: 2020-07-30 | End: 2020-08-17

## 2020-07-30 RX ORDER — ENOXAPARIN SODIUM 100 MG/ML
100 INJECTION SUBCUTANEOUS EVERY 12 HOURS
Refills: 0 | Status: DISCONTINUED | OUTPATIENT
Start: 2020-07-31 | End: 2020-08-02

## 2020-07-30 RX ORDER — METOPROLOL TARTRATE 50 MG
50 TABLET ORAL DAILY
Refills: 0 | Status: DISCONTINUED | OUTPATIENT
Start: 2020-07-30 | End: 2020-08-02

## 2020-07-30 RX ORDER — SODIUM CHLORIDE 9 MG/ML
1000 INJECTION, SOLUTION INTRAVENOUS
Refills: 0 | Status: DISCONTINUED | OUTPATIENT
Start: 2020-07-30 | End: 2020-07-31

## 2020-07-30 RX ORDER — LORATADINE 10 MG/1
10 TABLET ORAL DAILY
Refills: 0 | Status: DISCONTINUED | OUTPATIENT
Start: 2020-07-30 | End: 2020-08-03

## 2020-07-30 RX ORDER — SUCRALFATE 1 G
1 TABLET ORAL EVERY 12 HOURS
Refills: 0 | Status: DISCONTINUED | OUTPATIENT
Start: 2020-07-30 | End: 2020-08-17

## 2020-07-30 RX ORDER — OXYCODONE HYDROCHLORIDE 5 MG/1
5 TABLET ORAL EVERY 4 HOURS
Refills: 0 | Status: DISCONTINUED | OUTPATIENT
Start: 2020-07-30 | End: 2020-08-05

## 2020-07-30 RX ORDER — ONDANSETRON 8 MG/1
4 TABLET, FILM COATED ORAL EVERY 6 HOURS
Refills: 0 | Status: DISCONTINUED | OUTPATIENT
Start: 2020-07-30 | End: 2020-08-17

## 2020-07-30 RX ORDER — CYCLOBENZAPRINE HYDROCHLORIDE 10 MG/1
5 TABLET, FILM COATED ORAL THREE TIMES A DAY
Refills: 0 | Status: DISCONTINUED | OUTPATIENT
Start: 2020-07-30 | End: 2020-08-03

## 2020-07-30 RX ORDER — SODIUM CHLORIDE 9 MG/ML
1000 INJECTION INTRAMUSCULAR; INTRAVENOUS; SUBCUTANEOUS ONCE
Refills: 0 | Status: COMPLETED | OUTPATIENT
Start: 2020-07-30 | End: 2020-07-30

## 2020-07-30 RX ORDER — CHLORHEXIDINE GLUCONATE 213 G/1000ML
1 SOLUTION TOPICAL ONCE
Refills: 0 | Status: COMPLETED | OUTPATIENT
Start: 2020-07-30 | End: 2020-07-30

## 2020-07-30 RX ORDER — HYDROMORPHONE HYDROCHLORIDE 2 MG/ML
0.5 INJECTION INTRAMUSCULAR; INTRAVENOUS; SUBCUTANEOUS
Refills: 0 | Status: DISCONTINUED | OUTPATIENT
Start: 2020-07-30 | End: 2020-08-03

## 2020-07-30 RX ORDER — PANTOPRAZOLE SODIUM 20 MG/1
40 TABLET, DELAYED RELEASE ORAL
Refills: 0 | Status: DISCONTINUED | OUTPATIENT
Start: 2020-07-30 | End: 2020-08-17

## 2020-07-30 RX ORDER — HYDROMORPHONE HYDROCHLORIDE 2 MG/ML
0.5 INJECTION INTRAMUSCULAR; INTRAVENOUS; SUBCUTANEOUS EVERY 4 HOURS
Refills: 0 | Status: DISCONTINUED | OUTPATIENT
Start: 2020-07-30 | End: 2020-08-03

## 2020-07-30 RX ORDER — CELECOXIB 200 MG/1
200 CAPSULE ORAL
Refills: 0 | Status: DISCONTINUED | OUTPATIENT
Start: 2020-07-30 | End: 2020-08-02

## 2020-07-30 RX ADMIN — Medication 650 MILLIGRAM(S): at 19:17

## 2020-07-30 RX ADMIN — GABAPENTIN 300 MILLIGRAM(S): 400 CAPSULE ORAL at 23:09

## 2020-07-30 RX ADMIN — OXYCODONE HYDROCHLORIDE 10 MILLIGRAM(S): 5 TABLET ORAL at 20:00

## 2020-07-30 RX ADMIN — SODIUM CHLORIDE 1000 MILLILITER(S): 9 INJECTION INTRAMUSCULAR; INTRAVENOUS; SUBCUTANEOUS at 17:30

## 2020-07-30 RX ADMIN — HYDROMORPHONE HYDROCHLORIDE 0.5 MILLIGRAM(S): 2 INJECTION INTRAMUSCULAR; INTRAVENOUS; SUBCUTANEOUS at 14:33

## 2020-07-30 RX ADMIN — Medication 650 MILLIGRAM(S): at 20:00

## 2020-07-30 RX ADMIN — Medication 650 MILLIGRAM(S): at 23:39

## 2020-07-30 RX ADMIN — QUETIAPINE FUMARATE 50 MILLIGRAM(S): 200 TABLET, FILM COATED ORAL at 23:09

## 2020-07-30 RX ADMIN — ROPINIROLE 10 MILLIGRAM(S): 8 TABLET, FILM COATED, EXTENDED RELEASE ORAL at 23:10

## 2020-07-30 RX ADMIN — Medication 1 APPLICATION(S): at 09:07

## 2020-07-30 RX ADMIN — HYDROMORPHONE HYDROCHLORIDE 0.5 MILLIGRAM(S): 2 INJECTION INTRAMUSCULAR; INTRAVENOUS; SUBCUTANEOUS at 15:54

## 2020-07-30 RX ADMIN — CHLORHEXIDINE GLUCONATE 1 APPLICATION(S): 213 SOLUTION TOPICAL at 09:07

## 2020-07-30 RX ADMIN — Medication 40 MILLIGRAM(S): at 19:17

## 2020-07-30 RX ADMIN — HYDROMORPHONE HYDROCHLORIDE 0.5 MILLIGRAM(S): 2 INJECTION INTRAMUSCULAR; INTRAVENOUS; SUBCUTANEOUS at 15:30

## 2020-07-30 RX ADMIN — OXYCODONE HYDROCHLORIDE 10 MILLIGRAM(S): 5 TABLET ORAL at 19:17

## 2020-07-30 RX ADMIN — Medication 100 MILLIGRAM(S): at 19:15

## 2020-07-30 RX ADMIN — Medication 1 GRAM(S): at 19:16

## 2020-07-30 RX ADMIN — OXYCODONE HYDROCHLORIDE 5 MILLIGRAM(S): 5 TABLET ORAL at 23:09

## 2020-07-30 RX ADMIN — HYDROMORPHONE HYDROCHLORIDE 0.5 MILLIGRAM(S): 2 INJECTION INTRAMUSCULAR; INTRAVENOUS; SUBCUTANEOUS at 15:55

## 2020-07-30 NOTE — DISCHARGE NOTE PROVIDER - CARE PROVIDER_API CALL
Bolivar Ballard)  Orthopaedic Surgery  [address]  [place], [place] [address]  Phone: [phone]  Fax: [phone]  Follow Up Time: 2 weeks Bolivar Ballard)  Orthopaedic Surgery  [address] [place] [address]  Phone: [phone]  Fax: [phone]  Follow Up Time: 2 weeks    Monica Vigil  HEMATOLOGY  [address] [place] [address]  Phone: [phone]  Fax: [phone]  Follow Up Time: Routine

## 2020-07-30 NOTE — DISCHARGE NOTE PROVIDER - PROVIDER TOKENS
PROVIDER:[TOKEN:[[id number]],FOLLOWUP:[2 weeks]] PROVIDER:[TOKEN:[[id number]],FOLLOWUP:[2 weeks]],PROVIDER:[TOKEN:[[id number]],FOLLOWUP:[Routine]]

## 2020-07-30 NOTE — CONSULT NOTE ADULT - ATTENDING COMMENTS
50 year old woman with GERD, RA, PE (2001), DVT , (on Lovenox as outpatient) s/p right hip revision arthroplasty. Internal medicine following for co-management.   - Can restart home AC (for history of VTEs) when primary team is confident re: hemostasis. Acceptable to hold in immediate post-op period.  - Rest of history and plan per Dr. Velasco' excellent note above.

## 2020-07-30 NOTE — DISCHARGE NOTE PROVIDER - NSDCACTIVITY_GEN_ALL_CORE
Stairs allowed/Showering allowed/Do not drive or operate machinery/Walking - Indoors allowed/No heavy lifting/straining/Walking - Outdoors allowed

## 2020-07-30 NOTE — DISCHARGE NOTE PROVIDER - NSDCFUADDINST_GEN_ALL_CORE_FT
50% protected weight on operated hip using a rolling walker  Anterior hip precautions to prevent hip dislocation  No crossing your legs. Do not bend past your waist.  Use long-handled reach to pick things up if purchased.  Sit in a high chair.  If you do not have a high chair, use cushions on the chair  No strenuous activity, heavy lifting, driving or returning to work until cleared by MD.  You may shower - dressing is water-resistant, no soaking in bathtubs.  Keep dressing on your hip until the follow up appointment.  Try to have regular bowel movements, take stool softener or laxative if necessary.  Swelling may travel all the way down leg to foot, this is normal and will subside in a few weeks.  Call to schedule an appt with Dr. Ballard for follow up.    Contact your doctor if you experience: fever greater than 101.5, chills, chest pain, difficulty breathing, redness or excessive drainage around the incision, other concerns.  Follow up with your primary care provider. 50% protected weight on operated hip using a rolling walker  Anterior hip precautions to prevent hip dislocation  No crossing your legs. Do not bend past your waist.  Use long-handled reach to pick things up if purchased.  Sit in a high chair.  If you do not have a high chair, use cushions on the chair  No strenuous activity, heavy lifting, driving or returning to work until cleared by MD.  You may shower - dressing is water-resistant, no soaking in bathtubs.  Keep dressing on your hip until the follow up appointment.  Try to have regular bowel movements, take stool softener or laxative if necessary.  Swelling may travel all the way down leg to foot, this is normal and will subside in a few weeks.    You were anemic during your hospital stay. Since you do not accept blood products, your Hgb (at lowest 4.4) was managed with procrit, iron, vitamin b12, and folate. You should continue oral iron, b12, folate, and vitamin C at home. Follow-up with your primary care provider or hematologist within a few days of discharge for CBC check. If you feel dizzy, lightheaded, or short of breath, return to the ER sooner. It was recommended you do not restart your lovenox until your hemoglobin is greater that 8.0. When you restart your lovenox, you should be monitored closely for signs of bleeding, or decreasing hemoglobin/ worsening anemia.     Call to schedule an appt with Dr. Ballard for follow up.    Contact your doctor if you experience: fever greater than 101.5, chills, chest pain, difficulty breathing, redness or excessive drainage around the incision, other concerns.  Follow up with your primary care provider. 50% protected weight on operated hip using a rolling walker  Anterior hip precautions to prevent hip dislocation  No crossing your legs. Do not bend past your waist.  Use long-handled reach to pick things up if purchased.  Sit in a high chair.  If you do not have a high chair, use cushions on the chair  No strenuous activity, heavy lifting, driving or returning to work until cleared by MD.  You may shower - dressing is water-resistant, no soaking in bathtubs.  Keep dressing on your hip until the follow up appointment.  Try to have regular bowel movements, take stool softener or laxative if necessary.  Swelling may travel all the way down leg to foot, this is normal and will subside in a few weeks.    You were anemic during your hospital stay. Since you do not accept blood products, your Hgb (at lowest 4.4) was managed with procrit, iron, vitamin b12, and folate. You should continue oral iron (as tolerated), b12, folate, and vitamin C at home. Home blood draws were arranged for you. The results will be faxed to hematologist, Dr. Vigil, who was following you inpatient at [facility]. If you do not hear from her office after your blood draw, or if your blood is not drawn weekly for any reason,  please call the office to follow-up. Dr. Vigil will let you know when it is OK to restart your lovenox injections.     If you feel dizzy, lightheaded, or short of breath, return to the ER sooner. Call your doctor immediately with any concerns. It was recommended you do not restart your lovenox until your hemoglobin is greater that 8.0. When you restart your lovenox, you should monitor yourself closely for signs of bleeding, or decreasing hemoglobin/ worsening anemia.     Call to schedule an appt with Dr. Ballard for follow up.    Contact your doctor if you experience: fever greater than 101.5, chills, chest pain, difficulty breathing, redness or excessive drainage around the incision, other concerns.  Follow up with your primary care provider. 50% protected weight on operated hip using a rolling walker  Anterior hip precautions to prevent hip dislocation  No crossing your legs. Do not bend past your waist.  Use long-handled reach to pick things up if purchased.  Sit in a high chair.  If you do not have a high chair, use cushions on the chair  No strenuous activity, heavy lifting, driving or returning to work until cleared by MD.  You may shower - dressing is water-resistant, no soaking in bathtubs.  Keep dressing on your hip until the follow up appointment.  Try to have regular bowel movements, take stool softener or laxative if necessary.  Swelling may travel all the way down leg to foot, this is normal and will subside in a few weeks.    You were anemic during your hospital stay. Since you do not accept blood products, your Hgb (at lowest 4.4) was managed with procrit, iron, vitamin b12, and folate. You should continue oral iron (as tolerated), b12, folate, and vitamin C at home. Home blood draws were arranged for you. The results will be faxed to hematologist, Dr. Vigil, who was following you inpatient at [facility]. If you do not hear from her office after your blood draw, or if your blood is not drawn weekly for any reason,  please call the office to follow-up. Dr. Vigil will let you know when it is OK to restart your lovenox injections.    If you feel dizzy, lightheaded, or short of breath, return to the ER sooner. Call your doctor immediately with any concerns. It was recommended you do not restart your lovenox until your hemoglobin is greater that 8.0. When you restart your lovenox, you should monitor yourself closely for signs of bleeding, or decreasing hemoglobin/ worsening anemia.     Your antihypertensives (blood pressure medications) were stopped in the hospital for the first 2 weeks after surgery due to your low hemoglobin and dizziness. Prior to discharge, your blood pressure was consistently in the 140s and 150s. Amlodipine was restarted on 8/15. Losartan/hydrochlorothiazide, and metoprolol were held. You should follow up with your primary care provider (Dr. Melissa) next week, and dependent on your blood pressure and symptoms of anemia, your other medications can slowly be added back to your normal home medication regimen.    Call to schedule an appt with Dr. Ballard for follow up.    Contact your doctor if you experience: fever greater than 101.5, chills, chest pain, difficulty breathing, redness or excessive drainage around the incision, other concerns.  Follow up with your primary care provider. 50% protected weight on operated hip using a rolling walker  Anterior hip precautions to prevent hip dislocation  No crossing your legs. Do not bend past your waist.  Use long-handled reach to pick things up if purchased.  Sit in a high chair.  If you do not have a high chair, use cushions on the chair  No strenuous activity, heavy lifting, driving or returning to work until cleared by MD.  You may shower - dressing is water-resistant, no soaking in bathtubs.  Keep dressing on your hip until the follow up appointment.  Try to have regular bowel movements, take stool softener or laxative if necessary.  Swelling may travel all the way down leg to foot, this is normal and will subside in a few weeks.    You were anemic during your hospital stay. Since you do not accept blood products, your Hgb (at lowest 4.4) was managed with procrit, iron, vitamin b12, and folate. You should continue oral iron (as tolerated), b12, folate, and vitamin C at home. Home blood draws were arranged for you. The results will be faxed to hematologist, Dr. Vigil, who was following you inpatient at [facility]. If you do not hear from her office after your blood draw, or if your blood is not drawn weekly for any reason,  please call the office to follow-up.    If you feel dizzy, lightheaded, or short of breath, return to the ER sooner. Call your doctor immediately with any concerns. When you restart your lovenox, you should monitor yourself closely for signs of bleeding, or decreasing hemoglobin/ worsening anemia.     Your antihypertensives (blood pressure medications) were stopped in the hospital for the first 2 weeks after surgery due to your low hemoglobin and dizziness. Prior to discharge, your blood pressure was consistently in the 140s and 150s. Amlodipine was restarted on 8/15. Losartan/hydrochlorothiazide, and metoprolol were held. You should follow up with your primary care provider (Dr. Melissa) next week, and dependent on your blood pressure and symptoms of anemia, your other medications can slowly be added back to your normal home medication regimen.    Call to schedule an appt with Dr. Ballard for follow up.    Contact your doctor if you experience: fever greater than 101.5, chills, chest pain, difficulty breathing, redness or excessive drainage around the incision, other concerns.  Follow up with your primary care provider. 50% protected weight on operated hip using a rolling walker  Anterior hip precautions to prevent hip dislocation  No crossing your legs. Do not bend past your waist.  Use long-handled reach to pick things up if purchased.  Sit in a high chair.  If you do not have a high chair, use cushions on the chair  No strenuous activity, heavy lifting, driving or returning to work until cleared by MD.  You may shower - dressing is water-resistant, no soaking in bathtubs.  Keep dressing on your hip until the follow up appointment.  Try to have regular bowel movements, take stool softener or laxative if necessary.  Swelling may travel all the way down leg to foot, this is normal and will subside in a few weeks.    You were anemic during your hospital stay. Since you do not accept blood products, your Hgb (at lowest 4.4) was managed with procrit, iron, vitamin b12, and folate. You should continue oral iron (as tolerated), b12, folate, and vitamin C at home. Home blood draws were arranged for you. The results will be faxed to hematologist, Dr. Vigil, who was following you inpatient at [facility]. If you do not hear from her office after your blood draw, or if your blood is not drawn weekly for any reason,  please call the office to follow-up.    If you feel dizzy, lightheaded, or short of breath, return to the ER sooner. Call your doctor immediately with any concerns. When you restart your lovenox, you should monitor yourself closely for signs of bleeding, or decreasing hemoglobin/ worsening anemia. Do no restart your lovenox until instructed to do so by Dr. Vigil (likely when your hemoglobin is above 8.0).    You were prescribed oxycodone 5mg tablets. You may take 1 tablet every 6-8 hours as needed for severe pain. If your pain is well-controlled by tylenol, do not take this medication.     Your antihypertensives (blood pressure medications) were stopped in the hospital for the first 2 weeks after surgery due to your low hemoglobin and dizziness. Prior to discharge, your blood pressure was consistently in the 140s and 150s. Amlodipine was restarted on 8/15. Losartan/hydrochlorothiazide, and metoprolol were held. You should follow up with your primary care provider (Dr. Melissa) next week, and dependent on your blood pressure and symptoms of anemia, your other medications can slowly be added back to your normal home medication regimen.        Call to schedule an appt with Dr. Ballard for follow up.    Contact your doctor if you experience: fever greater than 101.5, chills, chest pain, difficulty breathing, redness or excessive drainage around the incision, other concerns.  Follow up with your primary care provider. 50% protected weight on operated hip using a rolling walker  Anterior hip precautions to prevent hip dislocation  No crossing your legs. Do not bend past your waist.  Use long-handled reach to pick things up if purchased.  Sit in a high chair.  If you do not have a high chair, use cushions on the chair  No strenuous activity, heavy lifting, driving or returning to work until cleared by MD.  You may shower - dressing is water-resistant, no soaking in bathtubs.  Keep dressing on your hip until the follow up appointment.  Try to have regular bowel movements, take stool softener or laxative if necessary.  Swelling may travel all the way down leg to foot, this is normal and will subside in a few weeks.    You were anemic during your hospital stay. Since you do not accept blood products, your Hgb (at lowest 4.4) was managed with procrit, iron, vitamin b12, and folate. You should continue oral iron (as tolerated), b12, folate, and vitamin C at home (over the counter supplements). Home blood draws were arranged for you. The results will be faxed to hematologist, Dr. Vigil, who was following you inpatient at [facility]. If you do not hear from her office after your blood draw, or if your blood is not drawn weekly for any reason,  please call the office to follow-up.    If you feel dizzy, lightheaded, or short of breath, return to the ER sooner. Call your doctor immediately with any concerns. When you restart your lovenox, you should monitor yourself closely for signs of bleeding, or decreasing hemoglobin/ worsening anemia. Do no restart your lovenox until instructed to do so by Dr. Vigil (likely when your hemoglobin is above 8.0).    You were prescribed oxycodone 5mg tablets. You may take 1 tablet every 6-8 hours as needed for severe pain. If your pain is well-controlled by tylenol, do not take this medication.     Your antihypertensives (blood pressure medications) were stopped in the hospital for the first 2 weeks after surgery due to your low hemoglobin and dizziness. Prior to discharge, your blood pressure was consistently in the 140s and 150s. Amlodipine was restarted on 8/15. Losartan/hydrochlorothiazide, and metoprolol were held. You should follow up with your primary care provider (Dr. Melissa) next week, and dependent on your blood pressure and symptoms of anemia, your other medications can slowly be added back to your normal home medication regimen.        Call to schedule an appt with Dr. Ballard for follow up.    Contact your doctor if you experience: fever greater than 101.5, chills, chest pain, difficulty breathing, redness or excessive drainage around the incision, other concerns.  Follow up with your primary care provider.

## 2020-07-30 NOTE — DISCHARGE NOTE PROVIDER - NSDCFUSCHEDAPPT_GEN_ALL_CORE_FT
VELÁSQUEZ, TABBY ; 08/21/2020 ; NPP Ophthal [address] [facility] ; 08/26/2020 ; NPP Intmed [address] [facility] ; 09/14/2020 ; NPP OB[address]  VELÁSQUEZ, TABBY ; 08/21/2020 ; NPP Ophthal [address] [facility] ; 08/26/2020 ; NPP Intmed [address] [facility] ; 09/14/2020 ; NPP OB[address]  VELÁSQUEZ, TABBY ; 08/21/2020 ; NPP Ophthal [address] [facility] ; 08/26/2020 ; NPP Intmed [address] [facility] ; 09/14/2020 ; NPP OB[address]  VELÁSQUEZ, TABBY ; 08/21/2020 ; NPP Ophthal [address] [facility] ; 08/26/2020 ; NPP Intmed [address] [facility] ; 09/14/2020 ; NPP OB[address]  VELÁSQUEZ, TABBY ; 08/21/2020 ; NPP Ophthal [address] [facility] ; 08/26/2020 ; NPP Intmed [address] [facility] ; 09/14/2020 ; NPP OB[address]  VELÁSQUEZ, TABBY ; 08/21/2020 ; NPP Ophthal [address] [facility] ; 08/26/2020 ; NPP Intmed [address] [facility] ; 09/14/2020 ; NPP OB[address]  VELÁSQUEZ, TABBY ; 08/21/2020 ; NPP Ophthal [address] [facility] ; 08/26/2020 ; NPP Intmed [address] [facility] ; 09/14/2020 ; NPP OB[address]  VELÁSQUEZ, TABBY ; 08/21/2020 ; NPP Ophthal [address] [facility] ; 08/26/2020 ; NPP Intmed [address] [facility] ; 09/14/2020 ; NPP OB[address]  VELÁSQUEZ, TABBY ; 08/21/2020 ; NPP Ophthal [address] [facility] ; 08/26/2020 ; NPP Intmed [address] [facility] ; 09/14/2020 ; NPP OB[address]  VELÁSQUEZ, TABBY ; 08/21/2020 ; NPP Ophthal [address] [facility] ; 08/26/2020 ; NPP Intmed [address] [facility] ; 09/14/2020 ; NPP OB[address]

## 2020-07-30 NOTE — DISCHARGE NOTE PROVIDER - NSDCFUADDAPPT_GEN_ALL_CORE_FT
Please follow-up with your hematologist and primary care provider within 1 week of discharge (the sooner the better). Continue to have your CBC (hemoglobin) and reticulocyte count monitored outpatient. Please follow-up with Dr. Monica Vigil (the hematologist who was seeing you at [facility]). Her number is provided on this page. Your blood work will be faxed to her weekly.  Additionally follow-up with your primary care provider. Please follow-up with Dr. Monica Vigil (the hematologist who was seeing you at [facility]). Her number is provided on this page. Your blood work will be faxed to her weekly.  Additionally follow-up with your primary care provider next week.

## 2020-07-30 NOTE — H&P ADULT - NSHPPHYSICALEXAM_GEN_ALL_CORE
NAD. Alert/oriented  MSK: + decreased ROM 2/2 pain, right hip  Skin warm and well perfused, no visible wounds/erythema/ecchymoses  EHL/FHL/TA/GS 5/5 b/l  gross sensation intact to light touch b/l LE  DP palpable b/l  calf soft, compressible b/l  surgical wound right hip well healed    Remainder of exam per medical clearance note

## 2020-07-30 NOTE — PHYSICAL THERAPY INITIAL EVALUATION ADULT - LONG TERM MEMORY, REHAB EVAL
Telephone Encounter by Jazz Kruse at 02/06/17 04:34 PM     Author:  Jazz Kruse Service:  (none) Author Type:       Filed:  02/06/17 04:35 PM Encounter Date:  2/6/2017 Status:  Signed     :  Jazz Kruse ()              MANAN BOSS    Patient Age: 67 year old    ACCT STATUS:   MESSAGE:[NC1.1T]   Pt calling saying Danga is way to much money over $400 after ins wants something else[NC1.1M]   Next and Last Visit with Provider and Department  Next visit with GUSTAFSONBOBBI is on 03/09/2017 at  1:00 PM in [facility]  Next visit with ENDOCRINOLOGY is on 03/09/2017 at  1:00 PM in [facility]  Last visit with GUSTAFSONBOBBI FRAZIER was on 01/26/2017 at  2:40 PM in [facility]  Last visit with ENDOCRINOLOGY was on 01/26/2017 at  2:40 PM in [facility]     WEIGHT AND HEIGHT: As of 01/26/2017 weight is 219 lbs.(99.338 kg). Height is 5' 6\"(1.676 m).   BMI is 35.36 kg/(m^2) calculated from:     Height 5' 6\" (1.676 m) as of 1/26/17     Weight 219 lb (99.338 kg) as of 1/26/17      Allergies     Allergen  Reactions   • Tetracycline Rash   • Zithromax [Azithromycin] Rash     Current outpatient prescriptions       Medication  Sig Dispense Refill   • Liraglutide (VICTOZA) 18 MG/3ML SOPN Inject 0.6 mg into the skin every morning. For 1 week before increasing to 1.2 mg daily 6 mL 0   • fenofibrate (TRICOR) 54 MG tablet One tab daily 30 Tab 0   • pioglitazone (ACTOS) 30 MG tablet Take 1 Tab by mouth daily. 30 Tab 1   • glipiZIDE (GLUCOTROL) 10 MG tablet Take 2 tabs (total 20mg) in the am and 1 tab (10mg) in the pm total for the day 30mg 90 Tab 0   • hydrocortisone 2.5 % cream Apply  topically 2 (two) times daily. 30 g 0   • ranitidine (ZANTAC) 150 MG tablet Take 1 Tab by mouth 2 (two) times daily. 60 Tab 5   • levothyroxine 50 MCG tablet Take 1 Tab by mouth daily. 30 Tab 5   • albuterol (PROAIR HFA) 108 (90 BASE) MCG/ACT inhaler Inhale 2 Puffs by mouth every 6  (six) hours as needed for Wheezing. 1 Inhaler 5      PHARMACY to use:[NC1.1T] [facility][NC1.1M]          Pharmacy preference(s) on file: [facility] [address]    CALL BACK INFO:[NC1.1T] Ok to leave response (including medical information) with family member or on answering machine[NC1.1M]  ROUTING:[NC1.1T] Patient's physician/staff[NC1.1M]        PCP: Roman Dreyer, MD         INS: Payor: MEDICARE - ASSIGNED / Plan: *No Plan* / Product Type: *No Product type* / Note: This is the primary coverage, but no account was found for this location or the patient's primary location.   ADDRESS:  [address] [place] [id number][NC1.1T]       Revision History        User Key Date/Time User Provider Type Action    > NC1.1 02/06/17 04:35 PM Jazz Kruse  Sign    M - Manual, T - Template             intact

## 2020-07-30 NOTE — DISCHARGE NOTE PROVIDER - NSDCCPTREATMENT_GEN_ALL_CORE_FT
PRINCIPAL PROCEDURE  Procedure: Major revision of total hip arthroplasty  Findings and Treatment: right

## 2020-07-30 NOTE — CONSULT NOTE ADULT - SUBJECTIVE AND OBJECTIVE BOX
*********************** INCOMPLETE NOTE ********************    DIDIERINDIRA  50y  Female      Patient is a 50y old  Female who presents with a chief complaint of right hip pain/surgery (30 Jul 2020 11:43)      INTERVAL HPI/OVERNIGHT EVENTS:        REVIEW OF SYSTEMS:  CONSTITUTIONAL: No fever, weight loss, or fatigue  EYES: No eye pain, visual disturbances, or discharge  ENMT:  No difficulty hearing, tinnitus, vertigo; No sinus or throat pain  NECK: No pain or stiffness  BREASTS: No pain, masses, or nipple discharge  RESPIRATORY: No cough, wheezing, chills or hemoptysis; No shortness of breath  CARDIOVASCULAR: No chest pain, palpitations, dizziness, or leg swelling  GASTROINTESTINAL: No abdominal or epigastric pain. No nausea, vomiting, or hematemesis; No diarrhea or constipation. No melena or hematochezia.  GENITOURINARY: No dysuria, frequency, hematuria, or incontinence  NEUROLOGICAL: No headaches, memory loss, loss of strength, numbness, or tremors  SKIN: No itching, burning, rashes, or lesions   LYMPH NODES: No enlarged glands  ENDOCRINE: No heat or cold intolerance; No hair loss  MUSCULOSKELETAL: No joint pain or swelling; No muscle, back, or extremity pain  PSYCHIATRIC: No depression, anxiety, mood swings, or difficulty sleeping  HEME/LYMPH: No easy bruising, or bleeding gums  ALLERY AND IMMUNOLOGIC: No hives or eczema    T(C): 36.1 (07-30-20 @ 13:52), Max: 36.1 (07-30-20 @ 08:49)  HR: 76 (07-30-20 @ 14:31) (73 - 90)  BP: 112/62 (07-30-20 @ 14:31) (91/67 - 123/69)  RR: 17 (07-30-20 @ 14:31) (14 - 18)  SpO2: 100% (07-30-20 @ 14:31) (100% - 100%)  Wt(kg): --Vital Signs Last 24 Hrs  T(C): 36.1 (30 Jul 2020 13:52), Max: 36.1 (30 Jul 2020 08:49)  T(F): 96.9 (30 Jul 2020 13:52), Max: 97 (30 Jul 2020 08:49)  HR: 76 (30 Jul 2020 14:31) (73 - 90)  BP: 112/62 (30 Jul 2020 14:31) (91/67 - 123/69)  BP(mean): 80 (30 Jul 2020 14:31) (78 - 89)  RR: 17 (30 Jul 2020 14:31) (14 - 18)  SpO2: 100% (30 Jul 2020 14:31) (100% - 100%)    PHYSICAL EXAM:  GENERAL: NAD, well-groomed, well-developed  HEAD:  Atraumatic, Normocephalic  EYES: EOMI, PERRLA, conjunctiva and sclera clear  ENMT: No tonsillar erythema, exudates, or enlargement; Moist mucous membranes, Good dentition, No lesions  NECK: Supple, No JVD, Normal thyroid  NERVOUS SYSTEM:  Alert & Oriented X3, Good concentration; Motor Strength 5/5 B/L upper and lower extremities; DTRs 2+ intact and symmetric  CHEST/LUNG: Clear to percussion bilaterally; No rales, rhonchi, wheezing, or rubs  HEART: Regular rate and rhythm; No murmurs, rubs, or gallops  ABDOMEN: Soft, Nontender, Nondistended; Bowel sounds present  EXTREMITIES:  2+ Peripheral Pulses, No clubbing, cyanosis, or edema  LYMPH: No lymphadenopathy noted  SKIN: No rashes or lesions    Consultant(s) Notes Reviewed:  [x ] YES  [ ] NO  Care Discussed with Consultants/Other Providers [ x] YES  [ ] NO    LABS:              CAPILLARY BLOOD GLUCOSE      POCT Blood Glucose.: 138 mg/dL (30 Jul 2020 09:09)            RADIOLOGY & ADDITIONAL TESTS:    Imaging Personally Reviewed:  [ ] YES  [ ] NO INDIRA VELÁSQUEZ  50y  Female      Patient is a 50y old  Female who presents with a chief complaint of right hip pain/surgery (30 Jul 2020 11:43)      INTERVAL HPI/OVERNIGHT EVENTS:        REVIEW OF SYSTEMS:  CONSTITUTIONAL: No fever, weight loss, or fatigue  EYES: No eye pain, visual disturbances, or discharge  ENMT:  No difficulty hearing, tinnitus, vertigo; No sinus or throat pain  NECK: No pain or stiffness  BREASTS: No pain, masses, or nipple discharge  RESPIRATORY: No cough, wheezing, chills or hemoptysis; No shortness of breath  CARDIOVASCULAR: No chest pain, palpitations, dizziness, or leg swelling  GASTROINTESTINAL: No abdominal or epigastric pain. No nausea, vomiting, or hematemesis; No diarrhea or constipation. No melena or hematochezia.  GENITOURINARY: No dysuria, frequency, hematuria, or incontinence  NEUROLOGICAL: No headaches, memory loss, loss of strength, numbness, or tremors  SKIN: No itching, burning, rashes, or lesions   LYMPH NODES: No enlarged glands  ENDOCRINE: No heat or cold intolerance; No hair loss  MUSCULOSKELETAL: No joint pain or swelling; No muscle, back, or extremity pain  PSYCHIATRIC: No depression, anxiety, mood swings, or difficulty sleeping  HEME/LYMPH: No easy bruising, or bleeding gums  ALLERY AND IMMUNOLOGIC: No hives or eczema    T(C): 36.1 (07-30-20 @ 13:52), Max: 36.1 (07-30-20 @ 08:49)  HR: 76 (07-30-20 @ 14:31) (73 - 90)  BP: 112/62 (07-30-20 @ 14:31) (91/67 - 123/69)  RR: 17 (07-30-20 @ 14:31) (14 - 18)  SpO2: 100% (07-30-20 @ 14:31) (100% - 100%)  Wt(kg): --Vital Signs Last 24 Hrs  T(C): 36.1 (30 Jul 2020 13:52), Max: 36.1 (30 Jul 2020 08:49)  T(F): 96.9 (30 Jul 2020 13:52), Max: 97 (30 Jul 2020 08:49)  HR: 76 (30 Jul 2020 14:31) (73 - 90)  BP: 112/62 (30 Jul 2020 14:31) (91/67 - 123/69)  BP(mean): 80 (30 Jul 2020 14:31) (78 - 89)  RR: 17 (30 Jul 2020 14:31) (14 - 18)  SpO2: 100% (30 Jul 2020 14:31) (100% - 100%)    PHYSICAL EXAM:  GENERAL: NAD, well-groomed, well-developed  HEAD:  Atraumatic, Normocephalic  EYES: EOMI, PERRLA, conjunctiva and sclera clear  ENMT: No tonsillar erythema, exudates, or enlargement; Moist mucous membranes, Good dentition, No lesions  NECK: Supple, No JVD, Normal thyroid  NERVOUS SYSTEM:  Alert & Oriented X3, Good concentration; Motor Strength 5/5 B/L upper and lower extremities; DTRs 2+ intact and symmetric  CHEST/LUNG: Clear to percussion bilaterally; No rales, rhonchi, wheezing, or rubs  HEART: Regular rate and rhythm; No murmurs, rubs, or gallops  ABDOMEN: Soft, Nontender, Nondistended; Bowel sounds present  EXTREMITIES:  2+ Peripheral Pulses, No clubbing, cyanosis, or edema  LYMPH: No lymphadenopathy noted  SKIN: No rashes or lesions    Consultant(s) Notes Reviewed:  [x ] YES  [ ] NO  Care Discussed with Consultants/Other Providers [ x] YES  [ ] NO    LABS:              CAPILLARY BLOOD GLUCOSE      POCT Blood Glucose.: 138 mg/dL (30 Jul 2020 09:09)            RADIOLOGY & ADDITIONAL TESTS:    Imaging Personally Reviewed:  [ ] YES  [ ] NO INDIRA VELÁSQUEZ  50y  Female      Patient is a 50y old  Female who presents with a chief complaint of right hip pain/surgery (30 Jul 2020 11:43) Medicine consulted for co management      INTERVAL HPI/OVERNIGHT EVENTS:  50F with PMH DARIO, GERD, RA, PE (2001), DVT (on loveneox 100 mg BID), DM2, RA, HTN, depression, chronic R hip pain, unresolved with conservative treatment. Pt had initial right hip replacement on 9/2019; began to experience discomfort to right hip approx. 1/2020. Admitted for right hip revision arthroplasty today. She ambulates with cane at baseline. Denies other recent changes in her health. States that PE/DVTs are unprovoked. Her fam hx significant for PE in her uncle, daughter, sister (DVT, PEs); patient and her family have  undergone genetic testing all of which was negative.  Denies CP, SOB, jael/vom. Medicine consulted for co-management.    PMH: DARIO, GERD, RA, PE (2001), DVT (on loveneox 100 mg BID), DM2, RA, HTN, depression, chronic R hip pain  Psurg: R hip replacement (2019)  Social hx: Non smoker, social EtOH use, denies drug use.   Fam hx: PE in her uncle, daughter, sister (DVT, PEs);        REVIEW OF SYSTEMS: As noted above in HPI    T(C): 36.1 (07-30-20 @ 13:52), Max: 36.1 (07-30-20 @ 08:49)  HR: 76 (07-30-20 @ 14:31) (73 - 90)  BP: 112/62 (07-30-20 @ 14:31) (91/67 - 123/69)  RR: 17 (07-30-20 @ 14:31) (14 - 18)  SpO2: 100% (07-30-20 @ 14:31) (100% - 100%)  Wt(kg): --Vital Signs Last 24 Hrs  T(C): 36.1 (30 Jul 2020 13:52), Max: 36.1 (30 Jul 2020 08:49)  T(F): 96.9 (30 Jul 2020 13:52), Max: 97 (30 Jul 2020 08:49)  HR: 76 (30 Jul 2020 14:31) (73 - 90)  BP: 112/62 (30 Jul 2020 14:31) (91/67 - 123/69)  BP(mean): 80 (30 Jul 2020 14:31) (78 - 89)  RR: 17 (30 Jul 2020 14:31) (14 - 18)  SpO2: 100% (30 Jul 2020 14:31) (100% - 100%)    PHYSICAL EXAM:  GENERAL: NAD, well-groomed, well-developed  HEAD:  Atraumatic, Normocephalic  EYES: EOMI, PERRLA, conjunctiva and sclera clear  ENMT: No tonsillar erythema, exudates, or enlargement; Moist mucous membranes, Good dentition, No lesions  NECK: Supple, No JVD, Normal thyroid  NERVOUS SYSTEM:  Alert & Oriented X3, Good concentration; Motor Strength 5/5 B/L upper and lower extremities; DTRs 2+ intact and symmetric  CHEST/LUNG: Clear to percussion bilaterally; No rales, rhonchi, wheezing, or rubs  HEART: Regular rate and rhythm; No murmurs, rubs, or gallops  ABDOMEN: Soft, Nontender, Nondistended; Bowel sounds present  EXTREMITIES:  R hip bandaged, tender to palpation 2+ Peripheral Pulses, No clubbing, cyanosis, or edema  LYMPH: No lymphadenopathy noted  SKIN: No rashes or lesions    Consultant(s) Notes Reviewed:  [x ] YES  [ ] NO  Care Discussed with Consultants/Other Providers [ x] YES  [ ] NO    LABS:              CAPILLARY BLOOD GLUCOSE      POCT Blood Glucose.: 138 mg/dL (30 Jul 2020 09:09)            RADIOLOGY & ADDITIONAL TESTS: Reviewed

## 2020-07-30 NOTE — PHYSICAL THERAPY INITIAL EVALUATION ADULT - CRITERIA FOR SKILLED THERAPEUTIC INTERVENTIONS
anticipated discharge recommendation/functional limitations in following categories/therapy frequency/impairments found/rehab potential

## 2020-07-30 NOTE — DISCHARGE NOTE PROVIDER - NSDCCPCAREPLAN_GEN_ALL_CORE_FT
PRINCIPAL DISCHARGE DIAGNOSIS  Diagnosis: Failed total hip arthroplasty  Assessment and Plan of Treatment: improvement s/p Revision Right THR

## 2020-07-30 NOTE — PATIENT PROFILE ADULT - NSASFALLNEEDSASSIST_GEN_A_NUR
[facility]    [address] [address]    Phone:  [phone]                                       Venu Amaya   MRN: [id number]    Department:  [facility]   Date of Visit:  9/24/2017           After Visit Summary Signature Page     I have received my discharge instructions, and my questions have been answered. I have discussed any challenges I see with this plan with the nurse or doctor.    ..........................................................................................................................................  Patient/Patient Representative Signature      ..........................................................................................................................................  Patient Representative Print Name and Relationship to Patient    ..................................................               ................................................  Date                                            Time    ..........................................................................................................................................  Reviewed by Signature/Title    ...................................................              ..............................................  Date                                                            Time           no

## 2020-07-30 NOTE — CONSULT NOTE ADULT - ASSESSMENT
*********************** INCOMPLETE NOTE ********************    #Pre op    #PE    #DARIO    #GERD    #RA    #DM2    #HTN 50F with PMH DARIO, GERD, RA, PE (2001), DVT (on loveneox 100 mg BID), DM2, RA, HTN, depression, chronic R hip pain admitted for for right hip revision arthroplasty 7/30. Medicine consulted for co-management    #PE  Pt with PMH of PE/DVT. Most recently on Lovenox 100 mg BID SQ. Last dose evening of 7/29/20.  - c/w holding therapeutic AC today while monitoring post -op  - If pt hemodynamically stable, can restart anticoagulation 7/31    #Leukocytosis  WBC 10.58 today, likely reactive 2/2 surgery.  -Trend CBC    #Anemia  Hgb 10.1. Baseline appears to be 11.4. Decrease likely 2/2 blood loss in OR. Pt hemodynamically stable and without signs of active bleeding.  -Trend CBC  - Maintain active T&S  - Transfusion threshold per primary team     #GERD  -c/w home med pantoprazole 40 mg     #DARIO  Pt states that she stopped using CPAP 2018.    #DM2  Pt states that she has pre-diabetes and is not on medications at home. Caution with tight glucose control while inpatient.  Goal FSG > 180.     #HTN  Pt's home meds include HCTZ 50 mg/losartan 12.5mg combination pill. BP controlled during this admission.   -Agree with continuing home med losartan with hold parameters (Hold for SBP < 180, HR < 60)    #neuropathy  - c/w gabapentin     #Depression  - c/w home meds Seroquel, fluoxetine     Plan discussed with attending. 50F with PMH DARIO, GERD, RA, PE (2001), DVT (on loveneox 100 mg BID), DM2, RA, HTN, depression, chronic R hip pain admitted for for right hip revision arthroplasty 7/30. Medicine consulted for co-management    #PE  Pt with PMH of PE/DVT. Most recently on Lovenox 100 mg BID SQ. Last dose evening of 7/29/20.  - c/w holding therapeutic AC today while monitoring post -op  - If pt hemodynamically stable, can restart anticoagulation 7/31    #Leukocytosis  WBC 10.58 today, likely reactive 2/2 surgery.  -Trend CBC    #Anemia  Hgb 10.1. Baseline appears to be 11.4. Decrease likely 2/2 blood loss in OR. Pt hemodynamically stable and without signs of active bleeding.  -Trend CBC  - Maintain active T&S  - Transfusion threshold per primary team     #Post op  - Abx per primary team  - Pain management per primary team   - Incentive spirometer  - Bowel regimen  - Post void residual    #GERD  -c/w home med pantoprazole 40 mg     #DARIO  Pt states that she stopped using CPAP 2018.    #DM2  Pt states that she has pre-diabetes and is not on medications at home. Caution with tight glucose control while inpatient.  Goal FSG < 180.     #HTN  Pt's home meds include HCTZ 50 mg/losartan 12.5mg combination pill. BP controlled during this admission.   -Agree with continuing home med losartan with hold parameters (Hold for SBP < 180, HR < 60)    #neuropathy  - c/w gabapentin     #Depression  - c/w home meds Seroquel, fluoxetine     Plan discussed with attending. 50F with PMH DARIO, GERD, RA, PE (2001), DVT (on loveneox 100 mg BID), DM2, RA, HTN, depression, chronic R hip pain admitted for for right hip revision arthroplasty 7/30. Medicine consulted for co-management    #PE  Pt with PMH of PE/DVT. Most recently on Lovenox 100 mg BID SQ. Last dose evening of 7/29/20.  - c/w holding therapeutic AC today while monitoring post -op  - If pt hemodynamically stable, can restart anticoagulation 7/31    #Leukocytosis  WBC 10.58 today, likely reactive 2/2 surgery.  -Trend CBC    #Anemia  Hgb 10.1. Baseline appears to be 11.4. Decrease likely 2/2 blood loss in OR. Pt hemodynamically stable and without signs of active bleeding.  -Trend CBC  - Maintain active T&S  - Transfusion threshold per primary team     #Post op  - Abx per primary team  - Pain management per primary team   - Incentive spirometer  - Bowel regimen  - Post void residual    #GERD  -c/w home med pantoprazole 40 mg     #DARIO  Pt states that she stopped using CPAP 2018.    #DM2  Pt states that she has pre-diabetes and is not on medications at home. Caution with tight glucose control while inpatient.  Goal FSG < 180.     #HTN  Pt's home meds include HCTZ 12.5 mg/losartan 50mg combination pill and amlodipine 5mg. BP controlled during this admission.   -Agree with continuing home med losartan with hold parameters (Hold for SBP < 100, HR < 60)    #neuropathy  - c/w gabapentin     #Depression  - c/w home meds Seroquel, fluoxetine     Plan discussed with attending.

## 2020-07-30 NOTE — H&P ADULT - HISTORY OF PRESENT ILLNESS
50F c/o right hip pain x chronic. Has had conservative treatment but still symptomatic.    Pt had initial right hip replacement on 9/2019. Began to experience discomfort to right hip approx. 1/2020.     Present for right hip revision arthroplasty today.  States prosthetic infection workup was negative. No fever, chills, SOB, cough, or recent illness. No other complaints. Uses cane for ambulation.   Has left SANDY as well. Refuses blood products for [employer] reasons with some products allowed (list in chart).  Hx of PE in 2001 and multiple other DVTs. Treated with Coumadin for 1 year after PE in 2001. Has been on Lovenox 100mg SubQ BID last 6 months (last dose yesterday am).

## 2020-07-30 NOTE — DISCHARGE NOTE PROVIDER - NSDCQMSTAIRS_GEN_ALL_CORE
Patient completed Session 3 of PAD Supervised Exercise Program. Please see Media Tab for scanned in Exercise Session Report.   Yes

## 2020-07-30 NOTE — PHYSICAL THERAPY INITIAL EVALUATION ADULT - ADDITIONAL COMMENTS
Pt lives in apartment with 1 flight of stairs, no prior use of DME (owns RW and SC from prior sx), denies hx of fall, independent PTA, however children help with outside home ADLs.

## 2020-07-30 NOTE — PHYSICAL THERAPY INITIAL EVALUATION ADULT - IMPAIRMENTS CONTRIBUTING TO GAIT DEVIATIONS, PT EVAL
impaired balance/decreased strength/decreased ROM/impaired motor control/pain/impaired postural control

## 2020-07-30 NOTE — DISCHARGE NOTE PROVIDER - NSDCMRMEDTOKEN_GEN_ALL_CORE_FT
amLODIPine 5 mg oral tablet: 1 tab(s) orally once a day  Carafate 1 g/10 mL oral suspension: 10 milliliter(s) orally once a day  cetirizine 10 mg oral tablet: 1 tab(s) orally once a day  cyanocobalamin 1000 mcg/mL injectable solution: injectable once a month  cyclobenzaprine 5 mg oral tablet: 1 tab(s) orally 3 times a day, As needed, Muscle Spasm  folic acid 1 mg oral tablet: 1 tab(s) orally once a day  gabapentin 300 mg oral tablet: 1 tab(s) orally once a day (at bedtime)  Klor-Con M10 oral tablet, extended release: 1 tab(s) orally once a day  losartan-hydrochlorothiazide 50mg-12.5mg oral tablet: 1 tab(s) orally once a day  Lovenox 100 mg/mL injectable solution: injectable every 12 hours  prescription provided outpatient by your primary care provider  metoprolol tartrate 50 mg oral tablet: 1 tab(s) orally once a day  omeprazole 40 mg oral delayed release capsule: 1 cap(s) orally once a day  oxyCODONE 5 mg oral tablet: 1-2 tab(s) orally every 4-6 hours, as needed for moderate to severe pain MDD:6  PROzac 40 mg oral capsule: 1 cap(s) orally once a day  rOPINIRole 5 mg oral tablet: 2 tab(s) orally once a day (at bedtime)  SEROquel 50 mg oral tablet: 1 tab(s) orally once a day (at bedtime)  sulfaSALAzine 500 mg oral tablet: 1 tab(s) orally once a day acetaminophen 325 mg oral tablet: 2 tab(s) orally every 6 hours, as needed for mild pain  amLODIPine 5 mg oral tablet: 1 tab(s) orally once a day  Carafate 1 g/10 mL oral suspension: 10 milliliter(s) orally once a day  cyanocobalamin 1000 mcg/mL injectable solution: injectable once a month  cyclobenzaprine 5 mg oral tablet: 1 tab(s) orally 3 times a day, As needed, Muscle Spasm  folic acid 1 mg oral tablet: 1 tab(s) orally once a day  gabapentin 300 mg oral tablet: 1 tab(s) orally once a day (at bedtime)  Klor-Con M10 oral tablet, extended release: 1 tab(s) orally once a day  losartan-hydrochlorothiazide 50mg-12.5mg oral tablet: 1 tab(s) orally once a day  Lovenox 100 mg/mL injectable solution: injectable every 12 hours  prescription provided outpatient by your primary care provider  metoprolol tartrate 50 mg oral tablet: 1 tab(s) orally once a day  omeprazole 40 mg oral delayed release capsule: 1 cap(s) orally once a day  oxyCODONE 5 mg oral tablet: 1-2 tab(s) orally every 4-6 hours, as needed for moderate to severe pain MDD:6  PROzac 40 mg oral capsule: 1 cap(s) orally once a day  rOPINIRole 5 mg oral tablet: 2 tab(s) orally once a day (at bedtime)  SEROquel 50 mg oral tablet: 1 tab(s) orally once a day (at bedtime)  sulfaSALAzine 500 mg oral tablet: 1 tab(s) orally once a day acetaminophen 325 mg oral tablet: 2 tab(s) orally every 6 hours, as needed for mild pain  amLODIPine 5 mg oral tablet: 1 tab(s) orally once a day  Carafate 1 g/10 mL oral suspension: 10 milliliter(s) orally once a day  CBC and reticulocyte count  lab draw: 1x/ week for 6 weeks  Use pediatric tubes for blood draws    Please fax results to hematologist Dr. Root: [phone]  cyanocobalamin 1000 mcg/mL injectable solution: injectable once a month  cyclobenzaprine 5 mg oral tablet: 1 tab(s) orally 3 times a day, As needed, Muscle Spasm  folic acid 1 mg oral tablet: 1 tab(s) orally once a day  gabapentin 300 mg oral tablet: 1 tab(s) orally once a day (at bedtime)  Klor-Con M10 oral tablet, extended release: 1 tab(s) orally once a day  losartan-hydrochlorothiazide 50mg-12.5mg oral tablet: 1 tab(s) orally once a day  Lovenox 100 mg/mL injectable solution: injectable every 12 hours  prescription provided outpatient by your primary care provider  Lovenox 100 mg/mL injectable solution: 1 milligram(s) subcutaneously every 12 hours MDD:2  metoprolol tartrate 50 mg oral tablet: 1 tab(s) orally once a day  omeprazole 40 mg oral delayed release capsule: 1 cap(s) orally once a day  oxyCODONE 5 mg oral capsule: 1 cap(s) orally every 8 hours MDD:3  PROzac 40 mg oral capsule: 1 cap(s) orally once a day  rOPINIRole 5 mg oral tablet: 2 tab(s) orally once a day (at bedtime)  SEROquel 50 mg oral tablet: 1 tab(s) orally once a day (at bedtime)  sulfaSALAzine 500 mg oral tablet: 1 tab(s) orally once a day acetaminophen 325 mg oral tablet: 2 tab(s) orally every 6 hours, as needed for mild pain  amLODIPine 5 mg oral tablet: 1 tab(s) orally once a day  Carafate 1 g/10 mL oral suspension: 10 milliliter(s) orally once a day  CBC and reticulocyte count  lab draw: 1x/ week for 6 weeks  Use pediatric tubes for blood draws    Please fax results to hematologist Dr. Root: [phone]  cyanocobalamin 1000 mcg/mL injectable solution: injectable once a month  cyclobenzaprine 5 mg oral tablet: 1 tab(s) orally 3 times a day, As needed, Muscle Spasm  folic acid 1 mg oral tablet: 1 tab(s) orally once a day  gabapentin 300 mg oral tablet: 1 tab(s) orally once a day (at bedtime)  Klor-Con M10 oral tablet, extended release: 1 tab(s) orally once a day  losartan-hydrochlorothiazide 50mg-12.5mg oral tablet: 1 tab(s) orally once a day    DO NOT RESTART UNTIL FOLLOW-UP WITH YOUR PRIMARY CARE PROVIDER.  Lovenox 100 mg/mL injectable solution: 1 milligram(s) subcutaneously every 12 hours MDD:2  Lovenox 100 mg/mL injectable solution: injectable every 12 hours  prescription provided outpatient by your primary care provider.    DO NOT RESTART UNTIL CONFIRM WITH DR. GONZALEZ (HEMATOLOGY).  YOUR HEMOGLOBIN SHOULD BE ABOVE 8.0 TO RESTART LOVENOX.  metoprolol tartrate 50 mg oral tablet: 1 tab(s) orally once a day    DO NOT RESTART UNTIL FOLLOW-UP WITH YOUR PRIMARY CARE PROVIDER.  omeprazole 40 mg oral delayed release capsule: 1 cap(s) orally once a day  oxyCODONE 5 mg oral capsule: 1 cap(s) orally every 8 hours MDD:3  PROzac 40 mg oral capsule: 1 cap(s) orally once a day  rOPINIRole 5 mg oral tablet: 2 tab(s) orally once a day (at bedtime)  SEROquel 50 mg oral tablet: 1 tab(s) orally once a day (at bedtime)  sulfaSALAzine 500 mg oral tablet: 1 tab(s) orally once a day acetaminophen 325 mg oral tablet: 2 tab(s) orally every 6 hours, as needed for mild pain  amLODIPine 5 mg oral tablet: 1 tab(s) orally once a day  Carafate 1 g/10 mL oral suspension: 10 milliliter(s) orally once a day  CBC and reticulocyte count  lab draw: 1x/ week for 6 weeks  Use pediatric tubes for blood draws    Please fax results to hematologist Dr. Root: [phone]  cyanocobalamin 1000 mcg/mL injectable solution: injectable once a month  cyclobenzaprine 5 mg oral tablet: 1 tab(s) orally 3 times a day, As needed, Muscle Spasm  folic acid 1 mg oral tablet: 1 tab(s) orally once a day  gabapentin 300 mg oral tablet: 1 tab(s) orally once a day (at bedtime)  Klor-Con M10 oral tablet, extended release: 1 tab(s) orally once a day  losartan-hydrochlorothiazide 50mg-12.5mg oral tablet: 1 tab(s) orally once a day    DO NOT RESTART UNTIL FOLLOW-UP WITH YOUR PRIMARY CARE PROVIDER.  Lovenox 100 mg/mL injectable solution: 1 milligram(s) subcutaneously every 12 hours MDD:2  Lovenox 100 mg/mL injectable solution: injectable every 12 hours  prescription provided outpatient by your primary care provider.    DO NOT RESTART UNTIL CONFIRM WITH DR. GONZALEZ (HEMATOLOGY).  YOUR HEMOGLOBIN SHOULD BE ABOVE 8.0 TO RESTART LOVENOX.  metoprolol tartrate 50 mg oral tablet: 1 tab(s) orally once a day    DO NOT RESTART UNTIL FOLLOW-UP WITH YOUR PRIMARY CARE PROVIDER.  omeprazole 40 mg oral delayed release capsule: 1 cap(s) orally once a day  oxyCODONE 5 mg oral capsule: 1 cap(s) orally every 8 hours MDD:3  PROzac 40 mg oral capsule: 1 cap(s) orally once a day  rOPINIRole 5 mg oral tablet: 2 tab(s) orally once a day (at bedtime)  SEROquel 50 mg oral tablet: 1 tab(s) orally once a day (at bedtime)  sulfaSALAzine 500 mg oral tablet: 1 tab(s) orally once a day acetaminophen 325 mg oral tablet: 2 tab(s) orally every 6 hours, as needed for mild pain  amLODIPine 5 mg oral tablet: 1 tab(s) orally once a day  Carafate 1 g/10 mL oral suspension: 10 milliliter(s) orally once a day  CBC and reticulocyte count  lab draw: 1x/ week for 6 weeks  Use pediatric tubes for blood draws    Please fax results to hematologist Dr. Root: [phone]  cyanocobalamin 1000 mcg/mL injectable solution: injectable once a month  cyclobenzaprine 5 mg oral tablet: 1 tab(s) orally 3 times a day, As needed, Muscle Spasm  folic acid 1 mg oral tablet: 1 tab(s) orally once a day  gabapentin 300 mg oral tablet: 1 tab(s) orally once a day (at bedtime)  Klor-Con M10 oral tablet, extended release: 1 tab(s) orally once a day  losartan-hydrochlorothiazide 50mg-12.5mg oral tablet: 1 tab(s) orally once a day    DO NOT RESTART UNTIL FOLLOW-UP WITH YOUR PRIMARY CARE PROVIDER.  Lovenox 100 mg/mL injectable solution: 1 milligram(s) subcutaneously every 12 hours MDD:2  Lovenox 100 mg/mL injectable solution: injectable every 12 hours  prescription provided outpatient by your primary care provider.    DO NOT RESTART UNTIL CONFIRM WITH DR. GONZALEZ (HEMATOLOGY).  YOUR HEMOGLOBIN SHOULD BE ABOVE 8.0 TO RESTART LOVENOX.  metoprolol tartrate 50 mg oral tablet: 1 tab(s) orally once a day    DO NOT RESTART UNTIL FOLLOW-UP WITH YOUR PRIMARY CARE PROVIDER.  omeprazole 40 mg oral delayed release capsule: 1 cap(s) orally once a day  oxyCODONE 5 mg oral capsule: 1 cap(s) orally every 8 hours MDD:3  PROzac 40 mg oral capsule: 1 cap(s) orally once a day  rOPINIRole 5 mg oral tablet: 2 tab(s) orally once a day (at bedtime)  SEROquel 50 mg oral tablet: 1 tab(s) orally once a day (at bedtime)  sulfaSALAzine 500 mg oral tablet: 1 tab(s) orally once a day

## 2020-07-30 NOTE — DISCHARGE NOTE PROVIDER - HOSPITAL COURSE
Admitted 7/30    Surgery 7/30 Revision Right THR    Sofia-op Antibiotics    Pain control    DVT prophylaxis    OOB/Physical Therapy Admitted 7/30    Surgery 7/30 Revision Right THR    Sofia-op Antibiotics    Pain control    DVT prophylaxis    OOB/Physical Therapy     Decrease in Hgb - acute blood- loss anemia    Heme/onc consult- Hgb 10s pre-op, at low point throughout hospitalization was 4.4. Likely due to blood loss from surgery.    Stool guiac + but no active bleeding seen on CT. Lovenox bid to be stopped until Hgb > 8.0    Blood products refused. Received procrit, IV iron, B12 IM, and folate IV. Hgb slowly increased of 2 weeks in the hospital. Anemia symptoms improved. Admitted 7/30    Surgery 7/30 Revision Right THR    Sofia-op Antibiotics    Pain control    DVT prophylaxis    OOB/Physical Therapy     Decrease in Hgb - acute blood- loss anemia    Heme/onc consult- Hgb 10s pre-op, at low point throughout hospitalization was 4.4. Likely due to blood loss from surgery.    Stool guiac + but no active bleeding seen on CT. Lovenox to be restarted at home, pt must have follow up w PMD and heme 2-3 days after discharge to check on Hgb     Blood products refused. Received procrit, IV iron, B12 IM, and folate IV. Hgb slowly increased of 2 weeks in the hospital. Anemia symptoms improved. Admitted 7/30    Surgery 7/30 Revision Right THR    Sofia-op Antibiotics    Pain control    DVT prophylaxis    OOB/Physical Therapy     Decrease in Hgb - acute blood- loss anemia    Heme/onc consult- Hgb 10s pre-op, at low point throughout hospitalization was 4.4. Likely due to blood loss from surgery.    Stool guiac + but no active bleeding seen on CT. Lovenox to be restarted at home when hemoglobin remains above 8.0.     Blood products refused. Received procrit, IV iron, B12 IM, and folate IV. Hgb slowly increased of 2 weeks in the hospital. Anemia symptoms improved.    D/c'd with Hemoglobin of 7.4.

## 2020-07-30 NOTE — H&P ADULT - NSHPLABSRESULTS_GEN_ALL_CORE
Preop cbc, BMP, PT/PTT/INR, UA - WNL per medical clearance (h/H 11/37)    Preop EKG - sinus rhythm -  WNL per medical clearance

## 2020-07-30 NOTE — DISCHARGE NOTE PROVIDER - CARE PROVIDERS DIRECT ADDRESSES
[email or web address]

## 2020-07-30 NOTE — PHYSICAL THERAPY INITIAL EVALUATION ADULT - GAIT DEVIATIONS NOTED, PT EVAL
decreased stride length/increased time in double stance/decreased pranav/decreased weight-shifting ability/decreased step length/decreased velocity of limb motion

## 2020-07-30 NOTE — PROGRESS NOTE ADULT - SUBJECTIVE AND OBJECTIVE BOX
Ortho Post Op Check    Procedure: Revision Right THR  Surgeon: Dr. Ballard    Pt comfortable without complaints, pain controlled  Denies CP, SOB, N/V, numbness/tingling     Vital Signs Last 24 Hrs  T(C): 36.3 (07-30-20 @ 15:31), Max: 36.3 (07-30-20 @ 15:31)  T(F): 97.4 (07-30-20 @ 15:31), Max: 97.4 (07-30-20 @ 15:31)  HR: 86 (07-30-20 @ 15:31) (73 - 86)  BP: 99/65 (07-30-20 @ 15:31) (99/65 - 123/69)  BP(mean): 74 (07-30-20 @ 15:31) (74 - 89)  RR: 17 (07-30-20 @ 15:31) (14 - 20)  SpO2: 100% (07-30-20 @ 15:31) (100% - 100%)    General: Pt Alert and oriented, NAD  DSG C/D/I right hip  Pulses intact RLE  Sensation intact RLE  Motor: EHL/FHL/TA/GS 5/5 RLE                          10.1   10.58 )-----------( 233      ( 30 Jul 2020 14:50 )             32.8     Post-op X-Ray: prosthesis in place    A/P: 50yFemale POD#0 s/p Revision Right THR  - Stable  - Pain Control  - DVT ppx: lovenox  - Post op abx: ancef  - PT, WBS: pwb    Ortho Pager [phone]

## 2020-07-31 DIAGNOSIS — I10 ESSENTIAL (PRIMARY) HYPERTENSION: ICD-10-CM

## 2020-07-31 DIAGNOSIS — M16.11 UNILATERAL PRIMARY OSTEOARTHRITIS, RIGHT HIP: ICD-10-CM

## 2020-07-31 DIAGNOSIS — E11.40 TYPE 2 DIABETES MELLITUS WITH DIABETIC NEUROPATHY, UNSPECIFIED: ICD-10-CM

## 2020-07-31 DIAGNOSIS — K21.9 GASTRO-ESOPHAGEAL REFLUX DISEASE WITHOUT ESOPHAGITIS: ICD-10-CM

## 2020-07-31 DIAGNOSIS — G47.33 OBSTRUCTIVE SLEEP APNEA (ADULT) (PEDIATRIC): ICD-10-CM

## 2020-07-31 DIAGNOSIS — Z86.711 PERSONAL HISTORY OF PULMONARY EMBOLISM: ICD-10-CM

## 2020-07-31 DIAGNOSIS — Z86.718 PERSONAL HISTORY OF OTHER VENOUS THROMBOSIS AND EMBOLISM: ICD-10-CM

## 2020-07-31 DIAGNOSIS — M06.9 RHEUMATOID ARTHRITIS, UNSPECIFIED: ICD-10-CM

## 2020-07-31 DIAGNOSIS — Z79.01 LONG TERM (CURRENT) USE OF ANTICOAGULANTS: ICD-10-CM

## 2020-07-31 LAB
ANION GAP SERPL CALC-SCNC: 12 MMOL/L — SIGNIFICANT CHANGE UP (ref 5–17)
BUN SERPL-MCNC: 15 MG/DL — SIGNIFICANT CHANGE UP (ref 7–23)
CALCIUM SERPL-MCNC: 8.5 MG/DL — SIGNIFICANT CHANGE UP (ref 8.4–10.5)
CHLORIDE SERPL-SCNC: 100 MMOL/L — SIGNIFICANT CHANGE UP (ref 96–108)
CO2 SERPL-SCNC: 24 MMOL/L — SIGNIFICANT CHANGE UP (ref 22–31)
CREAT SERPL-MCNC: 0.86 MG/DL — SIGNIFICANT CHANGE UP (ref 0.5–1.3)
GLUCOSE SERPL-MCNC: 165 MG/DL — HIGH (ref 70–99)
MAGNESIUM SERPL-MCNC: 1.5 MG/DL — LOW (ref 1.6–2.6)
PHOSPHATE SERPL-MCNC: 2.5 MG/DL — SIGNIFICANT CHANGE UP (ref 2.5–4.5)
POTASSIUM SERPL-MCNC: 3.1 MMOL/L — LOW (ref 3.5–5.3)
POTASSIUM SERPL-SCNC: 3.1 MMOL/L — LOW (ref 3.5–5.3)
SODIUM SERPL-SCNC: 136 MMOL/L — SIGNIFICANT CHANGE UP (ref 135–145)

## 2020-07-31 PROCEDURE — 99233 SBSQ HOSP IP/OBS HIGH 50: CPT | Mod: GC

## 2020-07-31 RX ORDER — MAGNESIUM SULFATE 500 MG/ML
2 VIAL (ML) INJECTION ONCE
Refills: 0 | Status: COMPLETED | OUTPATIENT
Start: 2020-07-31 | End: 2020-07-31

## 2020-07-31 RX ORDER — POTASSIUM CHLORIDE 20 MEQ
40 PACKET (EA) ORAL EVERY 4 HOURS
Refills: 0 | Status: COMPLETED | OUTPATIENT
Start: 2020-07-31 | End: 2020-07-31

## 2020-07-31 RX ORDER — AMLODIPINE BESYLATE 2.5 MG/1
5 TABLET ORAL DAILY
Refills: 0 | Status: DISCONTINUED | OUTPATIENT
Start: 2020-07-31 | End: 2020-08-02

## 2020-07-31 RX ORDER — SODIUM CHLORIDE 9 MG/ML
500 INJECTION INTRAMUSCULAR; INTRAVENOUS; SUBCUTANEOUS
Refills: 0 | Status: COMPLETED | OUTPATIENT
Start: 2020-07-31 | End: 2020-07-31

## 2020-07-31 RX ORDER — SODIUM CHLORIDE 9 MG/ML
500 INJECTION INTRAMUSCULAR; INTRAVENOUS; SUBCUTANEOUS ONCE
Refills: 0 | Status: COMPLETED | OUTPATIENT
Start: 2020-07-31 | End: 2020-07-31

## 2020-07-31 RX ADMIN — Medication 500 MILLIGRAM(S): at 11:39

## 2020-07-31 RX ADMIN — Medication 1 GRAM(S): at 17:51

## 2020-07-31 RX ADMIN — LOSARTAN POTASSIUM 50 MILLIGRAM(S): 100 TABLET, FILM COATED ORAL at 05:34

## 2020-07-31 RX ADMIN — OXYCODONE HYDROCHLORIDE 10 MILLIGRAM(S): 5 TABLET ORAL at 10:25

## 2020-07-31 RX ADMIN — Medication 650 MILLIGRAM(S): at 12:30

## 2020-07-31 RX ADMIN — OXYCODONE HYDROCHLORIDE 5 MILLIGRAM(S): 5 TABLET ORAL at 00:30

## 2020-07-31 RX ADMIN — Medication 650 MILLIGRAM(S): at 00:30

## 2020-07-31 RX ADMIN — QUETIAPINE FUMARATE 50 MILLIGRAM(S): 200 TABLET, FILM COATED ORAL at 21:25

## 2020-07-31 RX ADMIN — Medication 650 MILLIGRAM(S): at 23:14

## 2020-07-31 RX ADMIN — Medication 40 MILLIGRAM(S): at 11:39

## 2020-07-31 RX ADMIN — OXYCODONE HYDROCHLORIDE 10 MILLIGRAM(S): 5 TABLET ORAL at 15:16

## 2020-07-31 RX ADMIN — SODIUM CHLORIDE 80 MILLILITER(S): 9 INJECTION INTRAMUSCULAR; INTRAVENOUS; SUBCUTANEOUS at 15:16

## 2020-07-31 RX ADMIN — OXYCODONE HYDROCHLORIDE 10 MILLIGRAM(S): 5 TABLET ORAL at 23:22

## 2020-07-31 RX ADMIN — Medication 650 MILLIGRAM(S): at 06:21

## 2020-07-31 RX ADMIN — OXYCODONE HYDROCHLORIDE 10 MILLIGRAM(S): 5 TABLET ORAL at 09:26

## 2020-07-31 RX ADMIN — Medication 50 MILLIGRAM(S): at 05:35

## 2020-07-31 RX ADMIN — Medication 650 MILLIGRAM(S): at 05:34

## 2020-07-31 RX ADMIN — Medication 40 MILLIEQUIVALENT(S): at 17:52

## 2020-07-31 RX ADMIN — OXYCODONE HYDROCHLORIDE 10 MILLIGRAM(S): 5 TABLET ORAL at 05:34

## 2020-07-31 RX ADMIN — ENOXAPARIN SODIUM 100 MILLIGRAM(S): 100 INJECTION SUBCUTANEOUS at 17:52

## 2020-07-31 RX ADMIN — PANTOPRAZOLE SODIUM 40 MILLIGRAM(S): 20 TABLET, DELAYED RELEASE ORAL at 06:23

## 2020-07-31 RX ADMIN — Medication 650 MILLIGRAM(S): at 18:50

## 2020-07-31 RX ADMIN — OXYCODONE HYDROCHLORIDE 10 MILLIGRAM(S): 5 TABLET ORAL at 21:25

## 2020-07-31 RX ADMIN — Medication 100 MILLIGRAM(S): at 02:32

## 2020-07-31 RX ADMIN — ENOXAPARIN SODIUM 100 MILLIGRAM(S): 100 INJECTION SUBCUTANEOUS at 06:23

## 2020-07-31 RX ADMIN — Medication 1 GRAM(S): at 05:34

## 2020-07-31 RX ADMIN — Medication 40 MILLIEQUIVALENT(S): at 09:25

## 2020-07-31 RX ADMIN — CELECOXIB 200 MILLIGRAM(S): 200 CAPSULE ORAL at 17:52

## 2020-07-31 RX ADMIN — GABAPENTIN 300 MILLIGRAM(S): 400 CAPSULE ORAL at 23:13

## 2020-07-31 RX ADMIN — ROPINIROLE 10 MILLIGRAM(S): 8 TABLET, FILM COATED, EXTENDED RELEASE ORAL at 21:25

## 2020-07-31 RX ADMIN — Medication 40 MILLIEQUIVALENT(S): at 15:16

## 2020-07-31 RX ADMIN — Medication 650 MILLIGRAM(S): at 17:52

## 2020-07-31 RX ADMIN — Medication 650 MILLIGRAM(S): at 11:39

## 2020-07-31 RX ADMIN — CELECOXIB 200 MILLIGRAM(S): 200 CAPSULE ORAL at 18:50

## 2020-07-31 RX ADMIN — Medication 50 GRAM(S): at 15:15

## 2020-07-31 RX ADMIN — SODIUM CHLORIDE 100 MILLILITER(S): 9 INJECTION, SOLUTION INTRAVENOUS at 02:17

## 2020-07-31 RX ADMIN — SODIUM CHLORIDE 500 MILLILITER(S): 9 INJECTION INTRAMUSCULAR; INTRAVENOUS; SUBCUTANEOUS at 11:44

## 2020-07-31 RX ADMIN — OXYCODONE HYDROCHLORIDE 10 MILLIGRAM(S): 5 TABLET ORAL at 06:21

## 2020-07-31 RX ADMIN — OXYCODONE HYDROCHLORIDE 10 MILLIGRAM(S): 5 TABLET ORAL at 16:15

## 2020-07-31 NOTE — PROGRESS NOTE ADULT - ATTENDING COMMENTS
50 year old woman with GERD, RA, PE (2001), DVT , (on Lovenox as outpatient) s/p right hip revision arthroplasty. Internal medicine following for co-management.   - Patient orthostatic when working with physical therapy in the morning; Saw patient while she was working with PT in the afternoon. No longer orthostatic after small fluid bolus.   - Now back on home AC (for history of VTEs)  - Rest of history and plan per Dr. Velasco' excellent note above.

## 2020-07-31 NOTE — PROGRESS NOTE ADULT - SUBJECTIVE AND OBJECTIVE BOX
**************** INCOMPLETE NOTE***************  O/N Events:  Subjective/ROS: Denies HA, CP, SOB, n/v, changes in bowel/urinary habits.  12pt ROS otherwise negative.    VITALS  Vital Signs Last 24 Hrs  T(C): 36.6 (30 Jul 2020 23:57), Max: 36.6 (30 Jul 2020 23:57)  T(F): 97.8 (30 Jul 2020 23:57), Max: 97.8 (30 Jul 2020 23:57)  HR: 88 (31 Jul 2020 05:30) (73 - 90)  BP: 114/69 (31 Jul 2020 05:30) (74/51 - 123/69)  BP(mean): 74 (30 Jul 2020 15:31) (74 - 89)  RR: 16 (31 Jul 2020 05:30) (14 - 20)  SpO2: 99% (31 Jul 2020 05:30) (96% - 100%)    CAPILLARY BLOOD GLUCOSE          PHYSICAL EXAM  General: A&Ox3; NAD  Head: NC/AT; MMM; PERRL; EOMI;  Neck: Supple; no JVD  Respiratory: CTA B/L; no wheezes/crackles   Cardiovascular: Regular rhythm/rate; S1/S2   Gastrointestinal: Soft; NTND; normoactive BS  Extremities: WWP; no edema/cyanosis  Neurological:  CNII-XII grossly intact; no obvious focal deficits    MEDICATIONS  (STANDING):  acetaminophen   Tablet .. 650 milliGRAM(s) Oral every 6 hours  amLODIPine   Tablet 5 milliGRAM(s) Oral daily  celecoxib 200 milliGRAM(s) Oral two times a day  enoxaparin Injectable 100 milliGRAM(s) SubCutaneous every 12 hours  FLUoxetine 40 milliGRAM(s) Oral daily  gabapentin 300 milliGRAM(s) Oral at bedtime  lactated ringers. 1000 milliLiter(s) (100 mL/Hr) IV Continuous <Continuous>  loratadine 10 milliGRAM(s) Oral daily  losartan 50 milliGRAM(s) Oral daily  metoprolol succinate ER 50 milliGRAM(s) Oral daily  pantoprazole    Tablet 40 milliGRAM(s) Oral before breakfast  polyethylene glycol 3350 17 Gram(s) Oral daily  potassium chloride    Tablet ER 40 milliEquivalent(s) Oral every 4 hours  QUEtiapine 50 milliGRAM(s) Oral at bedtime  rOPINIRole 10 milliGRAM(s) Oral at bedtime  sucralfate suspension 1 Gram(s) Oral every 12 hours  sulfaSALAzine 500 milliGRAM(s) Oral daily    MEDICATIONS  (PRN):  aluminum hydroxide/magnesium hydroxide/simethicone Suspension 30 milliLiter(s) Oral four times a day PRN Indigestion  cyclobenzaprine 5 milliGRAM(s) Oral three times a day PRN Muscle Spasm  HYDROmorphone  Injectable 0.5 milliGRAM(s) IV Push every 4 hours PRN breakthrough  HYDROmorphone  Injectable 0.5 milliGRAM(s) IV Push every 15 minutes PRN breakthrough pain  ondansetron Injectable 4 milliGRAM(s) IV Push every 6 hours PRN Nausea and/or Vomiting  oxyCODONE    IR 5 milliGRAM(s) Oral every 4 hours PRN Moderate Pain (4 - 6)  oxyCODONE    IR 10 milliGRAM(s) Oral every 4 hours PRN Severe Pain (7 - 10)  senna 2 Tablet(s) Oral at bedtime PRN Constipation      lisinopril (Other)  verapamil (Other)      LABS                        10.1   10.58 )-----------( 233      ( 30 Jul 2020 14:50 )             32.8     07-31    136  |  100  |  15  ----------------------------<  165<H>  3.1<L>   |  24  |  0.86    Ca    8.5      31 Jul 2020 07:32                IMAGING/EKG/ETC  EKG:  Xray:  CT:  MRI: O/N Events: RIVAS  Subjective/ROS: Met pt at bedside, resting comfortably in bed. States that she is having 3-4 loose BMs per day, which is her baseline. Pain moderately controlled. Denies HA, CP, SOB, n/v, changes in bowel/urinary habits.  12pt ROS otherwise negative.    VITALS  Vital Signs Last 24 Hrs  T(C): 36.6 (30 Jul 2020 23:57), Max: 36.6 (30 Jul 2020 23:57)  T(F): 97.8 (30 Jul 2020 23:57), Max: 97.8 (30 Jul 2020 23:57)  HR: 88 (31 Jul 2020 05:30) (73 - 90)  BP: 114/69 (31 Jul 2020 05:30) (74/51 - 123/69)  BP(mean): 74 (30 Jul 2020 15:31) (74 - 89)  RR: 16 (31 Jul 2020 05:30) (14 - 20)  SpO2: 99% (31 Jul 2020 05:30) (96% - 100%)    CAPILLARY BLOOD GLUCOSE          PHYSICAL EXAM  General: A&Ox3; NAD  Head: NC/AT; MMM; PERRL; EOMI;  Neck: Supple; no JVD  Respiratory: CTA B/L; no wheezes/crackles   Cardiovascular: Regular rhythm/rate; S1/S2   Gastrointestinal: Soft; NTND; normoactive BS  Extremities: WWP; R hip surgical site, clean, dry, intact, tender to superficial palpation  Neurological:  CNII-XII grossly intact; no obvious focal deficits    MEDICATIONS  (STANDING):  acetaminophen   Tablet .. 650 milliGRAM(s) Oral every 6 hours  amLODIPine   Tablet 5 milliGRAM(s) Oral daily  celecoxib 200 milliGRAM(s) Oral two times a day  enoxaparin Injectable 100 milliGRAM(s) SubCutaneous every 12 hours  FLUoxetine 40 milliGRAM(s) Oral daily  gabapentin 300 milliGRAM(s) Oral at bedtime  lactated ringers. 1000 milliLiter(s) (100 mL/Hr) IV Continuous <Continuous>  loratadine 10 milliGRAM(s) Oral daily  losartan 50 milliGRAM(s) Oral daily  metoprolol succinate ER 50 milliGRAM(s) Oral daily  pantoprazole    Tablet 40 milliGRAM(s) Oral before breakfast  polyethylene glycol 3350 17 Gram(s) Oral daily  potassium chloride    Tablet ER 40 milliEquivalent(s) Oral every 4 hours  QUEtiapine 50 milliGRAM(s) Oral at bedtime  rOPINIRole 10 milliGRAM(s) Oral at bedtime  sucralfate suspension 1 Gram(s) Oral every 12 hours  sulfaSALAzine 500 milliGRAM(s) Oral daily    MEDICATIONS  (PRN):  aluminum hydroxide/magnesium hydroxide/simethicone Suspension 30 milliLiter(s) Oral four times a day PRN Indigestion  cyclobenzaprine 5 milliGRAM(s) Oral three times a day PRN Muscle Spasm  HYDROmorphone  Injectable 0.5 milliGRAM(s) IV Push every 4 hours PRN breakthrough  HYDROmorphone  Injectable 0.5 milliGRAM(s) IV Push every 15 minutes PRN breakthrough pain  ondansetron Injectable 4 milliGRAM(s) IV Push every 6 hours PRN Nausea and/or Vomiting  oxyCODONE    IR 5 milliGRAM(s) Oral every 4 hours PRN Moderate Pain (4 - 6)  oxyCODONE    IR 10 milliGRAM(s) Oral every 4 hours PRN Severe Pain (7 - 10)  senna 2 Tablet(s) Oral at bedtime PRN Constipation      lisinopril (Other)  verapamil (Other)      LABS                        10.1   10.58 )-----------( 233      ( 30 Jul 2020 14:50 )             32.8     07-31    136  |  100  |  15  ----------------------------<  165<H>  3.1<L>   |  24  |  0.86    Ca    8.5      31 Jul 2020 07:32                IMAGING/EKG/ETC: Reviewed

## 2020-07-31 NOTE — PROGRESS NOTE ADULT - SUBJECTIVE AND OBJECTIVE BOX
Ortho Note    Pt seen and examined. Comfortable without complaints, pain controlled  Denies CP, SOB, N/V, numbness/tingling     Vital Signs Last 24 Hrs  T(C): 36.6 (07-31-20 @ 09:17), Max: 36.6 (07-31-20 @ 09:17)  T(F): 97.9 (07-31-20 @ 09:17), Max: 97.9 (07-31-20 @ 09:17)  HR: 85 (07-31-20 @ 09:17) (85 - 88)  BP: 114/91 (07-31-20 @ 09:17) (114/69 - 114/91)  BP(mean): --  RR: 19 (07-31-20 @ 09:17) (16 - 19)  SpO2: 99% (07-31-20 @ 09:17) (99% - 99%)  AVSS    focused exam:  General: Pt Alert and oriented, NAD  DSG C/D/I  Pulses: +2DP, WWP feet  Sensation: SILT BLE  Motor: 5/5 EHL/FHL/TA/GS                          10.1   10.58 )-----------( 233      ( 30 Jul 2020 14:50 )             32.8   31 Jul 2020 07:32    136    |  100    |  15     ----------------------------<  165    3.1     |  24     |  0.86     Ca    8.5        31 Jul 2020 07:32        A/P: 50yFemale POD#1 s/p right total hip revision  - Stable  - Pain Control  - DVT ppx:  bid (home med)  - PT, WBS: PWB  - OOB for meals, I/S  - continue bowel regimen  - dispo: home with [facility]     Ortho Pager [phone] Ortho Note    Pt seen and examined. Comfortable without complaints, pain controlled  Denies CP, SOB, N/V, numbness/tingling     Vital Signs Last 24 Hrs  T(C): 36.6 (07-31-20 @ 09:17), Max: 36.6 (07-31-20 @ 09:17)  T(F): 97.9 (07-31-20 @ 09:17), Max: 97.9 (07-31-20 @ 09:17)  HR: 85 (07-31-20 @ 09:17) (85 - 88)  BP: 114/91 (07-31-20 @ 09:17) (114/69 - 114/91)  BP(mean): --  RR: 19 (07-31-20 @ 09:17) (16 - 19)  SpO2: 99% (07-31-20 @ 09:17) (99% - 99%)  AVSS    focused exam:  General: Pt Alert and oriented, NAD  DSG C/D/I  Pulses: +2DP, WWP feet  Sensation: SILT BLE  Motor: 5/5 EHL/FHL/TA/GS                          10.1   10.58 )-----------( 233      ( 30 Jul 2020 14:50 )             32.8   31 Jul 2020 07:32    136    |  100    |  15     ----------------------------<  165    3.1     |  24     |  0.86     Ca    8.5        31 Jul 2020 07:32        A/P: 50yFemale POD#1 s/p right total hip revision  - Stable, refusing CBCs or blood products  - Pain Control  - DVT ppx:  bid (home med)  - PT, WBS: PWB  - OOB for meals, I/S  - continue bowel regimen  - dispo: home with [facility]     Ortho Pager [phone]

## 2020-07-31 NOTE — PROGRESS NOTE ADULT - ASSESSMENT
**************** INCOMPLETE NOTE*************** **************** INCOMPLETE NOTE***************    #Electrolytes  - Replete K. Add on Mg, Phos and replete as needed. Repletion of Mg will aid in maintaining repletion of K 50F with PMH DARIO, GERD, RA, PE (2001), DVT (on loveneox 100 mg BID), hypokalemia, DM2, RA, HTN, depression, chronic R hip pain admitted for for right hip revision arthroplasty 7/30. Medicine consulted for co-management    #PE  Pt with PMH of PE/DVT. Most recently on Lovenox 100 mg BID SQ. Last dose evening of 7/29/20.  - agree with restarting  Lovenox 100 mg SQ BID (pt's home medication)    #Hypokalemia  Pt states that she has been hypokalemic in the past and has needed to take K supplementation as an outpatient.  - Replete K.   - Trend Mg, Phos and replete as needed. Repletion of Mg will aid in maintaining repletion of K    #Leukocytosis   Likely reactive 2/2 surgery.  -Trend CBC    #Anemia  Baseline appears to be 11.4. Decrease likely 2/2 blood loss in OR. Pt hemodynamically stable and without signs of active bleeding.  -Trend CBC  - Maintain active T&S  - Transfusion threshold per primary team     #Post op  - Abx per primary team  - Pain management per primary team   - Incentive spirometer  - Bowel regimen  - Post void residual    #GERD  -c/w home med pantoprazole 40 mg     #DM2  Pt states that she has pre-diabetes and is not on medication at home. Caution with tight glucose control while inpatient.  Goal FSG < 180.     #HTN  Pt's home meds include HCTZ 12.5 mg/losartan 50mg combination pill and amlodipine 5mg. BP soft during this admission.   - Agree with holding hydrochlorthiazide in setting of soft BPs  - c/w losartan with hold parameters (Hold for SBP < 100, HR < 60)    #neuropathy  - c/w gabapentin     #Depression  - c/w home meds Seroquel, fluoxetine       #DARIO  Pt states that she stopped using CPAP 2018. 50F with PMH DARIO, GERD, RA, PE (2001), DVT (on loveneox 100 mg BID), hypokalemia, DM2, RA, HTN, depression, chronic R hip pain admitted for for right hip revision arthroplasty 7/30. Medicine consulted for co-management    #PE  Pt with PMH of PE/DVT. Most recently on Lovenox 100 mg BID SQ. Last dose evening of 7/29/20.  - agree with restarting  Lovenox 100 mg SQ BID (pt's home medication)    #Hypokalemia  Pt states that she has been hypokalemic in the past and has needed to take K supplementation as an outpatient. HCTZ is one of her outpatient medications, which can be contributing to her hypokalemia. Pt has not received HCTZ while inpatient and has been receiving losartan which would increase K, so close monitoring of K is needed. Please schedule follow up appt with pt's outpatient PCP for monitoring of potassium upon discharge.   - Replete K.   - Trend Mg, Phos and replete as needed. Repletion of Mg will aid in maintaining repletion of K    #Leukocytosis   Likely reactive 2/2 surgery.  -Trend CBC    #Anemia  Baseline appears to be 11.4. Decrease likely 2/2 blood loss in OR. Pt hemodynamically stable and without signs of active bleeding.  -Trend CBC  - Maintain active T&S  - Transfusion threshold per primary team     #Post op  - Abx per primary team  - Pain management per primary team   - Incentive spirometer  - Bowel regimen  - Post void residual    #GERD  -c/w home med pantoprazole 40 mg     #DM2  Pt states that she has pre-diabetes and is not on medication at home. Caution with tight glucose control while inpatient.  Goal FSG < 180.     #HTN  Pt's home meds include HCTZ 12.5 mg/losartan 50mg combination pill and amlodipine 5mg. BP soft during this admission.   - c/w holding hydrochlorthiazide in setting of soft BPs and ongoing hypokalemia  - c/w losartan with hold parameters (Hold for SBP < 100, HR < 60)    #neuropathy  - c/w gabapentin     #Depression  - c/w home meds Seroquel, fluoxetine       #DARIO  Pt states that she stopped using CPAP 2018.    Plan discussed with attending. 50F with PMH DARIO, GERD, RA, PE (2001), DVT (on loveneox 100 mg BID), hypokalemia, DM2, RA, HTN, depression, chronic R hip pain admitted for for right hip revision arthroplasty 7/30. Medicine consulted for co-management    #PE  Pt with PMH of PE/DVT. Most recently on Lovenox 100 mg BID SQ. Last dose evening of 7/29/20.  - agree with restarting  Lovenox 100 mg SQ BID (pt's home medication)    #Hypokalemia  Pt states that she has been hypokalemic in the past and has needed to take K supplementation as an outpatient. HCTZ is one of her outpatient medications, which can be contributing to her hypokalemia. Pt has not received HCTZ while inpatient and has been receiving losartan which would increase K, so close monitoring of K is needed. Please schedule follow up appt with pt's outpatient PCP for monitoring of potassium upon discharge.   - Replete K.   - Trend Mg, Phos and replete as needed. Repletion of Mg will aid in maintaining repletion of K    #Leukocytosis   Likely reactive 2/2 surgery.  -Trend CBC    #Anemia  Baseline appears to be 11.4. Decrease likely 2/2 blood loss in OR. Pt hemodynamically stable and without signs of active bleeding.  -Trend CBC  - Maintain active T&S  - Transfusion threshold per primary team     #Post op  - Abx per primary team  - Pain management per primary team   - Incentive spirometer  - Bowel regimen  - Post void residual    #GERD  -c/w home med pantoprazole 40 mg     #DM2  Pt states that she has pre-diabetes and is not on medication at home. Caution with tight glucose control while inpatient.  Goal FSG < 180.     #HTN  Pt's home meds include HCTZ 12.5 mg/losartan 50mg combination pill in AM and amlodipine 5mg in PM. BP soft during this admission.   - c/w holding hydrochlorthiazide in setting of soft BPs and ongoing hypokalemia  - c/w losartan with hold parameters (Hold for SBP < 100, HR < 60)  - c/w amlodipine 5mg with hold parameters (Hold for SBP < 100, HR < 60)    #neuropathy  - c/w gabapentin     #Depression  - c/w home meds Seroquel, fluoxetine       #DARIO  Pt states that she stopped using CPAP 2018.    Plan discussed with primary team and attending.

## 2020-07-31 NOTE — PROGRESS NOTE ADULT - SUBJECTIVE AND OBJECTIVE BOX
SUBJECTIVE: Patient seen and examined. Patient endorses pain.  Pt did well o/n  No f/c/n/v/cp/sob.     OBJECTIVE:  NAD  Vital Signs Last 24 Hrs  T(C): 36.6 (30 Jul 2020 23:57), Max: 36.6 (30 Jul 2020 23:57)  T(F): 97.8 (30 Jul 2020 23:57), Max: 97.8 (30 Jul 2020 23:57)  HR: 88 (31 Jul 2020 05:30) (73 - 90)  BP: 114/69 (31 Jul 2020 05:30) (74/51 - 123/69)  BP(mean): 74 (30 Jul 2020 15:31) (74 - 89)  RR: 16 (31 Jul 2020 05:30) (14 - 20)  SpO2: 99% (31 Jul 2020 05:30) (96% - 100%)      Physical Exam:  General: Resting comfortably in bed. AAOx3. NAD.  Extremities:        LLE: No gross deformity. SILT L2-S1 distribution, symmetric. TA/EHL/FHL/GS motor intact. WWP, DP 2+       RLE: Dressing c/d/i. SILT L2-S1 distribution, symmetric. TA/EHL/FHL/GS motor intact. WWP, DP 2+      Labs:                    10.1   10.58 )-----------( 233      ( 30 Jul 2020 14:50 )             32.8         A/P :  Pt is a 51yo Female s/p R SANDY revision (acetabulum and femoral head)  -    Pain control  -    DVT ppx: Lovenox 100 BID (home dose), SCD     -    Weight bearing status: PWB  -    Physical Therapy  -    Dispo: Home w HPT

## 2020-08-01 LAB
ANION GAP SERPL CALC-SCNC: 6 MMOL/L — SIGNIFICANT CHANGE UP (ref 5–17)
APPEARANCE UR: CLEAR — SIGNIFICANT CHANGE UP
BACTERIA # UR AUTO: PRESENT /HPF
BILIRUB UR-MCNC: NEGATIVE — SIGNIFICANT CHANGE UP
BUN SERPL-MCNC: 10 MG/DL — SIGNIFICANT CHANGE UP (ref 7–23)
CALCIUM SERPL-MCNC: 8.6 MG/DL — SIGNIFICANT CHANGE UP (ref 8.4–10.5)
CHLORIDE SERPL-SCNC: 107 MMOL/L — SIGNIFICANT CHANGE UP (ref 96–108)
CO2 SERPL-SCNC: 25 MMOL/L — SIGNIFICANT CHANGE UP (ref 22–31)
COLOR SPEC: YELLOW — SIGNIFICANT CHANGE UP
CREAT SERPL-MCNC: 0.75 MG/DL — SIGNIFICANT CHANGE UP (ref 0.5–1.3)
DIFF PNL FLD: NEGATIVE — SIGNIFICANT CHANGE UP
EPI CELLS # UR: SIGNIFICANT CHANGE UP /HPF (ref 0–5)
GLUCOSE SERPL-MCNC: 142 MG/DL — HIGH (ref 70–99)
GLUCOSE UR QL: NEGATIVE — SIGNIFICANT CHANGE UP
KETONES UR-MCNC: NEGATIVE — SIGNIFICANT CHANGE UP
LEUKOCYTE ESTERASE UR-ACNC: NEGATIVE — SIGNIFICANT CHANGE UP
MAGNESIUM SERPL-MCNC: 1.9 MG/DL — SIGNIFICANT CHANGE UP (ref 1.6–2.6)
NITRITE UR-MCNC: NEGATIVE — SIGNIFICANT CHANGE UP
PH UR: 6 — SIGNIFICANT CHANGE UP (ref 5–8)
PHOSPHATE SERPL-MCNC: 2.3 MG/DL — LOW (ref 2.5–4.5)
POTASSIUM SERPL-MCNC: 3.9 MMOL/L — SIGNIFICANT CHANGE UP (ref 3.5–5.3)
POTASSIUM SERPL-SCNC: 3.9 MMOL/L — SIGNIFICANT CHANGE UP (ref 3.5–5.3)
PROT UR-MCNC: ABNORMAL MG/DL
RBC CASTS # UR COMP ASSIST: < 5 /HPF — SIGNIFICANT CHANGE UP
SODIUM SERPL-SCNC: 138 MMOL/L — SIGNIFICANT CHANGE UP (ref 135–145)
SP GR SPEC: 1.02 — SIGNIFICANT CHANGE UP (ref 1–1.03)
UROBILINOGEN FLD QL: 0.2 E.U./DL — SIGNIFICANT CHANGE UP
WBC UR QL: < 5 /HPF — SIGNIFICANT CHANGE UP

## 2020-08-01 RX ORDER — SODIUM CHLORIDE 9 MG/ML
1000 INJECTION, SOLUTION INTRAVENOUS ONCE
Refills: 0 | Status: COMPLETED | OUTPATIENT
Start: 2020-08-01 | End: 2020-08-01

## 2020-08-01 RX ADMIN — OXYCODONE HYDROCHLORIDE 10 MILLIGRAM(S): 5 TABLET ORAL at 11:54

## 2020-08-01 RX ADMIN — CELECOXIB 200 MILLIGRAM(S): 200 CAPSULE ORAL at 18:42

## 2020-08-01 RX ADMIN — Medication 50 MILLIGRAM(S): at 07:33

## 2020-08-01 RX ADMIN — PANTOPRAZOLE SODIUM 40 MILLIGRAM(S): 20 TABLET, DELAYED RELEASE ORAL at 07:28

## 2020-08-01 RX ADMIN — OXYCODONE HYDROCHLORIDE 10 MILLIGRAM(S): 5 TABLET ORAL at 03:00

## 2020-08-01 RX ADMIN — SODIUM CHLORIDE 1000 MILLILITER(S): 9 INJECTION, SOLUTION INTRAVENOUS at 21:10

## 2020-08-01 RX ADMIN — SODIUM CHLORIDE 1000 MILLILITER(S): 9 INJECTION, SOLUTION INTRAVENOUS at 14:39

## 2020-08-01 RX ADMIN — Medication 650 MILLIGRAM(S): at 00:47

## 2020-08-01 RX ADMIN — Medication 1 GRAM(S): at 07:29

## 2020-08-01 RX ADMIN — ONDANSETRON 4 MILLIGRAM(S): 8 TABLET, FILM COATED ORAL at 18:49

## 2020-08-01 RX ADMIN — OXYCODONE HYDROCHLORIDE 5 MILLIGRAM(S): 5 TABLET ORAL at 23:03

## 2020-08-01 RX ADMIN — OXYCODONE HYDROCHLORIDE 10 MILLIGRAM(S): 5 TABLET ORAL at 02:01

## 2020-08-01 RX ADMIN — Medication 650 MILLIGRAM(S): at 09:35

## 2020-08-01 RX ADMIN — Medication 650 MILLIGRAM(S): at 11:53

## 2020-08-01 RX ADMIN — CELECOXIB 200 MILLIGRAM(S): 200 CAPSULE ORAL at 18:45

## 2020-08-01 RX ADMIN — ENOXAPARIN SODIUM 100 MILLIGRAM(S): 100 INJECTION SUBCUTANEOUS at 18:42

## 2020-08-01 RX ADMIN — Medication 650 MILLIGRAM(S): at 18:42

## 2020-08-01 RX ADMIN — HYDROMORPHONE HYDROCHLORIDE 0.5 MILLIGRAM(S): 2 INJECTION INTRAMUSCULAR; INTRAVENOUS; SUBCUTANEOUS at 04:48

## 2020-08-01 RX ADMIN — OXYCODONE HYDROCHLORIDE 5 MILLIGRAM(S): 5 TABLET ORAL at 22:37

## 2020-08-01 RX ADMIN — OXYCODONE HYDROCHLORIDE 10 MILLIGRAM(S): 5 TABLET ORAL at 07:27

## 2020-08-01 RX ADMIN — OXYCODONE HYDROCHLORIDE 10 MILLIGRAM(S): 5 TABLET ORAL at 09:36

## 2020-08-01 RX ADMIN — OXYCODONE HYDROCHLORIDE 10 MILLIGRAM(S): 5 TABLET ORAL at 13:00

## 2020-08-01 RX ADMIN — HYDROMORPHONE HYDROCHLORIDE 0.5 MILLIGRAM(S): 2 INJECTION INTRAMUSCULAR; INTRAVENOUS; SUBCUTANEOUS at 04:32

## 2020-08-01 RX ADMIN — Medication 650 MILLIGRAM(S): at 18:45

## 2020-08-01 RX ADMIN — CELECOXIB 200 MILLIGRAM(S): 200 CAPSULE ORAL at 09:35

## 2020-08-01 RX ADMIN — Medication 500 MILLIGRAM(S): at 11:52

## 2020-08-01 RX ADMIN — LORATADINE 10 MILLIGRAM(S): 10 TABLET ORAL at 11:53

## 2020-08-01 RX ADMIN — CELECOXIB 200 MILLIGRAM(S): 200 CAPSULE ORAL at 07:27

## 2020-08-01 RX ADMIN — ROPINIROLE 10 MILLIGRAM(S): 8 TABLET, FILM COATED, EXTENDED RELEASE ORAL at 22:01

## 2020-08-01 RX ADMIN — POLYETHYLENE GLYCOL 3350 17 GRAM(S): 17 POWDER, FOR SOLUTION ORAL at 11:53

## 2020-08-01 RX ADMIN — OXYCODONE HYDROCHLORIDE 10 MILLIGRAM(S): 5 TABLET ORAL at 16:17

## 2020-08-01 RX ADMIN — Medication 40 MILLIGRAM(S): at 11:53

## 2020-08-01 RX ADMIN — Medication 1 GRAM(S): at 18:42

## 2020-08-01 RX ADMIN — OXYCODONE HYDROCHLORIDE 10 MILLIGRAM(S): 5 TABLET ORAL at 17:00

## 2020-08-01 RX ADMIN — QUETIAPINE FUMARATE 50 MILLIGRAM(S): 200 TABLET, FILM COATED ORAL at 22:36

## 2020-08-01 RX ADMIN — ENOXAPARIN SODIUM 100 MILLIGRAM(S): 100 INJECTION SUBCUTANEOUS at 07:28

## 2020-08-01 RX ADMIN — GABAPENTIN 300 MILLIGRAM(S): 400 CAPSULE ORAL at 22:36

## 2020-08-01 RX ADMIN — Medication 650 MILLIGRAM(S): at 07:29

## 2020-08-01 NOTE — PROGRESS NOTE ADULT - SUBJECTIVE AND OBJECTIVE BOX
SUBJECTIVE: Patient seen and examined. Patient endorses pain, somewhat improved compared to yesterday.  Pt did well o/n  No f/c/n/v/cp/sob.     OBJECTIVE:  NAD    Vital Signs Last 24 Hrs  T(C): 37.2 (31 Jul 2020 20:33), Max: 37.3 (31 Jul 2020 14:22)  T(F): 98.9 (31 Jul 2020 20:33), Max: 99.2 (31 Jul 2020 14:22)  HR: 102 (01 Aug 2020 08:57) (79 - 102)  BP: 102/59 (01 Aug 2020 08:57) (88/50 - 119/78)  BP(mean): --  RR: 16 (01 Aug 2020 08:57) (16 - 17)  SpO2: 99% (01 Aug 2020 08:57) (97% - 100%)    Physical Exam:  General: Resting comfortably in bed. AAOx3. NAD.  Extremities:        LLE: No gross deformity. SILT L2-S1 distribution, symmetric. TA/EHL/FHL/GS motor intact. WWP, DP 2+       RLE: Dressing c/d/i. SILT L2-S1 distribution, symmetric. TA/EHL/FHL/GS motor intact. WWP, DP 2+      Labs:                    10.1   10.58 )-----------( 233      ( 30 Jul 2020 14:50 )             32.8         A/P :  Pt is a 51yo Female s/p R SANDY revision (acetabulum and femoral head)  -    Pain control  -    DVT ppx: Lovenox 100 BID (home dose), SCD     -    Weight bearing status: PWB  -    Physical Therapy, pt became orthostatic yesterday during PT, will try again today  -    Dispo: Home w HPT once cleared

## 2020-08-02 LAB
ALBUMIN SERPL ELPH-MCNC: 2.5 G/DL — LOW (ref 3.3–5)
ALT FLD-CCNC: 9 U/L — LOW (ref 10–45)
ANION GAP SERPL CALC-SCNC: 10 MMOL/L — SIGNIFICANT CHANGE UP (ref 5–17)
AST SERPL-CCNC: 17 U/L — SIGNIFICANT CHANGE UP (ref 10–40)
BILIRUB DIRECT SERPL-MCNC: <0.2 MG/DL — SIGNIFICANT CHANGE UP (ref 0–0.2)
BILIRUB INDIRECT FLD-MCNC: SIGNIFICANT CHANGE UP MG/DL (ref 0.2–1.2)
BILIRUB SERPL-MCNC: <0.2 MG/DL — SIGNIFICANT CHANGE UP (ref 0.2–1.2)
BUN SERPL-MCNC: 10 MG/DL — SIGNIFICANT CHANGE UP (ref 7–23)
CALCIUM SERPL-MCNC: 8.5 MG/DL — SIGNIFICANT CHANGE UP (ref 8.4–10.5)
CHLORIDE SERPL-SCNC: 107 MMOL/L — SIGNIFICANT CHANGE UP (ref 96–108)
CO2 SERPL-SCNC: 23 MMOL/L — SIGNIFICANT CHANGE UP (ref 22–31)
CREAT SERPL-MCNC: 0.75 MG/DL — SIGNIFICANT CHANGE UP (ref 0.5–1.3)
GLUCOSE SERPL-MCNC: 133 MG/DL — HIGH (ref 70–99)
HCT VFR BLD CALC: 14.5 % — CRITICAL LOW (ref 34.5–45)
HCT VFR BLD CALC: 18.5 % — CRITICAL LOW (ref 34.5–45)
HCT VFR BLD CALC: 18.6 % — CRITICAL LOW (ref 34.5–45)
HGB BLD-MCNC: 4.6 G/DL — CRITICAL LOW (ref 11.5–15.5)
HGB BLD-MCNC: 5.7 G/DL — CRITICAL LOW (ref 11.5–15.5)
HGB BLD-MCNC: 5.7 G/DL — CRITICAL LOW (ref 11.5–15.5)
MCHC RBC-ENTMCNC: 27.7 PG — SIGNIFICANT CHANGE UP (ref 27–34)
MCHC RBC-ENTMCNC: 27.8 PG — SIGNIFICANT CHANGE UP (ref 27–34)
MCHC RBC-ENTMCNC: 28.6 PG — SIGNIFICANT CHANGE UP (ref 27–34)
MCHC RBC-ENTMCNC: 30.6 GM/DL — LOW (ref 32–36)
MCHC RBC-ENTMCNC: 30.8 GM/DL — LOW (ref 32–36)
MCHC RBC-ENTMCNC: 31.7 GM/DL — LOW (ref 32–36)
MCV RBC AUTO: 89.8 FL — SIGNIFICANT CHANGE UP (ref 80–100)
MCV RBC AUTO: 90.1 FL — SIGNIFICANT CHANGE UP (ref 80–100)
MCV RBC AUTO: 90.7 FL — SIGNIFICANT CHANGE UP (ref 80–100)
NRBC # BLD: 0 /100 WBCS — SIGNIFICANT CHANGE UP (ref 0–0)
OB PNL STL: POSITIVE
PHOSPHATE SERPL-MCNC: 2.8 MG/DL — SIGNIFICANT CHANGE UP (ref 2.5–4.5)
PLATELET # BLD AUTO: 163 K/UL — SIGNIFICANT CHANGE UP (ref 150–400)
PLATELET # BLD AUTO: 180 K/UL — SIGNIFICANT CHANGE UP (ref 150–400)
PLATELET # BLD AUTO: 183 K/UL — SIGNIFICANT CHANGE UP (ref 150–400)
POTASSIUM SERPL-MCNC: 3.6 MMOL/L — SIGNIFICANT CHANGE UP (ref 3.5–5.3)
POTASSIUM SERPL-SCNC: 3.6 MMOL/L — SIGNIFICANT CHANGE UP (ref 3.5–5.3)
RBC # BLD: 1.61 M/UL — LOW (ref 3.8–5.2)
RBC # BLD: 2.05 M/UL — LOW (ref 3.8–5.2)
RBC # BLD: 2.06 M/UL — LOW (ref 3.8–5.2)
RBC # FLD: 16 % — HIGH (ref 10.3–14.5)
RBC # FLD: 16.2 % — HIGH (ref 10.3–14.5)
RBC # FLD: 16.5 % — HIGH (ref 10.3–14.5)
SODIUM SERPL-SCNC: 140 MMOL/L — SIGNIFICANT CHANGE UP (ref 135–145)
WBC # BLD: 7.55 K/UL — SIGNIFICANT CHANGE UP (ref 3.8–10.5)
WBC # BLD: 7.61 K/UL — SIGNIFICANT CHANGE UP (ref 3.8–10.5)
WBC # BLD: 8.8 K/UL — SIGNIFICANT CHANGE UP (ref 3.8–10.5)
WBC # FLD AUTO: 7.55 K/UL — SIGNIFICANT CHANGE UP (ref 3.8–10.5)
WBC # FLD AUTO: 7.61 K/UL — SIGNIFICANT CHANGE UP (ref 3.8–10.5)
WBC # FLD AUTO: 8.8 K/UL — SIGNIFICANT CHANGE UP (ref 3.8–10.5)

## 2020-08-02 PROCEDURE — 99233 SBSQ HOSP IP/OBS HIGH 50: CPT

## 2020-08-02 PROCEDURE — 74174 CTA ABD&PLVS W/CONTRAST: CPT | Mod: 26

## 2020-08-02 PROCEDURE — 93010 ELECTROCARDIOGRAM REPORT: CPT

## 2020-08-02 RX ORDER — SODIUM CHLORIDE 9 MG/ML
1000 INJECTION, SOLUTION INTRAVENOUS ONCE
Refills: 0 | Status: COMPLETED | OUTPATIENT
Start: 2020-08-02 | End: 2020-08-02

## 2020-08-02 RX ORDER — PREGABALIN 225 MG/1
1000 CAPSULE ORAL ONCE
Refills: 0 | Status: COMPLETED | OUTPATIENT
Start: 2020-08-02 | End: 2020-08-02

## 2020-08-02 RX ORDER — IRON SUCROSE 20 MG/ML
200 INJECTION, SOLUTION INTRAVENOUS EVERY 24 HOURS
Refills: 0 | Status: COMPLETED | OUTPATIENT
Start: 2020-08-02 | End: 2020-08-06

## 2020-08-02 RX ORDER — CEFTRIAXONE 500 MG/1
1000 INJECTION, POWDER, FOR SOLUTION INTRAMUSCULAR; INTRAVENOUS ONCE
Refills: 0 | Status: DISCONTINUED | OUTPATIENT
Start: 2020-08-02 | End: 2020-08-02

## 2020-08-02 RX ORDER — CEFTRIAXONE 500 MG/1
1000 INJECTION, POWDER, FOR SOLUTION INTRAMUSCULAR; INTRAVENOUS ONCE
Refills: 0 | Status: COMPLETED | OUTPATIENT
Start: 2020-08-02 | End: 2020-08-02

## 2020-08-02 RX ORDER — FOLIC ACID 0.8 MG
1 TABLET ORAL DAILY
Refills: 0 | Status: DISCONTINUED | OUTPATIENT
Start: 2020-08-02 | End: 2020-08-02

## 2020-08-02 RX ORDER — FOLIC ACID 0.8 MG
5 TABLET ORAL DAILY
Refills: 0 | Status: DISCONTINUED | OUTPATIENT
Start: 2020-08-03 | End: 2020-08-17

## 2020-08-02 RX ORDER — METOPROLOL TARTRATE 50 MG
50 TABLET ORAL DAILY
Refills: 0 | Status: DISCONTINUED | OUTPATIENT
Start: 2020-08-03 | End: 2020-08-02

## 2020-08-02 RX ADMIN — ENOXAPARIN SODIUM 100 MILLIGRAM(S): 100 INJECTION SUBCUTANEOUS at 06:10

## 2020-08-02 RX ADMIN — ONDANSETRON 4 MILLIGRAM(S): 8 TABLET, FILM COATED ORAL at 17:56

## 2020-08-02 RX ADMIN — CELECOXIB 200 MILLIGRAM(S): 200 CAPSULE ORAL at 17:53

## 2020-08-02 RX ADMIN — GABAPENTIN 300 MILLIGRAM(S): 400 CAPSULE ORAL at 22:47

## 2020-08-02 RX ADMIN — POLYETHYLENE GLYCOL 3350 17 GRAM(S): 17 POWDER, FOR SOLUTION ORAL at 12:40

## 2020-08-02 RX ADMIN — Medication 650 MILLIGRAM(S): at 12:40

## 2020-08-02 RX ADMIN — QUETIAPINE FUMARATE 50 MILLIGRAM(S): 200 TABLET, FILM COATED ORAL at 22:48

## 2020-08-02 RX ADMIN — ONDANSETRON 4 MILLIGRAM(S): 8 TABLET, FILM COATED ORAL at 10:50

## 2020-08-02 RX ADMIN — Medication 650 MILLIGRAM(S): at 06:09

## 2020-08-02 RX ADMIN — Medication 40 MILLIGRAM(S): at 12:40

## 2020-08-02 RX ADMIN — Medication 500 MILLIGRAM(S): at 12:40

## 2020-08-02 RX ADMIN — OXYCODONE HYDROCHLORIDE 10 MILLIGRAM(S): 5 TABLET ORAL at 09:14

## 2020-08-02 RX ADMIN — CELECOXIB 200 MILLIGRAM(S): 200 CAPSULE ORAL at 06:09

## 2020-08-02 RX ADMIN — Medication 1 GRAM(S): at 06:09

## 2020-08-02 RX ADMIN — ENOXAPARIN SODIUM 100 MILLIGRAM(S): 100 INJECTION SUBCUTANEOUS at 17:49

## 2020-08-02 RX ADMIN — SODIUM CHLORIDE 1000 MILLILITER(S): 9 INJECTION, SOLUTION INTRAVENOUS at 00:24

## 2020-08-02 RX ADMIN — LORATADINE 10 MILLIGRAM(S): 10 TABLET ORAL at 12:40

## 2020-08-02 RX ADMIN — CELECOXIB 200 MILLIGRAM(S): 200 CAPSULE ORAL at 17:49

## 2020-08-02 RX ADMIN — Medication 1 GRAM(S): at 17:49

## 2020-08-02 RX ADMIN — OXYCODONE HYDROCHLORIDE 10 MILLIGRAM(S): 5 TABLET ORAL at 19:00

## 2020-08-02 RX ADMIN — PANTOPRAZOLE SODIUM 40 MILLIGRAM(S): 20 TABLET, DELAYED RELEASE ORAL at 06:10

## 2020-08-02 RX ADMIN — Medication 650 MILLIGRAM(S): at 00:24

## 2020-08-02 RX ADMIN — Medication 650 MILLIGRAM(S): at 06:45

## 2020-08-02 RX ADMIN — OXYCODONE HYDROCHLORIDE 10 MILLIGRAM(S): 5 TABLET ORAL at 17:57

## 2020-08-02 RX ADMIN — Medication 650 MILLIGRAM(S): at 01:01

## 2020-08-02 RX ADMIN — CELECOXIB 200 MILLIGRAM(S): 200 CAPSULE ORAL at 06:45

## 2020-08-02 RX ADMIN — ROPINIROLE 10 MILLIGRAM(S): 8 TABLET, FILM COATED, EXTENDED RELEASE ORAL at 22:49

## 2020-08-02 RX ADMIN — CEFTRIAXONE 100 MILLIGRAM(S): 500 INJECTION, POWDER, FOR SOLUTION INTRAMUSCULAR; INTRAVENOUS at 01:44

## 2020-08-02 RX ADMIN — PREGABALIN 1000 MICROGRAM(S): 225 CAPSULE ORAL at 20:42

## 2020-08-02 RX ADMIN — OXYCODONE HYDROCHLORIDE 10 MILLIGRAM(S): 5 TABLET ORAL at 10:00

## 2020-08-02 RX ADMIN — IRON SUCROSE 110 MILLIGRAM(S): 20 INJECTION, SOLUTION INTRAVENOUS at 12:41

## 2020-08-02 NOTE — PROGRESS NOTE ADULT - SUBJECTIVE AND OBJECTIVE BOX
Interval Hx:  Patient's CBC checked today with Hb down to 5.7 g/dL. Patient refusing blood transfusion due to Religion beliefs. She notes one bowel movement w/ BRBPR this AM. She notes slightly more oozing within right hip.     She states she hasn't seen a hematologist in few years and saw Dr. Vigil a while back. She notes significant history of DVT's and PE as well as splenic vein thrombosis. Significant + family hx.    At this time, she endorses lightheadedness when she moves but resolves at rest. Noted to be tachycardic otherwise hemodynamically stable.       VITALS  Vital Signs Last 24 Hrs  T(C): 36.9 (08-02-20 @ 16:27), Max: 37 (08-01-20 @ 21:06)  T(F): 98.4 (08-02-20 @ 16:27), Max: 98.6 (08-01-20 @ 21:06)  HR: 115 (08-02-20 @ 16:27) (89 - 115)  BP: 109/55 (08-02-20 @ 16:27) (82/47 - 117/53)  ABP: --  ABP(mean): --  RR: 16 (08-02-20 @ 16:27) (16 - 20)  SpO2: 100% (08-02-20 @ 16:27) (95% - 100%)          PHYSICAL EXAM  General: A&Ox3; Obese; Able to converse w/o being in resp distress, appears comfortable at rest  Head: NC/AT; MMM; PERRL; EOMI  Neck: Suppe  Cardio: Normal s1s2, tachycardic but regular rhythm, no murmurs appreciated   Respiratory: CTA B/L on anterior auscultation   Gastrointestinal: Soft; NT/ND;+BS  Extremities: Right hip dressing with blood however not saturated  Neurological:  CNII-XII grossly intact; no obvious focal deficits    MEDICATIONS  (STANDING):  MEDICATIONS  (STANDING):  amLODIPine   Tablet 5 milliGRAM(s) Oral daily  celecoxib 200 milliGRAM(s) Oral two times a day  cyanocobalamin Injectable 1000 MICROGram(s) IntraMuscular once  enoxaparin Injectable 100 milliGRAM(s) SubCutaneous every 12 hours  FLUoxetine 40 milliGRAM(s) Oral daily  folic acid 1 milliGRAM(s) Oral daily  gabapentin 300 milliGRAM(s) Oral at bedtime  iron sucrose IVPB 200 milliGRAM(s) IV Intermittent every 24 hours  loratadine 10 milliGRAM(s) Oral daily  losartan 50 milliGRAM(s) Oral daily  metoprolol succinate ER 50 milliGRAM(s) Oral daily  pantoprazole    Tablet 40 milliGRAM(s) Oral before breakfast  polyethylene glycol 3350 17 Gram(s) Oral daily  QUEtiapine 50 milliGRAM(s) Oral at bedtime  rOPINIRole 10 milliGRAM(s) Oral at bedtime  sucralfate suspension 1 Gram(s) Oral every 12 hours  sulfaSALAzine 500 milliGRAM(s) Oral daily    MEDICATIONS  (PRN):  aluminum hydroxide/magnesium hydroxide/simethicone Suspension 30 milliLiter(s) Oral four times a day PRN Indigestion  bisacodyl Suppository 10 milliGRAM(s) Rectal daily PRN If no bowel movement by POD#2  cyclobenzaprine 5 milliGRAM(s) Oral three times a day PRN Muscle Spasm  HYDROmorphone  Injectable 0.5 milliGRAM(s) IV Push every 4 hours PRN breakthrough  HYDROmorphone  Injectable 0.5 milliGRAM(s) IV Push every 15 minutes PRN breakthrough pain  ondansetron Injectable 4 milliGRAM(s) IV Push every 6 hours PRN Nausea and/or Vomiting  oxyCODONE    IR 5 milliGRAM(s) Oral every 4 hours PRN Moderate Pain (4 - 6)  oxyCODONE    IR 10 milliGRAM(s) Oral every 4 hours PRN Severe Pain (7 - 10)  senna 2 Tablet(s) Oral at bedtime PRN Constipation        LABS                                   5.7    8.80  )-----------( 180      ( 02 Aug 2020 09:06 )             18.5         CBC Full  -  ( 02 Aug 2020 09:06 )  WBC Count : 8.80 K/uL  RBC Count : 2.06 M/uL  Hemoglobin : 5.7 g/dL  Hematocrit : 18.5 %  Platelet Count - Automated : 180 K/uL  Mean Cell Volume : 89.8 fl  Mean Cell Hemoglobin : 27.7 pg  Mean Cell Hemoglobin Concentration : 30.8 gm/dL  Auto Neutrophil # : x  Auto Lymphocyte # : x  Auto Monocyte # : x  Auto Eosinophil # : x  Auto Basophil # : x  Auto Neutrophil % : x  Auto Lymphocyte % : x  Auto Monocyte % : x  Auto Eosinophil % : x  Auto Basophil % : x        08-02    140  |  107  |  10  ----------------------------<  133<H>  3.6   |  23  |  0.75    Ca    8.5      02 Aug 2020 07:38  Phos  2.3     08-01  Mg     1.9     08-01

## 2020-08-02 NOTE — PROGRESS NOTE ADULT - ASSESSMENT
50F with PMH DARIO, GERD, RA, PE (2001), DVT (on loveneox 100 mg BID), hypokalemia, DM2, RA, HTN, depression, chronic R hip pain admitted for for right hip revision arthroplasty 7/30. Medicine consulted for co-management    #Acute anemia  #Hypercoaguable state  Patient is Nondenominational and refusing blood transfusion after Hb dropped from 10.1 to 5.7 g/dL. She did endorse one episode of brbpr this AM. After discussion with ortho, they do not believe she has had significant bleeding from right hip and there is no evidence of hematoma in the area.    -Iron studies and erythropoietin levels add on  -recheck cbc tonight; trend CBC daily  -IV Venofer 200 mg daily for 5 days (ordered for daily)  -Please start patient on folic acid 1 mg daily and vitamin B12 1000 mcg qweekly starting today   -Patient is high risk VTE: History of PE/DVT and splenic vein thrombosis, family history of VTE's, Obesity, and post-operative state. Therefore, we recommend repeating CBC and assessing whether blood loss remains active prior to adminsitration of Lovenox tonight  -Procrit has increased risk of DVT and stroke particularly when targeting Hb > 11; will consider based of serum epo levels and response to iron, folic acid and b12  -Consider calling Dr. Vigil from hematology tomorrow     #Tachycardia  Secondary to active blood loss/post op state  -EKG reviewed; sinus tachycardia noted.  -CTM HR; monitor VS frequently. would hold off on beta blocker right now given mild hypotension ntoed      #Post op  - Abx per primary team  - Pain management per primary team   - incentive spirometer     #GERD  -c/w home med pantoprazole 40 mg     #DM2  Pt states that she has pre-diabetes and is not on medication at home. Caution with tight glucose control while inpatient.  Goal FSG < 180.     #HTN    - Recommend holding all anti hypertensives as she is normotensive/slightly hypotensive particularly with the acute blood loss     #neuropathy  - c/w gabapentin     #Depression  - c/w home meds Seroquel, fluoxetine

## 2020-08-02 NOTE — CHART NOTE - NSCHARTNOTEFT_GEN_A_CORE
Medicine called regarding acute development of anemia with repeat Hgb 5.7 this AM. In setting of Ms. Marcelino being a Confucianist and refusing whole blood products, would recommend:  - venofer infusion 200mg QD for 5 days  - obtain/add-on iron studies  - obtain/add-on EPO level  - obtain FOBT  - trend CBC  - monitor for signs or symptoms of acute bleed  - monitor vital signs for hemodynamic stability

## 2020-08-02 NOTE — PROGRESS NOTE ADULT - SUBJECTIVE AND OBJECTIVE BOX
SUBJECTIVE: Patient seen and examined. Pt feels a little dizzy with changes in position.  Pt did well o/n  No f/c/n/v/cp/sob.     OBJECTIVE:  NAD    Vital Signs Last 24 Hrs  T(C): 36.6 (02 Aug 2020 08:44), Max: 37 (01 Aug 2020 21:06)  T(F): 97.9 (02 Aug 2020 08:44), Max: 98.6 (01 Aug 2020 21:06)  HR: 107 (02 Aug 2020 08:44) (83 - 107)  BP: 117/53 (02 Aug 2020 08:44) (77/42 - 117/53)  BP(mean): 65 (01 Aug 2020 14:00) (54 - 65)  RR: 20 (02 Aug 2020 08:44) (16 - 20)  SpO2: 100% (02 Aug 2020 08:44) (95% - 100%)    Physical Exam:  General: Resting comfortably in bed. AAOx3. NAD.  Extremities:        LLE: No gross deformity. SILT L2-S1 distribution, symmetric. TA/EHL/FHL/GS motor intact. WWP, DP 2+       RLE: Dressing c/d/i. SILT L2-S1 distribution, symmetric. TA/EHL/FHL/GS motor intact. WWP, DP 2+            A/P :  Pt is a 49yo Female s/p R SANDY revision (acetabulum and femoral head) on 7/30  -    Anemia: Hgb this AM 5.7 x2, since pt is Voodoo, declined blood transfusion. Will proceed with iron infusion 200 mg x 5 days  -    F/u FOBT, EPO level, Iron Studies, Trend CBC, Monitor Hemodynamics  -    Pain control  -    DVT ppx: Lovenox 100 BID (home dose), SCD     -    Weight bearing status: PWB  -    Physical Therapy, pt became orthostatic yesterday during PT, will try again today  -    Dispo: Home w HPT once cleared

## 2020-08-03 LAB
ALBUMIN SERPL ELPH-MCNC: 2.5 G/DL — LOW (ref 3.3–5)
ALP SERPL-CCNC: 57 U/L — SIGNIFICANT CHANGE UP (ref 40–120)
ALT FLD-CCNC: 10 U/L — SIGNIFICANT CHANGE UP (ref 10–45)
ANION GAP SERPL CALC-SCNC: 11 MMOL/L — SIGNIFICANT CHANGE UP (ref 5–17)
APTT BLD: 31.6 SEC — SIGNIFICANT CHANGE UP (ref 27.5–35.5)
AST SERPL-CCNC: 17 U/L — SIGNIFICANT CHANGE UP (ref 10–40)
BASOPHILS # BLD AUTO: 0.01 K/UL — SIGNIFICANT CHANGE UP (ref 0–0.2)
BASOPHILS NFR BLD AUTO: 0.1 % — SIGNIFICANT CHANGE UP (ref 0–2)
BILIRUB SERPL-MCNC: 0.3 MG/DL — SIGNIFICANT CHANGE UP (ref 0.2–1.2)
BUN SERPL-MCNC: 6 MG/DL — LOW (ref 7–23)
CALCIUM SERPL-MCNC: 8.6 MG/DL — SIGNIFICANT CHANGE UP (ref 8.4–10.5)
CHLORIDE SERPL-SCNC: 104 MMOL/L — SIGNIFICANT CHANGE UP (ref 96–108)
CO2 SERPL-SCNC: 24 MMOL/L — SIGNIFICANT CHANGE UP (ref 22–31)
CREAT SERPL-MCNC: 0.73 MG/DL — SIGNIFICANT CHANGE UP (ref 0.5–1.3)
EOSINOPHIL # BLD AUTO: 0.15 K/UL — SIGNIFICANT CHANGE UP (ref 0–0.5)
EOSINOPHIL NFR BLD AUTO: 2.1 % — SIGNIFICANT CHANGE UP (ref 0–6)
GLUCOSE SERPL-MCNC: 121 MG/DL — HIGH (ref 70–99)
HCT VFR BLD CALC: 14.4 % — CRITICAL LOW (ref 34.5–45)
HGB BLD-MCNC: 4.4 G/DL — CRITICAL LOW (ref 11.5–15.5)
IMM GRANULOCYTES NFR BLD AUTO: 1.9 % — HIGH (ref 0–1.5)
INR BLD: 1.04 — SIGNIFICANT CHANGE UP (ref 0.88–1.16)
LYMPHOCYTES # BLD AUTO: 1.46 K/UL — SIGNIFICANT CHANGE UP (ref 1–3.3)
LYMPHOCYTES # BLD AUTO: 20.9 % — SIGNIFICANT CHANGE UP (ref 13–44)
MCHC RBC-ENTMCNC: 27.7 PG — SIGNIFICANT CHANGE UP (ref 27–34)
MCHC RBC-ENTMCNC: 30.6 GM/DL — LOW (ref 32–36)
MCV RBC AUTO: 90.6 FL — SIGNIFICANT CHANGE UP (ref 80–100)
MONOCYTES # BLD AUTO: 0.54 K/UL — SIGNIFICANT CHANGE UP (ref 0–0.9)
MONOCYTES NFR BLD AUTO: 7.7 % — SIGNIFICANT CHANGE UP (ref 2–14)
NEUTROPHILS # BLD AUTO: 4.7 K/UL — SIGNIFICANT CHANGE UP (ref 1.8–7.4)
NEUTROPHILS NFR BLD AUTO: 67.3 % — SIGNIFICANT CHANGE UP (ref 43–77)
NRBC # BLD: 1 /100 WBCS — HIGH (ref 0–0)
PLATELET # BLD AUTO: 202 K/UL — SIGNIFICANT CHANGE UP (ref 150–400)
POTASSIUM SERPL-MCNC: 3.6 MMOL/L — SIGNIFICANT CHANGE UP (ref 3.5–5.3)
POTASSIUM SERPL-SCNC: 3.6 MMOL/L — SIGNIFICANT CHANGE UP (ref 3.5–5.3)
PROT SERPL-MCNC: 5.4 G/DL — LOW (ref 6–8.3)
PROTHROM AB SERPL-ACNC: 12.4 SEC — SIGNIFICANT CHANGE UP (ref 10.6–13.6)
RBC # BLD: 1.59 M/UL — LOW (ref 3.8–5.2)
RBC # FLD: 16.4 % — HIGH (ref 10.3–14.5)
SODIUM SERPL-SCNC: 139 MMOL/L — SIGNIFICANT CHANGE UP (ref 135–145)
WBC # BLD: 6.99 K/UL — SIGNIFICANT CHANGE UP (ref 3.8–10.5)
WBC # FLD AUTO: 6.99 K/UL — SIGNIFICANT CHANGE UP (ref 3.8–10.5)

## 2020-08-03 PROCEDURE — 99233 SBSQ HOSP IP/OBS HIGH 50: CPT

## 2020-08-03 PROCEDURE — 99233 SBSQ HOSP IP/OBS HIGH 50: CPT | Mod: GC

## 2020-08-03 PROCEDURE — 71045 X-RAY EXAM CHEST 1 VIEW: CPT | Mod: 26

## 2020-08-03 PROCEDURE — 99291 CRITICAL CARE FIRST HOUR: CPT

## 2020-08-03 RX ORDER — ACETAMINOPHEN 500 MG
650 TABLET ORAL EVERY 6 HOURS
Refills: 0 | Status: DISCONTINUED | OUTPATIENT
Start: 2020-08-03 | End: 2020-08-17

## 2020-08-03 RX ORDER — PREGABALIN 225 MG/1
1000 CAPSULE ORAL DAILY
Refills: 0 | Status: DISCONTINUED | OUTPATIENT
Start: 2020-08-03 | End: 2020-08-03

## 2020-08-03 RX ORDER — ERYTHROPOIETIN 10000 [IU]/ML
10000 INJECTION, SOLUTION INTRAVENOUS; SUBCUTANEOUS ONCE
Refills: 0 | Status: COMPLETED | OUTPATIENT
Start: 2020-08-03 | End: 2020-08-03

## 2020-08-03 RX ORDER — SODIUM CHLORIDE 9 MG/ML
1000 INJECTION, SOLUTION INTRAVENOUS
Refills: 0 | Status: DISCONTINUED | OUTPATIENT
Start: 2020-08-03 | End: 2020-08-03

## 2020-08-03 RX ORDER — PREGABALIN 225 MG/1
1000 CAPSULE ORAL DAILY
Refills: 0 | Status: DISCONTINUED | OUTPATIENT
Start: 2020-08-03 | End: 2020-08-17

## 2020-08-03 RX ADMIN — Medication 650 MILLIGRAM(S): at 13:02

## 2020-08-03 RX ADMIN — PANTOPRAZOLE SODIUM 40 MILLIGRAM(S): 20 TABLET, DELAYED RELEASE ORAL at 07:13

## 2020-08-03 RX ADMIN — Medication 500 MILLIGRAM(S): at 10:44

## 2020-08-03 RX ADMIN — OXYCODONE HYDROCHLORIDE 10 MILLIGRAM(S): 5 TABLET ORAL at 11:38

## 2020-08-03 RX ADMIN — Medication 650 MILLIGRAM(S): at 14:34

## 2020-08-03 RX ADMIN — SODIUM CHLORIDE 1000 MILLILITER(S): 9 INJECTION, SOLUTION INTRAVENOUS at 00:52

## 2020-08-03 RX ADMIN — OXYCODONE HYDROCHLORIDE 10 MILLIGRAM(S): 5 TABLET ORAL at 21:15

## 2020-08-03 RX ADMIN — Medication 40 MILLIGRAM(S): at 10:44

## 2020-08-03 RX ADMIN — Medication 650 MILLIGRAM(S): at 07:13

## 2020-08-03 RX ADMIN — OXYCODONE HYDROCHLORIDE 10 MILLIGRAM(S): 5 TABLET ORAL at 13:03

## 2020-08-03 RX ADMIN — ONDANSETRON 4 MILLIGRAM(S): 8 TABLET, FILM COATED ORAL at 07:21

## 2020-08-03 RX ADMIN — PREGABALIN 1000 MICROGRAM(S): 225 CAPSULE ORAL at 22:11

## 2020-08-03 RX ADMIN — Medication 5 MILLIGRAM(S): at 00:52

## 2020-08-03 RX ADMIN — Medication 1 GRAM(S): at 18:00

## 2020-08-03 RX ADMIN — Medication 650 MILLIGRAM(S): at 18:30

## 2020-08-03 RX ADMIN — ONDANSETRON 4 MILLIGRAM(S): 8 TABLET, FILM COATED ORAL at 17:26

## 2020-08-03 RX ADMIN — Medication 1 GRAM(S): at 07:14

## 2020-08-03 RX ADMIN — Medication 650 MILLIGRAM(S): at 07:43

## 2020-08-03 RX ADMIN — Medication 5 MILLIGRAM(S): at 10:46

## 2020-08-03 RX ADMIN — OXYCODONE HYDROCHLORIDE 10 MILLIGRAM(S): 5 TABLET ORAL at 20:45

## 2020-08-03 RX ADMIN — IRON SUCROSE 110 MILLIGRAM(S): 20 INJECTION, SOLUTION INTRAVENOUS at 10:44

## 2020-08-03 RX ADMIN — ERYTHROPOIETIN 10000 UNIT(S): 10000 INJECTION, SOLUTION INTRAVENOUS; SUBCUTANEOUS at 11:30

## 2020-08-03 RX ADMIN — SODIUM CHLORIDE 125 MILLILITER(S): 9 INJECTION, SOLUTION INTRAVENOUS at 03:54

## 2020-08-03 RX ADMIN — GABAPENTIN 300 MILLIGRAM(S): 400 CAPSULE ORAL at 22:13

## 2020-08-03 RX ADMIN — QUETIAPINE FUMARATE 50 MILLIGRAM(S): 200 TABLET, FILM COATED ORAL at 22:12

## 2020-08-03 RX ADMIN — Medication 650 MILLIGRAM(S): at 18:00

## 2020-08-03 NOTE — CONSULT NOTE ADULT - SUBJECTIVE AND OBJECTIVE BOX
HPI:  50F w/ [employer] (decline blood product), MO s/p sleeve gastrectomy, hx of diverticulitis s/p colonic resection, hx of hernia repair, PE/DVT on LMWH, recent L SANDY 2/20 p/f revision of R SANDY.  Patient had chronic hip pain w/ persistent symptoms despite conservative management.  Sx started around 1/20.  Pt had initial right hip replacement on 9/2019.  She had revision of R SANDY on 7/30/20.  Hospital course complicated by acute anemia with hgb drop to 5s from 10s.  Patient reports small BM with some blood on stool on TP.  Otherwise, denies melena or hx of GI bleeding.  Last colonoscopy 1 yr ago with polyps.  She received therapeutic LMWH during hospital course with last dose this afternoon despite acute drop in anemia.  She also received NSAID and SSRI.  Mother side with cancer, unclear if CRC.      During interview, patient is SOB and feels weak.  Denies fever, chill, cp, abd pain, vomiting.  +Nausea and loose BM, but on laxative.  Had light brown formed stool at bedside commode following examination.      Allergies    lisinopril (Other)  verapamil (Other)    Intolerances      Home Medications:  amLODIPine 5 mg oral tablet: 1 tab(s) orally once a day (30 Jul 2020 09:04)  Carafate 1 g/10 mL oral suspension: 10 milliliter(s) orally once a day (30 Jul 2020 09:04)  cetirizine 10 mg oral tablet: 1 tab(s) orally once a day (30 Jul 2020 09:04)  cyanocobalamin 1000 mcg/mL injectable solution: injectable once a month (30 Jul 2020 09:04)  folic acid 1 mg oral tablet: 1 tab(s) orally once a day (30 Jul 2020 09:04)  gabapentin 300 mg oral tablet: 1 tab(s) orally once a day (at bedtime) (30 Jul 2020 09:04)  Klor-Con M10 oral tablet, extended release: 1 tab(s) orally once a day (30 Jul 2020 09:04)  losartan-hydrochlorothiazide 50mg-12.5mg oral tablet: 1 tab(s) orally once a day (30 Jul 2020 09:04)  Lovenox 100 mg/mL injectable solution: injectable every 12 hours  prescription provided outpatient by your primary care provider (30 Jul 2020 09:04)  metoprolol tartrate 50 mg oral tablet: 1 tab(s) orally once a day (30 Jul 2020 09:04)  omeprazole 40 mg oral delayed release capsule: 1 cap(s) orally once a day (30 Jul 2020 09:04)  PROzac 40 mg oral capsule: 1 cap(s) orally once a day (30 Jul 2020 09:04)  rOPINIRole 5 mg oral tablet: 2 tab(s) orally once a day (at bedtime) (30 Jul 2020 09:04)  SEROquel 50 mg oral tablet: 1 tab(s) orally once a day (at bedtime) (30 Jul 2020 09:04)  sulfaSALAzine 500 mg oral tablet: 1 tab(s) orally once a day (30 Jul 2020 09:04)    MEDICATIONS:  MEDICATIONS  (STANDING):  FLUoxetine 40 milliGRAM(s) Oral daily  folic acid 5 milliGRAM(s) Oral daily  gabapentin 300 milliGRAM(s) Oral at bedtime  iron sucrose IVPB 200 milliGRAM(s) IV Intermittent every 24 hours  lactated ringers Bolus 1000 milliLiter(s) IV Bolus once  loratadine 10 milliGRAM(s) Oral daily  pantoprazole    Tablet 40 milliGRAM(s) Oral before breakfast  QUEtiapine 50 milliGRAM(s) Oral at bedtime  rOPINIRole 10 milliGRAM(s) Oral at bedtime  sucralfate suspension 1 Gram(s) Oral every 12 hours  sulfaSALAzine 500 milliGRAM(s) Oral daily    MEDICATIONS  (PRN):  aluminum hydroxide/magnesium hydroxide/simethicone Suspension 30 milliLiter(s) Oral four times a day PRN Indigestion  bisacodyl Suppository 10 milliGRAM(s) Rectal daily PRN If no bowel movement by POD#2  cyclobenzaprine 5 milliGRAM(s) Oral three times a day PRN Muscle Spasm  HYDROmorphone  Injectable 0.5 milliGRAM(s) IV Push every 4 hours PRN breakthrough  HYDROmorphone  Injectable 0.5 milliGRAM(s) IV Push every 15 minutes PRN breakthrough pain  ondansetron Injectable 4 milliGRAM(s) IV Push every 6 hours PRN Nausea and/or Vomiting  oxyCODONE    IR 5 milliGRAM(s) Oral every 4 hours PRN Moderate Pain (4 - 6)  oxyCODONE    IR 10 milliGRAM(s) Oral every 4 hours PRN Severe Pain (7 - 10)  senna 2 Tablet(s) Oral at bedtime PRN Constipation    PAST MEDICAL & SURGICAL HISTORY:  Depression  Rheumatoid arthritis  Osteoarthritis  Restless leg  DARIO (obstructive sleep apnea)  GERD (gastroesophageal reflux disease)  Diverticulitis  Diabetes mellitus, type 2  Chronic anticoagulation  Hypercoagulable state  Neuropathy  Pancreatitis  HTN (hypertension)  Pulmonary embolism  DVT (deep venous thrombosis): left leg  History of surgery: right total hip replacement 9/12/2019  History of surgery: 2019 L thr  History of cholecystectomy  History of colon resection: diverticulitis  Abdominal hernia: umbilical  History of sleeve gastrectomy    FAMILY HISTORY:  Reports fam hx of Ca on mother's side    SOCIAL HISTORY:  Tobacco: denies  Alcohol: drinks around once a month  Illicit Drugs:denies    REVIEW OF SYSTEMS:  All other 10 review of systems is negative except as indicated in HPI    Vital Signs Last 24 Hrs  T(C): 36.7 (02 Aug 2020 20:41), Max: 36.9 (02 Aug 2020 16:27)  T(F): 98.1 (02 Aug 2020 20:41), Max: 98.4 (02 Aug 2020 16:27)  HR: 112 (02 Aug 2020 23:35) (91 - 125)  BP: 99/52 (02 Aug 2020 23:35) (82/47 - 117/53)  BP(mean): --  RR: 20 (02 Aug 2020 23:35) (16 - 20)  SpO2: 95% (02 Aug 2020 23:35) (95% - 100%)    08-01 @ 07:01  -  08-02 @ 07:00  --------------------------------------------------------  IN: 4550 mL / OUT: 1000 mL / NET: 3550 mL    08-02 @ 07:01  -  08-03 @ 00:43  --------------------------------------------------------  IN: 240 mL / OUT: 400 mL / NET: -160 mL        PHYSICAL EXAM:    General: Well developed; well nourished; in no acute distress  Eyes: moist conjunctivae, sclerae anicteric  HENT: Moist mucous membranes, tongue midline  Neck: Trachea midline, supple  Lungs: Normal respiratory effort and no intercostal retractions  Cardiovascular: RRR, S1S2  Abdomen: Soft, non-tender non-distended; Normal bowel sounds; No rebound or guarding  Rectal exam: External hemorrhoid, no blood clot or stigmata of recent bleeding, normal rectal tone, stool in rectal vault, light green-brown stool, no evidence of blood or melena  Extremities: Right hip with ecchymosis at the lateral side inferior to the dressing  Neurological: Alert and oriented x3, nonfocal exam  Skin: Warm and dry. No obvious rash    LABS:                        4.6    7.61  )-----------( 183      ( 02 Aug 2020 20:41 )             14.5     08-02    140  |  107  |  10  ----------------------------<  133<H>  3.6   |  23  |  0.75    Ca    8.5      02 Aug 2020 07:38  Phos  2.8     08-02  Mg     1.9     08-01    TPro  x   /  Alb  2.5<L>  /  TBili  <0.2  /  DBili  <0.2  /  AST  17  /  ALT  9<L>  /  AlkPhos  x   08-02            RADIOLOGY & ADDITIONAL STUDIES:   Reviewed  < from: CT Angio Abdomen and Pelvis w/ IV Cont (08.02.20 @ 23:30) >  1.  No active GI hemorrhage within the small bowel and colon. Limited evaluation of therectum.  2.  Large fluid collection within the lateral subcutaneous tissues overlying the right hip and within the lower extremity musculature. Foci of gas within the collection which is concerning for infection. Hematoma cannot be excluded.    < end of copied text > HPI:  50F w/ [employer] (decline blood product), MO s/p sleeve gastrectomy, DM, hx of diverticulitis s/p colonic resection, hx of hernia repair, DARIO, RA, PE/DVT on LMWH, recent L SANDY 2/20 p/f revision of R SANDY.  Patient had chronic hip pain w/ persistent symptoms despite conservative management.  Sx started around 1/20.  Pt had initial right hip replacement on 9/2019.  She had revision of R SANDY on 7/30/20.  Hospital course complicated by acute anemia with hgb drop to 5s from 10s.  Patient reports small BM with some blood on stool on TP.  Otherwise, denies melena or hx of GI bleeding.  Last colonoscopy 1 yr ago with polyps.  She received therapeutic LMWH during hospital course with last dose this afternoon despite acute drop in anemia.  She also received NSAID and SSRI.  Mother side with cancer, unclear if CRC.      During interview, patient is SOB and feels weak.  Denies fever, chill, cp, abd pain, vomiting.  +Nausea and loose BM, but on laxative.  Had light brown formed stool at bedside commode following examination.      Allergies    lisinopril (Other)  verapamil (Other)    Intolerances      Home Medications:  amLODIPine 5 mg oral tablet: 1 tab(s) orally once a day (30 Jul 2020 09:04)  Carafate 1 g/10 mL oral suspension: 10 milliliter(s) orally once a day (30 Jul 2020 09:04)  cetirizine 10 mg oral tablet: 1 tab(s) orally once a day (30 Jul 2020 09:04)  cyanocobalamin 1000 mcg/mL injectable solution: injectable once a month (30 Jul 2020 09:04)  folic acid 1 mg oral tablet: 1 tab(s) orally once a day (30 Jul 2020 09:04)  gabapentin 300 mg oral tablet: 1 tab(s) orally once a day (at bedtime) (30 Jul 2020 09:04)  Klor-Con M10 oral tablet, extended release: 1 tab(s) orally once a day (30 Jul 2020 09:04)  losartan-hydrochlorothiazide 50mg-12.5mg oral tablet: 1 tab(s) orally once a day (30 Jul 2020 09:04)  Lovenox 100 mg/mL injectable solution: injectable every 12 hours  prescription provided outpatient by your primary care provider (30 Jul 2020 09:04)  metoprolol tartrate 50 mg oral tablet: 1 tab(s) orally once a day (30 Jul 2020 09:04)  omeprazole 40 mg oral delayed release capsule: 1 cap(s) orally once a day (30 Jul 2020 09:04)  PROzac 40 mg oral capsule: 1 cap(s) orally once a day (30 Jul 2020 09:04)  rOPINIRole 5 mg oral tablet: 2 tab(s) orally once a day (at bedtime) (30 Jul 2020 09:04)  SEROquel 50 mg oral tablet: 1 tab(s) orally once a day (at bedtime) (30 Jul 2020 09:04)  sulfaSALAzine 500 mg oral tablet: 1 tab(s) orally once a day (30 Jul 2020 09:04)    MEDICATIONS:  MEDICATIONS  (STANDING):  FLUoxetine 40 milliGRAM(s) Oral daily  folic acid 5 milliGRAM(s) Oral daily  gabapentin 300 milliGRAM(s) Oral at bedtime  iron sucrose IVPB 200 milliGRAM(s) IV Intermittent every 24 hours  lactated ringers Bolus 1000 milliLiter(s) IV Bolus once  loratadine 10 milliGRAM(s) Oral daily  pantoprazole    Tablet 40 milliGRAM(s) Oral before breakfast  QUEtiapine 50 milliGRAM(s) Oral at bedtime  rOPINIRole 10 milliGRAM(s) Oral at bedtime  sucralfate suspension 1 Gram(s) Oral every 12 hours  sulfaSALAzine 500 milliGRAM(s) Oral daily    MEDICATIONS  (PRN):  aluminum hydroxide/magnesium hydroxide/simethicone Suspension 30 milliLiter(s) Oral four times a day PRN Indigestion  bisacodyl Suppository 10 milliGRAM(s) Rectal daily PRN If no bowel movement by POD#2  cyclobenzaprine 5 milliGRAM(s) Oral three times a day PRN Muscle Spasm  HYDROmorphone  Injectable 0.5 milliGRAM(s) IV Push every 4 hours PRN breakthrough  HYDROmorphone  Injectable 0.5 milliGRAM(s) IV Push every 15 minutes PRN breakthrough pain  ondansetron Injectable 4 milliGRAM(s) IV Push every 6 hours PRN Nausea and/or Vomiting  oxyCODONE    IR 5 milliGRAM(s) Oral every 4 hours PRN Moderate Pain (4 - 6)  oxyCODONE    IR 10 milliGRAM(s) Oral every 4 hours PRN Severe Pain (7 - 10)  senna 2 Tablet(s) Oral at bedtime PRN Constipation    PAST MEDICAL & SURGICAL HISTORY:  Depression  Rheumatoid arthritis  Osteoarthritis  Restless leg  DARIO (obstructive sleep apnea)  GERD (gastroesophageal reflux disease)  Diverticulitis  Diabetes mellitus, type 2  Chronic anticoagulation  Hypercoagulable state  Neuropathy  Pancreatitis  HTN (hypertension)  Pulmonary embolism  DVT (deep venous thrombosis): left leg  History of surgery: right total hip replacement 9/12/2019  History of surgery: 2019 L thr  History of cholecystectomy  History of colon resection: diverticulitis  Abdominal hernia: umbilical  History of sleeve gastrectomy    FAMILY HISTORY:  Reports fam hx of Ca on mother's side    SOCIAL HISTORY:  Tobacco: denies  Alcohol: drinks around once a month  Illicit Drugs:denies    REVIEW OF SYSTEMS:  All other 10 review of systems is negative except as indicated in HPI    Vital Signs Last 24 Hrs  T(C): 36.7 (02 Aug 2020 20:41), Max: 36.9 (02 Aug 2020 16:27)  T(F): 98.1 (02 Aug 2020 20:41), Max: 98.4 (02 Aug 2020 16:27)  HR: 112 (02 Aug 2020 23:35) (91 - 125)  BP: 99/52 (02 Aug 2020 23:35) (82/47 - 117/53)  BP(mean): --  RR: 20 (02 Aug 2020 23:35) (16 - 20)  SpO2: 95% (02 Aug 2020 23:35) (95% - 100%)    08-01 @ 07:01 - 08-02 @ 07:00  --------------------------------------------------------  IN: 4550 mL / OUT: 1000 mL / NET: 3550 mL    08-02 @ 07:01  -  08-03 @ 00:43  --------------------------------------------------------  IN: 240 mL / OUT: 400 mL / NET: -160 mL        PHYSICAL EXAM:    General: Well developed; well nourished; in no acute distress  Eyes: moist conjunctivae, sclerae anicteric  HENT: Moist mucous membranes, tongue midline  Neck: Trachea midline, supple  Lungs: Normal respiratory effort and no intercostal retractions  Cardiovascular: RRR, S1S2  Abdomen: Soft, non-tender non-distended; Normal bowel sounds; No rebound or guarding  Rectal exam: External hemorrhoid, no blood clot or stigmata of recent bleeding, normal rectal tone, stool in rectal vault, light green-brown stool, no evidence of blood or melena  Extremities: Right hip with ecchymosis at the lateral side inferior to the dressing  Neurological: Alert and oriented x3, nonfocal exam  Skin: Warm and dry. No obvious rash    LABS:                        4.6    7.61  )-----------( 183      ( 02 Aug 2020 20:41 )             14.5     08-02    140  |  107  |  10  ----------------------------<  133<H>  3.6   |  23  |  0.75    Ca    8.5      02 Aug 2020 07:38  Phos  2.8     08-02  Mg     1.9     08-01    TPro  x   /  Alb  2.5<L>  /  TBili  <0.2  /  DBili  <0.2  /  AST  17  /  ALT  9<L>  /  AlkPhos  x   08-02            RADIOLOGY & ADDITIONAL STUDIES:   Reviewed  < from: CT Angio Abdomen and Pelvis w/ IV Cont (08.02.20 @ 23:30) >  1.  No active GI hemorrhage within the small bowel and colon. Limited evaluation of therectum.  2.  Large fluid collection within the lateral subcutaneous tissues overlying the right hip and within the lower extremity musculature. Foci of gas within the collection which is concerning for infection. Hematoma cannot be excluded.    < end of copied text >

## 2020-08-03 NOTE — CONSULT NOTE ADULT - ASSESSMENT
50F Protestant with obesity s/p sleeve gastrectomy, RA, HTN,  DM, hx of diverticulitis s/p colonic resection, hx of hernia repair, DARIO, RA, PE/DVT on LMWH (lovenox 100 mg BID), recent L SANDY 2/20  PMH DARIO, GERD, RA, PE (2001), DVT (on loveneox 100 mg BID),  admitted for for right hip revision arthroplasty 7/30. Patient is currently  Post-operative day #4 s/p R revision SANDY. Hematology consulted for drop  hgb of 10.1 on 7/30 to 5.6.  Repeat hgb decrease to 4.6.  GI was consulted to assess for GI bleed and per GI note, not likely source of bleed  CTA  performed with following impression:  1.  No active GI hemorrhage within the small bowel and colon. Limited evaluation of the rectum.  2.  Large fluid collection within the lateral subcutaneous tissues overlying the right hip and within the lower extremity musculature. Foci of gas within the collection which is concerning for infection. Hematoma cannot be excluded.  Currently patient feels tired and fatigued. No shortness or breath or chest pain. Continues to be tachycardic.     #Anemia  - Acute drop in Hgb   - Unclear source of bleed, CTA non revealing. GI following  - S/p Procrit x1 (12274), would avoid further epo as patient has significant history of VTE   - Continue Venofer 200 mg QD for a total of 5 doses  - S/p 1000 mcg of B12 IM  - Continue folic acid, 5 mg IV QD  - Discussed with patient risk of not taking PRBCs may include death, patient understands  - Would limit blood draws, check retic count with next round of labs, include hemolysis labs as well (since clear source of bleed was not identified)    D/w Dr. Vigil

## 2020-08-03 NOTE — PROGRESS NOTE ADULT - SUBJECTIVE AND OBJECTIVE BOX
GASTROENTEROLOGY PROGRESS NOTE  Patient seen and examined at bedside. No GIB reported.    PERTINENT REVIEW OF SYSTEMS:  As noted above    Allergies    lisinopril (Other)  verapamil (Other)    Intolerances      MEDICATIONS:  MEDICATIONS  (STANDING):  acetaminophen   Tablet .. 650 milliGRAM(s) Oral every 6 hours  cyanocobalamin 1000 MICROGram(s) Oral daily  FLUoxetine 40 milliGRAM(s) Oral daily  folic acid 5 milliGRAM(s) Oral daily  gabapentin 300 milliGRAM(s) Oral at bedtime  iron sucrose IVPB 200 milliGRAM(s) IV Intermittent every 24 hours  pantoprazole    Tablet 40 milliGRAM(s) Oral before breakfast  QUEtiapine 50 milliGRAM(s) Oral at bedtime  sucralfate suspension 1 Gram(s) Oral every 12 hours  sulfaSALAzine 500 milliGRAM(s) Oral daily    MEDICATIONS  (PRN):  ondansetron Injectable 4 milliGRAM(s) IV Push every 6 hours PRN Nausea and/or Vomiting  oxyCODONE    IR 5 milliGRAM(s) Oral every 4 hours PRN Moderate Pain (4 - 6)  oxyCODONE    IR 10 milliGRAM(s) Oral every 4 hours PRN Severe Pain (7 - 10)  senna 2 Tablet(s) Oral at bedtime PRN Constipation    Vital Signs Last 24 Hrs  T(C): 36.8 (03 Aug 2020 17:02), Max: 37.2 (03 Aug 2020 00:54)  T(F): 98.2 (03 Aug 2020 17:02), Max: 98.9 (03 Aug 2020 00:54)  HR: 110 (03 Aug 2020 18:00) (104 - 125)  BP: 79/46 (03 Aug 2020 15:00) (79/46 - 111/56)  BP(mean): 58 (03 Aug 2020 15:00) (58 - 81)  RR: 19 (03 Aug 2020 18:00) (12 - 39)  SpO2: 100% (03 Aug 2020 18:00) (94% - 100%)     @ 07:01  -   @ 07:00  --------------------------------------------------------  IN: 1490 mL / OUT: 600 mL / NET: 890 mL     @ 07:01  -   @ 18:17  --------------------------------------------------------  IN: 500 mL / OUT: 925 mL / NET: -425 mL      PHYSICAL EXAM:    General: Well developed; well nourished; in no acute distress  HEENT: MMM, conjunctiva and sclera clear  Gastrointestinal: Soft non-tender non-distended; Normal bowel sounds; No hepatosplenomegaly. No rebound or guarding  Skin: Warm and dry. No obvious rash    LABS:                        4.4    6.99  )-----------( 202      ( 03 Aug 2020 06:20 )             14.4     08-03    139  |  104  |  6<L>  ----------------------------<  121<H>  3.6   |  24  |  0.73    Ca    8.6      03 Aug 2020 06:20  Phos  2.8     08-02    TPro  5.4<L>  /  Alb  2.5<L>  /  TBili  0.3  /  DBili  x   /  AST  17  /  ALT  10  /  AlkPhos  57  08-03    PT/INR - ( 03 Aug 2020 09:04 )   PT: 12.4 sec;   INR: 1.04          PTT - ( 03 Aug 2020 09:04 )  PTT:31.6 sec      Urinalysis Basic - ( 01 Aug 2020 20:55 )    Color: Yellow / Appearance: Clear / S.025 / pH: x  Gluc: x / Ketone: NEGATIVE  / Bili: Negative / Urobili: 0.2 E.U./dL   Blood: x / Protein: Trace mg/dL / Nitrite: NEGATIVE   Leuk Esterase: NEGATIVE / RBC: < 5 /HPF / WBC < 5 /HPF   Sq Epi: x / Non Sq Epi: 0-5 /HPF / Bacteria: Present /HPF                RADIOLOGY & ADDITIONAL STUDIES:  Reviewed

## 2020-08-03 NOTE — CONSULT NOTE ADULT - SUBJECTIVE AND OBJECTIVE BOX
SICU CONSULT NOTE    HPI:    50F Rastafarian with PMHx diverticulitis s/p R hemicolectomy, MO s/p sleeve gastrectomy, DARIO, RA, PE/DVT/SVT on LMWH, DARIO, RA, recent L SANDY  presents for revision of R SANDY on . Per ortho the operation was uncomplicated and involved 100cc EBL. Postop course c/b postop anemia;  Hgb preop was 10.6, unchecked POD1, 5.4->4.6 on POD2, and 4.4 today on POD3. Pt noted small bloody BM overnight (FOBT +). She was started on IV iron and folic acid per medicine, procrit was withheld 2/2 h/o hypercoagulability. CTA A/P obtained showing no active GI hemorrhage but large fluid collection overlying subq tissue and w/in musculature of R hip c/w hematoma. GI team consulted overnight and noted to have rectal exam w/o evidence of bleeding and normal appearing stool, recommending against colonoscopy at this time.    SICU consulted for hemodynamic monitoring. Overnight the patient was given 1L fluids. Since 4PM tachycardic 115-125, with downtrending /55->86/58, however not tachypneic and w/ O2Sat 97% on RA. 400cc UOP overnight. Pt reports feeling dizzy and fatigued with minor movements. Denies chest pain, dyspnea, or continued bloody bowel movements. Also endorses chronic b/l LE tingling/numbness. States she will not accept blood, platelets, or plasma. However she is agreeable to albumin, EPO, cryoprecipitate, immune globulins, synthetic clotting factors. Patient will be admitted to SICU for further monitoring.      PAST MEDICAL & SURGICAL HISTORY:  Depression  Rheumatoid arthritis  Osteoarthritis  Restless leg  DARIO (obstructive sleep apnea)  GERD (gastroesophageal reflux disease)  Diverticulitis  Diabetes mellitus, type 2  Chronic anticoagulation  Hypercoagulable state  Neuropathy  Pancreatitis  HTN (hypertension)  Pulmonary embolism  DVT (deep venous thrombosis): left leg  History of surgery: right total hip replacement 2019  History of surgery: 2019 L thr  History of cholecystectomy  History of colon resection: diverticulitis  Abdominal hernia: umbilical  History of sleeve gastrectomy      REVIEW OF SYSTEMS:   Pertinent positives/negatives noted in HPI.     HOME MEDICATIONS: amlodipine 5, carafate, cetirizine 10, cyclobenzaprine 5 TID, folic acid 1 daily, gabapentin 300 qHS, losartan-HCTZ 50/12.5 d, lovenox 100 BID, metoprolol 50, omeprazole 40, prozac 40, ropinirole 10 qHS, seroquel 50 qHS, sulfasalazine 500    ALLERGIES:  Allergies    lisinopril (Other)  verapamil (Other)    Intolerances      Vital Signs Last 24 Hrs  T(C): 36.9 (03 Aug 2020 06:21), Max: 37.2 (03 Aug 2020 00:54)  T(F): 98.5 (03 Aug 2020 06:21), Max: 98.9 (03 Aug 2020 00:54)  HR: 120 (03 Aug 2020 06:21) (107 - 125)  BP: 86/58 (03 Aug 2020 06:21) (86/58 - 117/53)  BP(mean): --  RR: 20 (03 Aug 2020 06:21) (16 - 20)  SpO2: 97% (03 Aug 2020 06:21) (95% - 100%)    PHYSICAL EXAM:  T(C): 36.9 (20 @ 06:21), Max: 37.2 (20 @ 00:54)  HR: 120 (20 @ 06:21) (107 - 125)  BP: 86/58 (20 @ 06:21) (86/58 - 117/53)  RR: 20 (20 @ 06:21) (16 - 20)  SpO2: 97% (20 @ 06:21) (95% - 100%)    GENERAL: NAD, Resting comfortably in bed  HEENT: NCAT  RESP: CTAB, Nonlabored breathing, No respiratory distress  CARD: Tachycardic, Normal peripheral perfusion  GI: Soft, NT, ND, No guarding, No rebound tenderness  EXTREM: R hip dressing with mild bleeding, moderate ecchymosis of R thigh, compression stockings in place with b/l pedal edema  NEURO: AAOx3, No focal deficits  PSYCH: Affect and characteristics of appearance, verbalizations, behaviors are appropriate    LABS:                        4.4    6.99  )-----------( 202      ( 03 Aug 2020 06:20 )             14.4     08-03    139  |  104  |  6<L>  ----------------------------<  121<H>  3.6   |  24  |  0.73    Ca    8.6      03 Aug 2020 06:20  Phos  2.8     08-02    TPro  5.4<L>  /  Alb  2.5<L>  /  TBili  0.3  /  DBili  x   /  AST  17  /  ALT  10  /  AlkPhos  57  08-03      Urinalysis Basic - ( 01 Aug 2020 20:55 )    Color: Yellow / Appearance: Clear / S.025 / pH: x  Gluc: x / Ketone: NEGATIVE  / Bili: Negative / Urobili: 0.2 E.U./dL   Blood: x / Protein: Trace mg/dL / Nitrite: NEGATIVE   Leuk Esterase: NEGATIVE / RBC: < 5 /HPF / WBC < 5 /HPF   Sq Epi: x / Non Sq Epi: 0-5 /HPF / Bacteria: Present /HPF      50F Rastafarian with PMHx diverticulitis s/p R hemicolectomy, MO s/p sleeve gastrectomy, DARIO, RA, PE/DVT/SVT on LMWH, DARIO, RA, recent L SANDY  presented for revision of R SANDY on . Per ortho the operation was uncomplicated and involved 100cc EBL. Postop course c/b symptomatic postop anemia and 1 episode BRBPR; Hgb preop was 10.6, unchecked POD1, 5.4->4.6 on POD2, and 4.4 today on POD3. Pt noted small bloody BM overnight (FOBT +). She was started on IV iron and folic acid per medicine, procrit was withheld 2/2 h/o hypercoagulability. CTA A/P obtained showing no active GI hemorrhage but large fluid collection overlying subq tissue and w/in musculature of R hip c/w hematoma. GI team consulted overnight and noted to have rectal exam w/o evidence of bleeding and normal appearing stool, recommending against colonoscopy at this time.    SICU consulted for hemodynamic monitoring. Overnight the patient was given 1L fluids. Since 4PM tachycardic 115-125, with downtrending /55->86/58, however not tachypneic and w/ O2Sat 97% on RA. 400cc UOP overnight. Pt reports feeling dizzy and fatigued with minor movements. Denies chest pain, dyspnea, or continued bloody bowel movements. Also endorses chronic b/l LE tingling/numbness. States she will not accept blood, platelets, or plasma. However she is agreeable to albumin, EPO, cryoprecipitate, immune globulins, synthetic clotting factors. Patient will be admitted to SICU for further monitoring.    NEURO: tylenol, prozac, seroquel, ropinirole gabapentin, (hold home flexeril)  CV: goal MAP >65  PULM:   GI/FEN: NPO, holding MIVF, bowel reg, sulfasalazine, carafate  : Tamayo, voids  ENDO: ISS  HEME: Hgb. C/w iron, folic acid, EPO (reorder daily)  ID: WBC  PPx:  LINES: PIV  WOUNDS/DRAINS:  PT/OT: Not ordered SICU CONSULT NOTE    HPI:    50F Adventist with PMHx diverticulitis s/p R hemicolectomy, MO s/p sleeve gastrectomy, DARIO, RA, PE/DVT/SVT on LMWH, DARIO, RA, recent L SANDY  presents for revision of R SANDY on . Per ortho the operation was uncomplicated and involved 100cc EBL. Postop course c/b postop anemia;  Hgb preop was 10.6, unchecked POD1, 5.4->4.6 on POD2, and 4.4 today on POD3. Pt noted small bloody BM overnight (FOBT +). She was started on IV iron and folic acid per medicine, procrit was withheld 2/2 h/o hypercoagulability. CTA A/P obtained showing no active GI hemorrhage but large fluid collection overlying subq tissue and w/in musculature of R hip c/w hematoma. GI team consulted overnight and noted to have rectal exam w/o evidence of bleeding and normal appearing stool, recommending against colonoscopy at this time.    SICU consulted for hemodynamic monitoring. Overnight the patient was given 1L fluids. Since 4PM tachycardic 115-125, with downtrending /55->86/58, however not tachypneic and w/ O2Sat 97% on RA. 400cc UOP overnight. Pt reports feeling dizzy and fatigued with minor movements. Denies chest pain, dyspnea, or continued bloody bowel movements. Also endorses chronic b/l LE tingling/numbness. States she will not accept blood, platelets, or plasma. However she is agreeable to albumin, EPO, cryoprecipitate, immune globulins, synthetic clotting factors. Patient will be admitted to SICU for further monitoring.      PAST MEDICAL & SURGICAL HISTORY:  Depression  Rheumatoid arthritis  Osteoarthritis  Restless leg  DARIO (obstructive sleep apnea)  GERD (gastroesophageal reflux disease)  Diverticulitis  Diabetes mellitus, type 2  Chronic anticoagulation  Hypercoagulable state  Neuropathy  Pancreatitis  HTN (hypertension)  Pulmonary embolism  DVT (deep venous thrombosis): left leg  History of surgery: right total hip replacement 2019  History of surgery: 2019 L thr  History of cholecystectomy  History of colon resection: diverticulitis  Abdominal hernia: umbilical  History of sleeve gastrectomy      REVIEW OF SYSTEMS:   Pertinent positives/negatives noted in HPI.     HOME MEDICATIONS: amlodipine 5, carafate, cetirizine 10, cyclobenzaprine 5 TID, folic acid 1 daily, gabapentin 300 qHS, losartan-HCTZ 50/12.5 d, lovenox 100 BID, metoprolol 50, omeprazole 40, prozac 40, ropinirole 10 qHS, seroquel 50 qHS, sulfasalazine 500    ALLERGIES:  Allergies    lisinopril (Other)  verapamil (Other)    Intolerances      Vital Signs Last 24 Hrs  T(C): 36.9 (03 Aug 2020 06:21), Max: 37.2 (03 Aug 2020 00:54)  T(F): 98.5 (03 Aug 2020 06:21), Max: 98.9 (03 Aug 2020 00:54)  HR: 120 (03 Aug 2020 06:21) (107 - 125)  BP: 86/58 (03 Aug 2020 06:21) (86/58 - 117/53)  BP(mean): --  RR: 20 (03 Aug 2020 06:21) (16 - 20)  SpO2: 97% (03 Aug 2020 06:21) (95% - 100%)    PHYSICAL EXAM:  T(C): 36.9 (20 @ 06:21), Max: 37.2 (20 @ 00:54)  HR: 120 (20 @ 06:21) (107 - 125)  BP: 86/58 (20 @ 06:21) (86/58 - 117/53)  RR: 20 (20 @ 06:21) (16 - 20)  SpO2: 97% (20 @ 06:21) (95% - 100%)    GENERAL: NAD, Resting comfortably in bed  HEENT: NCAT  RESP: CTAB, Nonlabored breathing, No respiratory distress  CARD: Tachycardic, Normal peripheral perfusion  GI: Soft, NT, ND, No guarding, No rebound tenderness  EXTREM: R hip dressing with mild bleeding, moderate ecchymosis of R thigh, compression stockings in place with b/l pedal edema  NEURO: AAOx3, No focal deficits  PSYCH: Affect and characteristics of appearance, verbalizations, behaviors are appropriate    LABS:                        4.4    6.99  )-----------( 202      ( 03 Aug 2020 06:20 )             14.4     08-03    139  |  104  |  6<L>  ----------------------------<  121<H>  3.6   |  24  |  0.73    Ca    8.6      03 Aug 2020 06:20  Phos  2.8     08-02    TPro  5.4<L>  /  Alb  2.5<L>  /  TBili  0.3  /  DBili  x   /  AST  17  /  ALT  10  /  AlkPhos  57  08-03      Urinalysis Basic - ( 01 Aug 2020 20:55 )    Color: Yellow / Appearance: Clear / S.025 / pH: x  Gluc: x / Ketone: NEGATIVE  / Bili: Negative / Urobili: 0.2 E.U./dL   Blood: x / Protein: Trace mg/dL / Nitrite: NEGATIVE   Leuk Esterase: NEGATIVE / RBC: < 5 /HPF / WBC < 5 /HPF   Sq Epi: x / Non Sq Epi: 0-5 /HPF / Bacteria: Present /HPF      50F Adventist with PMHx diverticulitis s/p R hemicolectomy, MO s/p sleeve gastrectomy, DARIO, RA, PE/DVT/SVT on LMWH, DARIO, RA, recent L SANDY  presented for revision of R SANDY on . Per ortho the operation was uncomplicated and involved 100cc EBL. Postop course c/b symptomatic postop anemia and 1 episode BRBPR; Hgb preop was 10.6, unchecked POD1, 5.4->4.6 on POD2, and 4.4 today on POD3. Pt noted small bloody BM overnight (FOBT +). She was started on IV iron and folic acid per medicine, procrit was withheld 2/2 h/o hypercoagulability. CTA A/P obtained showing no active GI hemorrhage but large fluid collection overlying subq tissue and w/in musculature of R hip c/w hematoma. GI team consulted overnight and noted to have rectal exam w/o evidence of bleeding and normal appearing stool, recommending against colonoscopy at this time.    SICU consulted for hemodynamic monitoring. Overnight the patient was given 1L fluids. Since 4PM tachycardic 115-125, with downtrending /55->86/58, however not tachypneic and w/ O2Sat 97% on RA. 400cc UOP overnight. Pt reports feeling dizzy and fatigued with minor movements. Denies chest pain, dyspnea, or continued bloody bowel movements. Also endorses chronic b/l LE tingling/numbness. States she will not accept blood, platelets, or plasma. However she is agreeable to albumin, EPO, cryoprecipitate, immune globulins, synthetic clotting factors. Patient will be admitted to SICU for further monitoring.    NEURO: tylenol, prozac, seroquel, ropinirole gabapentin, (hold home flexeril)  CV: goal MAP >65  PULM: BJORN  GI/FEN: NPO, holding MIVF, bowel reg, sulfasalazine, carafate.   : Tamayo, voids  ENDO: ISS  HEME: Hgb 4.4 (10.6 preop). R hip hematoma on CTA no active GIB. 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 Per ortho the operation was uncomplicated and involved 100cc EBL. Postop course c/b postop anemia;  Hgb preop was 10.6, unchecked POD1, 5.4->4.6 on POD2, and 4.4 today on POD3. Pt noted small bloody BM overnight (FOBT +). She was started on IV iron and folic acid per medicine, procrit was withheld 2/2 h/o hypercoagulability. CTA A/P obtained showing no active GI hemorrhage but large fluid collection overlying subq tissue and w/in musculature of R hip c/w hematoma. GI team consulted overnight and noted to have rectal exam w/o evidence of bleeding and normal appearing stool, recommending against colonoscopy at this time.    SICU consulted for hemodynamic monitoring. Overnight the patient was given 1L fluids. Since 4PM tachycardic 115-125, with downtrending /55->86/58, however not tachypneic and w/ O2Sat 97% on RA. 400cc UOP overnight. Pt reports feeling dizzy and fatigued with minor movements. Denies chest pain, dyspnea, or continued bloody bowel movements. Also endorses chronic b/l LE tingling/numbness. States she will not accept blood, platelets, or plasma. However she is agreeable to albumin, EPO, cryoprecipitate, immune globulins, synthetic clotting factors. Patient will be admitted to SICU for further monitoring.      PAST MEDICAL & SURGICAL HISTORY:  Depression  Rheumatoid arthritis  Osteoarthritis  Restless leg  DARIO (obstructive sleep apnea)  GERD (gastroesophageal reflux disease)  Diverticulitis  Diabetes mellitus, type 2  Chronic anticoagulation  Hypercoagulable state  Neuropathy  Pancreatitis  HTN (hypertension)  Pulmonary embolism  DVT (deep venous thrombosis): left leg  History of surgery: right total hip replacement 2019  History of surgery: 2019 L thr  History of cholecystectomy  History of colon resection: diverticulitis  Abdominal hernia: umbilical  History of sleeve gastrectomy      REVIEW OF SYSTEMS:   Pertinent positives/negatives noted in HPI.     HOME MEDICATIONS: amlodipine 5, carafate, cetirizine 10, cyclobenzaprine 5 TID, folic acid 1 daily, gabapentin 300 qHS, losartan-HCTZ 50/12.5 d, lovenox 100 BID, metoprolol 50, omeprazole 40, prozac 40, ropinirole 10 qHS, seroquel 50 qHS, sulfasalazine 500    ALLERGIES:  Allergies    lisinopril (Other)  verapamil (Other)    Intolerances      Vital Signs Last 24 Hrs  T(C): 36.9 (03 Aug 2020 06:21), Max: 37.2 (03 Aug 2020 00:54)  T(F): 98.5 (03 Aug 2020 06:21), Max: 98.9 (03 Aug 2020 00:54)  HR: 120 (03 Aug 2020 06:21) (107 - 125)  BP: 86/58 (03 Aug 2020 06:21) (86/58 - 117/53)  BP(mean): --  RR: 20 (03 Aug 2020 06:21) (16 - 20)  SpO2: 97% (03 Aug 2020 06:21) (95% - 100%)    PHYSICAL EXAM:  T(C): 36.9 (20 @ 06:21), Max: 37.2 (20 @ 00:54)  HR: 120 (20 @ 06:21) (107 - 125)  BP: 86/58 (20 @ 06:21) (86/58 - 117/53)  RR: 20 (20 @ 06:21) (16 - 20)  SpO2: 97% (20 @ 06:21) (95% - 100%)    GENERAL: NAD, Resting comfortably in bed  HEENT: NCAT  RESP: CTAB, Nonlabored breathing, No respiratory distress  CARD: Tachycardic, Normal peripheral perfusion  GI: Soft, NT, ND, No guarding, No rebound tenderness  EXTREM: R hip dressing with mild bleeding, moderate ecchymosis of R thigh, compression stockings in place with b/l pedal edema  NEURO: AAOx3, No focal deficits  PSYCH: Affect and characteristics of appearance, verbalizations, behaviors are appropriate    LABS:                        4.4    6.99  )-----------( 202      ( 03 Aug 2020 06:20 )             14.4     08-03    139  |  104  |  6<L>  ----------------------------<  121<H>  3.6   |  24  |  0.73    Ca    8.6      03 Aug 2020 06:20  Phos  2.8     08-02    TPro  5.4<L>  /  Alb  2.5<L>  /  TBili  0.3  /  DBili  x   /  AST  17  /  ALT  10  /  AlkPhos  57  08-03      Urinalysis Basic - ( 01 Aug 2020 20:55 )    Color: Yellow / Appearance: Clear / S.025 / pH: x  Gluc: x / Ketone: NEGATIVE  / Bili: Negative / Urobili: 0.2 E.U./dL   Blood: x / Protein: Trace mg/dL / Nitrite: NEGATIVE   Leuk Esterase: NEGATIVE / RBC: < 5 /HPF / WBC < 5 /HPF   Sq Epi: x / Non Sq Epi: 0-5 /HPF / Bacteria: Present /HPF      50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 Per ortho the operation was uncomplicated and involved 100cc EBL. Postop course c/b symptomatic postop anemia and 1 episode BRBPR; Hgb preop was 10.6, unchecked POD1, 5.4->4.6 on POD2, and 4.4 today on POD3. Pt noted small bloody BM overnight (FOBT +). She was started on IV iron and folic acid per medicine, procrit was withheld 2/2 h/o hypercoagulability. CTA A/P obtained showing no active GI hemorrhage but large fluid collection overlying subq tissue and w/in musculature of R hip c/w hematoma. GI team consulted overnight and noted to have rectal exam w/o evidence of bleeding and normal appearing stool, recommending against colonoscopy at this time.    SICU consulted for hemodynamic monitoring. Overnight the patient was given 1L fluids. Since 4PM tachycardic 115-125, with downtrending /55->86/58, however not tachypneic and w/ O2Sat 97% on RA. 400cc UOP overnight. Pt reports feeling dizzy and fatigued with minor movements. Denies chest pain, dyspnea, or continued bloody bowel movements. Also endorses chronic b/l LE tingling/numbness. States she will not accept blood, platelets, or plasma. However she is agreeable to albumin, EPO, cryoprecipitate, immune globulins, synthetic clotting factors. Patient will be admitted to SICU for further monitoring.    NEURO: tylenol, prozac, seroquel, ropinirole gabapentin, (hold home flexeril)  CV: goal MAP >65  PULM: BJORN  GI/FEN: NPO, holding MIVF, bowel reg, sulfasalazine, carafate.   : Tamayo, voids  ENDO: ISS  HEME: Hgb 4.4 (10.6 preop). R hip hematoma on CTA no active GIB. 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 Postop course c/b acute anemia (Hgb 4.4 today [POD3] from 10.6 immediately postop), noted to have +FOBT and ;  Hgb postop was 10.6, unchecked POD1, 5.4->4.6 on POD2, and 4.4 today on POD3. Pt noted small bloody BM overnight (FOBT +). She was started on IV iron and folic acid per medicine, procrit was withheld 2/2 h/o hypercoagulability. CTA A/P obtained showing no active GI hemorrhage but large fluid collection overlying subq tissue and w/in musculature of R hip c/w hematoma. GI team consulted overnight and noted to have rectal exam w/o evidence of bleeding and normal appearing stool, recommending against colonoscopy at this time.    SICU consulted for hemodynamic monitoring. Overnight the patient was given 1L fluids. Since 4PM tachycardic 115-125, with downtrending /55->86/58, however not tachypneic and w/ O2Sat 97% on RA. 400cc UOP overnight. Pt reports feeling dizzy and fatigued with minor movements. Denies chest pain, dyspnea, or continued bloody bowel movements. Also endorses chronic b/l LE tingling/numbness. States she will not accept blood, platelets, or plasma. However she is agreeable to albumin, EPO, cryoprecipitate, immune globulins, synthetic clotting factors. Patient will be admitted to SICU for further monitoring.      PAST MEDICAL & SURGICAL HISTORY:  Depression  Rheumatoid arthritis  Osteoarthritis  Restless leg  DARIO (obstructive sleep apnea)  GERD (gastroesophageal reflux disease)  Diverticulitis  Diabetes mellitus, type 2  Chronic anticoagulation  Hypercoagulable state  Neuropathy  Pancreatitis  HTN (hypertension)  Pulmonary embolism  DVT (deep venous thrombosis): left leg  History of surgery: right total hip replacement 2019  History of surgery: 2019 L thr  History of cholecystectomy  History of colon resection: diverticulitis  Abdominal hernia: umbilical  History of sleeve gastrectomy      REVIEW OF SYSTEMS:   Pertinent positives/negatives noted in HPI.     HOME MEDICATIONS: amlodipine 5, carafate, cetirizine 10, cyclobenzaprine 5 TID, folic acid 1 daily, gabapentin 300 qHS, losartan-HCTZ 50/12.5 d, lovenox 100 BID, metoprolol 50, omeprazole 40, prozac 40, ropinirole 10 qHS, seroquel 50 qHS, sulfasalazine 500    ALLERGIES:  Allergies    lisinopril (Other)  verapamil (Other)    Intolerances      Vital Signs Last 24 Hrs  T(C): 36.9 (03 Aug 2020 06:21), Max: 37.2 (03 Aug 2020 00:54)  T(F): 98.5 (03 Aug 2020 06:21), Max: 98.9 (03 Aug 2020 00:54)  HR: 120 (03 Aug 2020 06:21) (107 - 125)  BP: 86/58 (03 Aug 2020 06:21) (86/58 - 117/53)  BP(mean): --  RR: 20 (03 Aug 2020 06:21) (16 - 20)  SpO2: 97% (03 Aug 2020 06:21) (95% - 100%)    PHYSICAL EXAM:  T(C): 36.9 (20 @ 06:21), Max: 37.2 (20 @ 00:54)  HR: 120 (20 @ 06:21) (107 - 125)  BP: 86/58 (20 @ 06:21) (86/58 - 117/53)  RR: 20 (20 @ 06:21) (16 - 20)  SpO2: 97% (20 @ 06:21) (95% - 100%)    GENERAL: NAD, Resting comfortably in bed  HEENT: NCAT  RESP: CTAB, Nonlabored breathing, No respiratory distress  CARD: Tachycardic, Normal peripheral perfusion  GI: Soft, NT, ND, No guarding, No rebound tenderness  EXTREM: R hip dressing with mild bleeding, moderate ecchymosis of R thigh, compression stockings in place with b/l pedal edema  NEURO: AAOx3, No focal deficits  PSYCH: Affect and characteristics of appearance, verbalizations, behaviors are appropriate    LABS:                        4.4    6.99  )-----------(       ( 03 Aug 2020 06:20 )             14.4     08-03    139  |  104  |  6<L>  ----------------------------<  121<H>  3.6   |  24  |  0.73    Ca    8.6      03 Aug 2020 06:20  Phos  2.8     08-02    TPro  5.4<L>  /  Alb  2.5<L>  /  TBili  0.3  /  DBili  x   /  AST  17  /  ALT  10  /  AlkPhos  57  08-03      Urinalysis Basic - ( 01 Aug 2020 20:55 )    Color: Yellow / Appearance: Clear / S.025 / pH: x  Gluc: x / Ketone: NEGATIVE  / Bili: Negative / Urobili: 0.2 E.U./dL   Blood: x / Protein: Trace mg/dL / Nitrite: NEGATIVE   Leuk Esterase: NEGATIVE / RBC: < 5 /HPF / WBC < 5 /HPF   Sq Epi: x / Non Sq Epi: 0-5 /HPF / Bacteria: Present /HPF      50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 Per ortho the operation was uncomplicated and involved 100cc EBL. Postop course c/b symptomatic postop anemia and 1 episode BRBPR; Hgb preop was 10.6, unchecked POD1, 5.4->4.6 on POD2, and 4.4 today on POD3. Pt noted small bloody BM overnight (FOBT +). She was started on IV iron and folic acid per medicine, procrit was withheld 2/2 h/o hypercoagulability. CTA A/P obtained showing no active GI hemorrhage but large fluid collection overlying subq tissue and w/in musculature of R hip c/w hematoma. GI team consulted overnight and noted to have rectal exam w/o evidence of bleeding and normal appearing stool, recommending against colonoscopy at this time.    SICU consulted for hemodynamic monitoring. Overnight the patient was given 1L fluids. Since 4PM tachycardic 115-125, with downtrending /55->86/58, however not tachypneic and w/ O2Sat 97% on RA. 400cc UOP overnight. Pt reports feeling dizzy and fatigued with minor movements. Denies chest pain, dyspnea, or continued bloody bowel movements. Also endorses chronic b/l LE tingling/numbness. States she will not accept blood, platelets, or plasma. However she is agreeable to albumin, EPO, cryoprecipitate, immune globulins, synthetic clotting factors. Patient will be admitted to SICU for further monitoring.    NEURO: tylenol, prozac, seroquel, ropinirole gabapentin, (hold home flexeril)  CV: goal MAP >65  PULM: BJORN  GI/FEN: NPO, holding MIVF, bowel reg, sulfasalazine, carafate.   : Tamayo, voids  ENDO: ISS  HEME: Hgb 4.4 (10.6 preop). R hip hematoma on CTA no active GIB. C/w iron, folic acid, EPO (reorder daily)  ID: WBC  PPx: SCD  LINES: PIV  WOUNDS/DRAINS: R hip incision  PT/OT: Not ordered SICU CONSULT NOTE    HPI:    50F Amish with PMHx diverticulitis s/p R hemicolectomy, MO s/p sleeve gastrectomy, DARIO, RA, PE/DVT/SVT on LMWH, DARIO, RA, recent L SANDY  presents for revision of R ASNDY on . Postop course c/b acute anemia (Hgb 4.4 today [POD3] from 10.6 immediately postop), noted to have +FOBT and ;  Hgb postop was 10.6, unchecked POD1, 5.4->4.6 on POD2, and 4.4 today on POD3. Pt noted small bloody BM overnight (FOBT +). She was started on IV iron and folic acid per medicine, procrit was withheld 2/2 h/o hypercoagulability. CTA A/P obtained showing no active GI hemorrhage but large fluid collection overlying subq tissue and w/in musculature of R hip c/w hematoma. GI team consulted overnight and noted to have rectal exam w/o evidence of bleeding and normal appearing stool, recommending against colonoscopy at this time.    SICU consulted for hemodynamic monitoring. Overnight the patient was given 1L fluids. Since 4PM tachycardic 115-125, with downtrending /55->86/58, however not tachypneic and w/ O2Sat 97% on RA. 400cc UOP overnight. Pt reports feeling dizzy and fatigued with minor movements. Denies chest pain, dyspnea, or continued bloody bowel movements. Also endorses chronic b/l LE tingling/numbness. States she will not accept blood, platelets, or plasma. However she is agreeable to albumin, EPO, cryoprecipitate, immune globulins, synthetic clotting factors. Patient will be admitted to SICU for further monitoring.      PAST MEDICAL & SURGICAL HISTORY:  Depression  Rheumatoid arthritis  Osteoarthritis  Restless leg  DARIO (obstructive sleep apnea)  GERD (gastroesophageal reflux disease)  Diverticulitis  Diabetes mellitus, type 2  Chronic anticoagulation  Hypercoagulable state  Neuropathy  Pancreatitis  HTN (hypertension)  Pulmonary embolism  DVT (deep venous thrombosis): left leg  History of surgery: right total hip replacement 2019  History of surgery: 2019 L thr  History of cholecystectomy  History of colon resection: diverticulitis  Abdominal hernia: umbilical  History of sleeve gastrectomy      REVIEW OF SYSTEMS:   Pertinent positives/negatives noted in HPI.     HOME MEDICATIONS: amlodipine 5, carafate, cetirizine 10, cyclobenzaprine 5 TID, folic acid 1 daily, gabapentin 300 qHS, losartan-HCTZ 50/12.5 d, lovenox 100 BID, metoprolol 50, omeprazole 40, prozac 40, ropinirole 10 qHS, seroquel 50 qHS, sulfasalazine 500    ALLERGIES:  Allergies    lisinopril (Other)  verapamil (Other)    Intolerances      Vital Signs Last 24 Hrs  T(C): 36.9 (03 Aug 2020 06:21), Max: 37.2 (03 Aug 2020 00:54)  T(F): 98.5 (03 Aug 2020 06:21), Max: 98.9 (03 Aug 2020 00:54)  HR: 120 (03 Aug 2020 06:21) (107 - 125)  BP: 86/58 (03 Aug 2020 06:21) (86/58 - 117/53)  BP(mean): --  RR: 20 (03 Aug 2020 06:21) (16 - 20)  SpO2: 97% (03 Aug 2020 06:21) (95% - 100%)    PHYSICAL EXAM:  T(C): 36.9 (20 @ 06:21), Max: 37.2 (20 @ 00:54)  HR: 120 (20 @ 06:21) (107 - 125)  BP: 86/58 (20 @ 06:21) (86/58 - 117/53)  RR: 20 (20 @ 06:21) (16 - 20)  SpO2: 97% (20 @ 06:21) (95% - 100%)    GENERAL: NAD, Resting comfortably in bed  HEENT: NCAT  RESP: CTAB, Nonlabored breathing, No respiratory distress  CARD: Tachycardic, Normal peripheral perfusion  GI: Soft, NT, ND, No guarding, No rebound tenderness  EXTREM: R hip dressing with mild bleeding, moderate ecchymosis of R thigh, compression stockings in place with b/l pedal edema  NEURO: AAOx3, No focal deficits  PSYCH: Affect and characteristics of appearance, verbalizations, behaviors are appropriate    LABS:                        4.4    6.99  )-----------(       ( 03 Aug 2020 06:20 )             14.4     08-03    139  |  104  |  6<L>  ----------------------------<  121<H>  3.6   |  24  |  0.73    Ca    8.6      03 Aug 2020 06:20  Phos  2.8     08-02    TPro  5.4<L>  /  Alb  2.5<L>  /  TBili  0.3  /  DBili  x   /  AST  17  /  ALT  10  /  AlkPhos  57  08-03      Urinalysis Basic - ( 01 Aug 2020 20:55 )    Color: Yellow / Appearance: Clear / S.025 / pH: x  Gluc: x / Ketone: NEGATIVE  / Bili: Negative / Urobili: 0.2 E.U./dL   Blood: x / Protein: Trace mg/dL / Nitrite: NEGATIVE   Leuk Esterase: NEGATIVE / RBC: < 5 /HPF / WBC < 5 /HPF   Sq Epi: x / Non Sq Epi: 0-5 /HPF / Bacteria: Present /HPF      50F Amish with PMHx diverticulitis s/p R hemicolectomy, MO s/p sleeve gastrectomy, DARIO, RA, PE/DVT/SVT on LMWH, DARIO, RA, now s/p R SANDY on , c/b acute anemia postoperatively. Pt Hgb 10.6 postop to 4.4 today (POD3). Most likely etiology from large R hip hematoma (7x7x14 cm), but also with +FOBT (no active GI bleed on CTA).     NEURO: tylenol, prozac, seroquel, ropinirole gabapentin, (hold home flexeril)  CV: Euvolemic, hyperdynamic state with tachycardia and likely low SVR, avoid fluid boluses, goal MAP >65, f/u EKG  PULM: Non-rebreather to maximize dissolved O2  GI/FEN: Regular diet, bowel reg, sulfasalazine, carafate.   : Tamayo   ENDO: ISS  HEME: Hgb 4.4 (10.6 in PACU). R hip hematoma on CTA, no active GIB. C/w iron, folic acid, EPO. Heme/onc consult, appreciate recs on epo dosing  ID: Monitor for fever and leukocytosis,   PPx: SCD  LINES: PIV, L wilfrid  WOUNDS/DRAINS: R hip incision  PT/OT: Bedrest SICU CONSULT NOTE    HPI:    50F Moravian with PMHx diverticulitis s/p R hemicolectomy, MO s/p sleeve gastrectomy, DARIO, RA, PE/DVT/SVT on LMWH, DARIO, RA, recent L SANDY  presents for revision of R SANDY on . Postop course c/b acute anemia (Hgb 4.4 today [POD3] from 10.6 immediately postop), noted to have +FOBT and ;  Hgb postop was 10.6, unchecked POD1, 5.4->4.6 on POD2, and 4.4 today on POD3. Pt noted small bloody BM overnight (FOBT +). She was started on IV iron and folic acid per medicine, procrit was withheld 2/2 h/o hypercoagulability. CTA A/P obtained showing no active GI hemorrhage but large fluid collection overlying subq tissue and w/in musculature of R hip c/w hematoma. GI team consulted overnight and noted to have rectal exam w/o evidence of bleeding and normal appearing stool, recommending against colonoscopy at this time.    SICU consulted for hemodynamic monitoring. Overnight the patient was given 1L fluids. Since 4PM tachycardic 115-125, with downtrending /55->86/58, however not tachypneic and w/ O2Sat 97% on RA. 400cc UOP overnight. Pt reports feeling dizzy and fatigued with minor movements. Denies chest pain, dyspnea, or continued bloody bowel movements. Also endorses chronic b/l LE tingling/numbness. States she will not accept blood, platelets, or plasma. However she is agreeable to albumin, EPO, cryoprecipitate, immune globulins, synthetic clotting factors. Patient will be admitted to SICU for further monitoring.      PAST MEDICAL & SURGICAL HISTORY:  Depression  Rheumatoid arthritis  Osteoarthritis  Restless leg  DARIO (obstructive sleep apnea)  GERD (gastroesophageal reflux disease)  Diverticulitis  Diabetes mellitus, type 2  Chronic anticoagulation  Hypercoagulable state  Neuropathy  Pancreatitis  HTN (hypertension)  Pulmonary embolism  DVT (deep venous thrombosis): left leg  History of surgery: right total hip replacement 2019  History of surgery: 2019 L thr  History of cholecystectomy  History of colon resection: diverticulitis  Abdominal hernia: umbilical  History of sleeve gastrectomy      REVIEW OF SYSTEMS:   Pertinent positives/negatives noted in HPI.     HOME MEDICATIONS: amlodipine 5, carafate, cetirizine 10, cyclobenzaprine 5 TID, folic acid 1 daily, gabapentin 300 qHS, losartan-HCTZ 50/12.5 d, lovenox 100 BID, metoprolol 50, omeprazole 40, prozac 40, ropinirole 10 qHS, seroquel 50 qHS, sulfasalazine 500    ALLERGIES:  Allergies    lisinopril (Other)  verapamil (Other)    Intolerances      Vital Signs Last 24 Hrs  T(C): 36.9 (03 Aug 2020 06:21), Max: 37.2 (03 Aug 2020 00:54)  T(F): 98.5 (03 Aug 2020 06:21), Max: 98.9 (03 Aug 2020 00:54)  HR: 120 (03 Aug 2020 06:21) (107 - 125)  BP: 86/58 (03 Aug 2020 06:21) (86/58 - 117/53)  BP(mean): --  RR: 20 (03 Aug 2020 06:21) (16 - 20)  SpO2: 97% (03 Aug 2020 06:21) (95% - 100%)    PHYSICAL EXAM:  T(C): 36.9 (20 @ 06:21), Max: 37.2 (20 @ 00:54)  HR: 120 (20 @ 06:21) (107 - 125)  BP: 86/58 (20 @ 06:21) (86/58 - 117/53)  RR: 20 (20 @ 06:21) (16 - 20)  SpO2: 97% (20 @ 06:21) (95% - 100%)    GENERAL: NAD, Resting comfortably in bed  HEENT: NCAT, EOMI, PERRL, MMM  RESP: CTAB, Nonlabored breathing, No respiratory distress  CARD: Tachycardic, Normal peripheral perfusion  GI: Soft, NT, ND, No guarding, No rebound tenderness  EXTREM: R hip dressing with swelling, moderate ecchymosis of R thigh, compression stockings in place with b/l pedal edema, cool extremities  : latham in place  NEURO: AAOx3, No focal deficits  PSYCH: Affect and characteristics of appearance, verbalizations, behaviors are appropriate    LABS:                        4.4    6.99  )-----------( 202      ( 03 Aug 2020 06:20 )             14.4     08-03    139  |  104  |  6<L>  ----------------------------<  121<H>  3.6   |  24  |  0.73    Ca    8.6      03 Aug 2020 06:20  Phos  2.8     08-02    TPro  5.4<L>  /  Alb  2.5<L>  /  TBili  0.3  /  DBili  x   /  AST  17  /  ALT  10  /  AlkPhos  57  08-03      Urinalysis Basic - ( 01 Aug 2020 20:55 )    Color: Yellow / Appearance: Clear / S.025 / pH: x  Gluc: x / Ketone: NEGATIVE  / Bili: Negative / Urobili: 0.2 E.U./dL   Blood: x / Protein: Trace mg/dL / Nitrite: NEGATIVE   Leuk Esterase: NEGATIVE / RBC: < 5 /HPF / WBC < 5 /HPF   Sq Epi: x / Non Sq Epi: 0-5 /HPF / Bacteria: Present /HPF      50F [place] with PMHx diverticulitis s/p R hemicolectomy, MO s/p sleeve gastrectomy, DARIO, RA, PE/DVT/SVT on LMWH, DARIO, RA, now s/p R SANDY on , c/b acute anemia postoperatively. Pt Hgb 10.6 postop to 4.4 today (POD3). Most likely etiology of acute blood loss anemia from large R hip hematoma (7x7x14 cm), but also with +FOBT (no active GI bleed on CTA).     NEURO: tylenol, prozac, seroquel, ropinirole gabapentin, (hold home flexeril)  CV: Euvolemic, hyperdynamic state with tachycardia and likely low SVR with euvolemic anemia., avoid fluid boluses, goal MAP >65 but will tolerate 55 if perfusion looks OK, f/u EKG. Will place arterial line for more accurate BPs  PULM: Non-rebreather to maximize dissolved O2  GI/FEN: Regular diet, bowel reg, sulfasalazine, carafate.   : Latham   ENDO: ISS  HEME: Hgb 4.4 (10.6 in PACU). R hip hematoma on CTA, no active GIB. C/w iron, folic acid, EPO X 1. Heme/onc consult, appreciate recs on epo dosing  ID: Monitor for fever and leukocytosis,   PPx: SCD  LINES: PIV, EUNICE [email or web address]  WOUNDS/DRAINS: R hip incision  PT/OT: Bedrest

## 2020-08-03 NOTE — CONSULT NOTE ADULT - ASSESSMENT
50F w/ Religious (decline blood product), MO s/p sleeve gastrectomy, hx of diverticulitis s/p colonic resection, hx of hernia repair, PE/DVT on LMWH, recent L SANDY 2/20 p/f revision of R SANDY. Hospital course complicated by acute drop in hemoglobin with imaging notable for large fluid collection within the lateral subq tissue overlying the right hip within the lower ext musculature suspicious for hematoma.      #Normocytic Anemia, Acute on Chronic  Patient with acute anemia on POD3 with drop from hgb of 10.1 on 7/30 to 5.6.  Repeat hgb notable for interval decrease to 4.6.  GI was consulted to assess for GI bleed in the setting of patient's Adventism belief and declined transfusion of blood product.  On SSRI, recent NSAIDs and A/C.  Rectal exam without evidence of bleeding and patient proceeded to have a large formed green-brown stool at bedside commode without evidence of any blood or melena.  Patient with recent colonoscopy in 2019 with polyps.  Review of CTA with radiology notable for fluid collection within lower ext musculature without evidence of acute GI bleeding.    -Continue to trend CBC, consider use of pediatric blood collection tube  -Monitor BM  -Can continue PPI and carafate  -Monitor hemodynamics   -Hold a/c if no contraindication  -Defer to primary team for evaluation of fluid collection within lower ext musculature on image   -Consult hematology for further optimization of anemia and role of EPO given Adventism belief    -Please notify GI team if patient develops overt GI bleeding or change in hemodynamics    Recommendation d/w primary team  Case d/w svc attg 50F w/ Yazidi (decline blood product), MO s/p sleeve gastrectomy, hx of diverticulitis s/p colonic resection, hx of hernia repair, PE/DVT on LMWH, recent L SANDY 2/20 p/f revision of R SANDY. Hospital course complicated by acute drop in hemoglobin with imaging notable for large fluid collection within the lateral subq tissue overlying the right hip within the lower ext musculature suspicious for hematoma.      #Normocytic Anemia, Acute on Chronic  Patient with acute anemia on POD3 with drop from hgb of 10.1 on 7/30 to 5.6.  Repeat hgb notable for interval decrease to 4.6.  GI was consulted to assess for GI bleed in the setting of patient's Jehovah's witness belief and declined transfusion of blood product.  On SSRI, recent NSAIDs and A/C.  Rectal exam without evidence of bleeding and patient proceeded to have a large formed green-brown stool at bedside commode without evidence of any blood or melena.  Patient with recent colonoscopy in 2019 with polyps.  Review of CTA with radiology notable for fluid collection within lower ext musculature without evidence of acute GI bleeding.    -Continue to trend CBC, consider use of pediatric blood collection tube  -Monitor BM  -Can continue PPI   -Monitor hemodynamics   -Hold a/c if no contraindication  -Defer to primary team for evaluation of fluid collection within lower ext musculature on image   -Consult hematology for further optimization of anemia and role of EPO given Jehovah's witness belief    -Please notify GI team if patient develops overt GI bleeding or change in hemodynamics    Recommendation d/w primary team  Case d/w svc attg 50F w/ Restoration (decline blood product), MO s/p sleeve gastrectomy, DM, hx of diverticulitis s/p colonic resection, hx of hernia repair, DARIO, RA, PE/DVT on LMWH, recent L SANDY 2/20 p/f revision of R SANDY. Hospital course complicated by acute drop in hemoglobin with imaging notable for large fluid collection within the lateral subq tissue overlying the right hip within the lower ext musculature suspicious for hematoma.      #Normocytic Anemia, Acute on Chronic  Patient with acute anemia on POD3 with drop from hgb of 10.1 on 7/30 to 5.6.  Repeat hgb notable for interval decrease to 4.6.  GI was consulted to assess for GI bleed in the setting of patient's Zoroastrian belief and declined transfusion of blood product.  On SSRI, recent NSAIDs and A/C.  Rectal exam without evidence of bleeding and patient proceeded to have a large formed green-brown stool at bedside commode without evidence of any blood or melena.  Patient with recent colonoscopy in 2019 with polyps.  Review of CTA with radiology notable for fluid collection within lower ext musculature without evidence of acute GI bleeding.    -Continue to trend CBC, consider use of pediatric blood collection tube  -Monitor BM  -Can continue PPI   -Monitor hemodynamics   -Hold a/c if no contraindication  -Defer to primary team for evaluation of fluid collection within lower ext musculature on image   -Consult hematology for further optimization of anemia and role of EPO given Zoroastrian belief    -Please notify GI team if patient develops overt GI bleeding or change in hemodynamics    Recommendation d/w primary team  Case d/w svc attg

## 2020-08-03 NOTE — CONSULT NOTE ADULT - ATTENDING COMMENTS
Patient c/o intermittent CP and dizzyness. No acute EKG changes. Will keep on 100% NRBM except when eating and can have NC at those times. To minimize blood draws and activity for now. SCDs. Her h/o DVT makes chronic epo dosing problematic.

## 2020-08-03 NOTE — CONSULT NOTE ADULT - SUBJECTIVE AND OBJECTIVE BOX
Hematology Oncology Consult Note    50F Samaritan with obesity s/p sleeve gastrectomy, RA, HTN,  DM, hx of diverticulitis s/p colonic resection, hx of hernia repair, DARIO, RA, PE/DVT on LMWH (lovenox 100 mg BID), recent L SANDY   PMH DARIO, GERD, RA, PE (), DVT (on loveneox 100 mg BID),  admitted for for right hip revision arthroplasty . Patient is currently  Post-operative day #4 s/p R revision SANDY. Hematology consulted for drop  hgb of 10.1 on  to 5.6.  Repeat hgb decrease to 4.6.  GI was consulted to assess for GI bleed and per GI note, not likely source of bleed  CTA  performed with following impression:  1.  No active GI hemorrhage within the small bowel and colon. Limited evaluation of the rectum.  2.  Large fluid collection within the lateral subcutaneous tissues overlying the right hip and within the lower extremity musculature. Foci of gas within the collection which is concerning for infection. Hematoma cannot be excluded.    Currently patient feels tired and fatigued. No shortness or breath or chest pain. Continues to be tachycardic.       PAST MEDICAL & SURGICAL HISTORY:  Depression  Rheumatoid arthritis  Osteoarthritis  Restless leg  DARIO (obstructive sleep apnea)  GERD (gastroesophageal reflux disease)  Diverticulitis  Diabetes mellitus, type 2  Chronic anticoagulation  Hypercoagulable state  Neuropathy  Pancreatitis  HTN (hypertension)  Pulmonary embolism  DVT (deep venous thrombosis): left leg  History of surgery: right total hip replacement 2019  History of surgery: 2019 L thr  History of cholecystectomy  History of colon resection: diverticulitis  Abdominal hernia: umbilical  History of sleeve gastrectomy      Allergies:  lisinopril (Other)  verapamil (Other)    Social History: Nonsmoker, drinks socially    FAMILY HISTORY: VTE uncle, daughter, sister      PHYSICAL EXAM:    T(F): 98.5 (20 @ 06:21), Max: 98.9 (20 @ 00:54)  HR: 118 (20 @ 15:00) (104 - 125)  BP: 79/46 (20 @ 15:00) (79/46 - 111/56)  RR: 19 (20 @ 15:00) (12 - 26)  SpO2: 99% (20 @ 15:00) (94% - 100%)  Wt(kg): --    Daily     Daily     Gen: well developed, Obese  HEENT: normocephalic/atraumatic, conjunctival pallor  Neck: supple, no masses  Cardiovascular: Tachycardic, S1S2  Respiratory: clear air entry b/l  Gastrointestinal: BS+, soft, NT/ND  Extremities: no clubbing/cyanosis  Neurological: CN 2-12 grossly intact, no focal deficits  Skin: no rash on visible skin  Musculoskeletal:  full ROM  Psychiatric:  mood stable        Labs:                          4.4    6.99  )-----------( 202      ( 03 Aug 2020 06:20 )             14.4     CBC Full  -  ( 03 Aug 2020 06:20 )  WBC Count : 6.99 K/uL  RBC Count : 1.59 M/uL  Hemoglobin : 4.4 g/dL  Hematocrit : 14.4 %  Platelet Count - Automated : 202 K/uL  Mean Cell Volume : 90.6 fl  Mean Cell Hemoglobin : 27.7 pg  Mean Cell Hemoglobin Concentration : 30.6 gm/dL  Auto Neutrophil # : 4.70 K/uL  Auto Lymphocyte # : 1.46 K/uL  Auto Monocyte # : 0.54 K/uL  Auto Eosinophil # : 0.15 K/uL  Auto Basophil # : 0.01 K/uL  Auto Neutrophil % : 67.3 %  Auto Lymphocyte % : 20.9 %  Auto Monocyte % : 7.7 %  Auto Eosinophil % : 2.1 %  Auto Basophil % : 0.1 %    PT/INR - ( 03 Aug 2020 09:04 )   PT: 12.4 sec;   INR: 1.04          PTT - ( 03 Aug 2020 09:04 )  PTT:31.6 sec    08-    139  |  104  |  6<L>  ----------------------------<  121<H>  3.6   |  24  |  0.73    Ca    8.6      03 Aug 2020 06:20  Phos  2.8     08-02    TPro  5.4<L>  /  Alb  2.5<L>  /  TBili  0.3  /  DBili  x   /  AST  17  /  ALT  10  /  AlkPhos  57  08-03      Urinalysis Basic - ( 01 Aug 2020 20:55 )    Color: Yellow / Appearance: Clear / S.025 / pH: x  Gluc: x / Ketone: NEGATIVE  / Bili: Negative / Urobili: 0.2 E.U./dL   Blood: x / Protein: Trace mg/dL / Nitrite: NEGATIVE   Leuk Esterase: NEGATIVE / RBC: < 5 /HPF / WBC < 5 /HPF   Sq Epi: x / Non Sq Epi: 0-5 /HPF / Bacteria: Present /HPF

## 2020-08-03 NOTE — PROGRESS NOTE ADULT - ATTENDING COMMENTS
Patient aware of the risks and declines transfusion of any blood products in keeping with her Judaism beliefs.  Continue supplementing iron by infusion, B12 by injection and IV folic acid.  Maintain strict bedrest as patient is symptomatic from severe anemia.

## 2020-08-03 NOTE — PROGRESS NOTE ADULT - SUBJECTIVE AND OBJECTIVE BOX
The patient was seen and examined.      50F Jain with obesity s/p sleeve gastrectomy, RA, HTN,  DM, hx of diverticulitis s/p colonic resection, hx of hernia repair, DARIO, RA, PE/DVT, on LMWH (lovenox 100 mg BID), recent L SANDY   PMH DARIO, GERD, RA, PE (), DVT (on loveneox 100 mg BID),  admitted for for right hip revision arthroplasty . Patient is currently  Post-operative day #4 s/p R revision SANDY. Hematology consulted for drop  hgb of 10.1 on  to 5.6.  Repeat hgb decrease to 4.6, presently 4.4.  GI was consulted to assess for GI bleed and per GI note, not likely source of bleed although stool guaiac positive.  CTA  performed with following impression:  1.  No active GI hemorrhage within the small bowel and colon. Limited evaluation of the rectum.  2.  Large fluid collection within the lateral subcutaneous tissues overlying the right hip and within the lower extremity musculature. Foci of gas within the collection which is concerning for infection. Hematoma cannot be excluded.  Lovenox has been held. Patient has received Venofer infusions x 2, Procrit and IV folic acid. Oral B12 has been ordered.  Currently patient feels tired and fatigued. No shortness or breath but has some chest discomfort. Continues to be tachycardic.          .       lisinopril (Other)  verapamil (Other)    Allergies    lisinopril (Other)  verapamil (Other)    Intolerances        Medications:  MEDICATIONS  (STANDING):  acetaminophen   Tablet .. 650 milliGRAM(s) Oral every 6 hours  cyanocobalamin Injectable 1000 MICROGram(s) IntraMuscular daily  FLUoxetine 40 milliGRAM(s) Oral daily  folic acid 5 milliGRAM(s) Oral daily  gabapentin 300 milliGRAM(s) Oral at bedtime  iron sucrose IVPB 200 milliGRAM(s) IV Intermittent every 24 hours  pantoprazole    Tablet 40 milliGRAM(s) Oral before breakfast  QUEtiapine 50 milliGRAM(s) Oral at bedtime  sucralfate suspension 1 Gram(s) Oral every 12 hours  sulfaSALAzine 500 milliGRAM(s) Oral daily    MEDICATIONS  (PRN):  ondansetron Injectable 4 milliGRAM(s) IV Push every 6 hours PRN Nausea and/or Vomiting  oxyCODONE    IR 5 milliGRAM(s) Oral every 4 hours PRN Moderate Pain (4 - 6)  oxyCODONE    IR 10 milliGRAM(s) Oral every 4 hours PRN Severe Pain (7 - 10)  senna 2 Tablet(s) Oral at bedtime PRN Constipation          Interval History:    The patient has chest discomfort, no  SOB on oxygen.  No nausea/vomiting/fevers/chills/night sweats.  Has fatigue, and dizziness.  Appetite is stable without weight loss.  No abdominal pain/diarrhea/constipation.  No melena or hematochezia.    No dysuria/hematuria.  No history of easy bruising/bleeding.  No gingival bleeding or epistaxis.  Has right hip discomfort and c/o oozing through dressing.    ROS is otherwise negative.    PHYSICAL EXAM:    T(F): 98.2 (20 @ 17:02), Max: 98.9 (20 @ 00:54)  HR: 105 (20 @ 19:00) (104 - 125)  BP: 79/46 (20 @ 15:00) (79/46 - 111/56)  RR: 17 (20 @ 19:00) (12 - 39)  SpO2: 100% (20 @ 19:00) (94% - 100%)  Wt(kg): --    Daily     Daily     Gen: well developed, well nourished, obese, pale,comfortable  HEENT: normocephalic/atraumatic, marked conjunctival pallor, no scleral icterus, no oral thrush/mucosal bleeding/mucositis  Neck: supple, no masses, no JVD  Cardiovascular: RR, nl S1S2, tachycardic  Respiratory: clear air entry b/l anteriorly  Gastrointestinal: BS+, soft, obese NT/ND, no masses, no splenomegaly, no hepatomegaly, no evidence for ascites  Extremities: no clubbing/cyanosis, has right lateral thigh edema, dressing wet with serosanguinous fluid, SCDs on calves  Neurological: no focal deficits  Skin: no rash on visible skin, excessive ecchymoses or petechiae, oozing from right hip wound  Lymph Nodes:  no significant peripheral adenopathy   Musculoskeletal: limited  ROM at strict bedrest  Psychiatric:  mood stable        Labs:                          4.4    6.99  )-----------( 202      ( 03 Aug 2020 06:20 )             14.4     CBC Full  -  ( 03 Aug 2020 06:20 )  WBC Count : 6.99 K/uL  RBC Count : 1.59 M/uL  Hemoglobin : 4.4 g/dL  Hematocrit : 14.4 %  Platelet Count - Automated : 202 K/uL  Mean Cell Volume : 90.6 fl  Mean Cell Hemoglobin : 27.7 pg  Mean Cell Hemoglobin Concentration : 30.6 gm/dL  Auto Neutrophil # : 4.70 K/uL  Auto Lymphocyte # : 1.46 K/uL  Auto Monocyte # : 0.54 K/uL  Auto Eosinophil # : 0.15 K/uL  Auto Basophil # : 0.01 K/uL  Auto Neutrophil % : 67.3 %  Auto Lymphocyte % : 20.9 %  Auto Monocyte % : 7.7 %  Auto Eosinophil % : 2.1 %  Auto Basophil % : 0.1 %    PT/INR - ( 03 Aug 2020 09:04 )   PT: 12.4 sec;   INR: 1.04          PTT - ( 03 Aug 2020 09:04 )  PTT:31.6 sec    08-03    139  |  104  |  6<L>  ----------------------------<  121<H>  3.6   |  24  |  0.73    Ca    8.6      03 Aug 2020 06:20  Phos  2.8     08-02    TPro  5.4<L>  /  Alb  2.5<L>  /  TBili  0.3  /  DBili  x   /  AST  17  /  ALT  10  /  AlkPhos  57  08-03      Urinalysis Basic - ( 01 Aug 2020 20:55 )    Color: Yellow / Appearance: Clear / S.025 / pH: x  Gluc: x / Ketone: NEGATIVE  / Bili: Negative / Urobili: 0.2 E.U./dL   Blood: x / Protein: Trace mg/dL / Nitrite: NEGATIVE   Leuk Esterase: NEGATIVE / RBC: < 5 /HPF / WBC < 5 /HPF   Sq Epi: x / Non Sq Epi: 0-5 /HPF / Bacteria: Present /HPF        Other Labs:    Cultures:    Pathology:    Imaging Studies:

## 2020-08-03 NOTE — PROGRESS NOTE ADULT - SUBJECTIVE AND OBJECTIVE BOX
Orthopaedic Surgery Progress Note    Post-operative day #4 s/p R revision SANDY    Subjective:     Patient seen and examined. She endorses shortness of breath, dizziness, weakness, feeling tired. She denies chest pain, numbness/tingling or nausae/vomiting.    Objective:    Vital Signs Last 24 Hrs  T(C): 36.9 (08-03-20 @ 06:21), Max: 36.9 (08-03-20 @ 06:21)  T(F): 98.5 (08-03-20 @ 06:21), Max: 98.5 (08-03-20 @ 06:21)  HR: 104 (08-03-20 @ 10:00) (104 - 120)  BP: 104/55 (08-03-20 @ 10:00) (86/58 - 104/55)  BP(mean): 76 (08-03-20 @ 10:00) (76 - 76)  RR: 19 (08-03-20 @ 10:00) (19 - 20)  SpO2: 100% (08-03-20 @ 10:00) (97% - 100%)  AVSS    PE:  General: Patient alert and oriented, NAD  Dressing: Clean/dry/intact aqucael with shadow of blood  Pulses: 2+ DP BLE   Sensation: SILT BLE   Motor: EHL/FHL/TA/GS 5/5 BLE                          4.4    6.99  )-----------( 202      ( 03 Aug 2020 06:20 )             14.4   03 Aug 2020 06:20    139    |  104    |  6      ----------------------------<  121    3.6     |  24     |  0.73     Ca    8.6        03 Aug 2020 06:20  Phos  2.8       02 Aug 2020 07:38    TPro  5.4    /  Alb  2.5    /  TBili  0.3    /  DBili  x      /  AST  17     /  ALT  10     /  AlkPhos  57     03 Aug 2020 06:20      A/P: 50yFemale POD#4 s/p R SANDY  1. Pain control as needed  2. DVT prophylaxis: held in setting of severe anemia  3. Will hold off on PT for now until patient is medically stable. PWB 50% RLE.   4. Anemia: patient refusing PRBCs due to Pentecostal reasons. She has been advised multiple times on the risk of refusing blood transfusion including death. She understands this. She is hemodynamically unstable likely due to this.   5. Transfer to SICU. Appreciate co-management and recs at this time.    Ortho Pager [phone]

## 2020-08-03 NOTE — PROGRESS NOTE ADULT - ASSESSMENT
50F w/ Roman Catholic (decline blood product), MO s/p sleeve gastrectomy, DM, hx of diverticulitis s/p colonic resection, hx of hernia repair, DARIO, RA, PE/DVT on LMWH, recent L SANDY 2/20 p/f revision of R SANDY. Hospital course complicated by acute drop in hemoglobin with imaging notable for large fluid collection within the lateral subq tissue overlying the right hip within the lower ext musculature suspicious for hematoma.      #Normocytic Anemia, Acute on Chronic  Patient with acute anemia on POD4 with drop from hgb of 10.1 on 7/30 to 5.6.  Repeat hgb notable for interval decrease to 4.6.  GI was consulted to assess for GI bleed in the setting of patient's Judaism belief and declined transfusion of blood product.  On SSRI, recent NSAIDs and A/C.  Rectal exam without evidence of bleeding and patient reported formed green-brown stool without evidence of any blood or melena.  Patient with recent colonoscopy in 2019 with polyps.  Review of CTA with radiology notable for fluid collection within lower ext musculature without evidence of acute GI bleeding.    -Given Judaism beliefs, pt not receiving PRBC instead being treated with EPO and venofer  -Continue to trend CBC, consider use of pediatric blood collection tube  -Monitor BM  -Can continue PPI daily for now and d/c upon discharge  -Monitor hemodynamics   -Hold a/c if no contraindication  -Defer to primary team for evaluation of fluid collection within lower ext musculature on image  - GI will sign off. Call back PRN      Recommendation d/w primary team  Case d/w svc attg    Recommendations discussed with primary team  Plan discussed with GI service attending    Ken Devries MD  PGY-4 GI fellow  Pager: [phone]

## 2020-08-03 NOTE — PROGRESS NOTE ADULT - ASSESSMENT
50F Buddhism with obesity s/p sleeve gastrectomy, RA, HTN,  DM, hx of diverticulitis s/p colonic resection, hx of hernia repair, DARIO, RA, PE/DVT, on LMWH (lovenox 100 mg BID), recent L SANDY 2/20  PMH DARIO, GERD, RA, PE (2001), DVT (on loveneox 100 mg BID),  admitted for for right hip revision arthroplasty 7/30. Patient is currently  Post-operative day #4 s/p R revision SANDY. Hematology consulted for drop  hgb of 10.1 on 7/30 to 5.6.  Repeat hgb decrease to 4.6, presently 4.4.   Anemia due to peewee- operative blood loss, hydration,  GI blood loss (stool guaiac positive), doubt hemolysis  Patient has declined transfusion of any blood products in keeping with her Rastafarian beliefs and is aware of the risks, including death.  Will continue iron by infusion, IV folic acid, suggest B12 by injection as patient's prior bariatric surgery may prevent oral absorption.Patient's last B12 injection was one month ago by her PCP. Limit blood draw to minimum amount (use pediatric tubes) once per day. Maintain strict bedrest. Discussed in detail with fellow, Dr. Dale and with ortho resident.

## 2020-08-03 NOTE — CHART NOTE - NSCHARTNOTEFT_GEN_A_CORE
Briefly, this is a 50F with PMH DARIO, GERD, RA, PE (2001), DVT (on loveneox 100 mg BID), hypokalemia, DM2, RA, HTN, depression, chronic R hip pain admitted for for right hip revision arthroplasty 7/30. Medicine has been consulted for co-management. Given that this pt is a Samaritan and is refusing any blood transfusion despite the drop in hemoglobin, we gave initial recommendations for IV iron, iron studies and possible hematology consult in the morning (see Attending Hospitalist note, Dr. Hensley). The primary team called us earlier tonight for an interval drop in Hgb from 5.7 to 4.6.    Additional recommendations:   -DC lovenox  -rectal exam to check for ongoing bleeding  -consult GI for recommendations for possible GI bleed Briefly, this is a 50F with PMH DARIO, GERD, RA, PE (2001), DVT (on Lovenox 100 mg BID), hypokalemia, DM2, RA, HTN, depression, chronic R hip pain admitted for right hip revision arthroplasty 7/30. Medicine has been consulted for co-management. Given that this pt is a Jain and is refusing any blood transfusion despite the drop in hemoglobin, we gave initial recommendations for IV iron, iron studies and possible hematology consult in the morning (see Attending Hospitalist note, Dr. Hensley - case has been discussed with Heme/Onc Attending Dr Vigil). The primary team called us earlier tonight for an interval drop in Hgb from 5.7 to 4.6.    Additional recommendations:   -DC lovenox  -rectal exam to check for ongoing bleeding  -consult GI for recommendations for possible GI bleed            Attending Statement -   As above, an acute drop in Hgb was sustained over a 2 day period from > 10 to now 4.6 post-operatively. In talking to the patient, she reported, earlier in the day, a blood BM resulting in blood mixed in the stool, now in the bowl of the toilet itself. She reported darker stools than usual. An FOBT was found to be positive. Additionally, however, in assessing her R hip - the area is extremely swollen and mildly tender. There is a bandage overlying the surgical incision and it is stained, but not saturated, with dark blood. Area surrounding the bandage is firm to touch. Patient reports the bandage had to be changed 3 times that day as it was soaked in bloody fluid. Given the above, recommendation was made to Orthopedics resident to 1) Stop Lovenox and Celecoxib, 2) Order CTA A/P + RLE to evaluate for any GI bleed or bleed from the hip, 3) Consult GI. Preliminary read from radiology illustrates no intra-abdominal source of bleed, but large fluid collection in RLE, can not exclude hematoma. GI evaluated patient - at present, no e/o GI bleed (patient had witnessed normal BM). Medicine and GI team advise further assessment of reported fluid collection/hematoma.     D/w Heme/Onc tomorrow regarding management of anemia in the setting of Adventist beliefs.   Discussion re EPO - it is pro-thrombotic and patient has history of DVT, risk/benefit analysis.   For now, trend Hgb, monitor for worsening tachycardia and hypotension. Patient symptomatically orthostatic.   Patient notes that under no circumstances, even an emergency where she may be unconscious, etc, she would not want to receive blood products.     Kimberly Mcwilliams MD Briefly, this is a 50F with PMH DARIO, GERD, RA, PE (2001), DVT (on Lovenox 100 mg BID), hypokalemia, DM2, RA, HTN, depression, chronic R hip pain admitted for right hip revision arthroplasty 7/30. Medicine has been consulted for co-management. Given that this pt is a Jewish and is refusing any blood transfusion despite the drop in hemoglobin, we gave initial recommendations for IV iron, iron studies and possible hematology consult in the morning (see Attending Hospitalist note, Dr. Hensley - case has been discussed with Heme/Onc Attending Dr Vigil). The primary team called us earlier tonight for an interval drop in Hgb from 5.7 to 4.6.    Additional recommendations:   -DC lovenox  -rectal exam to check for ongoing bleeding  -consult GI for recommendations for possible GI bleed            Attending Statement -   As above, an acute drop in Hgb was sustained over a 2 day period from > 10 to now 4.6 post-operatively. In talking to the patient, she reported, earlier in the day, a blood BM resulting in blood mixed in the stool, now in the bowl of the toilet itself. She reported darker stools than usual. An FOBT was found to be positive. Additionally, however, in assessing her R hip - the area is extremely swollen and mildly tender. There is a bandage overlying the surgical incision and it is stained, but not saturated, with dark blood. Area surrounding the bandage is firm to touch. Patient reports the bandage had to be changed 3 times that day as it was soaked in bloody fluid. Given the above, recommendation was made to Orthopedics resident to 1) Stop Lovenox and Celecoxib, 2) Order CTA A/P + RLE to evaluate for any GI bleed or bleed from the hip, 3) Consult GI. Preliminary read from radiology illustrates no intra-abdominal source of bleed, but large fluid collection in RLE, can not exclude hematoma. GI evaluated patient - at present, no e/o GI bleed (patient had witnessed normal BM). Medicine and GI team advise further assessment of reported fluid collection/hematoma.     D/w Heme/Onc tomorrow regarding management of anemia in the setting of Moravian beliefs. C/w IV Iron for now.   Discussion re EPO - it is pro-thrombotic and patient has history of DVT, risk/benefit analysis.   For now, trend Hgb, monitor for worsening tachycardia and hypotension. Patient symptomatically orthostatic.   Patient notes that under no circumstances, even an emergency where she may be unconscious, etc, she would not want to receive blood products.     Kimberly Mcwilliams MD Briefly, this is a 50F with PMH DARIO, GERD, RA, PE (2001), DVT (on Lovenox 100 mg BID), hypokalemia, DM2, RA, HTN, depression, chronic R hip pain admitted for right hip revision arthroplasty 7/30. Medicine has been consulted for co-management. 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 Hensley - case has been discussed with Heme/Onc Attending Dr Vigil). The primary team called us earlier tonight for an interval drop in Hgb from 5.7 to 4.6.    Additional recommendations:   -DC lovenox  -rectal exam to check for ongoing bleeding  -consult GI for recommendations for possible GI bleed      Attending Statement -   As above, an acute drop in Hgb was sustained over a 2 day period from > 10 to now 4.6 post-operatively. In talking to the patient, she reported, earlier in the day, a blood BM resulting in blood mixed in the stool, now in the bowl of the toilet itself. She reported darker stools than usual. An FOBT was found to be positive. Additionally, however, in assessing her R hip - the area is extremely swollen and mildly tender. There is a bandage overlying the surgical incision and it is stained, but not saturated, with dark blood. Area surrounding the bandage is firm to touch. Patient reports the bandage had to be changed 3 times that day as it was soaked in bloody fluid. Given the above, recommendation was made to Orthopedics resident to 1) Stop Lovenox and Celecoxib, 2) Order CTA A/P + RLE to evaluate for any GI bleed or bleed from the hip, 3) Consult GI. Preliminary read from radiology illustrates no intra-abdominal source of bleed, but large fluid collection in RLE, can not exclude hematoma. GI evaluated patient - at present, no e/o GI bleed (patient had witnessed normal BM). Medicine and GI team advise further assessment of reported fluid collection/hematoma.     D/w Heme/Onc tomorrow regarding management of anemia in the setting of Christian beliefs. C/w IV Iron for now.   Discussion re EPO - it is pro-thrombotic and patient has history of DVT, risk/benefit analysis.   For now, trend Hgb, monitor for worsening tachycardia and hypotension. Patient symptomatically orthostatic.   Patient notes that under no circumstances, even an emergency where she may be unconscious, etc, she would not want to receive blood products.     Re-assessment 6:30AM  Patient's BP 80/60s with worsening tachycardia. Patient is mentating and overall stable, but persistent complaint of dizziness, which is not surprising.   Inspection of R hip - increased swelling and a new, demarcated area of ecchymoses posteriorly that was not as pronounced earlier in the night.   There is also a tense, fluid filled blister below the site.   Patient reports worsening pain in the area.   Ortho resident aware, relayed to attending.     Kimberly Mcwilliams MD

## 2020-08-03 NOTE — PROGRESS NOTE ADULT - SUBJECTIVE AND OBJECTIVE BOX
SUBJECTIVE: Patient seen and examined. Patient states not feeling very well, feels dizzy and weak with shortness of breath when ambulating. Hemoglobin downtrending but as patient is Anabaptism, she is refusing blood products. Patient was informed that without blood transfusion there is a very high likelihood of death as supportive products will take time to word to increase her hemoglobin. Patient understands the risks but continues to refuse any blood products. Supportive care with IVF boluses and close monitoring. Consult for ICU placed this morning.       OBJECTIVE:  NAD  Vital Signs Last 24 Hrs  T(C): 36.9 (03 Aug 2020 06:21), Max: 37.2 (03 Aug 2020 00:54)  T(F): 98.5 (03 Aug 2020 06:21), Max: 98.9 (03 Aug 2020 00:54)  HR: 120 (03 Aug 2020 06:21) (107 - 125)  BP: 86/58 (03 Aug 2020 06:21) (86/58 - 117/53)  BP(mean): --  RR: 20 (03 Aug 2020 06:21) (16 - 20)  SpO2: 97% (03 Aug 2020 06:21) (95% - 100%)      Physical Exam:  General: Sitting in chair, appears uncomfortable. AAOx3  Extremities:        LLE: No gross deformity. SILT L2-S1 distribution, symmetric. TA/EHL/FHL/GS motor intact.       RLE: Dressing c/d/i. SILT L2-S1 distribution, symmetric. TA/EHL/FHL/GS motor intact.             Labs:                  4.4    6.99  )-----------( 202      ( 03 Aug 2020 06:20 )             14.4   08-03    139  |  104  |  6<L>  ----------------------------<  121<H>  3.6   |  24  |  0.73    Ca    8.6      03 Aug 2020 06:20  Phos  2.8     08-02    TPro  5.4<L>  /  Alb  2.5<L>  /  TBili  0.3  /  DBili  x   /  AST  17  /  ALT  10  /  AlkPhos  57  08-03      A/P :  Pt is a 51yo Female s/p R SANDY revision (acetabulum and femoral head) on 7/30  -    Anemia: Hgb this AM 4.4 this AM since pt is Restorationism, declined blood transfusion. Will proceed with iron infusion 200 mg x 5 days  -   CT demonstrating 7x7x14 fluid collection in hip, likely hematoma  -    F/u FOBT, EPO level, Iron Studies, Trend CBC, Monitor Hemodynamics  -    Pain control  -    DVT ppx: Lovenox 100 BID (home dose) being held due to acute blood loss anemia  -    Weight bearing status: PWB  -    Holding PT for now  -    Dispo: TBD SUBJECTIVE: Patient seen and examined. Patient states not feeling very well, feels dizzy and weak with shortness of breath when ambulating. Hemoglobin downtrending but as patient is Adventism, she is refusing blood products. Patient was informed that without blood transfusion there is a very high likelihood of death as supportive products will take time to word to increase her hemoglobin. Patient understands the risks but continues to refuse any blood products. Supportive care with IVF boluses and close monitoring. Consult for ICU placed this morning.       OBJECTIVE:  NAD  Vital Signs Last 24 Hrs  T(C): 36.9 (03 Aug 2020 06:21), Max: 37.2 (03 Aug 2020 00:54)  T(F): 98.5 (03 Aug 2020 06:21), Max: 98.9 (03 Aug 2020 00:54)  HR: 120 (03 Aug 2020 06:21) (107 - 125)  BP: 86/58 (03 Aug 2020 06:21) (86/58 - 117/53)  BP(mean): --  RR: 20 (03 Aug 2020 06:21) (16 - 20)  SpO2: 97% (03 Aug 2020 06:21) (95% - 100%)      Physical Exam:  General: Sitting in chair, appears uncomfortable. AAOx3  Extremities:        LLE: No gross deformity. SILT L2-S1 distribution, symmetric. TA/EHL/FHL/GS motor intact.       RLE: Dressing c/d/i. SILT L2-S1 distribution, symmetric. TA/EHL/FHL/GS motor intact.             Labs:                  4.4    6.99  )-----------( 202      ( 03 Aug 2020 06:20 )             14.4   08-03    139  |  104  |  6<L>  ----------------------------<  121<H>  3.6   |  24  |  0.73    Ca    8.6      03 Aug 2020 06:20  Phos  2.8     08-02    TPro  5.4<L>  /  Alb  2.5<L>  /  TBili  0.3  /  DBili  x   /  AST  17  /  ALT  10  /  AlkPhos  57  08-03      A/P :  Pt is a 51yo Female s/p R SANDY revision (acetabulum and femoral head) on 7/30  -    Anemia: Hgb this AM 4.4 this AM since pt is Shinto, declined blood transfusion. Will proceed with iron infusion 200 mg x 5 days  -   CT demonstrating 7x7x14 fluid collection in hip, likely hematoma  -    F/u FOBT, EPO level, Iron Studies, Trend CBC, Monitor Hemodynamics  -    Pain control  -    DVT ppx: Lovenox 100 BID (home dose) being held due to acute blood loss anemia  -    Weight bearing status: PWB  -    Holding PT for now  -    Dispo: Pt to be stepped up to ICU

## 2020-08-04 LAB
BASOPHILS # BLD AUTO: 0.02 K/UL — SIGNIFICANT CHANGE UP (ref 0–0.2)
BASOPHILS NFR BLD AUTO: 0.3 % — SIGNIFICANT CHANGE UP (ref 0–2)
EOSINOPHIL # BLD AUTO: 0.2 K/UL — SIGNIFICANT CHANGE UP (ref 0–0.5)
EOSINOPHIL NFR BLD AUTO: 3.2 % — SIGNIFICANT CHANGE UP (ref 0–6)
HCT VFR BLD CALC: 15.1 % — CRITICAL LOW (ref 34.5–45)
HGB BLD-MCNC: 4.5 G/DL — CRITICAL LOW (ref 11.5–15.5)
IMM GRANULOCYTES NFR BLD AUTO: 6.9 % — HIGH (ref 0–1.5)
LYMPHOCYTES # BLD AUTO: 1.11 K/UL — SIGNIFICANT CHANGE UP (ref 1–3.3)
LYMPHOCYTES # BLD AUTO: 17.8 % — SIGNIFICANT CHANGE UP (ref 13–44)
MCHC RBC-ENTMCNC: 28 PG — SIGNIFICANT CHANGE UP (ref 27–34)
MCHC RBC-ENTMCNC: 29.8 GM/DL — LOW (ref 32–36)
MCV RBC AUTO: 93.8 FL — SIGNIFICANT CHANGE UP (ref 80–100)
MONOCYTES # BLD AUTO: 0.47 K/UL — SIGNIFICANT CHANGE UP (ref 0–0.9)
MONOCYTES NFR BLD AUTO: 7.5 % — SIGNIFICANT CHANGE UP (ref 2–14)
NEUTROPHILS # BLD AUTO: 4.01 K/UL — SIGNIFICANT CHANGE UP (ref 1.8–7.4)
NEUTROPHILS NFR BLD AUTO: 64.3 % — SIGNIFICANT CHANGE UP (ref 43–77)
NRBC # BLD: 2 /100 WBCS — HIGH (ref 0–0)
PLATELET # BLD AUTO: 224 K/UL — SIGNIFICANT CHANGE UP (ref 150–400)
RBC # BLD: 1.61 M/UL — LOW (ref 3.8–5.2)
RBC # BLD: 1.61 M/UL — LOW (ref 3.8–5.2)
RBC # BLD: 1.64 M/UL — LOW (ref 3.8–5.2)
RBC # FLD: 16.7 % — HIGH (ref 10.3–14.5)
RETICS #: 66.7 K/UL — SIGNIFICANT CHANGE UP (ref 25–125)
RETICS #: 67.5 K/UL — SIGNIFICANT CHANGE UP (ref 25–125)
RETICS/RBC NFR: 4.1 % — HIGH (ref 0.5–2.5)
RETICS/RBC NFR: 4.2 % — HIGH (ref 0.5–2.5)
WBC # BLD: 6.24 K/UL — SIGNIFICANT CHANGE UP (ref 3.8–10.5)
WBC # FLD AUTO: 6.24 K/UL — SIGNIFICANT CHANGE UP (ref 3.8–10.5)

## 2020-08-04 PROCEDURE — 99233 SBSQ HOSP IP/OBS HIGH 50: CPT

## 2020-08-04 PROCEDURE — 99233 SBSQ HOSP IP/OBS HIGH 50: CPT | Mod: GC

## 2020-08-04 RX ADMIN — Medication 1 GRAM(S): at 18:27

## 2020-08-04 RX ADMIN — OXYCODONE HYDROCHLORIDE 10 MILLIGRAM(S): 5 TABLET ORAL at 04:15

## 2020-08-04 RX ADMIN — Medication 650 MILLIGRAM(S): at 08:18

## 2020-08-04 RX ADMIN — Medication 5 MILLIGRAM(S): at 10:04

## 2020-08-04 RX ADMIN — Medication 1 GRAM(S): at 06:25

## 2020-08-04 RX ADMIN — Medication 650 MILLIGRAM(S): at 12:00

## 2020-08-04 RX ADMIN — Medication 650 MILLIGRAM(S): at 18:27

## 2020-08-04 RX ADMIN — OXYCODONE HYDROCHLORIDE 10 MILLIGRAM(S): 5 TABLET ORAL at 23:00

## 2020-08-04 RX ADMIN — OXYCODONE HYDROCHLORIDE 10 MILLIGRAM(S): 5 TABLET ORAL at 07:58

## 2020-08-04 RX ADMIN — OXYCODONE HYDROCHLORIDE 10 MILLIGRAM(S): 5 TABLET ORAL at 14:18

## 2020-08-04 RX ADMIN — ONDANSETRON 4 MILLIGRAM(S): 8 TABLET, FILM COATED ORAL at 09:19

## 2020-08-04 RX ADMIN — Medication 500 MILLIGRAM(S): at 10:05

## 2020-08-04 RX ADMIN — ONDANSETRON 4 MILLIGRAM(S): 8 TABLET, FILM COATED ORAL at 17:57

## 2020-08-04 RX ADMIN — OXYCODONE HYDROCHLORIDE 5 MILLIGRAM(S): 5 TABLET ORAL at 17:58

## 2020-08-04 RX ADMIN — OXYCODONE HYDROCHLORIDE 10 MILLIGRAM(S): 5 TABLET ORAL at 18:27

## 2020-08-04 RX ADMIN — Medication 650 MILLIGRAM(S): at 06:25

## 2020-08-04 RX ADMIN — QUETIAPINE FUMARATE 50 MILLIGRAM(S): 200 TABLET, FILM COATED ORAL at 22:00

## 2020-08-04 RX ADMIN — GABAPENTIN 300 MILLIGRAM(S): 400 CAPSULE ORAL at 22:00

## 2020-08-04 RX ADMIN — PREGABALIN 1000 MICROGRAM(S): 225 CAPSULE ORAL at 10:05

## 2020-08-04 RX ADMIN — OXYCODONE HYDROCHLORIDE 10 MILLIGRAM(S): 5 TABLET ORAL at 08:18

## 2020-08-04 RX ADMIN — IRON SUCROSE 110 MILLIGRAM(S): 20 INJECTION, SOLUTION INTRAVENOUS at 10:00

## 2020-08-04 RX ADMIN — PANTOPRAZOLE SODIUM 40 MILLIGRAM(S): 20 TABLET, DELAYED RELEASE ORAL at 06:25

## 2020-08-04 RX ADMIN — OXYCODONE HYDROCHLORIDE 10 MILLIGRAM(S): 5 TABLET ORAL at 22:10

## 2020-08-04 RX ADMIN — Medication 40 MILLIGRAM(S): at 10:05

## 2020-08-04 RX ADMIN — OXYCODONE HYDROCHLORIDE 10 MILLIGRAM(S): 5 TABLET ORAL at 03:44

## 2020-08-04 RX ADMIN — OXYCODONE HYDROCHLORIDE 10 MILLIGRAM(S): 5 TABLET ORAL at 13:15

## 2020-08-04 NOTE — PROGRESS NOTE ADULT - SUBJECTIVE AND OBJECTIVE BOX
24 HOUR EVENTS:   O/N: RIVAS  8/3: Pt with continued hypotension and Hgb 4.4- transferred to SICU. D/c'd IVF Started NRB. Pt still stating she dose not want blood products even if it means she has an MI or dies. c/o mild CP EKG negative.    SUBJECTIVE: Patient seen and examined bedside by chief resident.      MEDICATIONS  (PRN):  ondansetron Injectable 4 milliGRAM(s) IV Push every 6 hours PRN Nausea and/or Vomiting  oxyCODONE    IR 5 milliGRAM(s) Oral every 4 hours PRN Moderate Pain (4 - 6)  oxyCODONE    IR 10 milliGRAM(s) Oral every 4 hours PRN Severe Pain (7 - 10)  senna 2 Tablet(s) Oral at bedtime PRN Constipation      I&O's Detail    02 Aug 2020 07:01  -  03 Aug 2020 07:00  --------------------------------------------------------  IN:    Lactated Ringers IV Bolus: 1000 mL    lactated ringers.: 250 mL    Oral Fluid: 240 mL  Total IN: 1490 mL    OUT:    Voided: 600 mL  Total OUT: 600 mL    Total NET: 890 mL      03 Aug 2020 07:01  -  04 Aug 2020 06:34  --------------------------------------------------------  IN:    IV PiggyBack: 100 mL    Oral Fluid: 400 mL  Total IN: 500 mL    OUT:    Indwelling Catheter - Urethral: 1720 mL  Total OUT: 1720 mL    Total NET: -1220 mL          Vital Signs Last 24 Hrs  T(C): 36.6 (04 Aug 2020 04:55), Max: 36.8 (03 Aug 2020 17:02)  T(F): 97.9 (04 Aug 2020 04:55), Max: 98.2 (03 Aug 2020 17:02)  HR: 102 (04 Aug 2020 06:00) (86 - 122)  BP: 79/46 (03 Aug 2020 15:00) (79/46 - 111/56)  BP(mean): 58 (03 Aug 2020 15:00) (58 - 81)  RR: 18 (04 Aug 2020 06:00) (10 - 39)  SpO2: 100% (04 Aug 2020 06:00) (94% - 100%)    GENERAL: NAD, Resting comfortably in bed  HEENT: NCAT, EOMI, PERRL, MMM  RESP: CTAB, Nonlabored breathing, No respiratory distress  CARD: Tachycardic, Normal peripheral perfusion  GI: Soft, NT, ND, No guarding, No rebound tenderness  EXTREM: R hip dressing with swelling, moderate ecchymosis of R thigh, compression stockings in place with b/l pedal edema, cool extremities  : latham in place  NEURO: AAOx3, No focal deficits  PSYCH: Affect and characteristics of appearance, verbalizations, behaviors are appropriate    LABS:                        4.4    6.99  )-----------( 202      ( 03 Aug 2020 06:20 )             14.4     08-03    139  |  104  |  6<L>  ----------------------------<  121<H>  3.6   |  24  |  0.73    Ca    8.6      03 Aug 2020 06:20  Phos  2.8     08-02    TPro  5.4<L>  /  Alb  2.5<L>  /  TBili  0.3  /  DBili  x   /  AST  17  /  ALT  10  /  AlkPhos  57  08-03    PT/INR - ( 03 Aug 2020 09:04 )   PT: 12.4 sec;   INR: 1.04          PTT - ( 03 Aug 2020 09:04 )  PTT:31.6 sec

## 2020-08-04 NOTE — PROGRESS NOTE ADULT - ASSESSMENT
50F Gnosticist with PMHx diverticulitis s/p R hemicolectomy, MO s/p sleeve gastrectomy, DARIO, RA, PE/DVT/SVT on LMWH, DARIO, RA, now s/p R SANDY on 7/30, c/b acute anemia postoperatively. Pt Hgb 10.6 postop to 4.4 today (POD3). Most likely etiology of acute blood loss anemia from large R hip hematoma (7x7x14 cm), but also with +FOBT (no active GI bleed on CTA).     NEURO: tylenol, prozac, seroquel, ropinirole gabapentin, (hold home flexeril)  CV: Euvolemic, hyperdynamic state with tachycardia and likely low SVR with euvolemic anemia., avoid fluid boluses, goal MAP >65 but will tolerate 55 if perfusion looks OK, EKG w/o acute changes  PULM: Non-rebreather to maximize dissolved O2  GI/FEN: Regular diet, bowel reg, sulfasalazine, carafate.   : Tamayo   ENDO: ISS  HEME: Hgb 4.4 (10.6 in PACU). R hip hematoma on CTA, no active GIB. C/w iron, folic acid, EPO X 1. Heme/onc consult, appreciate recs on epo dosing  ID: Monitor for fever and leukocytosis,   PPx: SCD  LINES: EUNICE PAUL  WOUNDS/DRAINS: R hip incision  PT/OT: Bedrest

## 2020-08-04 NOTE — PROGRESS NOTE ADULT - SUBJECTIVE AND OBJECTIVE BOX
SUBJECTIVE: Patient seen and examined. States she feels same as yesterday, not much worse. Still refusing blood products. Patient was informed that without blood transfusion there is a very high likelihood of death as supportive products will take time to word to increase her hemoglobin. Patient understands the risks but continues to refuse any blood products. Supportive care continued. Patient being monitored in SICU.      Vital Signs Last 24 Hrs  T(C): 36.6 (04 Aug 2020 04:55), Max: 36.8 (03 Aug 2020 17:02)  T(F): 97.9 (04 Aug 2020 04:55), Max: 98.2 (03 Aug 2020 17:02)  HR: 110 (04 Aug 2020 07:00) (86 - 122)  BP: 79/46 (03 Aug 2020 15:00) (79/46 - 111/56)  BP(mean): 58 (03 Aug 2020 15:00) (58 - 81)  RR: 20 (04 Aug 2020 07:00) (10 - 39)  SpO2: 100% (04 Aug 2020 07:00) (94% - 100%)      Physical Exam:  General: Sitting in chair, appears uncomfortable. AAOx3  Extremities:        LLE: No gross deformity. SILT L2-S1 distribution, symmetric. TA/EHL/FHL/GS motor intact.       RLE: Dressing saturated w/ serosanguinous fluid, changed for new aquacel. SILT L2-S1 distribution, symmetric. TA/EHL/FHL/GS motor intact.             Labs:                                     4.5    6.24  )-----------( 224      ( 04 Aug 2020 06:21 )             15.1   08-03    139  |  104  |  6<L>  ----------------------------<  121<H>  3.6   |  24  |  0.73    Ca    8.6      03 Aug 2020 06:20    TPro  5.4<L>  /  Alb  2.5<L>  /  TBili  0.3  /  DBili  x   /  AST  17  /  ALT  10  /  AlkPhos  57  08-03        A/P :  Pt is a 51yo Female s/p R SANDY revision (acetabulum and femoral head) on 7/30 c/b acute blood loss anemia  -    Anemia: Hgb this AM 4.5 this AM, stable since yesterday, but declines blood transfusion. Will proceed with iron infusion 200 mg x 5 days  -   CT demonstrating 7x7x14 fluid collection in hip, likely hematoma  -    F/u FOBT, EPO level, Iron Studies, Trend CBC, Monitor Hemodynamics  -    Pain control  -    DVT ppx: Lovenox 100 BID (home dose) being held due to acute blood loss anemia  -    Weight bearing status: PWB  -    Holding PT for now  -    Dispo: Pt stepped up to ICU

## 2020-08-04 NOTE — CHART NOTE - NSCHARTNOTEFT_GEN_A_CORE
Admitting Diagnosis:   Patient is a 50y old  Female who presents with a chief complaint of right hip pain/surgery (04 Aug 2020 07:54)      Consult: Yes [   ]  No [ x  ]    Reason for Initial Nutrition Assessment:  Length Of Stay     PAST MEDICAL & SURGICAL HISTORY:  Depression  Rheumatoid arthritis  Osteoarthritis  Restless leg  DARIO (obstructive sleep apnea)  GERD (gastroesophageal reflux disease)  Diverticulitis  Diabetes mellitus, type 2  Chronic anticoagulation  Hypercoagulable state  Neuropathy  Pancreatitis  HTN (hypertension)  Pulmonary embolism  DVT (deep venous thrombosis): left leg  History of surgery: right total hip replacement 9/12/2019  History of surgery: 2019 L thr  History of cholecystectomy  History of colon resection: diverticulitis  Abdominal hernia: umbilical  History of sleeve gastrectomy      Current Nutrition Order:  CSTCHO w/ evening snack    PO Intake: Excellent (%) [   ]  Good (50-75%) [   ]  Fair (25-50%) [   ]  Poor (<25%) [  x ]  Minimal intake 2/2 lethargy, nausea, feeling full (likely excess air in stomach from NRB and h/o sleeve)    GI Issues:   C/o nausea, no emesis. Received zofran per RN  +2 BM 8/3  Ordered for zofran, protonix, senna, carafate    Pain:  No pain reported  Ordered for tylenol, oxycodone    Skin Integrity:  Surgical incision  Wade score 17    Labs:   08-03    139  |  104  |  6<L>  ----------------------------<  121<H>  3.6   |  24  |  0.73    Ca    8.6      03 Aug 2020 06:20    TPro  5.4<L>  /  Alb  2.5<L>  /  TBili  0.3  /  DBili  x   /  AST  17  /  ALT  10  /  AlkPhos  57  08-03    CAPILLARY BLOOD GLUCOSE        Nutritionally Pertinent Lab Values:  BUN 6, Hgb/Hct 4.5/15.1, glu 121    Medications:  MEDICATIONS  (STANDING):  acetaminophen   Tablet .. 650 milliGRAM(s) Oral every 6 hours  cyanocobalamin Injectable 1000 MICROGram(s) IntraMuscular daily  FLUoxetine 40 milliGRAM(s) Oral daily  folic acid 5 milliGRAM(s) Oral daily  gabapentin 300 milliGRAM(s) Oral at bedtime  iron sucrose IVPB 200 milliGRAM(s) IV Intermittent every 24 hours  pantoprazole    Tablet 40 milliGRAM(s) Oral before breakfast  QUEtiapine 50 milliGRAM(s) Oral at bedtime  sucralfate suspension 1 Gram(s) Oral every 12 hours  sulfaSALAzine 500 milliGRAM(s) Oral daily    MEDICATIONS  (PRN):  ondansetron Injectable 4 milliGRAM(s) IV Push every 6 hours PRN Nausea and/or Vomiting  oxyCODONE    IR 5 milliGRAM(s) Oral every 4 hours PRN Moderate Pain (4 - 6)  oxyCODONE    IR 10 milliGRAM(s) Oral every 4 hours PRN Severe Pain (7 - 10)  senna 2 Tablet(s) Oral at bedtime PRN Constipation      Admitted Anthropometrics:  Height: 5'8", IBW 140lbs+/-10%, %%, BMI 34.1     Weight: 224lbs (7/30)  Daily     Weight Change:   Pt with a PSH of sleeve gastrectomy (2014) where she weighed 224lbs prior to Sx. Current admitted weight is 224lbs but patient is unsure of current BW as she does not weigh herself. Bed scale weight reading 268lbs. Please obtain updated weight for accuracy and trending.   Nutrition Focused Physical Exam: Completed [  x ]  Unable to complete [   ] No remarkable findings.    Estimated energy needs:   IBW (63.6kg) used for calculations as pt >120% of IBW   Nutrient needs based on [facility] standards of care for maintenance in adults.  Needs adjusted for post-op healing  1590-1908kcal/day (25-30kcal/kg)  64-76g pro/day (1-1.2g pro/kg)  Fluids per team    Subjective:   50F Yarsani with PMHx diverticulitis s/p R hemicolectomy, MO s/p sleeve gastrectomy, DARIO, RA, PE/DVT/SVT on LMWH, DARIO, RA, now s/p R SANDY on 7/30, c/b acute anemia postoperatively. Pt Hgb 10.6 postop to 4.4 today (POD3). Most likely etiology of acute blood loss anemia from large R hip hematoma (7x7x14 cm), but also with +FOBT (no active GI bleed on CTA). Being treated with EPO d/t Mormon beliefs. Pt seen in room, resting in bed, on NC. Must be on NRB for most of day to help w/ Hgb levels and can transition to NC for meal times. Currently on a CSTCHO, evening snack diet and tolerating PO. Endorsing poor appetite 2/2 lethargy. nausea, and feeling of fullness (likely excess air in stomach from NRB). Had a few bites of eggs and turkey ramon for breakfast this am. At home pt takes B12, Centrum MVI, Thiamine, Folic Acid, Potassium supplements. Used to take 50,000IU Vitamin D but not longer has a prescription for it. Pt with a PSH of sleeve gastrectomy (2014) where she weighed 224lbs prior to Sx. Current admitted weight is 224lbs but patient is unsure of current BW as she does not weigh herself. NKFA or dietary restrictions. Skin: intact pressure wise; GI: +FOBT, +2BM yesterday. RD to follow.     Nutrition Diagnosis:  Inadequate energy intake RT poor appetite 2/2 lethargy, nausea, early satiety 2/2 NRB and h/o sleeve AEB consuming ~25% of meals    Goal:  Pt to consistently meet % of estimated needs PO     Recommendations:  1. Nausea management PRN  2. Encourage small frequent meals if experiencing satiety  3. Pain and bowel regimen per team  4. MVI and B12 daily   5. Consider Ensure High Protein TID (480kcal, 48g pro)- or per team discretion, do not want dairy in ONS to interfere w/ any iron absorption.     Education:   small frequent meals, vitamin supplementation, option of ONS    Risk Level: High [ x  ] Moderate [   ] Low [   ]

## 2020-08-04 NOTE — PROGRESS NOTE ADULT - ASSESSMENT
50F Sabianist with obesity s/p sleeve gastrectomy, RA, HTN,  DM, hx of diverticulitis s/p colonic resection, hx of hernia repair, DARIO, RA, PE/DVT, on LMWH (lovenox 100 mg BID), recent L SANDY 2/20  PMH DARIO, GERD, RA, PE (2001), DVT (on loveneox 100 mg BID),  admitted for for right hip revision arthroplasty 7/30. Patient is currently  Post-operative day #4 s/p R revision SANDY. Hematology consulted for drop  hgb of 10.1 on 7/30 to 5.6.  Repeat hgb decrease to 4.6, 4.4 presently 4.5.   Anemia due to peewee- operative blood loss, hydration,  GI blood loss (stool guaiac positive) though no active bleeding seen on CT, doubt hemolysis  Patient has declined transfusion of any blood products in keeping with her Baptism beliefs and is aware of the risks, including death.  Will continue iron by infusion, IV folic acid,  B12 by injection as patient's prior bariatric surgery may prevent oral absorption. Patient's last B12 injection was one month ago by her PCP. Limit blood draw to minimum amount (use pediatric tubes) once per day. Maintain strict bedrest. Discussed  with ortho resident and nursing staff.

## 2020-08-04 NOTE — PROGRESS NOTE ADULT - SUBJECTIVE AND OBJECTIVE BOX
Ortho Note    Pt states her pain is well controlled at this time.  Pt is currently states she feels very fatigued at this time.    Denies CP, SOB, N/V, numbness/tingling down le b/l.    Vital Signs Last 24 Hrs  T(C): 36.7 (08-04-20 @ 09:00), Max: 36.7 (08-04-20 @ 09:00)  T(F): 98.1 (08-04-20 @ 09:00), Max: 98.1 (08-04-20 @ 09:00)  HR: 104 (08-04-20 @ 11:00) (96 - 118)  BP: 132/73 (08-04-20 @ 11:00) (112/60 - 132/73)  BP(mean): 95 (08-04-20 @ 11:00) (76 - 95)  RR: 22 (08-04-20 @ 11:00) (11 - 22)  SpO2: 100% (08-04-20 @ 11:00) (100% - 100%)      General: Pt Alert and oriented, NAD  DSG aquacel some C/D/I  Pulses: brisk cap refill b/l le   Sensation:  SILT throughout le b/l   Motor: EHL/FHL/TA/GS 5/5 b/l                           4.5    6.24  )-----------( 224      ( 04 Aug 2020 06:21 )             15.1   03 Aug 2020 06:20    139    |  104    |  6      ----------------------------<  121    3.6     |  24     |  0.73       TPro  5.4    /  Alb  2.5    /  TBili  0.3    /  DBili  x      /  AST  17     /  ALT  10     /  AlkPhos  57     03 Aug 2020 06:20      A/P: 50yFemale POD#5 s/p revision R SANDY   - Stable  - Pain Control as needed  - DVT ppx:  scds, chemical ac being held given anemia  - strict bedrest given current anemia  - Medical management per SICU team  - will continue to appreciate heme onc and GI recs   - Trend labs using pediatric tubes  - Pt still refusing all blood products at this time   - dispo pending hospital course    Ortho Pager [phone]

## 2020-08-04 NOTE — PROGRESS NOTE ADULT - SUBJECTIVE AND OBJECTIVE BOX
The patient was seen and examined.    50F Jainism with obesity s/p sleeve gastrectomy, RA, HTN,  DM, hx of diverticulitis s/p colonic resection, hx of hernia repair, DARIO, RA, PE/DVT, on LMWH (lovenox 100 mg BID), recent L SANDY 2/20  PMH DARIO, GERD, RA, PE (2001), DVT (on loveneox 100 mg BID),  admitted for for right hip revision arthroplasty 7/30. Patient is currently  Post-operative day #4 s/p R revision SANDY. Hematology consulted for drop  hgb of 10.1 on 7/30 to 5.6.  Repeat hgb decrease to 4.6, 4.4 presently 4.5.   The patient feels extremely fatigued but there has been no further decompensation in her vital signs.    .       lisinopril (Other)  verapamil (Other)    Allergies    lisinopril (Other)  verapamil (Other)    Intolerances        Medications:  MEDICATIONS  (STANDING):  acetaminophen   Tablet .. 650 milliGRAM(s) Oral every 6 hours  cyanocobalamin Injectable 1000 MICROGram(s) IntraMuscular daily  FLUoxetine 40 milliGRAM(s) Oral daily  folic acid 5 milliGRAM(s) Oral daily  gabapentin 300 milliGRAM(s) Oral at bedtime  iron sucrose IVPB 200 milliGRAM(s) IV Intermittent every 24 hours  pantoprazole    Tablet 40 milliGRAM(s) Oral before breakfast  QUEtiapine 50 milliGRAM(s) Oral at bedtime  sucralfate suspension 1 Gram(s) Oral every 12 hours  sulfaSALAzine 500 milliGRAM(s) Oral daily    MEDICATIONS  (PRN):  ondansetron Injectable 4 milliGRAM(s) IV Push every 6 hours PRN Nausea and/or Vomiting  oxyCODONE    IR 5 milliGRAM(s) Oral every 4 hours PRN Moderate Pain (4 - 6)  oxyCODONE    IR 10 milliGRAM(s) Oral every 4 hours PRN Severe Pain (7 - 10)  senna 2 Tablet(s) Oral at bedtime PRN Constipation          Interval History:    The patient has chest discomfort substernally when taking a deep breath..  No nausea/vomiting/fevers/chills/night sweats. Has fatigue, and dizziness.  Appetite is stable without weight loss.  No abdominal pain/diarrhea/constipation.  No melena or hematochezia.    No dysuria/hematuria.  No history of easy bruising/bleeding.  No gingival bleeding or epistaxis.  Has right thigh swelling.    ROS is otherwise negative.    PHYSICAL EXAM:    T(F): 98.1 (08-04-20 @ 09:00), Max: 98.2 (08-03-20 @ 17:02)  HR: 96 (08-04-20 @ 10:00) (86 - 122)  BP: 112/60 (08-04-20 @ 10:00) (79/46 - 114/54)  RR: 11 (08-04-20 @ 10:00) (10 - 39)  SpO2: 100% (08-04-20 @ 10:00) (94% - 100%)  Wt(kg): --    Daily     Daily     Gen: well developed, well nourished, obese, fairly comfortable at bedrest  HEENT: normocephalic/atraumatic, marked conjunctival pallor, no scleral icterus, no oral thrush/mucosal bleeding/mucositis  Neck: supple, no masses, no JVD  Cardiovascular: RR, nl S1S2, tachycardia  Respiratory: clear air entry b/l anteriorly  Gastrointestinal: BS+, soft, obese, NT/ND, no masses, no splenomegaly, no hepatomegaly, no evidence for ascites  Extremities: no clubbing/cyanosis, right thigh edema, no calf tenderness, SCDs in place  Neurological: no focal deficits  Skin: no rash on visible skin, excessive ecchymoses or petechiae, some drainage on thigh dressing  Lymph Nodes:  no significant peripheral adenopathy   Musculoskeletal:  full ROM  Psychiatric:  mood stable        Labs:                          4.5    6.24  )-----------( 224      ( 04 Aug 2020 06:21 )             15.1     CBC Full  -  ( 04 Aug 2020 07:47 )  WBC Count : x  RBC Count : 1.64 M/uL  Hemoglobin : x  Hematocrit : x  Platelet Count - Automated : x  Mean Cell Volume : x  Mean Cell Hemoglobin : x  Mean Cell Hemoglobin Concentration : x  Auto Neutrophil # : x  Auto Lymphocyte # : x  Auto Monocyte # : x  Auto Eosinophil # : x  Auto Basophil # : x  Auto Neutrophil % : x  Auto Lymphocyte % : x  Auto Monocyte % : x  Auto Eosinophil % : x  Auto Basophil % : x    PT/INR - ( 03 Aug 2020 09:04 )   PT: 12.4 sec;   INR: 1.04          PTT - ( 03 Aug 2020 09:04 )  PTT:31.6 sec    08-03    139  |  104  |  6<L>  ----------------------------<  121<H>  3.6   |  24  |  0.73    Ca    8.6      03 Aug 2020 06:20    TPro  5.4<L>  /  Alb  2.5<L>  /  TBili  0.3  /  DBili  x   /  AST  17  /  ALT  10  /  AlkPhos  57  08-03          Other Labs:    Cultures:    Pathology:    Imaging Studies:

## 2020-08-05 PROCEDURE — 99232 SBSQ HOSP IP/OBS MODERATE 35: CPT | Mod: GC

## 2020-08-05 PROCEDURE — 99233 SBSQ HOSP IP/OBS HIGH 50: CPT

## 2020-08-05 RX ORDER — OXYCODONE HYDROCHLORIDE 5 MG/1
10 TABLET ORAL EVERY 4 HOURS
Refills: 0 | Status: DISCONTINUED | OUTPATIENT
Start: 2020-08-05 | End: 2020-08-11

## 2020-08-05 RX ORDER — OXYCODONE HYDROCHLORIDE 5 MG/1
5 TABLET ORAL EVERY 4 HOURS
Refills: 0 | Status: DISCONTINUED | OUTPATIENT
Start: 2020-08-05 | End: 2020-08-11

## 2020-08-05 RX ADMIN — OXYCODONE HYDROCHLORIDE 10 MILLIGRAM(S): 5 TABLET ORAL at 20:00

## 2020-08-05 RX ADMIN — Medication 650 MILLIGRAM(S): at 11:06

## 2020-08-05 RX ADMIN — IRON SUCROSE 110 MILLIGRAM(S): 20 INJECTION, SOLUTION INTRAVENOUS at 11:06

## 2020-08-05 RX ADMIN — QUETIAPINE FUMARATE 50 MILLIGRAM(S): 200 TABLET, FILM COATED ORAL at 22:27

## 2020-08-05 RX ADMIN — OXYCODONE HYDROCHLORIDE 5 MILLIGRAM(S): 5 TABLET ORAL at 09:23

## 2020-08-05 RX ADMIN — OXYCODONE HYDROCHLORIDE 5 MILLIGRAM(S): 5 TABLET ORAL at 10:20

## 2020-08-05 RX ADMIN — Medication 650 MILLIGRAM(S): at 18:52

## 2020-08-05 RX ADMIN — Medication 40 MILLIGRAM(S): at 11:06

## 2020-08-05 RX ADMIN — PANTOPRAZOLE SODIUM 40 MILLIGRAM(S): 20 TABLET, DELAYED RELEASE ORAL at 06:04

## 2020-08-05 RX ADMIN — GABAPENTIN 300 MILLIGRAM(S): 400 CAPSULE ORAL at 22:27

## 2020-08-05 RX ADMIN — Medication 650 MILLIGRAM(S): at 06:04

## 2020-08-05 RX ADMIN — Medication 5 MILLIGRAM(S): at 11:06

## 2020-08-05 RX ADMIN — ONDANSETRON 4 MILLIGRAM(S): 8 TABLET, FILM COATED ORAL at 08:46

## 2020-08-05 RX ADMIN — PREGABALIN 1000 MICROGRAM(S): 225 CAPSULE ORAL at 11:07

## 2020-08-05 RX ADMIN — OXYCODONE HYDROCHLORIDE 10 MILLIGRAM(S): 5 TABLET ORAL at 12:29

## 2020-08-05 RX ADMIN — OXYCODONE HYDROCHLORIDE 10 MILLIGRAM(S): 5 TABLET ORAL at 11:29

## 2020-08-05 RX ADMIN — Medication 1 GRAM(S): at 06:04

## 2020-08-05 RX ADMIN — Medication 650 MILLIGRAM(S): at 17:44

## 2020-08-05 RX ADMIN — Medication 650 MILLIGRAM(S): at 12:06

## 2020-08-05 RX ADMIN — Medication 1 GRAM(S): at 17:44

## 2020-08-05 RX ADMIN — OXYCODONE HYDROCHLORIDE 10 MILLIGRAM(S): 5 TABLET ORAL at 19:49

## 2020-08-05 NOTE — PROVIDER CONTACT NOTE (OTHER) - SITUATION
Pt reports right leg increasingly swollen. Right leg larger than left leg, looking increasingly swollen

## 2020-08-05 NOTE — PROGRESS NOTE ADULT - ASSESSMENT
50F Oriental orthodox with PMHx diverticulitis s/p R hemicolectomy, MO s/p sleeve gastrectomy, DARIO, RA, PE/DVT/SVT on LMWH, DARIO, RA, now s/p R SANDY on 7/30, c/b acute anemia postoperatively. Pt Hgb 10.6 postop to 4.4 today (POD3). Most likely etiology of acute blood loss anemia from large R hip hematoma (7x7x14 cm), but also with +FOBT (no active GI bleed on CTA).     NEURO: tylenol, prozac, seroquel, ropinirole gabapentin, (hold home flexeril)  CV: Euvolemic, hyperdynamic state with tachycardia and likely low SVR with euvolemic anemia., avoid fluid boluses, goal MAP >65 but will tolerate 55 if perfusion looks OK, EKG w/o acute changes  PULM: Non-rebreather to maximize dissolved O2  GI/FEN: Regular diet, bowel reg, sulfasalazine, carafate.   : dc latham   ENDO: ISS  HEME: Hgb 4.4 (10.6 in PACU). R hip hematoma on CTA, no active GIB. C/w iron, folic acid, EPO X 1. Heme/onc consult, appreciate recs on epo, IV iron, vit B12, folate.   ID: no issues   PPx: SCD, holding SQH due to recent bleed.   LINES: PIV,   WOUNDS/DRAINS: R hip incision  PT/OT: can do PT and WBAT as per Ortho.

## 2020-08-05 NOTE — PROGRESS NOTE ADULT - SUBJECTIVE AND OBJECTIVE BOX
Orthopedics Post Op Check    Procedure: RIGHT THRevision  Surgeon: Elio    Pt. states she still feels dizzy and nauseous. Pt. able to eat a bit more now. Pt. c/o holding going to the bathroom due to the fact she did not want to use a bedpan and would like to go to the bathroom. Pt. explained concerns of dizziness and low h/h risk of fall with walking.    Denies CP, SOB, numbness/tingling les.    Vital Signs Last 24 Hrs  T(C): 36.6 (05 Aug 2020 09:00), Max: 37 (05 Aug 2020 04:50)  T(F): 97.9 (05 Aug 2020 09:00), Max: 98.6 (05 Aug 2020 04:50)  HR: 108 (05 Aug 2020 11:00) (94 - 130)  BP: 117/58 (05 Aug 2020 11:00) (94/63 - 138/75)  BP(mean): 80 (05 Aug 2020 11:00) (72 - 100)  RR: 23 (05 Aug 2020 11:00) (9 - 26)  SpO2: 100% (05 Aug 2020 11:00) (100% - 100%)      Dressing right hip with drainage outlined (changed this am by resident)    Pulses: DP/PT 2+ B/L LES  SLT: intact  B/L LES  Motor:  EHL/FHL/TA/GS  5/5 b/l les                          4.5    6.24  )-----------( 224      ( 04 Aug 2020 06:21 )             15.1       A/P: 50yoFemale POD#0 s/p RIGHT THRevision currently in ICU 2/2 low h/h  -pain control  - stepped down today from ICU to tele  - heme onc following appreciate recs : Will continue iron by infusion, IV folic acid,  B12 by injection as patient's prior bariatric surgery may prevent oral absorption. Patient's last B12 injection was one month ago by her PCP. Limit blood draw to minimum amount (use pediatric tubes) once per day ( skipped today,  to be drawn tomorrow). Maintain strict bedrest except for bathroom with assistance only. No blood transfusions 2/2 to being Jehovas Witness.  - f/u am labs tomorrow  - keep pt. on nonrebreather, no fluids Orthopedics Post Op Check    Procedure: RIGHT THRevision  Surgeon: Elio    Pt. states she still feels dizzy and nauseous. Pt. able to eat a bit more now. Pt. c/o holding going to the bathroom due to the fact she did not want to use a bedpan and would like to go to the bathroom. Pt. explained concerns of dizziness and low h/h risk of fall with walking.    Denies CP, SOB, numbness/tingling les.    Vital Signs Last 24 Hrs  T(C): 36.6 (05 Aug 2020 09:00), Max: 37 (05 Aug 2020 04:50)  T(F): 97.9 (05 Aug 2020 09:00), Max: 98.6 (05 Aug 2020 04:50)  HR: 108 (05 Aug 2020 11:00) (94 - 130)  BP: 117/58 (05 Aug 2020 11:00) (94/63 - 138/75)  BP(mean): 80 (05 Aug 2020 11:00) (72 - 100)  RR: 23 (05 Aug 2020 11:00) (9 - 26)  SpO2: 100% (05 Aug 2020 11:00) (100% - 100%)      Dressing right hip with drainage outlined (changed this am by resident)    Pulses: DP/PT 2+ B/L LES  SLT: intact  B/L LES  Motor:  EHL/FHL/TA/GS  5/5 b/l les                          4.5    6.24  )-----------( 224      ( 04 Aug 2020 06:21 )             15.1       A/P: 50yoFemale POD#0 s/p RIGHT THRevision currently in ICU 2/2 low h/h with (PMHx diverticulitis s/p R hemicolectomy, MO s/p sleeve gastrectomy, DARIO, RA, PE/DVT/SVT on LMWH, DARIO, RA, now s/p R SANDY on 7/30, c/b acute anemia postoperatively. Pt Hgb 10.6 postop to 4.4 today (POD3). Most likely etiology of acute blood loss anemia from large R hip hematoma (7x7x14 cm), but also with +FOBT (no active GI bleed on CTA).     -pain control  - stepped down today from ICU to tele  - heme onc following appreciate recs : Will continue iron by infusion, IV folic acid,  B12 by injection as patient's prior bariatric surgery may prevent oral absorption. Patient's last B12 injection was one month ago by her PCP. Limit blood draw to minimum amount (use pediatric tubes) once per day ( skipped today,  to be drawn tomorrow). Maintain strict bedrest except for bathroom with assistance only. No blood transfusions 2/2 to being Jehovas Witness.  - f/u am labs tomorrow  - keep pt. on nonrebreather, no fluids per SICU POST OPERATIVE DAY #: 6  STATUS POST: Right THRevision    SUBJECTIVE: Patient seen and examined. Pt. states she still feels dizzy and nauseous. Pt. able to eat a bit more now. Pt. c/o holding going to the bathroom due to the fact she did not want to use a bedpan and would like to go to the bathroom. Pt. explained concerns of dizziness and low h/h risk of fall with walking.   Denies CP, SOB, numbness/tingling les.  Denies any sob/cp/n/v/numbness or tingling in b/l     OBJECTIVE:     Vital Signs Last 24 Hrs  T(C): 36.6 (05 Aug 2020 14:39), Max: 37 (05 Aug 2020 04:50)  T(F): 97.9 (05 Aug 2020 14:39), Max: 98.6 (05 Aug 2020 04:50)  HR: 124 (05 Aug 2020 15:00) (94 - 130)  BP: 106/58 (05 Aug 2020 15:00) (94/61 - 138/75)  BP(mean): 76 (05 Aug 2020 15:00) (72 - 100)  RR: 29 (05 Aug 2020 15:00) (9 - 29)  SpO2: 100% (05 Aug 2020 15:00) (100% - 100%)    Affected extremity: right le          Dressing: aquacel mildly saturated and outlined (was changed by resident earlier) with ecchymosis at posterior thigh. Right thigh swelling > left thigh. Thigh compressible and soft. No ttp. Extremities warm to touch.          Sensation: intact to light touch to patient's baseline         Motor exam: EHL/TA/GS 5/5  Pulses 2+             I&O's Detail    04 Aug 2020 07:01  -  05 Aug 2020 07:00  --------------------------------------------------------  IN:    IV PiggyBack: 100 mL    Oral Fluid: 620 mL  Total IN: 720 mL    OUT:    Indwelling Catheter - Urethral: 1950 mL    Voided: 300 mL  Total OUT: 2250 mL    Total NET: -1530 mL      05 Aug 2020 07:01  -  05 Aug 2020 15:27  --------------------------------------------------------  IN:    IV PiggyBack: 110 mL    Oral Fluid: 570 mL  Total IN: 680 mL    OUT:    Indwelling Catheter - Urethral: 285 mL  Total OUT: 285 mL    Total NET: 395 mL          LABS:                        4.5    6.24  )-----------( 224      ( 04 Aug 2020 06:21 )             15.1       MEDICATIONS:    acetaminophen   Tablet .. 650 milliGRAM(s) Oral every 6 hours  FLUoxetine 40 milliGRAM(s) Oral daily  gabapentin 300 milliGRAM(s) Oral at bedtime  ondansetron Injectable 4 milliGRAM(s) IV Push every 6 hours PRN  oxyCODONE    IR 5 milliGRAM(s) Oral every 4 hours PRN  oxyCODONE    IR 10 milliGRAM(s) Oral every 4 hours PRN  QUEtiapine 50 milliGRAM(s) Oral at bedtime          ASSESSMENT AND PLAN: 49yo Female 50yoFemale POD#0 s/p RIGHT THRevision currently in ICU 2/2 low h/h with (PMHx diverticulitis s/p R hemicolectomy, MO s/p sleeve gastrectomy, DARIO, RA, PE/DVT/SVT on LMWH, DARIO, RA, now s/p R SANDY on 7/30, c/b acute anemia postoperatively. Pt Hgb 10.6 postop to 4.4 today (POD3). Most likely etiology of acute blood loss anemia from large R hip hematoma (7x7x14 cm), but also with +FOBT (no active GI bleed on CTA).     1. Analgesic pain control  2. DVT prophylaxis: scds, dvt ppx held for now  3. Weight Bearing Status:  4. stepped down today from ICU to tele  5. heme onc following appreciate recs : Will continue iron by infusion, IV folic acid,  B12 by injection as patient's prior bariatric surgery may prevent oral absorption. Patient's last B12 injection was one month ago by her PCP. Limit blood draw to minimum amount (use pediatric tubes) once per day ( skipped today,  to be drawn tomorrow). Maintain strict bedrest except for bathroom with assistance only. No blood transfusions 2/2 to being Jehovas Witness.  6.  f/u am labs tomorrow  7. keep pt. on nonrebreather, no fluids per SICU       Addendum:  Nurse called ortho and SICU stating patient right thigh looks more swollen. per nurse SICU had no major concerns or interventions. Pt. seen at bedside stated she was able to get up today and go to commode at bedside, still with dizziness. Pt. right thigh is more swollen than left thigh and aquacel dressing is more saturated but not leaking. Pt. states she is hot and cold on/off. Explained to patient due to anemia she may be cold. Right thigh soft and compressible with ecchymosis on posterior thigh region. Extremities warm to touch. Will continue to monitor for any changes. Will follow up labs tomorrow am and continue IV infusions of Fe.

## 2020-08-05 NOTE — PROGRESS NOTE ADULT - SUBJECTIVE AND OBJECTIVE BOX
SUBJECTIVE: Patient seen and examined. States she feels same as yesterday, persistently dizzy. Still refusing blood products. Supportive care continued. Patient being monitored in SICU.    Vital Signs Last 24 Hrs  T(C): 37 (05 Aug 2020 04:50), Max: 37 (05 Aug 2020 04:50)  T(F): 98.6 (05 Aug 2020 04:50), Max: 98.6 (05 Aug 2020 04:50)  HR: 108 (05 Aug 2020 06:00) (94 - 122)  BP: 118/66 (05 Aug 2020 06:00) (108/53 - 138/75)  BP(mean): 87 (05 Aug 2020 06:00) (76 - 100)  RR: 12 (05 Aug 2020 06:00) (9 - 26)  SpO2: 100% (05 Aug 2020 06:00) (100% - 100%)      Physical Exam:  General: Sitting in chair, appears uncomfortable. AAOx3  Extremities:        LLE: No gross deformity. SILT L2-S1 distribution, symmetric. TA/EHL/FHL/GS motor intact.       RLE: Dressing saturated w/ serosanguinous fluid, changed for new aquacel. SILT L2-S1 distribution, symmetric. TA/EHL/FHL/GS motor intact.             Labs:                                     4.5    6.24  )-----------( 224      ( 04 Aug 2020 06:21 )             15.1   08-03    139  |  104  |  6<L>  ----------------------------<  121<H>  3.6   |  24  |  0.73    Ca    8.6      03 Aug 2020 06:20    TPro  5.4<L>  /  Alb  2.5<L>  /  TBili  0.3  /  DBili  x   /  AST  17  /  ALT  10  /  AlkPhos  57  08-03        A/P :  Pt is a 49yo Female s/p R SANDY revision (acetabulum and femoral head) on 7/30 c/b acute blood loss anemia  -    Anemia: Hgb 4.5 yesterda AM, pending this am. Will proceed with iron infusion 200 mg x 5 days, Epo, cyanocobalamin, folic acid  -   CT demonstrating 7x7x14 fluid collection in hip, likely hematoma  -    F/u FOBT, EPO level, Iron Studies, Trend CBC, Monitor Hemodynamics  -    Pain control  -    DVT ppx: Lovenox 100 BID (home dose) being held due to acute blood loss anemia  -    Holding PT for now  -    Dispo: Pt in ICU

## 2020-08-05 NOTE — PROGRESS NOTE ADULT - SUBJECTIVE AND OBJECTIVE BOX
S: No new issues/events overnight, no new med c/o. pt wants to get OOB.     O: ICU Vital Signs Last 24 Hrs  T(F): 97.9 (08-05-20 @ 09:00), Max: 98.6 (08-05-20 @ 04:50)  HR: 102 (08-05-20 @ 10:00) (94 - 130)  BP: 109/62 (08-05-20 @ 10:00) (94/63 - 138/75)  BP(mean): 78 (08-05-20 @ 10:00) (72 - 100)  ABP: 86/58 (08-04-20 @ 19:00)  RR: 9 (08-05-20 @ 10:00) (9 - 26)  SpO2: 100% (08-05-20 @ 10:00) (100% - 100%)    PHYSICAL EXAM:   Neurological: AAOx3, CNII-XII intact,  strength 5/5 b/l  Cardiovascular: RRR  Respiratory: CTA  Gastrointestinal: soft, NT, ND, BS+  Extremities: warm, no dependent edema, right hip with dsg intact (changed by Ortho this morning), hip soft with posterior ecchymosis.   Vascular: no cyanosis/erythema    LABS:                              4.5    6.24  )-----------( 224      ( 04 Aug 2020 06:21 )             15.1     CAPILLARY BLOOD GLUCOSE        MEDICATIONS  (STANDING):  acetaminophen   Tablet .. 650 milliGRAM(s) Oral every 6 hours  cyanocobalamin Injectable 1000 MICROGram(s) IntraMuscular daily  FLUoxetine 40 milliGRAM(s) Oral daily  folic acid 5 milliGRAM(s) Oral daily  gabapentin 300 milliGRAM(s) Oral at bedtime  iron sucrose IVPB 200 milliGRAM(s) IV Intermittent every 24 hours  pantoprazole    Tablet 40 milliGRAM(s) Oral before breakfast  QUEtiapine 50 milliGRAM(s) Oral at bedtime  sucralfate suspension 1 Gram(s) Oral every 12 hours  sulfaSALAzine 500 milliGRAM(s) Oral daily    MEDICATIONS  (PRN):  ondansetron Injectable 4 milliGRAM(s) IV Push every 6 hours PRN Nausea and/or Vomiting  oxyCODONE    IR 5 milliGRAM(s) Oral every 4 hours PRN Moderate Pain (4 - 6)  oxyCODONE    IR 10 milliGRAM(s) Oral every 4 hours PRN Severe Pain (7 - 10)  senna 2 Tablet(s) Oral at bedtime PRN Constipation      Tamayo:	  [ ] None	[x ] Daily Tamayo Order Placed	   Indication:	  [ x] Strict I and O's    [ ] Obstruction     [ ] Incontinence + Stage 3 or 4 Decubitus  Central Line:  [ x] None	   [ ]  Medication / TPN Administration     [ ] No Peripheral IV

## 2020-08-05 NOTE — PROGRESS NOTE ADULT - SUBJECTIVE AND OBJECTIVE BOX
The patient was seen and examined.    1F Jainism with obesity s/p sleeve gastrectomy, RA, HTN,  DM, hx of diverticulitis s/p colonic resection, hx of hernia repair, DARIO, RA, PE/DVT, on LMWH (lovenox 100 mg BID), recent L SANDY 2/20  PMH DARIO, GERD, RA, PE (2001), DVT (on loveneox 100 mg BID),  admitted for for right hip revision arthroplasty 7/30. Patient is currently  Post-operative  s/p R revision SANDY. Hematology consulted for drop  hgb of 10.1 on 7/30 to 5.6.  Repeat hgb decrease to 4.6, 4.4 presently 4.5 (8/3).   Anemia due to peewee- operative blood loss, hydration,  GI blood loss (stool guaiac positive) though no active bleeding seen on CT, doubt hemolysis (bilirubin 0.3). Patient feeling slightly stronger today. Iron infusions in progress.      .       lisinopril (Other)  verapamil (Other)    Allergies    lisinopril (Other)  verapamil (Other)    Intolerances        Medications:  MEDICATIONS  (STANDING):  acetaminophen   Tablet .. 650 milliGRAM(s) Oral every 6 hours  cyanocobalamin Injectable 1000 MICROGram(s) IntraMuscular daily  FLUoxetine 40 milliGRAM(s) Oral daily  folic acid 5 milliGRAM(s) Oral daily  gabapentin 300 milliGRAM(s) Oral at bedtime  iron sucrose IVPB 200 milliGRAM(s) IV Intermittent every 24 hours  pantoprazole    Tablet 40 milliGRAM(s) Oral before breakfast  QUEtiapine 50 milliGRAM(s) Oral at bedtime  sucralfate suspension 1 Gram(s) Oral every 12 hours  sulfaSALAzine 500 milliGRAM(s) Oral daily    MEDICATIONS  (PRN):  ondansetron Injectable 4 milliGRAM(s) IV Push every 6 hours PRN Nausea and/or Vomiting  oxyCODONE    IR 5 milliGRAM(s) Oral every 4 hours PRN Moderate Pain (4 - 6)  oxyCODONE    IR 10 milliGRAM(s) Oral every 4 hours PRN Severe Pain (7 - 10)  senna 2 Tablet(s) Oral at bedtime PRN Constipation          Interval History:  Patient feels a little stronger today. Walked to bathroom with assistance.  The patient denies chest pain or SOB at rest.  No nausea/vomiting/fevers/chills/night sweats.  Has fatigue, dizziness.  Appetite is stable without weight loss.  No abdominal pain/diarrhea/constipation.  No melena or hematochezia.    No dysuria/hematuria.  No history of easy bruising/bleeding.  No gingival bleeding or epistaxis.  Less R leg pain or leg swelling.    ROS is otherwise negative.    PHYSICAL EXAM:    T(F): 97.9 (08-05-20 @ 09:00), Max: 98.6 (08-05-20 @ 04:50)  HR: 102 (08-05-20 @ 10:00) (94 - 130)  BP: 109/62 (08-05-20 @ 10:00) (94/63 - 138/75)  RR: 9 (08-05-20 @ 10:00) (9 - 26)  SpO2: 100% (08-05-20 @ 10:00) (100% - 100%)  Wt(kg): --    Daily     Daily     Gen: well developed, well nourished,obese, comfortable  HEENT: normocephalic/atraumatic, has marked conjunctival pallor, no scleral icterus, no oral thrush/mucosal bleeding/mucositis  Neck: supple, no masses, no JVD  Cardiovascular: RR, nl S1S2, no murmurs/rubs/gallops  Respiratory: clear air entry b/l  Gastrointestinal: BS+, soft, obese, NT/ND, no masses, no splenomegaly, no hepatomegaly, no evidence for ascites  Extremities: no clubbing/cyanosis, R thigh edema,  less drainage on dressing, no calf tenderness, SCDs inplace  Neurological: no focal deficits  Skin: no rash on visible skin, excessive ecchymoses or petechiae  Lymph Nodes:  no significant peripheral adenopathy   Musculoskeletal:  full ROM  Psychiatric:  mood stable        Labs:                          4.5    6.24  )-----------( 224      ( 04 Aug 2020 06:21 )             15.1     CBC Full  -  ( 04 Aug 2020 07:47 )  WBC Count : x  RBC Count : 1.64 M/uL  Hemoglobin : x  Hematocrit : x  Platelet Count - Automated : x  Mean Cell Volume : x  Mean Cell Hemoglobin : x  Mean Cell Hemoglobin Concentration : x  Auto Neutrophil # : x  Auto Lymphocyte # : x  Auto Monocyte # : x  Auto Eosinophil # : x  Auto Basophil # : x  Auto Neutrophil % : x  Auto Lymphocyte % : x  Auto Monocyte % : x  Auto Eosinophil % : x  Auto Basophil % : x                    Other Labs:    Cultures:    Pathology:    Imaging Studies:

## 2020-08-05 NOTE — PROVIDER CONTACT NOTE (OTHER) - ACTION/TREATMENT ORDERED:
KAVITA Francisco and Leelee to bedside, assessed leg -- as leg still soft and Right hip dressing not saturated, no interventions at this time. Pt still ok for stepdown unit.

## 2020-08-05 NOTE — PROGRESS NOTE ADULT - ASSESSMENT
50F Nondenominational with obesity s/p sleeve gastrectomy, RA, HTN,  DM, hx of diverticulitis s/p colonic resection, hx of hernia repair, DARIO, RA, PE/DVT, on LMWH (lovenox 100 mg BID), recent L SANDY 2/20  PMH DARIO, GERD, RA, PE (2001), DVT (on loveneox 100 mg BID),  admitted for for right hip revision arthroplasty 7/30. Patient is currently  Post-operative  s/p R revision SANDY. Hematology consulted for drop  hgb of 10.1 on 7/30 to 5.6.  Repeat hgb decrease to 4.6, 4.4 presently 4.5 (8/3).   Anemia due to peewee- operative blood loss, hydration,  GI blood loss (stool guaiac positive) though no active bleeding seen on CT, doubt hemolysis (bilirubin 0.3)  Patient has declined transfusion of any blood products in keeping with her Mormonism beliefs and is aware of the risks, including death.  Will continue iron by infusion, IV folic acid,  B12 by injection as patient's prior bariatric surgery may prevent oral absorption. Patient's last B12 injection was one month ago by her PCP. Limit blood draw to minimum amount (use pediatric tubes) once per day ( skipped today,  to be drawn tomorrow). Maintain strict bedrest except for bathroom with assistance only.. Discussed  with nursing staff.

## 2020-08-06 LAB
ANION GAP SERPL CALC-SCNC: 9 MMOL/L — SIGNIFICANT CHANGE UP (ref 5–17)
BUN SERPL-MCNC: 6 MG/DL — LOW (ref 7–23)
CALCIUM SERPL-MCNC: 9.1 MG/DL — SIGNIFICANT CHANGE UP (ref 8.4–10.5)
CHLORIDE SERPL-SCNC: 104 MMOL/L — SIGNIFICANT CHANGE UP (ref 96–108)
CO2 SERPL-SCNC: 30 MMOL/L — SIGNIFICANT CHANGE UP (ref 22–31)
CREAT SERPL-MCNC: 0.86 MG/DL — SIGNIFICANT CHANGE UP (ref 0.5–1.3)
GLUCOSE SERPL-MCNC: 116 MG/DL — HIGH (ref 70–99)
HAPTOGLOB SERPL-MCNC: 266 MG/DL — HIGH (ref 34–200)
HCT VFR BLD CALC: 15.6 % — CRITICAL LOW (ref 34.5–45)
HGB BLD-MCNC: 4.5 G/DL — CRITICAL LOW (ref 11.5–15.5)
MCHC RBC-ENTMCNC: 27.6 PG — SIGNIFICANT CHANGE UP (ref 27–34)
MCHC RBC-ENTMCNC: 28.8 GM/DL — LOW (ref 32–36)
MCV RBC AUTO: 95.7 FL — SIGNIFICANT CHANGE UP (ref 80–100)
NRBC # BLD: 3 /100 WBCS — HIGH (ref 0–0)
PLATELET # BLD AUTO: 263 K/UL — SIGNIFICANT CHANGE UP (ref 150–400)
POTASSIUM SERPL-MCNC: 4.1 MMOL/L — SIGNIFICANT CHANGE UP (ref 3.5–5.3)
POTASSIUM SERPL-SCNC: 4.1 MMOL/L — SIGNIFICANT CHANGE UP (ref 3.5–5.3)
RBC # BLD: 1.63 M/UL — LOW (ref 3.8–5.2)
RBC # FLD: 16.8 % — HIGH (ref 10.3–14.5)
SODIUM SERPL-SCNC: 143 MMOL/L — SIGNIFICANT CHANGE UP (ref 135–145)
T4 FREE SERPL-MCNC: 0.82 NG/DL — SIGNIFICANT CHANGE UP (ref 0.7–1.48)
TSH SERPL-MCNC: 2.67 UIU/ML — SIGNIFICANT CHANGE UP (ref 0.35–4.94)
WBC # BLD: 5.73 K/UL — SIGNIFICANT CHANGE UP (ref 3.8–10.5)
WBC # FLD AUTO: 5.73 K/UL — SIGNIFICANT CHANGE UP (ref 3.8–10.5)

## 2020-08-06 PROCEDURE — 99233 SBSQ HOSP IP/OBS HIGH 50: CPT

## 2020-08-06 PROCEDURE — 70450 CT HEAD/BRAIN W/O DYE: CPT | Mod: 26

## 2020-08-06 RX ORDER — POLYETHYLENE GLYCOL 3350 17 G/17G
17 POWDER, FOR SOLUTION ORAL DAILY
Refills: 0 | Status: DISCONTINUED | OUTPATIENT
Start: 2020-08-06 | End: 2020-08-17

## 2020-08-06 RX ORDER — ERYTHROPOIETIN 10000 [IU]/ML
10000 INJECTION, SOLUTION INTRAVENOUS; SUBCUTANEOUS ONCE
Refills: 0 | Status: COMPLETED | OUTPATIENT
Start: 2020-08-06 | End: 2020-08-06

## 2020-08-06 RX ORDER — ERYTHROPOIETIN 10000 [IU]/ML
10000 INJECTION, SOLUTION INTRAVENOUS; SUBCUTANEOUS ONCE
Refills: 0 | Status: DISCONTINUED | OUTPATIENT
Start: 2020-08-06 | End: 2020-08-06

## 2020-08-06 RX ADMIN — Medication 650 MILLIGRAM(S): at 12:50

## 2020-08-06 RX ADMIN — Medication 650 MILLIGRAM(S): at 00:26

## 2020-08-06 RX ADMIN — OXYCODONE HYDROCHLORIDE 5 MILLIGRAM(S): 5 TABLET ORAL at 06:30

## 2020-08-06 RX ADMIN — GABAPENTIN 300 MILLIGRAM(S): 400 CAPSULE ORAL at 22:31

## 2020-08-06 RX ADMIN — OXYCODONE HYDROCHLORIDE 5 MILLIGRAM(S): 5 TABLET ORAL at 14:00

## 2020-08-06 RX ADMIN — Medication 5 MILLIGRAM(S): at 12:50

## 2020-08-06 RX ADMIN — Medication 40 MILLIGRAM(S): at 12:50

## 2020-08-06 RX ADMIN — POLYETHYLENE GLYCOL 3350 17 GRAM(S): 17 POWDER, FOR SOLUTION ORAL at 12:51

## 2020-08-06 RX ADMIN — PANTOPRAZOLE SODIUM 40 MILLIGRAM(S): 20 TABLET, DELAYED RELEASE ORAL at 06:30

## 2020-08-06 RX ADMIN — ERYTHROPOIETIN 10000 UNIT(S): 10000 INJECTION, SOLUTION INTRAVENOUS; SUBCUTANEOUS at 18:12

## 2020-08-06 RX ADMIN — Medication 650 MILLIGRAM(S): at 06:30

## 2020-08-06 RX ADMIN — QUETIAPINE FUMARATE 50 MILLIGRAM(S): 200 TABLET, FILM COATED ORAL at 22:31

## 2020-08-06 RX ADMIN — IRON SUCROSE 110 MILLIGRAM(S): 20 INJECTION, SOLUTION INTRAVENOUS at 12:50

## 2020-08-06 RX ADMIN — Medication 650 MILLIGRAM(S): at 14:21

## 2020-08-06 RX ADMIN — Medication 650 MILLIGRAM(S): at 23:47

## 2020-08-06 RX ADMIN — Medication 1 GRAM(S): at 18:12

## 2020-08-06 RX ADMIN — PREGABALIN 1000 MICROGRAM(S): 225 CAPSULE ORAL at 12:50

## 2020-08-06 RX ADMIN — OXYCODONE HYDROCHLORIDE 5 MILLIGRAM(S): 5 TABLET ORAL at 07:35

## 2020-08-06 RX ADMIN — Medication 650 MILLIGRAM(S): at 01:00

## 2020-08-06 RX ADMIN — Medication 650 MILLIGRAM(S): at 23:06

## 2020-08-06 RX ADMIN — OXYCODONE HYDROCHLORIDE 5 MILLIGRAM(S): 5 TABLET ORAL at 12:48

## 2020-08-06 RX ADMIN — Medication 650 MILLIGRAM(S): at 07:35

## 2020-08-06 RX ADMIN — Medication 1 GRAM(S): at 06:30

## 2020-08-06 NOTE — CHART NOTE - NSCHARTNOTEFT_GEN_A_CORE
Admitting Diagnosis:   Patient is a 50y old  Female who presents with a chief complaint of right hip pain/surgery (06 Aug 2020 11:59)      PAST MEDICAL & SURGICAL HISTORY:  Depression  Rheumatoid arthritis  Osteoarthritis  Restless leg  DARIO (obstructive sleep apnea)  GERD (gastroesophageal reflux disease)  Diverticulitis  Diabetes mellitus, type 2  Chronic anticoagulation  Hypercoagulable state  Neuropathy  Pancreatitis  HTN (hypertension)  Pulmonary embolism  DVT (deep venous thrombosis): left leg  History of surgery: right total hip replacement 9/12/2019  History of surgery: 2019 L thr  History of cholecystectomy  History of colon resection: diverticulitis  Abdominal hernia: umbilical  History of sleeve gastrectomy      Current Nutrition Order:  Consistent Carbohydrate Diet w/snack    PO Intake: Good (%) [   ]  Fair (50-75%) [ X  ] Poor (<25%) [   ]    GI Issues: Nausea endorsed- ordered for zofran PRN  No vomiting, constipation, or diarrhea  Last BM this morning     Pain: She denied pain at time of visit     Skin Integrity: Wade 19; R. hip surgical wound    Labs:   08-06    143  |  104  |  6<L>  ----------------------------<  116<H>  4.1   |  30  |  0.86    Ca    9.1      06 Aug 2020 07:26      CAPILLARY BLOOD GLUCOSE          Medications:  MEDICATIONS  (STANDING):  acetaminophen   Tablet .. 650 milliGRAM(s) Oral every 6 hours  cyanocobalamin Injectable 1000 MICROGram(s) IntraMuscular daily  epoetin sophie (PROCRIT) Injectable 46220 Unit(s) IV Push once  FLUoxetine 40 milliGRAM(s) Oral daily  folic acid 5 milliGRAM(s) Oral daily  gabapentin 300 milliGRAM(s) Oral at bedtime  pantoprazole    Tablet 40 milliGRAM(s) Oral before breakfast  polyethylene glycol 3350 17 Gram(s) Oral daily  QUEtiapine 50 milliGRAM(s) Oral at bedtime  sucralfate suspension 1 Gram(s) Oral every 12 hours  sulfaSALAzine 500 milliGRAM(s) Oral daily    MEDICATIONS  (PRN):  ondansetron Injectable 4 milliGRAM(s) IV Push every 6 hours PRN Nausea and/or Vomiting  oxyCODONE    IR 5 milliGRAM(s) Oral every 4 hours PRN Moderate Pain (4 - 6)  oxyCODONE    IR 10 milliGRAM(s) Oral every 4 hours PRN Severe Pain (7 - 10)  senna 2 Tablet(s) Oral at bedtime PRN Constipation      Weight: 101.8kg    Weight Change: No new weights recorded since admit     Admitted Anthropometrics:  Height: 5'8", IBW 140lbs+/-10%, %%, BMI 34.1     Weight Change:   Pt with a PSH of sleeve gastrectomy (2014) where she weighed 224lbs prior to Sx. Current admitted weight is 224lbs but patient is unsure of current BW as she does not weigh herself. Bed scale weight reading 268lbs. Please obtain updated weight for accuracy and trending.   Nutrition Focused Physical Exam: Completed [  x ]  Unable to complete [   ] No remarkable findings.    Estimated energy needs:   IBW (63.6kg) used for calculations as pt >120% of IBW   Nutrient needs based on [facility] standards of care for maintenance in adults.  Needs adjusted for post-op healing  1590-1908kcal/day (25-30kcal/kg)  64-76g pro/day (1-1.2g pro/kg)  Fluids per team    Subjective:   50F Jehovah's witness with PMHx diverticulitis s/p R hemicolectomy, MO s/p sleeve gastrectomy, DARIO, RA, PE/DVT/SVT on LMWH, DARIO, RA, now s/p R SANDY on 7/30, c/b acute anemia postoperatively. Pt Hgb 10.6 postop to 4.4 today (POD3). Most likely etiology of acute blood loss anemia from large R hip hematoma (7x7x14 cm), but also with +FOBT (no active GI bleed on CTA). Being treated with procrit, B12, and folic acid. Pt continues to have persistently low hemoglobin levels (4.5 this AM). This AM (8/6) she had complaints of nausea and dizziness- taken for stat CTH that showed no intracranial pathology. Pt seen in room, awake, alert, pleasant, breathing w/NRB mask (to maximize dissolved O2, per MD note). She endorsed being able to eat breakfast and lunch today- consuming grilled cheese and soup for lunch. She reported that appetite has improved but she is still experiencing nausea (ordered for zofran). BM 8/6. No pain except for mild headache. Afebrile this morning. H/H 4.5/15.6%, Na/K WNL, . Pt would like to be liberalized to regular diet- will discuss w/team.     Nutrition Diagnosis:  Inadequate energy intake RT poor appetite 2/2 lethargy, nausea, early satiety 2/2 NRB and h/o sleeve AEB consuming ~25% of meals  Active [  ]  Resolved [ X ]    New PES: Increased protein needs RT increased demand for protein intake AEB post-op/wound healing     Goal:  Pt to consistently meet % of estimated needs PO     Recommendations:  1. Nausea management PRN  2. Encourage small frequent meals if experiencing satiety. Please consider liberalizing to regular diet   3. Pain and bowel regimen per team  4. MVI and B12 daily   5. Encourage intake of high iron foods   *paged team to discuss    Education:   small frequent meals, iron-rich foods     Risk Level: High [   ] Moderate [ X  ] Low [   ].

## 2020-08-06 NOTE — PROGRESS NOTE ADULT - SUBJECTIVE AND OBJECTIVE BOX
SUBJECTIVE: Patient seen and examined. Stepped down to telemetry yesterday: BP maintained, still tachycardic. States she feels headache this AM but tylenol doesn't help, narcotic use being minimized to ensure BP is maintained. Dressing persistently soaking- consider poss prevena?      Vital Signs Last 24 Hrs  T(C): 36.5 (06 Aug 2020 05:00), Max: 36.9 (05 Aug 2020 16:55)  T(F): 97.7 (06 Aug 2020 05:00), Max: 98.4 (05 Aug 2020 16:55)  HR: 105 (06 Aug 2020 05:00) (95 - 130)  BP: 113/64 (06 Aug 2020 05:00) (94/61 - 135/74)  BP(mean): 87 (05 Aug 2020 21:00) (72 - 96)  RR: 18 (06 Aug 2020 05:00) (9 - 29)  SpO2: 100% (06 Aug 2020 05:00) (100% - 100%)    Physical Exam:  General: Sitting in chair, appears uncomfortable. AAOx3  Extremities:        LLE: No gross deformity. SILT L2-S1 distribution, symmetric. TA/EHL/FHL/GS motor intact.       RLE: Dressing saturated w/ mostly serous fluid, changed for new aquacel. SILT L2-S1 distribution, symmetric. TA/EHL/FHL/GS motor intact.             Labs:                             4.5    6.24  )-----------( 224      ( 04 Aug 2020 06:21 )             15.1             A/P :  Pt is a 49yo Female s/p R SANDY revision (acetabulum and femoral head) on 7/30 c/b acute blood loss anemia  -    Anemia: Hgb 4.5 yesterda AM, pending this am. Will proceed with iron infusion 200 mg x 5 days, Epo, cyanocobalamin, folic acid  -   CT demonstrating 7x7x14 fluid collection in hip, likely hematoma  -    F/u FOBT, EPO level, Iron Studies, Trend CBC, Monitor Hemodynamics  -    Pain control  -    DVT ppx: Lovenox 100 BID (home dose) being held due to acute blood loss anemia  -    Holding PT for now  -    Dispo: Pt stepped down

## 2020-08-06 NOTE — PROGRESS NOTE ADULT - SUBJECTIVE AND OBJECTIVE BOX
Ortho Note    Pt complaining of headaches which she states has been going on for 2 months pt also notes that she has been having hazey vision over the past several days.    Denies CP, SOB, N/V, weakness, numbness/tingling down upper or lower extremities    Vital Signs Last 24 Hrs  T(C): 36.9 (08-06-20 @ 08:50), Max: 36.9 (08-06-20 @ 08:50)  T(F): 98.5 (08-06-20 @ 08:50), Max: 98.5 (08-06-20 @ 08:50)  HR: 114 (08-06-20 @ 08:50) (114 - 114)  BP: 121/57 (08-06-20 @ 08:50) (121/57 - 121/57)  BP(mean): --  RR: 17 (08-06-20 @ 08:50) (17 - 17)  SpO2: 100% (08-06-20 @ 08:50) (100% - 100%)      General: Pt Alert and oriented x 3, NAD  DSG aquacel right hip C/D/I  Pulses:  DPs palpable b/l le   Sensation:  SILT throughout upper and lower extremities b/l   Motor: EHL/FHL/TA/GS 5/5 b/l    5/5 b/l   face symmetrical  PERRLA  CNII-XII intact                         4.5    5.73  )-----------( 263      ( 06 Aug 2020 07:26 )             15.6   06 Aug 2020 07:26    143    |  104    |  6      ----------------------------<  116    4.1     |  30     |  0.86     Ca    9.1        06 Aug 2020 07:26        A/P: 50yFemale POD# s/p  R SANDY revision (acetabulum and femoral head) on 7/30 c/b acute blood loss anemia  -    Anemia: Hgb 4.5 will give another does of EPO per Hem/onc  -   CT demonstrating 7x7x14 fluid collection in hip, likely hematoma  -    F/u Trend CBC, Monitor Hemodynamics, haptoglobin  -    Pain control  -    DVT ppx: Lovenox 100 BID (home dose) being held due to acute blood loss anemia  -    Holding PT for now  - hemeonc and medicine following will continue to appreciate recs    # headache and blurry vision  - STAT head CT noncontrast  - Pt seen and evaluated by Dr. Kessler  - will consider neuro consult    - Stable  - Pain Control  - DVT ppx:  - PT, WBS:     Ortho Pager [phone] Ortho Note    Pt complaining of headaches which she states has been going on for 2 months pt also notes that she has been having hazey vision over the past several days.    Denies CP, SOB, N/V, weakness, numbness/tingling down upper or lower extremities    Vital Signs Last 24 Hrs  T(C): 36.9 (08-06-20 @ 08:50), Max: 36.9 (08-06-20 @ 08:50)  T(F): 98.5 (08-06-20 @ 08:50), Max: 98.5 (08-06-20 @ 08:50)  HR: 114 (08-06-20 @ 08:50) (114 - 114)  BP: 121/57 (08-06-20 @ 08:50) (121/57 - 121/57)  BP(mean): --  RR: 17 (08-06-20 @ 08:50) (17 - 17)  SpO2: 100% (08-06-20 @ 08:50) (100% - 100%)      General: Pt Alert and oriented x 3, NAD  DSG aquacel right hip C/D/I  Pulses:  DPs palpable b/l le   Sensation:  SILT throughout upper and lower extremities b/l   Motor: EHL/FHL/TA/GS 5/5 b/l    5/5 b/l   face symmetrical  PERRLA  CNII-XII intact                         4.5    5.73  )-----------( 263      ( 06 Aug 2020 07:26 )             15.6   06 Aug 2020 07:26    143    |  104    |  6      ----------------------------<  116    4.1     |  30     |  0.86     Ca    9.1        06 Aug 2020 07:26        A/P: 50yFemale POD# s/p  R SANDY revision (acetabulum and femoral head) on 7/30 c/b acute blood loss anemia  -    Anemia: Hgb 4.5 will give another does of EPO per Hem/onc  -   CT demonstrating 7x7x14 fluid collection in hip, likely hematoma  -    F/u Trend CBC, Monitor Hemodynamics, haptoglobin  -    Pain control as needed   -    DVT ppx: Lovenox 100 BID (home dose) being held due to acute blood loss anemia  -    Holding PT for now strict bedrest except going to the bathroom with assistance  - hemeonc and medicine following will continue to appreciate recs    # headache and blurry vision  - STAT head CT noncontrast negative for acute pathology   - Pt seen and evaluated by Dr. Kessler  - will continue to treat headache with tylenol prn        Ortho Pager [phone]

## 2020-08-06 NOTE — PROGRESS NOTE ADULT - SUBJECTIVE AND OBJECTIVE BOX
The patient was seen and examined.      50F [place] with obesity s/p sleeve gastrectomy, RA, HTN,  DM, hx of diverticulitis s/p colonic resection, hx of hernia repair, DARIO, RA, PE/DVT, on LMWH (lovenox 100 mg BID), recent L SANDY 2/20  PMH DARIO, GERD, RA, PE (2001), DVT (on loveneox 100 mg BID),  admitted for for right hip revision arthroplasty 7/30. Patient is currently  Post-operative  s/p R revision SANDY. Hematology consulted for drop  hgb of 10.1 on 7/30 to 5.6.  Repeat hgb decrease to 4.6, 4.4 presently 4.5 (8/6).   Anemia due to peewee- operative blood loss, hydration,  GI blood loss (stool guaiac positive) though no active bleeding seen on CT, doubt hemolysis (bilirubin 0.3)  Patient has declined transfusion of any blood products in keeping with her Adventist beliefs and is aware of the risks, including death. She remains  symptomatic with slightest exertion.         lisinopril (Other)  verapamil (Other)    Allergies    lisinopril (Other)  verapamil (Other)    Intolerances        Medications:  MEDICATIONS  (STANDING):  acetaminophen   Tablet .. 650 milliGRAM(s) Oral every 6 hours  cyanocobalamin Injectable 1000 MICROGram(s) IntraMuscular daily  epoetin sophie-epbx (RETACRIT) Injectable 89585 Unit(s) IV Push once  FLUoxetine 40 milliGRAM(s) Oral daily  folic acid 5 milliGRAM(s) Oral daily  gabapentin 300 milliGRAM(s) Oral at bedtime  iron sucrose IVPB 200 milliGRAM(s) IV Intermittent every 24 hours  pantoprazole    Tablet 40 milliGRAM(s) Oral before breakfast  polyethylene glycol 3350 17 Gram(s) Oral daily  QUEtiapine 50 milliGRAM(s) Oral at bedtime  sucralfate suspension 1 Gram(s) Oral every 12 hours  sulfaSALAzine 500 milliGRAM(s) Oral daily    MEDICATIONS  (PRN):  ondansetron Injectable 4 milliGRAM(s) IV Push every 6 hours PRN Nausea and/or Vomiting  oxyCODONE    IR 5 milliGRAM(s) Oral every 4 hours PRN Moderate Pain (4 - 6)  oxyCODONE    IR 10 milliGRAM(s) Oral every 4 hours PRN Severe Pain (7 - 10)  senna 2 Tablet(s) Oral at bedtime PRN Constipation          Interval History:    The patient denies chest pain or SOB at rest but is very symptomatic when transferring to the commode..  No nausea/vomiting/fevers/chills/night sweats. Has fatigue, headaches and dizziness.  Appetite is stable without weight loss.  No abdominal pain/diarrhea/constipation.  No melena or hematochezia.    No dysuria/hematuria.  No history of easy bruising/bleeding.  No gingival bleeding or epistaxis.  Less right leg pain and leg swelling.  No calf pain.    ROS is otherwise negative.    PHYSICAL EXAM:    T(F): 98.5 (08-06-20 @ 08:50), Max: 98.5 (08-06-20 @ 08:50)  HR: 114 (08-06-20 @ 08:50) (95 - 124)  BP: 121/57 (08-06-20 @ 08:50) (94/61 - 135/74)  RR: 17 (08-06-20 @ 08:50) (12 - 29)  SpO2: 100% (08-06-20 @ 08:50) (100% - 100%)  Wt(kg): --    Daily     Daily     Gen: well developed, well nourished, obese, comfortable only at bedrest  HEENT: normocephalic/atraumatic, marked conjunctival pallor, no scleral icterus, no oral thrush/mucosal bleeding/mucositis  Neck: supple, no masses, no JVD  Cardiovascular: RR, nl S1S2, no murmurs, tachycardic  Respiratory: clear air entry b/l anteriorly  Gastrointestinal: BS+, soft, NT/ND, no masses, no splenomegaly, no hepatomegaly, no evidence for ascites  Extremities: no clubbing/cyanosis, Right thigh edema, dressing with small amount of drainage no calf tenderness  Neurological: no focal deficits  Skin: no rash on visible skin, excessive ecchymoses or petechiae  Lymph Nodes:  no significant peripheral adenopathy   Musculoskeletal:  full ROM  Psychiatric:  mood stable        Labs:                          4.5    5.73  )-----------( 263      ( 06 Aug 2020 07:26 )             15.6     CBC Full  -  ( 06 Aug 2020 07:26 )  WBC Count : 5.73 K/uL  RBC Count : 1.63 M/uL  Hemoglobin : 4.5 g/dL  Hematocrit : 15.6 %  Platelet Count - Automated : 263 K/uL  Mean Cell Volume : 95.7 fl  Mean Cell Hemoglobin : 27.6 pg  Mean Cell Hemoglobin Concentration : 28.8 gm/dL  Auto Neutrophil # : x  Auto Lymphocyte # : x  Auto Monocyte # : x  Auto Eosinophil # : x  Auto Basophil # : x  Auto Neutrophil % : x  Auto Lymphocyte % : x  Auto Monocyte % : x  Auto Eosinophil % : x  Auto Basophil % : x        08-06    143  |  104  |  6<L>  ----------------------------<  116<H>  4.1   |  30  |  0.86    Ca    9.1      06 Aug 2020 07:26            Other Labs:    Cultures:    Pathology:    Imaging Studies:

## 2020-08-06 NOTE — PROGRESS NOTE ADULT - ASSESSMENT
50F [place] (declining RBCs), DVT on therapeutic lovenox. DARIO not on CPAP, Obesity, HTN, Depression, GERD, UC here for revision R SANDY w Dr. Ballard 7/30, c/b symptomatic anemia post-operatively w Hgb to 4.4 likely to OR and incisional drainage - transferred to SICU for further monitoring, now transitioned to telemetry, remains symptomatic w Hgb 4.5    #Acute blood loss anemia - Hgb unchanged 4.5. Symptomatic - HR in 100-110s and lightheaded during transfers.  from OR as well as drainage post-op. Pt is Jewish and declining blood transfusion. s/p procrit and completing 5d course of IV iron infusion. Heme following. Pt baseline 10-11. Though Gu[place] positive stools, CT Abd neg for bleeding  Post-op state - pain controlled. On bowel regimen and IS. PPX: SCDs. DISPO: HPT  #Headache - subacute. Does not appear to be acutely worsening. Acute process less likely. DDx does include DARIO - pt does have h/o DARIO but not using CPAP (denying PND, though AM HA may be sxs). Relatively non-focal neuro examination.   #DVT - pt was on therapeutic lovenox pre-op. Currently held in setting of symptomatic anemia.  #UC - stable. at baseline. On home sulfasalazine  #GERD - chronic. c/w protonix  #Depression - chronic. c/w fluoxetine and seroquel  #HTN - holding BP meds at this time (amlodipine 5, HCTZ 12.5, Losartan 50). BP at target  #Obesity - 34 outpt f/u    Plan  CT head non-con to evaluate subacute headache  Continue working w PT  F/U Heme-onc recommendations  Alternate day blood draws w pediatric tubes only    DISPO; HPT pending improvement anemia sxs/clearing PT

## 2020-08-06 NOTE — PROGRESS NOTE ADULT - ASSESSMENT
50F [place] with obesity s/p sleeve gastrectomy, RA, HTN,  DM, hx of diverticulitis s/p colonic resection, hx of hernia repair, DARIO, RA, PE/DVT, on LMWH (lovenox 100 mg BID), recent L SANDY 2/20  PMH DARIO, GERD, RA, PE (2001), DVT (on loveneox 100 mg BID),  admitted for for right hip revision arthroplasty 7/30. Patient is currently  Post-operative  s/p R revision SANDY. Hematology consulted for drop  hgb of 10.1 on 7/30 to 5.6.  Repeat hgb decrease to 4.6, 4.4 presently 4.5 (8/6).   Anemia due to peewee- operative blood loss, hydration,  GI blood loss (stool guaiac positive) though no active bleeding seen on CT, doubt hemolysis (bilirubin 0.3)  Patient has declined transfusion of any blood products in keeping with her Hindu beliefs and is aware of the risks, including death. She remains  symptomatic with slightest exertion.      Will continue iron by infusion to complete 5 days, IV folic acid,  B12 by injection as patient's prior bariatric surgery may prevent oral absorption. Consider one dose of Procrit today.  Limit blood draw to minimum amount (use pediatric tubes) once per day.  Maintain strict bedrest except for bathroom with assistance only.. Discussed  with nursing staff.

## 2020-08-06 NOTE — PROGRESS NOTE ADULT - SUBJECTIVE AND OBJECTIVE BOX
INTERVAL HPI/OVERNIGHT EVENTS: RIVAS O/N    SUBJECTIVE: Patient seen and examined at bedside.   Pt endorses feeling fatigued - about the same as yesterday. States pain in hip is controlled. No chest pain, dyspnea, nausea, abd pain. +BM. Voiding wo issue.    Endorses R sided headache that has been relatively constant for approx 2 months. States pain medications has helped it this admission but it never went away. Worsening in AM. Endorses nausea for approx 2 years now. Denies numbness, tingling, weakness. Accompanied by blurry vision for several days. States outpatient PMD referred her to neurology but has not seen neurology.    OBJECTIVE:    VITAL SIGNS:  ICU Vital Signs Last 24 Hrs  T(C): 37.2 (06 Aug 2020 14:02), Max: 37.2 (06 Aug 2020 14:02)  T(F): 98.9 (06 Aug 2020 14:02), Max: 98.9 (06 Aug 2020 14:02)  HR: 110 (06 Aug 2020 14:02) (95 - 114)  BP: 100/63 (06 Aug 2020 14:02) (100/63 - 121/62)  BP(mean): 87 (05 Aug 2020 21:00) (87 - 87)  ABP: --  ABP(mean): --  RR: 18 (06 Aug 2020 14:02) (16 - 18)  SpO2: 100% (06 Aug 2020 14:02) (100% - 100%)        08-05 @ 07:01 - 08-06 @ 07:00  --------------------------------------------------------  IN: 1950 mL / OUT: 1836 mL / NET: 114 mL    08-06 @ 07:01  -  08-06 @ 17:52  --------------------------------------------------------  IN: 0 mL / OUT: 400 mL / NET: -400 mL      CAPILLARY BLOOD GLUCOSE          PHYSICAL EXAM:  GEN: Female in NAD on RA  HEENT: NC/AT, MMM  CV: increased rate, no murmurs, no BLE edema.  PULM: nml effort, CTAB  ABD: Soft, NABS, non-tender to palpation  NEURO: Alert, oriented x3.  CN II-XI grossly intact. EOMI  5/5 in BUE strength. R hip 3/5. Sensation intact  Finger to nose; L heel-to shin nml  PSYCH: Appropriate, conversant    MEDICATIONS:  MEDICATIONS  (STANDING):  acetaminophen   Tablet .. 650 milliGRAM(s) Oral every 6 hours  cyanocobalamin Injectable 1000 MICROGram(s) IntraMuscular daily  epoetin sophie (PROCRIT) Injectable 46363 Unit(s) IV Push once  FLUoxetine 40 milliGRAM(s) Oral daily  folic acid 5 milliGRAM(s) Oral daily  gabapentin 300 milliGRAM(s) Oral at bedtime  pantoprazole    Tablet 40 milliGRAM(s) Oral before breakfast  polyethylene glycol 3350 17 Gram(s) Oral daily  QUEtiapine 50 milliGRAM(s) Oral at bedtime  sucralfate suspension 1 Gram(s) Oral every 12 hours  sulfaSALAzine 500 milliGRAM(s) Oral daily    MEDICATIONS  (PRN):  ondansetron Injectable 4 milliGRAM(s) IV Push every 6 hours PRN Nausea and/or Vomiting  oxyCODONE    IR 5 milliGRAM(s) Oral every 4 hours PRN Moderate Pain (4 - 6)  oxyCODONE    IR 10 milliGRAM(s) Oral every 4 hours PRN Severe Pain (7 - 10)  senna 2 Tablet(s) Oral at bedtime PRN Constipation      ALLERGIES:  Allergies    lisinopril (Other)  verapamil (Other)    Intolerances        LABS:                        4.5    5.73  )-----------( 263      ( 06 Aug 2020 07:26 )             15.6     08-06    143  |  104  |  6<L>  ----------------------------<  116<H>  4.1   |  30  |  0.86    Ca    9.1      06 Aug 2020 07:26            RADIOLOGY & ADDITIONAL TESTS: Reviewed.

## 2020-08-07 ENCOUNTER — TRANSCRIPTION ENCOUNTER (OUTPATIENT)
Age: 50
End: 2020-08-07

## 2020-08-07 PROCEDURE — 99233 SBSQ HOSP IP/OBS HIGH 50: CPT

## 2020-08-07 RX ADMIN — Medication 650 MILLIGRAM(S): at 18:11

## 2020-08-07 RX ADMIN — OXYCODONE HYDROCHLORIDE 10 MILLIGRAM(S): 5 TABLET ORAL at 06:33

## 2020-08-07 RX ADMIN — Medication 650 MILLIGRAM(S): at 12:00

## 2020-08-07 RX ADMIN — Medication 5 MILLIGRAM(S): at 11:24

## 2020-08-07 RX ADMIN — Medication 40 MILLIGRAM(S): at 11:24

## 2020-08-07 RX ADMIN — Medication 1 GRAM(S): at 17:10

## 2020-08-07 RX ADMIN — Medication 650 MILLIGRAM(S): at 23:45

## 2020-08-07 RX ADMIN — Medication 500 MILLIGRAM(S): at 11:24

## 2020-08-07 RX ADMIN — PREGABALIN 1000 MICROGRAM(S): 225 CAPSULE ORAL at 11:24

## 2020-08-07 RX ADMIN — QUETIAPINE FUMARATE 50 MILLIGRAM(S): 200 TABLET, FILM COATED ORAL at 21:47

## 2020-08-07 RX ADMIN — GABAPENTIN 300 MILLIGRAM(S): 400 CAPSULE ORAL at 21:46

## 2020-08-07 RX ADMIN — OXYCODONE HYDROCHLORIDE 10 MILLIGRAM(S): 5 TABLET ORAL at 23:00

## 2020-08-07 RX ADMIN — Medication 650 MILLIGRAM(S): at 05:55

## 2020-08-07 RX ADMIN — OXYCODONE HYDROCHLORIDE 5 MILLIGRAM(S): 5 TABLET ORAL at 16:24

## 2020-08-07 RX ADMIN — OXYCODONE HYDROCHLORIDE 10 MILLIGRAM(S): 5 TABLET ORAL at 21:46

## 2020-08-07 RX ADMIN — Medication 650 MILLIGRAM(S): at 06:33

## 2020-08-07 RX ADMIN — Medication 650 MILLIGRAM(S): at 17:11

## 2020-08-07 RX ADMIN — Medication 1 GRAM(S): at 05:55

## 2020-08-07 RX ADMIN — ONDANSETRON 4 MILLIGRAM(S): 8 TABLET, FILM COATED ORAL at 08:11

## 2020-08-07 RX ADMIN — Medication 650 MILLIGRAM(S): at 11:23

## 2020-08-07 RX ADMIN — OXYCODONE HYDROCHLORIDE 10 MILLIGRAM(S): 5 TABLET ORAL at 05:56

## 2020-08-07 RX ADMIN — OXYCODONE HYDROCHLORIDE 5 MILLIGRAM(S): 5 TABLET ORAL at 17:24

## 2020-08-07 RX ADMIN — PANTOPRAZOLE SODIUM 40 MILLIGRAM(S): 20 TABLET, DELAYED RELEASE ORAL at 06:29

## 2020-08-07 NOTE — PROGRESS NOTE ADULT - ASSESSMENT
50F [place] with obesity s/p sleeve gastrectomy, RA, HTN,  DM, hx of diverticulitis s/p colonic resection, hx of hernia repair, DARIO, RA, PE/DVT, on LMWH (lovenox 100 mg BID), recent L SANDY 2/20  PMH DARIO, GERD, RA, PE (2001), DVT (on loveneox 100 mg BID),  admitted for for right hip revision arthroplasty 7/30. Patient is currently  Post-operative  s/p R revision SANDY. Hematology consulted for drop  hgb of 10.1 on 7/30 to 5.6.  Repeat hgb decrease to 4.6, 4.4 presently 4.5 (8/6). Patient minimally improved but remains orthostatic and tachycardic with position change.  Anemia due to peewee- operative blood loss, hydration,  GI blood loss (stool guaiac positive) though no active bleeding seen on CT, doubt hemolysis (bilirubin 0.3, haptoglobin not low)  Patient has declined transfusion of any blood products in keeping with her [employer] beliefs and is aware of the risks, including death. She remains  symptomatic with  exertion and change in position..      Completed 5 days of Venofer by infusion, continue IV folic acid,  B12 by injection as patient's prior bariatric surgery may prevent oral absorption. Additional  Procrit given.  Limit blood draw to minimum amount (use pediatric tubes); venipuncture tomorrow.  Maintain strict bedrest except for bathroom with assistance only.. Discussed  with Dr. Kessler resident and hem/onc fellow..

## 2020-08-07 NOTE — PROGRESS NOTE ADULT - SUBJECTIVE AND OBJECTIVE BOX
SUBJECTIVE: Patient seen and examined, doing well, not feeling significantly better and feels her heart beating faster consistent w/ tachy. Continued mild SOB, no chest pain/fever/chills. No issues overnight.     Vital Signs Last 24 Hrs  T(C): 36.8 (07 Aug 2020 11:31), Max: 37.6 (06 Aug 2020 23:00)  T(F): 98.2 (07 Aug 2020 11:31), Max: 99.7 (06 Aug 2020 23:00)  HR: 109 (07 Aug 2020 11:31) (102 - 123)  BP: 105/58 (07 Aug 2020 11:31) (100/63 - 134/73)  BP(mean): 70 (07 Aug 2020 11:31) (70 - 70)  RR: 18 (07 Aug 2020 11:31) (15 - 19)  SpO2: 99% (07 Aug 2020 11:31) (98% - 100%)      Physical Exam:  General: Sitting in chair, appears uncomfortable. AAOx3  Extremities:        LLE: No gross deformity. SILT L2-S1 distribution, symmetric. TA/EHL/FHL/GS motor intact.       RLE: Dressing w/ significantly less drainage today, changed for new aquacel. SILT L2-S1 distribution, symmetric. TA/EHL/FHL/GS motor intact.             Labs:                                        4.5    5.73  )-----------( 263      ( 06 Aug 2020 07:26 )             15.6     08-06    143  |  104  |  6<L>  ----------------------------<  116<H>  4.1   |  30  |  0.86    Ca    9.1      06 Aug 2020 07:26            A/P :  Pt is a 49yo Female s/p R SANDY revision (acetabulum and femoral head) on 7/30 c/b acute blood loss anemia  -    Anemia: Hgb 4.5 yesterda AM, pending this am. Will proceed with iron infusion 200 mg x 5 days, Epo, cyanocobalamin, folic acid  -   CT demonstrating 7x7x14 fluid collection in hip, likely hematoma  -    F/u FOBT, EPO level, Iron Studies, Trend CBC, Monitor Hemodynamics  -    H+H stable for last few days, drainage on bandage decreasing  -    Pain control  -    DVT ppx: Lovenox 100 BID (home dose) being held due to acute blood loss anemia  -    Holding PT for now  -    Dispo: Telemetry

## 2020-08-07 NOTE — PROGRESS NOTE ADULT - SUBJECTIVE AND OBJECTIVE BOX
INTERVAL HPI/OVERNIGHT EVENTS: RIVAS O/n    SUBJECTIVE: Patient seen and examined at bedside.   Pt states headache improving today. Feels less tired though still endorsing some lightheadedness when first transitioning from bed to chair. Eager to move around and sit in chair more today. Denies any melena, hematochezia. States pain in hip is controlled. No fever, chest pain, dyspnea.    CT Head results discussed w pt this AM        OBJECTIVE:    VITAL SIGNS:  ICU Vital Signs Last 24 Hrs  T(C): 37.1 (07 Aug 2020 16:23), Max: 37.6 (06 Aug 2020 23:00)  T(F): 98.8 (07 Aug 2020 16:23), Max: 99.7 (06 Aug 2020 23:00)  HR: 107 (07 Aug 2020 16:23) (102 - 123)  BP: 110/59 (07 Aug 2020 16:23) (100/66 - 134/73)  BP(mean): 77 (07 Aug 2020 16:23) (70 - 77)  ABP: --  ABP(mean): --  RR: 17 (07 Aug 2020 16:23) (15 - 18)  SpO2: 99% (07 Aug 2020 16:23) (98% - 100%)        08-06 @ 07:01  -  08-07 @ 07:00  --------------------------------------------------------  IN: 0 mL / OUT: 1550 mL / NET: -1550 mL    08-07 @ 07:01  -  08-07 @ 18:20  --------------------------------------------------------  IN: 0 mL / OUT: 250 mL / NET: -250 mL      CAPILLARY BLOOD GLUCOSE          PHYSICAL EXAM:  GEN: Female in NAD on RA  HEENT: NC/AT, MMM  CV: increased rate, no murmurs, no BLE edema.  PULM: nml effort, CTAB  ABD: Soft, NABS, non-tender to palpation  NEURO: Alert, oriented x3.  CN II-XI grossly intact. EOMI  5/5 in BUE strength. R hip 3/5. Sensation intact  Finger to nose; L heel-to shin nml  PSYCH: Appropriate, conversant  EXT: R latera hip dressing w minimal strikethrough. Intact and clean    MEDICATIONS:  MEDICATIONS  (STANDING):  acetaminophen   Tablet .. 650 milliGRAM(s) Oral every 6 hours  cyanocobalamin Injectable 1000 MICROGram(s) IntraMuscular daily  FLUoxetine 40 milliGRAM(s) Oral daily  folic acid 5 milliGRAM(s) Oral daily  gabapentin 300 milliGRAM(s) Oral at bedtime  pantoprazole    Tablet 40 milliGRAM(s) Oral before breakfast  polyethylene glycol 3350 17 Gram(s) Oral daily  QUEtiapine 50 milliGRAM(s) Oral at bedtime  sucralfate suspension 1 Gram(s) Oral every 12 hours  sulfaSALAzine 500 milliGRAM(s) Oral daily    MEDICATIONS  (PRN):  ondansetron Injectable 4 milliGRAM(s) IV Push every 6 hours PRN Nausea and/or Vomiting  oxyCODONE    IR 5 milliGRAM(s) Oral every 4 hours PRN Moderate Pain (4 - 6)  oxyCODONE    IR 10 milliGRAM(s) Oral every 4 hours PRN Severe Pain (7 - 10)  senna 2 Tablet(s) Oral at bedtime PRN Constipation      ALLERGIES:  Allergies    lisinopril (Other)  verapamil (Other)    Intolerances        LABS:                        4.5    5.73  )-----------( 263      ( 06 Aug 2020 07:26 )             15.6     08-06    143  |  104  |  6<L>  ----------------------------<  116<H>  4.1   |  30  |  0.86    Ca    9.1      06 Aug 2020 07:26            RADIOLOGY & ADDITIONAL TESTS: Reviewed.  Impression: Right pontine low density that may represent a lacunar infarction, age indeterminate  .

## 2020-08-07 NOTE — PROGRESS NOTE ADULT - SUBJECTIVE AND OBJECTIVE BOX
Ortho Note    Pt comfortable without complaints, pain controlled.  Pt states headache and hazey vision has been improved.  Denies CP, SOB, N/V, numbness/tingling down le b/l.     Vital Signs Last 24 Hrs  T(C): 37.1 (08-07-20 @ 04:43), Max: 37.1 (08-07-20 @ 04:43)  T(F): 98.7 (08-07-20 @ 04:43), Max: 98.7 (08-07-20 @ 04:43)  HR: 123 (08-07-20 @ 08:45) (102 - 123)  BP: 100/66 (08-07-20 @ 08:45) (100/66 - 119/65)  BP(mean): --  RR: 18 (08-07-20 @ 08:45) (16 - 18)  SpO2: 100% (08-07-20 @ 08:45) (98% - 100%)      General: Pt Alert and oriented x 3, NAD  DSG aquacel R Hip C/D/I  Pulses: DPs palpable b/l le   Sensation:  SILT throughout distal le b/l  Motor: EHL/FHL/TA/GS 5/5 b/l   calves soft compressible, NTTP                          4.5    5.73  )-----------( 263      ( 06 Aug 2020 07:26 )             15.6   06 Aug 2020 07:26    143    |  104    |  6      ----------------------------<  116    4.1     |  30     |  0.86           A/P: 50yFemale POD#7 s/p rev R SANDY  - Stable  - Pain Control as needed  - DVT ppx: hold chemical ac at this time given anemia,  scds only at this time  - Hold PT at this time strict bedrest other than going to the bathroom with assistance  - Hemonc and medicine following will continue to appreciate recs  - Trend labs every other day using pediatric tubes only     Ortho Pager [phone]

## 2020-08-07 NOTE — PROGRESS NOTE ADULT - SUBJECTIVE AND OBJECTIVE BOX
The patient was seen and examined.        50F Latter day with obesity s/p sleeve gastrectomy, RA, HTN,  DM, hx of diverticulitis s/p colonic resection, hx of hernia repair, DARIO, RA, PE/DVT, on LMWH (lovenox 100 mg BID), recent L SANDY 2/20  PMH DARIO, GERD, RA, PE (2001), DVT (on loveneox 100 mg BID),  admitted for for right hip revision arthroplasty 7/30. Patient is currently  Post-operative  s/p R revision SANDY. Hematology consulted for drop  hgb of 10.1 on 7/30 to 5.6.  Repeat hgb decrease to 4.6, 4.4 presently 4.5 (8/6). Patient minimally improved but remains orthostatic and tachycardic with position change.    .       lisinopril (Other)  verapamil (Other)    Allergies    lisinopril (Other)  verapamil (Other)    Intolerances        Medications:  MEDICATIONS  (STANDING):  acetaminophen   Tablet .. 650 milliGRAM(s) Oral every 6 hours  cyanocobalamin Injectable 1000 MICROGram(s) IntraMuscular daily  FLUoxetine 40 milliGRAM(s) Oral daily  folic acid 5 milliGRAM(s) Oral daily  gabapentin 300 milliGRAM(s) Oral at bedtime  pantoprazole    Tablet 40 milliGRAM(s) Oral before breakfast  polyethylene glycol 3350 17 Gram(s) Oral daily  QUEtiapine 50 milliGRAM(s) Oral at bedtime  sucralfate suspension 1 Gram(s) Oral every 12 hours  sulfaSALAzine 500 milliGRAM(s) Oral daily    MEDICATIONS  (PRN):  ondansetron Injectable 4 milliGRAM(s) IV Push every 6 hours PRN Nausea and/or Vomiting  oxyCODONE    IR 5 milliGRAM(s) Oral every 4 hours PRN Moderate Pain (4 - 6)  oxyCODONE    IR 10 milliGRAM(s) Oral every 4 hours PRN Severe Pain (7 - 10)  senna 2 Tablet(s) Oral at bedtime PRN Constipation          Interval History:    The patient denies chest pain or SOB at rest.  No nausea/vomiting/fevers/chills/night sweats. Has fatigue, headaches/dizziness.  Appetite is stable without weight loss.  No abdominal pain/diarrhea/constipation.  No melena or hematochezia.    No dysuria/hematuria.  No history of easy bruising/bleeding.  No gingival bleeding or epistaxis.  Less R leg pain and leg swelling.    ROS is otherwise negative.    PHYSICAL EXAM:    T(F): 98.2 (08-07-20 @ 11:31), Max: 99.7 (08-06-20 @ 23:00)  HR: 109 (08-07-20 @ 11:31) (102 - 123)  BP: 105/58 (08-07-20 @ 11:31) (100/63 - 134/73)  RR: 18 (08-07-20 @ 11:31) (15 - 19)  SpO2: 99% (08-07-20 @ 11:31) (98% - 100%)  Wt(kg): --    Daily     Daily     Gen: well developed, well nourished, obese, comfortable in stting position but more tachycardic  HEENT: normocephalic/atraumatic, marked conjunctival pallor, no scleral icterus, no oral thrush/mucosal bleeding/mucositis  Neck: supple, no masses, no JVD  Cardiovascular: RR, nl S1S2, tachycardic  Respiratory: clear air entry b/l  Gastrointestinal: BS+, soft,obese, NT/ND, no masses, no splenomegaly, no hepatomegaly, no evidence for ascites  Extremities: no clubbing/cyanosis, R thigh edema, no calf tenderness, dressing intact  Neurological: no focal deficits  Skin: no rash on visible skin, excessive ecchymoses or petechiae  Lymph Nodes:  no significant peripheral adenopathy   Musculoskeletal:  full ROM  Psychiatric:  mood stable        Labs:                          4.5    5.73  )-----------( 263      ( 06 Aug 2020 07:26 )             15.6     CBC Full  -  ( 06 Aug 2020 07:26 )  WBC Count : 5.73 K/uL  RBC Count : 1.63 M/uL  Hemoglobin : 4.5 g/dL  Hematocrit : 15.6 %  Platelet Count - Automated : 263 K/uL  Mean Cell Volume : 95.7 fl  Mean Cell Hemoglobin : 27.6 pg  Mean Cell Hemoglobin Concentration : 28.8 gm/dL  Auto Neutrophil # : x  Auto Lymphocyte # : x  Auto Monocyte # : x  Auto Eosinophil # : x  Auto Basophil # : x  Auto Neutrophil % : x  Auto Lymphocyte % : x  Auto Monocyte % : x  Auto Eosinophil % : x  Auto Basophil % : x        08-06    143  |  104  |  6<L>  ----------------------------<  116<H>  4.1   |  30  |  0.86    Ca    9.1      06 Aug 2020 07:26            Other Labs:    Cultures:    Pathology:    Imaging Studies:

## 2020-08-07 NOTE — PROGRESS NOTE ADULT - ASSESSMENT
50F Taoist (declining RBCs), DVT on therapeutic lovenox. DARIO not on CPAP, Obesity, HTN, Depression, GERD, UC here for revision R SANDY w Dr. Ballard 7/30, c/b symptomatic anemia post-operatively w Hgb to 4.4 likely to OR and incisional drainage - transferred to SICU for further monitoring, now transitioned to telemetry, remains symptomatic w Hgb 4.5    #Acute blood loss anemia - labs deferred today. 4.5. Symptomatic - HR in 100-110s and lightheaded during transfers.  from OR as well as drainage post-op. Pt is Taoist and declining blood transfusion. s/p procrit and completing 5d course of IV iron infusion. Heme following. Pt baseline 10-11. Though Guaic positive stools, CT Abd neg for bleeding  Post-op state - pain controlled. On bowel regimen and IS. PPX: SCDs. DISPO: HPT  #Headache - subacute. Does not appear to be acutely worsening. Acute process less likely. DDx does include DARIO - pt does have h/o DARIO but not using CPAP (denying PND, though AM HA may be sxs). Relatively non-focal neuro examination.   #DVT - pt was on therapeutic lovenox pre-op. Currently held in setting of symptomatic anemia.  #UC - stable. at baseline. On home sulfasalazine  #GERD - chronic. c/w protonix  #Depression - chronic. c/w fluoxetine and seroquel  #HTN - holding BP meds at this time (amlodipine 5, HCTZ 12.5, Losartan 50). BP at target  #Obesity - 34 outpt f/u    Plan  Clinically appears improved today w BP and energy levels  F/U Heme-onc recommendations. Discussion w Dr. Vigil - typically response in RBCs 5-10d after IV Iron w Epo  Continue working w PT  CBC w diff w retic in AM  Continue holding Therapeutic AC at this time until hgb responds    DISPO; HPT pending improvement anemia sxs/clearing PT

## 2020-08-08 LAB
HCT VFR BLD CALC: 16.8 % — CRITICAL LOW (ref 34.5–45)
HGB BLD-MCNC: 4.7 G/DL — CRITICAL LOW (ref 11.5–15.5)
MCHC RBC-ENTMCNC: 28 GM/DL — LOW (ref 32–36)
MCHC RBC-ENTMCNC: 28.3 PG — SIGNIFICANT CHANGE UP (ref 27–34)
MCV RBC AUTO: 101.2 FL — HIGH (ref 80–100)
NRBC # BLD: 6 /100 WBCS — HIGH (ref 0–0)
PLATELET # BLD AUTO: 332 K/UL — SIGNIFICANT CHANGE UP (ref 150–400)
RBC # BLD: 1.66 M/UL — LOW (ref 3.8–5.2)
RBC # BLD: 1.66 M/UL — LOW (ref 3.8–5.2)
RBC # FLD: 21.1 % — HIGH (ref 10.3–14.5)
RETICS #: 153.2 K/UL — HIGH (ref 25–125)
RETICS/RBC NFR: 9.2 % — HIGH (ref 0.5–2.5)
WBC # BLD: 8.35 K/UL — SIGNIFICANT CHANGE UP (ref 3.8–10.5)
WBC # FLD AUTO: 8.35 K/UL — SIGNIFICANT CHANGE UP (ref 3.8–10.5)

## 2020-08-08 PROCEDURE — 99233 SBSQ HOSP IP/OBS HIGH 50: CPT

## 2020-08-08 RX ADMIN — GABAPENTIN 300 MILLIGRAM(S): 400 CAPSULE ORAL at 21:07

## 2020-08-08 RX ADMIN — OXYCODONE HYDROCHLORIDE 10 MILLIGRAM(S): 5 TABLET ORAL at 22:48

## 2020-08-08 RX ADMIN — Medication 1 GRAM(S): at 17:44

## 2020-08-08 RX ADMIN — Medication 650 MILLIGRAM(S): at 12:40

## 2020-08-08 RX ADMIN — Medication 5 MILLIGRAM(S): at 11:54

## 2020-08-08 RX ADMIN — Medication 1 GRAM(S): at 05:25

## 2020-08-08 RX ADMIN — Medication 650 MILLIGRAM(S): at 19:31

## 2020-08-08 RX ADMIN — Medication 650 MILLIGRAM(S): at 11:53

## 2020-08-08 RX ADMIN — Medication 500 MILLIGRAM(S): at 11:54

## 2020-08-08 RX ADMIN — OXYCODONE HYDROCHLORIDE 5 MILLIGRAM(S): 5 TABLET ORAL at 17:44

## 2020-08-08 RX ADMIN — Medication 40 MILLIGRAM(S): at 11:54

## 2020-08-08 RX ADMIN — PREGABALIN 1000 MICROGRAM(S): 225 CAPSULE ORAL at 11:54

## 2020-08-08 RX ADMIN — Medication 650 MILLIGRAM(S): at 20:00

## 2020-08-08 RX ADMIN — Medication 650 MILLIGRAM(S): at 05:24

## 2020-08-08 RX ADMIN — OXYCODONE HYDROCHLORIDE 10 MILLIGRAM(S): 5 TABLET ORAL at 21:47

## 2020-08-08 RX ADMIN — OXYCODONE HYDROCHLORIDE 5 MILLIGRAM(S): 5 TABLET ORAL at 18:30

## 2020-08-08 RX ADMIN — ONDANSETRON 4 MILLIGRAM(S): 8 TABLET, FILM COATED ORAL at 09:00

## 2020-08-08 RX ADMIN — Medication 650 MILLIGRAM(S): at 00:25

## 2020-08-08 RX ADMIN — QUETIAPINE FUMARATE 50 MILLIGRAM(S): 200 TABLET, FILM COATED ORAL at 21:07

## 2020-08-08 RX ADMIN — PANTOPRAZOLE SODIUM 40 MILLIGRAM(S): 20 TABLET, DELAYED RELEASE ORAL at 06:58

## 2020-08-08 RX ADMIN — OXYCODONE HYDROCHLORIDE 10 MILLIGRAM(S): 5 TABLET ORAL at 04:32

## 2020-08-08 NOTE — PROGRESS NOTE ADULT - SUBJECTIVE AND OBJECTIVE BOX
INTERVAL HPI/OVERNIGHT EVENTS: RIVAS O/N    SUBJECTIVE: Patient seen and examined at bedside.   Pt states energy level about the same. BM wo melena or hematochezia. No chest pain, dyspnea. Sitting in chair. No headache, dizziness, lightheadedness        OBJECTIVE:    VITAL SIGNS:  ICU Vital Signs Last 24 Hrs  T(C): 37.7 (08 Aug 2020 18:07), Max: 37.7 (08 Aug 2020 18:07)  T(F): 99.9 (08 Aug 2020 18:07), Max: 99.9 (08 Aug 2020 18:07)  HR: 104 (08 Aug 2020 18:07) (94 - 109)  BP: 130/64 (08 Aug 2020 18:07) (101/53 - 145/70)  BP(mean): 70 (08 Aug 2020 09:08) (70 - 70)  ABP: --  ABP(mean): --  RR: 14 (08 Aug 2020 18:07) (14 - 16)  SpO2: 97% (08 Aug 2020 18:07) (95% - 100%)        08-07 @ 07:01  -  08-08 @ 07:00  --------------------------------------------------------  IN: 760 mL / OUT: 651 mL / NET: 109 mL    08-08 @ 07:01  -  08-08 @ 19:21  --------------------------------------------------------  IN: 0 mL / OUT: 600 mL / NET: -600 mL      CAPILLARY BLOOD GLUCOSE          PHYSICAL EXAM:  GEN: Female in NAD on RA  HEENT: NC/AT, MMM  CV: increased rate, no murmurs, no BLE edema.  PULM: nml effort, CTAB  ABD: Soft, NABS, non-tender to palpation  NEURO: Alert, oriented x3.  CN II-XI grossly intact. EOMI  5/5 in BUE strength. R hip 3/5. Sensation intact  Finger to nose; L heel-to shin nml  PSYCH: Appropriate, conversant  EXT: R latera hip dressing w minimal strikethrough. Intact and clean    MEDICATIONS:  MEDICATIONS  (STANDING):  acetaminophen   Tablet .. 650 milliGRAM(s) Oral every 6 hours  cyanocobalamin Injectable 1000 MICROGram(s) IntraMuscular daily  FLUoxetine 40 milliGRAM(s) Oral daily  folic acid 5 milliGRAM(s) Oral daily  gabapentin 300 milliGRAM(s) Oral at bedtime  pantoprazole    Tablet 40 milliGRAM(s) Oral before breakfast  polyethylene glycol 3350 17 Gram(s) Oral daily  QUEtiapine 50 milliGRAM(s) Oral at bedtime  sucralfate suspension 1 Gram(s) Oral every 12 hours  sulfaSALAzine 500 milliGRAM(s) Oral daily    MEDICATIONS  (PRN):  ondansetron Injectable 4 milliGRAM(s) IV Push every 6 hours PRN Nausea and/or Vomiting  oxyCODONE    IR 5 milliGRAM(s) Oral every 4 hours PRN Moderate Pain (4 - 6)  oxyCODONE    IR 10 milliGRAM(s) Oral every 4 hours PRN Severe Pain (7 - 10)  senna 2 Tablet(s) Oral at bedtime PRN Constipation      ALLERGIES:  Allergies    lisinopril (Other)  verapamil (Other)    Intolerances        LABS:                        4.7    8.35  )-----------( 332      ( 08 Aug 2020 06:16 )             16.8                 RADIOLOGY & ADDITIONAL TESTS: Reviewed.

## 2020-08-08 NOTE — PROGRESS NOTE ADULT - ASSESSMENT
50F [place] (declining RBCs), DVT on therapeutic lovenox. DARIO not on CPAP, Obesity, HTN, Depression, GERD, UC here for revision R SANDY w Dr. Ballard 7/30, c/b symptomatic anemia post-operatively w Hgb to 4.4 likely to OR and incisional drainage - transferred to SICU for further monitoring, now transitioned to telemetry, remains symptomatic w Hgb 4.7 from 4.5    #Acute blood loss anemia -  sxs improving - HR in 100-110s and lightheaded during transfers.  from OR as well as drainage post-op. Pt is [place] and declining blood transfusion. s/p procrit and completing 5d course of IV iron infusion. Heme following. Pt baseline 10-11. Though Guaic positive stools, CT Abd neg for bleeding  Post-op state - pain controlled. On bowel regimen and IS. PPX: SCDs. DISPO: HPT  #Headache - resolved. subacute. Does not appear to be acutely worsening. Acute process less likely. DDx does include DARIO - pt does have h/o DARIO but not using CPAP (denying PND, though AM HA may be sxs). Relatively non-focal neuro examination.   #DVT - pt was on therapeutic lovenox pre-op. Currently held in setting of symptomatic anemia.  #UC - stable. at baseline. On home sulfasalazine  #GERD - chronic. c/w protonix  #Depression - chronic. c/w fluoxetine and seroquel  #HTN - holding BP meds at this time (amlodipine 5, HCTZ 12.5, Losartan 50). BP at target  #Obesity - 34 outpt f/u    Plan  Clinically appears improved today w BPs improving. Hgb 4.7 from 4.5; Retic index still hypoproliferative  F/U Heme-onc recommendations. Per Dr. Vigil - typically response in RBCs 5-10d after IV Iron w Epo  Continue working w PT  CBC w diff w retic in AM  Continue holding Therapeutic AC at this time until hgb responds    DISPO; HPT pending improvement anemia sxs/clearing PT

## 2020-08-08 NOTE — PROGRESS NOTE ADULT - SUBJECTIVE AND OBJECTIVE BOX
SUBJECTIVE: Patient seen and examined, states she is feeling better. Continued mild SOB, no chest pain/fever/chills. No issues overnight.     Vital Signs Last 24 Hrs  T(C): 36.8 (08 Aug 2020 04:52), Max: 37.2 (07 Aug 2020 22:12)  T(F): 98.2 (08 Aug 2020 04:52), Max: 98.9 (07 Aug 2020 22:12)  HR: 98 (08 Aug 2020 04:34) (94 - 123)  BP: 118/68 (08 Aug 2020 04:34) (100/66 - 120/70)  BP(mean): 77 (07 Aug 2020 16:23) (70 - 77)  RR: 14 (08 Aug 2020 04:34) (14 - 18)  SpO2: 97% (08 Aug 2020 04:34) (95% - 100%)      Physical Exam:  General: Sitting in chair, appears uncomfortable. AAOx3  Extremities:        LLE: No gross deformity. SILT L2-S1 distribution, symmetric. TA/EHL/FHL/GS motor intact.       RLE: Dressing w/ significantly less drainage today, changed for new aquacel. SILT L2-S1 distribution, symmetric. TA/EHL/FHL/GS motor intact.             Labs:                                             4.7    8.35  )-----------( 332      ( 08 Aug 2020 06:16 )             16.8                 A/P :  Pt is a 51yo Female s/p R SANDY revision (acetabulum and femoral head) on 7/30 c/b acute blood loss anemia  -    Anemia: Hgb 4.5 yesterda AM, pending this am. Will proceed with iron infusion 200 mg x 5 days, Epo, cyanocobalamin, folic acid  -   CT demonstrating 7x7x14 fluid collection in hip, likely hematoma  -    F/u FOBT, EPO level, Iron Studies, Trend CBC, Monitor Hemodynamics  -    H+H improved slightly today, drainage on bandage decreasing  -    Pain control  -    DVT ppx: Lovenox 100 BID (home dose) being held due to acute blood loss anemia  -    Holding PT for now  -    Dispo: Telemetry

## 2020-08-09 LAB
ANION GAP SERPL CALC-SCNC: 9 MMOL/L — SIGNIFICANT CHANGE UP (ref 5–17)
BUN SERPL-MCNC: 9 MG/DL — SIGNIFICANT CHANGE UP (ref 7–23)
CALCIUM SERPL-MCNC: 9.1 MG/DL — SIGNIFICANT CHANGE UP (ref 8.4–10.5)
CHLORIDE SERPL-SCNC: 104 MMOL/L — SIGNIFICANT CHANGE UP (ref 96–108)
CO2 SERPL-SCNC: 29 MMOL/L — SIGNIFICANT CHANGE UP (ref 22–31)
CREAT SERPL-MCNC: 0.91 MG/DL — SIGNIFICANT CHANGE UP (ref 0.5–1.3)
GLUCOSE SERPL-MCNC: 116 MG/DL — HIGH (ref 70–99)
HCT VFR BLD CALC: 18.5 % — CRITICAL LOW (ref 34.5–45)
HGB BLD-MCNC: 5.2 G/DL — CRITICAL LOW (ref 11.5–15.5)
MCHC RBC-ENTMCNC: 28.1 GM/DL — LOW (ref 32–36)
MCHC RBC-ENTMCNC: 28.6 PG — SIGNIFICANT CHANGE UP (ref 27–34)
MCV RBC AUTO: 101.6 FL — HIGH (ref 80–100)
NRBC # BLD: 8 /100 WBCS — HIGH (ref 0–0)
PLATELET # BLD AUTO: 354 K/UL — SIGNIFICANT CHANGE UP (ref 150–400)
POTASSIUM SERPL-MCNC: 4.6 MMOL/L — SIGNIFICANT CHANGE UP (ref 3.5–5.3)
POTASSIUM SERPL-SCNC: 4.6 MMOL/L — SIGNIFICANT CHANGE UP (ref 3.5–5.3)
RBC # BLD: 1.82 M/UL — LOW (ref 3.8–5.2)
RBC # FLD: 23 % — HIGH (ref 10.3–14.5)
SODIUM SERPL-SCNC: 142 MMOL/L — SIGNIFICANT CHANGE UP (ref 135–145)
WBC # BLD: 7.74 K/UL — SIGNIFICANT CHANGE UP (ref 3.8–10.5)
WBC # FLD AUTO: 7.74 K/UL — SIGNIFICANT CHANGE UP (ref 3.8–10.5)

## 2020-08-09 PROCEDURE — 99233 SBSQ HOSP IP/OBS HIGH 50: CPT

## 2020-08-09 RX ORDER — HYDROMORPHONE HYDROCHLORIDE 2 MG/ML
0.5 INJECTION INTRAMUSCULAR; INTRAVENOUS; SUBCUTANEOUS EVERY 4 HOURS
Refills: 0 | Status: DISCONTINUED | OUTPATIENT
Start: 2020-08-09 | End: 2020-08-16

## 2020-08-09 RX ADMIN — QUETIAPINE FUMARATE 50 MILLIGRAM(S): 200 TABLET, FILM COATED ORAL at 22:13

## 2020-08-09 RX ADMIN — Medication 1 GRAM(S): at 18:10

## 2020-08-09 RX ADMIN — PANTOPRAZOLE SODIUM 40 MILLIGRAM(S): 20 TABLET, DELAYED RELEASE ORAL at 05:03

## 2020-08-09 RX ADMIN — OXYCODONE HYDROCHLORIDE 10 MILLIGRAM(S): 5 TABLET ORAL at 21:57

## 2020-08-09 RX ADMIN — HYDROMORPHONE HYDROCHLORIDE 0.5 MILLIGRAM(S): 2 INJECTION INTRAMUSCULAR; INTRAVENOUS; SUBCUTANEOUS at 00:44

## 2020-08-09 RX ADMIN — OXYCODONE HYDROCHLORIDE 10 MILLIGRAM(S): 5 TABLET ORAL at 06:55

## 2020-08-09 RX ADMIN — HYDROMORPHONE HYDROCHLORIDE 0.5 MILLIGRAM(S): 2 INJECTION INTRAMUSCULAR; INTRAVENOUS; SUBCUTANEOUS at 01:00

## 2020-08-09 RX ADMIN — Medication 40 MILLIGRAM(S): at 12:07

## 2020-08-09 RX ADMIN — Medication 650 MILLIGRAM(S): at 13:16

## 2020-08-09 RX ADMIN — Medication 650 MILLIGRAM(S): at 06:02

## 2020-08-09 RX ADMIN — Medication 650 MILLIGRAM(S): at 12:08

## 2020-08-09 RX ADMIN — PREGABALIN 1000 MICROGRAM(S): 225 CAPSULE ORAL at 12:08

## 2020-08-09 RX ADMIN — OXYCODONE HYDROCHLORIDE 10 MILLIGRAM(S): 5 TABLET ORAL at 20:57

## 2020-08-09 RX ADMIN — Medication 650 MILLIGRAM(S): at 00:16

## 2020-08-09 RX ADMIN — GABAPENTIN 300 MILLIGRAM(S): 400 CAPSULE ORAL at 22:13

## 2020-08-09 RX ADMIN — Medication 650 MILLIGRAM(S): at 05:02

## 2020-08-09 RX ADMIN — Medication 5 MILLIGRAM(S): at 12:08

## 2020-08-09 RX ADMIN — OXYCODONE HYDROCHLORIDE 10 MILLIGRAM(S): 5 TABLET ORAL at 07:55

## 2020-08-09 RX ADMIN — Medication 650 MILLIGRAM(S): at 01:16

## 2020-08-09 RX ADMIN — Medication 1 GRAM(S): at 05:02

## 2020-08-09 NOTE — PROGRESS NOTE ADULT - SUBJECTIVE AND OBJECTIVE BOX
SUBJECTIVE: Patient seen and examined, states she is continuing to feel better. No chest pain/SOB/fever/chills. No issues overnight.     Vital Signs Last 24 Hrs  T(C): 36.8 (09 Aug 2020 05:09), Max: 37.7 (08 Aug 2020 18:07)  T(F): 98.3 (09 Aug 2020 05:09), Max: 99.9 (08 Aug 2020 18:07)  HR: 111 (09 Aug 2020 05:09) (103 - 111)  BP: 131/75 (09 Aug 2020 05:09) (101/53 - 145/70)  BP(mean): 70 (08 Aug 2020 09:08) (70 - 70)  RR: 16 (09 Aug 2020 05:09) (14 - 17)  SpO2: 96% (09 Aug 2020 05:09) (95% - 100%)    Physical Exam:  General: Sitting in chair, appears uncomfortable. AAOx3  Extremities:        LLE: No gross deformity. SILT L2-S1 distribution, symmetric. TA/EHL/FHL/GS motor intact.       RLE: Aquacel with minimal spotting today. SILT L2-S1 distribution, symmetric. TA/EHL/FHL/GS motor intact.             Labs:                                    4.7    8.35  )-----------( 332      ( 08 Aug 2020 06:16 )             16.8                 A/P :  Pt is a 51yo Female s/p R SANDY revision (acetabulum and femoral head) on 7/30 c/b acute blood loss anemia  -    Anemia: Hgb 4.5 yesterda AM, pending this am. Will proceed with iron infusion 200 mg x 5 days, Epo, cyanocobalamin, folic acid  -   CT demonstrating 7x7x14 fluid collection in hip, likely hematoma  -    F/u FOBT, EPO level, Iron Studies, Trend CBC, Monitor Hemodynamics  -    H+H improved slightly today, drainage on bandage decreasing  -    Pain control  -    DVT ppx: Lovenox 100 BID (home dose) being held due to acute blood loss anemia  -    Holding PT for now  -    Dispo: Remaining on telemetry

## 2020-08-09 NOTE — PROGRESS NOTE ADULT - ASSESSMENT
50F Nondenominational (declining RBCs), DVT on therapeutic lovenox. DARIO not on CPAP, Obesity, HTN, Depression, GERD, UC here for revision R SANDY w Dr. Ballard 7/30, c/b symptomatic anemia post-operatively w Hgb to 4.4 likely to OR and incisional drainage - transferred to SICU for further monitoring, now transitioned to telemetry s/p EPO and IV Iron x5d, remains symptomatic w hgb increasing    #Acute blood loss anemia -  sxs improving. Pt is Nondenominational and declining blood transfusion. s/p procrit and completing 5d course of IV iron infusion. Heme following. Pt baseline 10-11. Though Guaic positive stools, CT Abd neg for bleeding  Post-op state - pain controlled. On bowel regimen and IS. PPX: SCDs. DISPO: HPT  #Headache - resolved. subacute. Does not appear to be acutely worsening. Acute process less likely. DDx does include DARIO - pt does have h/o DARIO but not using CPAP (denying PND, though AM HA may be sxs). Relatively non-focal neuro examination.   #DVT - pt was on therapeutic lovenox pre-op. Currently held in setting of symptomatic anemia.  #UC - stable. at baseline. On home sulfasalazine  #GERD - chronic. c/w protonix  #Depression - chronic. c/w fluoxetine and seroquel  #HTN - holding BP meds at this time (amlodipine 5, HCTZ 12.5, Losartan 50). BP at target  #Obesity - 34 outpt f/u    Plan  Clinically appears improved today w BPs improving. Hgb up to 5.2 from 4.7 from 4.5. Last retic still hypoproliferative.  F/U Heme-onc recommendations. Per Dr. Vigil - typically response in RBCs 5-10d after IV Iron w Epo  Continue working w PT  CBC w diff w retic in AM  Continue holding Therapeutic AC at this time until hgb responds    Dispo: Home w HPT - need to be cleared by orthopedics to resume PT. May need re-classification.

## 2020-08-09 NOTE — PROGRESS NOTE ADULT - SUBJECTIVE AND OBJECTIVE BOX
INTERVAL HPI/OVERNIGHT EVENTS: RIVAS o/n    SUBJECTIVE: Patient seen and examined at bedside.   Feels energy improving. Ambulating to commode - endorses lightheadedness w changes in position. Eating wo N/V/Abd pain. No chest pain, dyspnea. Denying headache, blurry vision. States pain in hip is controlled.      OBJECTIVE:    VITAL SIGNS:  ICU Vital Signs Last 24 Hrs  T(C): 36.7 (09 Aug 2020 20:40), Max: 37.2 (08 Aug 2020 21:04)  T(F): 98.1 (09 Aug 2020 20:40), Max: 99 (09 Aug 2020 17:02)  HR: 100 (09 Aug 2020 20:40) (93 - 111)  BP: 132/81 (09 Aug 2020 20:40) (97/52 - 143/80)  BP(mean): 105 (09 Aug 2020 13:14) (71 - 105)  ABP: --  ABP(mean): --  RR: 18 (09 Aug 2020 20:40) (16 - 18)  SpO2: 95% (09 Aug 2020 20:40) (95% - 99%)        08-08 @ 07:01  -  08-09 @ 07:00  --------------------------------------------------------  IN: 0 mL / OUT: 601 mL / NET: -601 mL      CAPILLARY BLOOD GLUCOSE          PHYSICAL EXAM:  GEN: Female in NAD on RA  HEENT: NC/AT, MMM  CV: increased rate, no murmurs, no BLE edema.  PULM: nml effort, CTAB  ABD: Soft, NABS, non-tender to palpation  NEURO: Alert, oriented x3.  CN II-XI grossly intact. EOMI  5/5 in BUE strength. R hip 3/5. Sensation intact  Finger to nose; L heel-to shin nml  PSYCH: Appropriate, conversant  EXT: R latera hip dressing w minimal strikethrough. Intact and clean    MEDICATIONS:  MEDICATIONS  (STANDING):  acetaminophen   Tablet .. 650 milliGRAM(s) Oral every 6 hours  cyanocobalamin Injectable 1000 MICROGram(s) IntraMuscular daily  FLUoxetine 40 milliGRAM(s) Oral daily  folic acid 5 milliGRAM(s) Oral daily  gabapentin 300 milliGRAM(s) Oral at bedtime  pantoprazole    Tablet 40 milliGRAM(s) Oral before breakfast  polyethylene glycol 3350 17 Gram(s) Oral daily  QUEtiapine 50 milliGRAM(s) Oral at bedtime  sucralfate suspension 1 Gram(s) Oral every 12 hours  sulfaSALAzine 500 milliGRAM(s) Oral daily    MEDICATIONS  (PRN):  HYDROmorphone  Injectable 0.5 milliGRAM(s) IV Push every 4 hours PRN breakthrough pain  ondansetron Injectable 4 milliGRAM(s) IV Push every 6 hours PRN Nausea and/or Vomiting  oxyCODONE    IR 5 milliGRAM(s) Oral every 4 hours PRN Moderate Pain (4 - 6)  oxyCODONE    IR 10 milliGRAM(s) Oral every 4 hours PRN Severe Pain (7 - 10)  senna 2 Tablet(s) Oral at bedtime PRN Constipation      ALLERGIES:  Allergies    lisinopril (Other)  verapamil (Other)    Intolerances        LABS:                        5.2    7.74  )-----------( 354      ( 09 Aug 2020 12:35 )             18.5     08-09    142  |  104  |  9   ----------------------------<  116<H>  4.6   |  29  |  0.91    Ca    9.1      09 Aug 2020 12:35            RADIOLOGY & ADDITIONAL TESTS: Reviewed.

## 2020-08-10 PROCEDURE — 99233 SBSQ HOSP IP/OBS HIGH 50: CPT

## 2020-08-10 PROCEDURE — 99232 SBSQ HOSP IP/OBS MODERATE 35: CPT

## 2020-08-10 RX ADMIN — Medication 40 MILLIGRAM(S): at 11:38

## 2020-08-10 RX ADMIN — Medication 650 MILLIGRAM(S): at 00:27

## 2020-08-10 RX ADMIN — GABAPENTIN 300 MILLIGRAM(S): 400 CAPSULE ORAL at 22:17

## 2020-08-10 RX ADMIN — PANTOPRAZOLE SODIUM 40 MILLIGRAM(S): 20 TABLET, DELAYED RELEASE ORAL at 06:13

## 2020-08-10 RX ADMIN — Medication 650 MILLIGRAM(S): at 12:28

## 2020-08-10 RX ADMIN — OXYCODONE HYDROCHLORIDE 10 MILLIGRAM(S): 5 TABLET ORAL at 16:30

## 2020-08-10 RX ADMIN — Medication 650 MILLIGRAM(S): at 11:32

## 2020-08-10 RX ADMIN — OXYCODONE HYDROCHLORIDE 10 MILLIGRAM(S): 5 TABLET ORAL at 07:25

## 2020-08-10 RX ADMIN — Medication 1 GRAM(S): at 06:12

## 2020-08-10 RX ADMIN — OXYCODONE HYDROCHLORIDE 10 MILLIGRAM(S): 5 TABLET ORAL at 12:29

## 2020-08-10 RX ADMIN — Medication 650 MILLIGRAM(S): at 06:12

## 2020-08-10 RX ADMIN — Medication 5 MILLIGRAM(S): at 11:37

## 2020-08-10 RX ADMIN — Medication 650 MILLIGRAM(S): at 07:12

## 2020-08-10 RX ADMIN — QUETIAPINE FUMARATE 50 MILLIGRAM(S): 200 TABLET, FILM COATED ORAL at 22:17

## 2020-08-10 RX ADMIN — PREGABALIN 1000 MICROGRAM(S): 225 CAPSULE ORAL at 11:32

## 2020-08-10 RX ADMIN — Medication 1 GRAM(S): at 17:42

## 2020-08-10 RX ADMIN — Medication 650 MILLIGRAM(S): at 18:40

## 2020-08-10 RX ADMIN — OXYCODONE HYDROCHLORIDE 10 MILLIGRAM(S): 5 TABLET ORAL at 23:25

## 2020-08-10 RX ADMIN — OXYCODONE HYDROCHLORIDE 10 MILLIGRAM(S): 5 TABLET ORAL at 06:13

## 2020-08-10 RX ADMIN — OXYCODONE HYDROCHLORIDE 10 MILLIGRAM(S): 5 TABLET ORAL at 15:33

## 2020-08-10 RX ADMIN — OXYCODONE HYDROCHLORIDE 10 MILLIGRAM(S): 5 TABLET ORAL at 22:17

## 2020-08-10 RX ADMIN — ONDANSETRON 4 MILLIGRAM(S): 8 TABLET, FILM COATED ORAL at 17:42

## 2020-08-10 RX ADMIN — OXYCODONE HYDROCHLORIDE 10 MILLIGRAM(S): 5 TABLET ORAL at 11:33

## 2020-08-10 RX ADMIN — Medication 650 MILLIGRAM(S): at 01:17

## 2020-08-10 RX ADMIN — Medication 650 MILLIGRAM(S): at 17:42

## 2020-08-10 NOTE — PROGRESS NOTE ADULT - ASSESSMENT
50F [place] (declining RBCs), DVT on therapeutic lovenox. DARIO not on CPAP, Obesity, HTN, Depression, GERD, UC here for revision R SANDY w Dr. Ballard 7/30, c/b symptomatic anemia post-operatively w Hgb to 4.4 likely to OR and incisional drainage - transferred to SICU for further monitoring, now transitioned to telemetry s/p EPO and IV Iron x5d, remains symptomatic w hgb increasing    #Acute blood loss anemia -  sxs improving. Pt is Cheondoism and declining blood transfusion. s/p procrit and completing 5d course of IV iron infusion. Heme following. Pt baseline 10-11. Though Guaic positive stools, CT Abd neg for bleeding  Post-op state - pain controlled. On bowel regimen and IS. PPX: SCDs. DISPO: HPT  #Headache - resolved. subacute. Does not appear to be acutely worsening. Acute process less likely. DDx does include DARIO - pt does have h/o DARIO but not using CPAP (denying PND, though AM HA may be sxs).   #DVT - pt was on therapeutic lovenox pre-op. Currently held in setting of symptomatic anemia.  #UC - stable. at baseline. On home sulfasalazine  #GERD - chronic. c/w protonix  #Depression - chronic. c/w fluoxetine and seroquel  #HTN - holding BP meds at this time (amlodipine 5, HCTZ 12.5, Losartan 50). BP at target  #Obesity - 34 outpt f/u    Plan  Clinically appears improved (HR in 90s, no subjective lightheadedness on transfers, uptrending Hgb from yesterday).  Discussed w Ortho: resuming PT today  --F/U Heme-onc recommendations  --CBC w diff, retic % AM tomorrow  --Continue holding Therapeutic AC at this time until hgb responds    Dispo: Home w HPT. For PT re-assessment to day

## 2020-08-10 NOTE — PROGRESS NOTE ADULT - SUBJECTIVE AND OBJECTIVE BOX
The patient was seen and examined.        .  50F Scientology with obesity s/p sleeve gastrectomy, RA, HTN,  DM, hx of diverticulitis s/p colonic resection, hx of hernia repair, DARIO, RA, PE/DVT, on LMWH (lovenox 100 mg BID), recent L SANDY 2/20  PMH DARIO, GERD, RA, PE (2001), DVT (on loveneox 100 mg BID),  admitted for for right hip revision arthroplasty 7/30. Patient is currently  Post-operative  s/p R revision SANDY. Hematology consulted for drop  hgb of 10.1 on 7/30 to 5.6.  Repeat hgb decrease to 4.6, 4.4 presently at 5.2 (8/9) after infusional iron, Procrit, IM B12 and IV folicacid. Patient still symptomatic but is slowly improving..         lisinopril (Other)  verapamil (Other)    Allergies    lisinopril (Other)  verapamil (Other)    Intolerances        Medications:  MEDICATIONS  (STANDING):  acetaminophen   Tablet .. 650 milliGRAM(s) Oral every 6 hours  cyanocobalamin Injectable 1000 MICROGram(s) IntraMuscular daily  FLUoxetine 40 milliGRAM(s) Oral daily  folic acid 5 milliGRAM(s) Oral daily  gabapentin 300 milliGRAM(s) Oral at bedtime  pantoprazole    Tablet 40 milliGRAM(s) Oral before breakfast  polyethylene glycol 3350 17 Gram(s) Oral daily  QUEtiapine 50 milliGRAM(s) Oral at bedtime  sucralfate suspension 1 Gram(s) Oral every 12 hours  sulfaSALAzine 500 milliGRAM(s) Oral daily    MEDICATIONS  (PRN):  HYDROmorphone  Injectable 0.5 milliGRAM(s) IV Push every 4 hours PRN breakthrough pain  ondansetron Injectable 4 milliGRAM(s) IV Push every 6 hours PRN Nausea and/or Vomiting  oxyCODONE    IR 5 milliGRAM(s) Oral every 4 hours PRN Moderate Pain (4 - 6)  oxyCODONE    IR 10 milliGRAM(s) Oral every 4 hours PRN Severe Pain (7 - 10)  senna 2 Tablet(s) Oral at bedtime PRN Constipation          Interval History:  Feeling better.  The patient denies chest pain or SOB at rest.  No nausea/vomiting/fevers/chills/night sweats.  Has fatigue,  less headaches/dizziness.  Appetite is stable without weight loss.  No abdominal pain/diarrhea/constipation.  No melena or hematochezia.    No dysuria/hematuria.  No history of easy bruising/bleeding.  No gingival bleeding or epistaxis.  No leg pain or leg swelling.    ROS is otherwise negative.    PHYSICAL EXAM:    T(F): 97.9 (08-10-20 @ 09:12), Max: 99 (08-09-20 @ 17:02)  HR: 100 (08-10-20 @ 09:12) (90 - 100)  BP: 113/64 (08-10-20 @ 09:12) (113/64 - 143/80)  RR: 15 (08-10-20 @ 09:12) (15 - 19)  SpO2: 98% (08-10-20 @ 09:12) (95% - 99%)  Wt(kg): --    Daily     Daily     Gen: well developed, well nourished, obese, fairly comfortable at rest  HEENT: normocephalic/atraumatic, has conjunctival pallor, no scleral icterus, no oral thrush/mucosal bleeding/mucositis  Neck: supple, no masses, no JVD  Cardiovascular: RR, nl S1S2, no murmurs/rubs/gallops  Respiratory: clear air entry b/l  Gastrointestinal: BS+, soft, NT/ND, no masses, no splenomegaly, no hepatomegaly, no evidence for ascites  Extremities: no clubbing/cyanosis, less R thigh edema, minimal drainage, no calf tenderness  Neurological: no focal deficits  Skin: no rash on visible skin, excessive ecchymoses or petechiae  Lymph Nodes:  no significant peripheral adenopathy   Musculoskeletal:  full ROM  Psychiatric:  mood stable        Labs:                          5.2    7.74  )-----------( 354      ( 09 Aug 2020 12:35 )             18.5     CBC Full  -  ( 09 Aug 2020 12:35 )  WBC Count : 7.74 K/uL  RBC Count : 1.82 M/uL  Hemoglobin : 5.2 g/dL  Hematocrit : 18.5 %  Platelet Count - Automated : 354 K/uL  Mean Cell Volume : 101.6 fl  Mean Cell Hemoglobin : 28.6 pg  Mean Cell Hemoglobin Concentration : 28.1 gm/dL  Auto Neutrophil # : x  Auto Lymphocyte # : x  Auto Monocyte # : x  Auto Eosinophil # : x  Auto Basophil # : x  Auto Neutrophil % : x  Auto Lymphocyte % : x  Auto Monocyte % : x  Auto Eosinophil % : x  Auto Basophil % : x        08-09    142  |  104  |  9   ----------------------------<  116<H>  4.6   |  29  |  0.91    Ca    9.1      09 Aug 2020 12:35            Other Labs:    Cultures:    Pathology:    Imaging Studies:

## 2020-08-10 NOTE — PROGRESS NOTE ADULT - SUBJECTIVE AND OBJECTIVE BOX
SUBJECTIVE: Patient seen and examined, states she is continuing to feel better. C/o some pain in R thigh adjacent to incision, will monitor improvement w/ pain med, may need to add muscle relaxant. No chest pain/SOB/fever/chills. No issues overnight.     Vital Signs Last 24 Hrs  T(C): 36.7 (10 Aug 2020 00:29), Max: 37.2 (09 Aug 2020 17:02)  T(F): 98 (10 Aug 2020 00:29), Max: 99 (09 Aug 2020 17:02)  HR: 90 (10 Aug 2020 00:29) (90 - 100)  BP: 142/76 (10 Aug 2020 00:29) (97/52 - 143/80)  BP(mean): 105 (09 Aug 2020 13:14) (71 - 105)  RR: 19 (10 Aug 2020 00:29) (17 - 19)  SpO2: 95% (10 Aug 2020 00:29) (95% - 99%)      Physical Exam:  General: Resting in bed, NAD  Extremities:        LLE: No gross deformity. SILT L2-S1 distribution, symmetric. TA/EHL/FHL/GS motor intact.       RLE: Aquacel with minimal spotting, minimal increase in drainage from yesterday. SILT L2-S1 distribution, symmetric. TA/EHL/FHL/GS motor intact.             Labs:                                                    5.2    7.74  )-----------( 354      ( 09 Aug 2020 12:35 )             18.5     08-09    142  |  104  |  9   ----------------------------<  116<H>  4.6   |  29  |  0.91    Ca    9.1      09 Aug 2020 12:35                   A/P :  Pt is a 51yo Female s/p R SANDY revision (acetabulum and femoral head) on 7/30 c/b acute blood loss anemia, hgb slowly improving, pt symptoms resolving  -    Will proceed with Epo, cyanocobalamin, folic acid  -   CT demonstrating 7x7x14 fluid collection in hip, likely hematoma  -    F/u FOBT, EPO level, Iron Studies, Trend CBC, Monitor Hemodynamics  -    H+H improved slightly yesterday, drainage on bandage decreasing; draw labs only every other day in pedi tubes  -    Pain control  -    DVT ppx: Lovenox 100 BID (home dose) being held due to acute blood loss anemia  -    Holding PT for now  -    F/u Elio when to DC staples  -    Dispo: Remaining on telemetry

## 2020-08-10 NOTE — PROGRESS NOTE ADULT - SUBJECTIVE AND OBJECTIVE BOX
INTERVAL HPI/OVERNIGHT EVENTS: RIVAS O/N    SUBJECTIVE: Patient seen and examined at bedside.   Pt says she no longer feels lightheaded between transfers to bedside commode. Eager to work w PT and go to bathroom. States pain controlled. No fever, chest pain, dyspnea. Voiding wo issue. +BM. Denies headache    OBJECTIVE:    VITAL SIGNS:  ICU Vital Signs Last 24 Hrs  T(C): 36.6 (10 Aug 2020 09:12), Max: 37.2 (09 Aug 2020 17:02)  T(F): 97.9 (10 Aug 2020 09:12), Max: 99 (09 Aug 2020 17:02)  HR: 100 (10 Aug 2020 09:12) (90 - 100)  BP: 113/64 (10 Aug 2020 09:12) (113/64 - 143/80)  BP(mean): 105 (09 Aug 2020 13:14) (105 - 105)  ABP: --  ABP(mean): --  RR: 15 (10 Aug 2020 09:12) (15 - 19)  SpO2: 98% (10 Aug 2020 09:12) (95% - 99%)        08-09 @ 07:01  -  08-10 @ 07:00  --------------------------------------------------------  IN: 250 mL / OUT: 590 mL / NET: -340 mL    08-10 @ 07:01  -  08-10 @ 10:50  --------------------------------------------------------  IN: 350 mL / OUT: 0 mL / NET: 350 mL      CAPILLARY BLOOD GLUCOSE          PHYSICAL EXAM:  GEN: Female in NAD on RA  HEENT: NC/AT, MMM  CV: RRR, no murmurs, no BLE edema.  PULM: nml effort, CTAB  ABD: Soft, NABS, non-tender to palpation  NEURO: Alert, oriented x3.  CN II-XI grossly intact. EOMI  5/5 in BUE strength. R hip 3/5. Sensation intact  Finger to nose; L heel-to shin nml  PSYCH: Appropriate, conversant  EXT: R lateral hip dressing c/d/i.     MEDICATIONS:  MEDICATIONS  (STANDING):  acetaminophen   Tablet .. 650 milliGRAM(s) Oral every 6 hours  cyanocobalamin Injectable 1000 MICROGram(s) IntraMuscular daily  FLUoxetine 40 milliGRAM(s) Oral daily  folic acid 5 milliGRAM(s) Oral daily  gabapentin 300 milliGRAM(s) Oral at bedtime  pantoprazole    Tablet 40 milliGRAM(s) Oral before breakfast  polyethylene glycol 3350 17 Gram(s) Oral daily  QUEtiapine 50 milliGRAM(s) Oral at bedtime  sucralfate suspension 1 Gram(s) Oral every 12 hours  sulfaSALAzine 500 milliGRAM(s) Oral daily    MEDICATIONS  (PRN):  HYDROmorphone  Injectable 0.5 milliGRAM(s) IV Push every 4 hours PRN breakthrough pain  ondansetron Injectable 4 milliGRAM(s) IV Push every 6 hours PRN Nausea and/or Vomiting  oxyCODONE    IR 5 milliGRAM(s) Oral every 4 hours PRN Moderate Pain (4 - 6)  oxyCODONE    IR 10 milliGRAM(s) Oral every 4 hours PRN Severe Pain (7 - 10)  senna 2 Tablet(s) Oral at bedtime PRN Constipation      ALLERGIES:  Allergies    lisinopril (Other)  verapamil (Other)    Intolerances        LABS:                        5.2    7.74  )-----------( 354      ( 09 Aug 2020 12:35 )             18.5     08-09    142  |  104  |  9   ----------------------------<  116<H>  4.6   |  29  |  0.91    Ca    9.1      09 Aug 2020 12:35            RADIOLOGY & ADDITIONAL TESTS: Reviewed.

## 2020-08-10 NOTE — PROGRESS NOTE ADULT - SUBJECTIVE AND OBJECTIVE BOX
Orthopaedic Surgery Progress Note    Patient seen and examined. RIVAS. Patient without complaints. Reports intermittent headaches but denies light headedness /palpitations /dizziness. Denies CP, SOB, N/V, tactile fevers, calf pain. Pt is a Jehovahs witness (refusing blood products) and 2/2 acute blood loss anemia was transferred to SICU on 8/3 for Hgb 4.4 with + hemoccult. Pt was stepped down on 8/5 and on 8/6 c/o headache and blurry vision. Since that time patient was placed on bed rest with PT held until symptoms improved. Today patient is feeling much better - Medicine consult is supportive of mobilizing patient with PT this morning.       Vital Signs Last 24 Hrs  T(C): 36.6 (10 Aug 2020 09:12), Max: 37.2 (09 Aug 2020 17:02)  T(F): 97.9 (10 Aug 2020 09:12), Max: 99 (09 Aug 2020 17:02)  HR: 100 (10 Aug 2020 09:12) (90 - 100)  BP: 113/64 (10 Aug 2020 09:12) (113/64 - 143/80)  BP(mean): 105 (09 Aug 2020 13:14) (105 - 105)  RR: 15 (10 Aug 2020 09:12) (15 - 19)  SpO2: 98% (10 Aug 2020 09:12) (95% - 99%)        Physical Exam:  Pt sitting comfortably at the side of her bed, NAD.  Skin warm and well perfused, no visible erythema/ecchymoses.  Dressing C/D/I   EHL/FHL/TA/GS 5/5 motor strength bilateral lower extremities   SLT in tact to distal bilateral lower extremities  DP pulses 2+     LABS                        5.2    7.74  )-----------( 354      ( 09 Aug 2020 12:35 )             18.5                                08-09    142  |  104  |  9   ----------------------------<  116<H>  4.6   |  29  |  0.91    Ca    9.1      09 Aug 2020 12:35          A/P: 50F POD #11 s/p right total hip revision     CONTINUE:        1. PT: 50% PWB , PT re evaluation today   2. DVT prophylaxis: SCDs  3. Pain Control as needed   4. Dispo: Pending PT re-evaluation and medical clearance

## 2020-08-10 NOTE — PROGRESS NOTE ADULT - ASSESSMENT
50F [place] with obesity s/p sleeve gastrectomy, RA, HTN,  DM, hx of diverticulitis s/p colonic resection, hx of hernia repair, DARIO, RA, PE/DVT, on LMWH (lovenox 100 mg BID), recent L SANDY 2/20  PMH DARIO, GERD, RA, PE (2001), DVT (on loveneox 100 mg BID),  admitted for for right hip revision arthroplasty 7/30. Patient is currently  Post-operative  s/p R revision SANDY. Hematology consulted for drop  hgb of 10.1 on 7/30 to 5.6.  Repeat hgb decrease to 4.6, 4.4 presently at 5.2 (8/9) after infusional iron, Procrit, IM B12 and IV folic acid. Patient still symptomatic but is slowly improving..  Anemia due to peewee- operative blood loss, hydration,  GI blood loss (stool guaiac positive) though no active bleeding seen on CT, doubt hemolysis (bilirubin 0.3, haptoglobin not low). Reticulocyte count increasing and hemoglobin beginning to increase.  Patient has declined transfusion of any blood products in keeping with her Hoahaoism beliefs and is aware of the risks, including death. .      Completed 5 days of Venofer by infusion, continue IV folic acid,  B12 by injection as patient's prior bariatric surgery may prevent oral absorption. S/P Procrit.  Limit blood draw to minimum amount (use pediatric tubes); venipuncture later today  Maintain strict bedrest except for bathroom with assistance only.. SCDs..

## 2020-08-11 LAB
BASOPHILS # BLD AUTO: 0 K/UL — SIGNIFICANT CHANGE UP (ref 0–0.2)
BASOPHILS NFR BLD AUTO: 0 % — SIGNIFICANT CHANGE UP (ref 0–2)
EOSINOPHIL # BLD AUTO: 0.33 K/UL — SIGNIFICANT CHANGE UP (ref 0–0.5)
EOSINOPHIL NFR BLD AUTO: 4.3 % — SIGNIFICANT CHANGE UP (ref 0–6)
HCT VFR BLD CALC: 19.5 % — CRITICAL LOW (ref 34.5–45)
HGB BLD-MCNC: 5.5 G/DL — CRITICAL LOW (ref 11.5–15.5)
LYMPHOCYTES # BLD AUTO: 0.8 K/UL — LOW (ref 1–3.3)
LYMPHOCYTES # BLD AUTO: 10.4 % — LOW (ref 13–44)
MCHC RBC-ENTMCNC: 28.2 GM/DL — LOW (ref 32–36)
MCHC RBC-ENTMCNC: 28.5 PG — SIGNIFICANT CHANGE UP (ref 27–34)
MCV RBC AUTO: 101 FL — HIGH (ref 80–100)
MONOCYTES # BLD AUTO: 0.46 K/UL — SIGNIFICANT CHANGE UP (ref 0–0.9)
MONOCYTES NFR BLD AUTO: 6 % — SIGNIFICANT CHANGE UP (ref 2–14)
NEUTROPHILS # BLD AUTO: 5.9 K/UL — SIGNIFICANT CHANGE UP (ref 1.8–7.4)
NEUTROPHILS NFR BLD AUTO: 76.7 % — SIGNIFICANT CHANGE UP (ref 43–77)
NRBC # BLD: SIGNIFICANT CHANGE UP /100 WBCS (ref 0–0)
PLATELET # BLD AUTO: 333 K/UL — SIGNIFICANT CHANGE UP (ref 150–400)
RBC # BLD: 1.93 M/UL — LOW (ref 3.8–5.2)
RBC # FLD: 24 % — HIGH (ref 10.3–14.5)
WBC # BLD: 7.69 K/UL — SIGNIFICANT CHANGE UP (ref 3.8–10.5)
WBC # FLD AUTO: 7.69 K/UL — SIGNIFICANT CHANGE UP (ref 3.8–10.5)

## 2020-08-11 PROCEDURE — 99232 SBSQ HOSP IP/OBS MODERATE 35: CPT

## 2020-08-11 PROCEDURE — 99233 SBSQ HOSP IP/OBS HIGH 50: CPT | Mod: GC

## 2020-08-11 RX ORDER — OXYCODONE HYDROCHLORIDE 5 MG/1
10 TABLET ORAL EVERY 4 HOURS
Refills: 0 | Status: DISCONTINUED | OUTPATIENT
Start: 2020-08-11 | End: 2020-08-17

## 2020-08-11 RX ORDER — OXYCODONE HYDROCHLORIDE 5 MG/1
5 TABLET ORAL EVERY 4 HOURS
Refills: 0 | Status: DISCONTINUED | OUTPATIENT
Start: 2020-08-11 | End: 2020-08-17

## 2020-08-11 RX ORDER — OXYMETAZOLINE HYDROCHLORIDE 0.5 MG/ML
1 SPRAY NASAL EVERY 12 HOURS
Refills: 0 | Status: DISCONTINUED | OUTPATIENT
Start: 2020-08-11 | End: 2020-08-17

## 2020-08-11 RX ADMIN — Medication 40 MILLIGRAM(S): at 11:38

## 2020-08-11 RX ADMIN — Medication 650 MILLIGRAM(S): at 06:11

## 2020-08-11 RX ADMIN — Medication 650 MILLIGRAM(S): at 12:46

## 2020-08-11 RX ADMIN — Medication 650 MILLIGRAM(S): at 00:19

## 2020-08-11 RX ADMIN — GABAPENTIN 300 MILLIGRAM(S): 400 CAPSULE ORAL at 21:09

## 2020-08-11 RX ADMIN — QUETIAPINE FUMARATE 50 MILLIGRAM(S): 200 TABLET, FILM COATED ORAL at 22:48

## 2020-08-11 RX ADMIN — PREGABALIN 1000 MICROGRAM(S): 225 CAPSULE ORAL at 11:38

## 2020-08-11 RX ADMIN — OXYCODONE HYDROCHLORIDE 10 MILLIGRAM(S): 5 TABLET ORAL at 22:09

## 2020-08-11 RX ADMIN — Medication 650 MILLIGRAM(S): at 11:39

## 2020-08-11 RX ADMIN — Medication 1 GRAM(S): at 17:02

## 2020-08-11 RX ADMIN — PANTOPRAZOLE SODIUM 40 MILLIGRAM(S): 20 TABLET, DELAYED RELEASE ORAL at 06:46

## 2020-08-11 RX ADMIN — Medication 650 MILLIGRAM(S): at 01:30

## 2020-08-11 RX ADMIN — Medication 5 MILLIGRAM(S): at 11:38

## 2020-08-11 RX ADMIN — Medication 650 MILLIGRAM(S): at 06:46

## 2020-08-11 RX ADMIN — OXYCODONE HYDROCHLORIDE 5 MILLIGRAM(S): 5 TABLET ORAL at 10:45

## 2020-08-11 RX ADMIN — Medication 1 GRAM(S): at 05:11

## 2020-08-11 RX ADMIN — OXYCODONE HYDROCHLORIDE 10 MILLIGRAM(S): 5 TABLET ORAL at 21:09

## 2020-08-11 RX ADMIN — OXYCODONE HYDROCHLORIDE 5 MILLIGRAM(S): 5 TABLET ORAL at 09:34

## 2020-08-11 RX ADMIN — POLYETHYLENE GLYCOL 3350 17 GRAM(S): 17 POWDER, FOR SOLUTION ORAL at 11:37

## 2020-08-11 RX ADMIN — OXYCODONE HYDROCHLORIDE 10 MILLIGRAM(S): 5 TABLET ORAL at 17:02

## 2020-08-11 NOTE — PROGRESS NOTE ADULT - SUBJECTIVE AND OBJECTIVE BOX
The patient was seen and examined.      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 Patient is currently  Post-operative  s/p R revision SANDY. Hematology consulted for drop  hgb of 10.1 on 7/30 to a low at 4.4; presently at 5.5 (8/11) after infusional iron, Procrit, IM B12 and IV folic acid. Patient still symptomatic but is slowly improving..      .       lisinopril (Other)  verapamil (Other)    Allergies    lisinopril (Other)  verapamil (Other)    Intolerances        Medications:  MEDICATIONS  (STANDING):  acetaminophen   Tablet .. 650 milliGRAM(s) Oral every 6 hours  cyanocobalamin Injectable 1000 MICROGram(s) IntraMuscular daily  FLUoxetine 40 milliGRAM(s) Oral daily  folic acid 5 milliGRAM(s) Oral daily  gabapentin 300 milliGRAM(s) Oral at bedtime  pantoprazole    Tablet 40 milliGRAM(s) Oral before breakfast  polyethylene glycol 3350 17 Gram(s) Oral daily  QUEtiapine 50 milliGRAM(s) Oral at bedtime  sucralfate suspension 1 Gram(s) Oral every 12 hours  sulfaSALAzine 500 milliGRAM(s) Oral daily    MEDICATIONS  (PRN):  HYDROmorphone  Injectable 0.5 milliGRAM(s) IV Push every 4 hours PRN breakthrough pain  ondansetron Injectable 4 milliGRAM(s) IV Push every 6 hours PRN Nausea and/or Vomiting  oxyCODONE    IR 5 milliGRAM(s) Oral every 4 hours PRN Moderate Pain (4 - 6)  oxyCODONE    IR 10 milliGRAM(s) Oral every 4 hours PRN Severe Pain (7 - 10)  senna 2 Tablet(s) Oral at bedtime PRN Constipation          Interval History:    The patient denies chest pain or SOB at rest.  No nausea/vomiting/fevers/chills/night sweats.  Has fatigue, no  headaches/ has dizziness and palpitations with exertion.  Appetite is stable without weight loss.  No abdominal pain/diarrhea/constipation.  No melena or hematochezia.    No dysuria/hematuria.  No history of easy bruising/bleeding.  No gingival bleeding or epistaxis.  No leg pain, less R leg swelling.    ROS is otherwise negative.    PHYSICAL EXAM:    T(F): 98.1 (08-11-20 @ 09:22), Max: 98.7 (08-11-20 @ 05:10)  HR: 109 (08-11-20 @ 09:22) (95 - 109)  BP: 117/58 (08-11-20 @ 09:22) (112/76 - 132/72)  RR: 19 (08-11-20 @ 09:22) (16 - 19)  SpO2: 99% (08-11-20 @ 09:22) (95% - 99%)  Wt(kg): --    Daily     Daily     Gen: well developed, well nourished, obese comfortable  HEENT: normocephalic/atraumatic, no conjunctival pallor, no scleral icterus, no oral thrush/mucosal bleeding/mucositis  Neck: supple, no masses, no JVD  Cardiovascular: RR, nl S1S2, no murmurs/rubs/gallops  Respiratory: clear air entry b/l  Gastrointestinal: BS+, soft, obese,  NT/ND, no masses, no splenomegaly, no hepatomegaly, no evidence for ascites  Extremities: no clubbing/cyanosis, less R thigh edema, no calf tenderness, dressing dry, SCDs in place  Neurological: no focal deficits  Skin: no rash on visible skin, excessive ecchymoses or petechiae  Lymph Nodes:  no significant peripheral adenopathy   Musculoskeletal:  full ROM  Psychiatric:  mood stable        Labs:                          5.5    7.69  )-----------( 333      ( 11 Aug 2020 06:36 )             19.5     CBC Full  -  ( 11 Aug 2020 06:36 )  WBC Count : 7.69 K/uL  RBC Count : 1.93 M/uL  Hemoglobin : 5.5 g/dL  Hematocrit : 19.5 %  Platelet Count - Automated : 333 K/uL  Mean Cell Volume : 101.0 fl  Mean Cell Hemoglobin : 28.5 pg  Mean Cell Hemoglobin Concentration : 28.2 gm/dL  Auto Neutrophil # : 5.90 K/uL  Auto Lymphocyte # : 0.80 K/uL  Auto Monocyte # : 0.46 K/uL  Auto Eosinophil # : 0.33 K/uL  Auto Basophil # : 0.00 K/uL  Auto Neutrophil % : 76.7 %  Auto Lymphocyte % : 10.4 %  Auto Monocyte % : 6.0 %  Auto Eosinophil % : 4.3 %  Auto Basophil % : 0.0 %        08-09    142  |  104  |  9   ----------------------------<  116<H>  4.6   |  29  |  0.91    Ca    9.1      09 Aug 2020 12:35            Other Labs:    Cultures:    Pathology:    Imaging Studies:

## 2020-08-11 NOTE — PROGRESS NOTE ADULT - ASSESSMENT
50F Bahai (declining RBCs), DVT on therapeutic lovenox. DAIRO not on CPAP, Obesity, HTN, Depression, GERD, UC here for revision R SANDY w Dr. Ballard 7/30, c/b symptomatic anemia post-operatively w Hgb to 4.4 likely to OR and incisional drainage - transferred to SICU for further monitoring, now transitioned to telemetry s/p EPO and IV Iron x5d, remains symptomatic w hgb increasing    #Acute blood loss anemia -  sxs improving. Pt is Bahai and declining blood transfusion. s/p procrit and completing 5d course of IV iron infusion. Heme following. Pt baseline 10-11. Though [place] positive stools, CT Abd neg for bleeding. HgB improving. Check b12, folate  Post-op state - pain controlled. On bowel regimen and IS. PPX: SCDs. DISPO: HPT  #Headache - possible rebound in setting of having allergy medication stopped - obtain collateral and restart  #DVT - pt was on therapeutic lovenox pre-op. Currently held in setting of symptomatic anemia.  #UC - stable. at baseline. On home sulfasalazine  #GERD - chronic. c/w protonix  #Depression - chronic. c/w fluoxetine and seroquel  #HTN - holding BP meds at this time (amlodipine 5, HCTZ 12.5, Losartan 50). BP at target  #Obesity - 34 outpt f/u

## 2020-08-11 NOTE — PROGRESS NOTE ADULT - SUBJECTIVE AND OBJECTIVE BOX
SUBJECTIVE: Patient seen and examined, states she is continuing to feel better. No chest pain/SOB/fever/chills. No issues overnight.     OBJECTIVE:  Vital Signs Last 24 Hrs  T(C): 37.1 (11 Aug 2020 05:10), Max: 37.1 (11 Aug 2020 05:10)  T(F): 98.7 (11 Aug 2020 05:10), Max: 98.7 (11 Aug 2020 05:10)  HR: 95 (11 Aug 2020 05:10) (95 - 103)  BP: 112/76 (11 Aug 2020 05:10) (112/76 - 132/72)  BP(mean): --  RR: 18 (11 Aug 2020 05:10) (16 - 18)  SpO2: 95% (11 Aug 2020 05:10) (95% - 99%)      Physical Exam:  General: Resting in bed, NAD  Extremities:        LLE: No gross deformity. SILT L2-S1 distribution, symmetric. TA/EHL/FHL/GS motor intact.       RLE: Aquacel with no spotting, not requiring replacement. SILT L2-S1 distribution, symmetric. TA/EHL/FHL/GS motor intact.             Labs:                                           5.5    7.69  )-----------( 333      ( 11 Aug 2020 06:36 )             19.5     08-09    142  |  104  |  9   ----------------------------<  116<H>  4.6   |  29  |  0.91    Ca    9.1      09 Aug 2020 12:35            A/P :  Pt is a 51yo Female s/p R SANDY revision (acetabulum and femoral head) on 7/30 c/b acute blood loss anemia, hgb slowly improving, pt symptoms resolving  -    Will proceed with Epo, cyanocobalamin, folic acid  -    H+H uptrending, 5.5 hgb today  -    Pain control  -    DVT ppx: Lovenox 100 BID (home dose) being held due to acute blood loss anemia  -    Holding PT for now  -    Dispo: Remaining on telemetry

## 2020-08-11 NOTE — PROGRESS NOTE ADULT - SUBJECTIVE AND OBJECTIVE BOX
INTERVAL HPI/OVERNIGHT EVENTS: RIVAS O/N    SUBJECTIVE: Patient seen and examined at bedside. Pt reports that she has had improvement in her symptoms. Denies feeling lightheaded. Still has headache. No fever, chest pain, dyspnea. Voiding wo issue. ROS is otherwise negative    OBJECTIVE:      Vital Signs Last 24 Hrs  T(C): 37.4 (11 Aug 2020 16:40), Max: 37.4 (11 Aug 2020 16:40)  T(F): 99.3 (11 Aug 2020 16:40), Max: 99.3 (11 Aug 2020 16:40)  HR: 98 (11 Aug 2020 14:47) (95 - 119)  BP: 113/69 (11 Aug 2020 16:40) (109/71 - 128/84)  BP(mean): --  RR: 16 (11 Aug 2020 16:40) (16 - 19)  SpO2: 99% (11 Aug 2020 16:40) (95% - 99%)      PHYSICAL EXAM:  GEN: Female in NAD on RA  HEENT: NC/AT, MMM  CV: RRR, no murmurs, no BLE edema.  PULM: nml effort, CTAB  ABD: Soft, NABS, non-tender to palpation  NEURO: Alert, oriented x3.  CN II-XI grossly intact. EOMI  5/5 in BUE strength. R hip 3/5. Sensation intact  Finger to nose; L heel-to shin nml  PSYCH: Appropriate, conversant  EXT: R lateral hip dressing c/d/i.       MEDICATIONS  (STANDING):  acetaminophen   Tablet .. 650 milliGRAM(s) Oral every 6 hours  cyanocobalamin Injectable 1000 MICROGram(s) IntraMuscular daily  FLUoxetine 40 milliGRAM(s) Oral daily  folic acid 5 milliGRAM(s) Oral daily  gabapentin 300 milliGRAM(s) Oral at bedtime  pantoprazole    Tablet 40 milliGRAM(s) Oral before breakfast  polyethylene glycol 3350 17 Gram(s) Oral daily  QUEtiapine 50 milliGRAM(s) Oral at bedtime  sucralfate suspension 1 Gram(s) Oral every 12 hours  sulfaSALAzine 500 milliGRAM(s) Oral daily    MEDICATIONS  (PRN):  HYDROmorphone  Injectable 0.5 milliGRAM(s) IV Push every 4 hours PRN breakthrough pain  ondansetron Injectable 4 milliGRAM(s) IV Push every 6 hours PRN Nausea and/or Vomiting  oxyCODONE    IR 5 milliGRAM(s) Oral every 4 hours PRN Moderate Pain (4 - 6)  oxyCODONE    IR 10 milliGRAM(s) Oral every 4 hours PRN Severe Pain (7 - 10)  oxymetazoline 0.05% Nasal Spray 1 Spray(s) Both Nostrils every 12 hours PRN Congestion  senna 2 Tablet(s) Oral at bedtime PRN Constipation      ALLERGIES:  Allergies    lisinopril (Other)  verapamil (Other)    Intolerances        LABS:                                   5.5    7.69  )-----------( 333      ( 11 Aug 2020 06:36 )             19.5         RADIOLOGY & ADDITIONAL TESTS: Reviewed.

## 2020-08-11 NOTE — PROGRESS NOTE ADULT - ASSESSMENT
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 Patient is currently  Post-operative  s/p R revision SANDY. Hematology consulted for drop  hgb of 10.1 on 7/30 to a low of 4.4; presently at 5.5 (8/11) after infusional iron, Procrit, IM B12 and IV folic acid. Patient still symptomatic but is slowly improving..  Anemia due to peewee- operative blood loss, hydration,  GI blood loss (stool guaiac positive) though no active bleeding seen on CT, doubt hemolysis (bilirubin 0.3, haptoglobin not low). Reticulocyte count increasing and hemoglobin beginning to increase.  Patient has declined transfusion of any blood products in keeping with her [employer] beliefs and is aware of the risks, including death. .      Completed 5 days of Venofer by infusion, continue IV folic acid,  B12 by injection as patient's prior bariatric surgery may prevent oral absorption. S/P Procrit.  Limit blood draw to minimum amount (use pediatric tubes); venipuncture tomorrow  Maintain strict bedrest except for bathroom with assistance only.. SCDs..

## 2020-08-11 NOTE — PROGRESS NOTE ADULT - SUBJECTIVE AND OBJECTIVE BOX
Orthopaedic Surgery Progress Note    Patient seen and examined. RIVAS. Patient without complaints. Right hip pain exacerbated this morning. Denies CP, SOB, N/V, tactile fevers, calf pain.  Pt is a Jehovahs witness (refusing blood products) and 2/2 acute blood loss anemia was transferred to SICU on 8/3 for Hgb 4.4 with + hemoccult. Pt was stepped down on 8/5 and on 8/6 c/o headache and blurry vision. Since that time patient was placed on bed rest with PT held until symptoms improved. Today patient is feeling better and denies dizziness with ambulation to and from the bathroom - Medicine consult is supportive of mobilizing patient with PT. Pt Hgb this morning 8/11 is showing uptrend to 5.5.       Vital Signs Last 24 Hrs  T(C): 36.7 (11 Aug 2020 09:22), Max: 37.1 (11 Aug 2020 05:10)  T(F): 98.1 (11 Aug 2020 09:22), Max: 98.7 (11 Aug 2020 05:10)  HR: 109 (11 Aug 2020 09:22) (95 - 109)  BP: 117/58 (11 Aug 2020 09:22) (112/76 - 132/72)  BP(mean): --  RR: 19 (11 Aug 2020 09:22) (16 - 19)  SpO2: 99% (11 Aug 2020 09:22) (95% - 99%)      Physical Exam:  Pt laying comfortably in bed, NAD.  Skin warm and well perfused, no visible erythema/ecchymoses.  Dressing C/D/I, staples d/c'd yesterday  EHL/FHL/TA/GS 5/5 motor strength bilateral lower extremities  Calves soft and nontender to palpation  DP pulses 2+     LABS                        5.5    7.69  )-----------( 333      ( 11 Aug 2020 06:36 )             19.5                                08-09    142  |  104  |  9   ----------------------------<  116<H>  4.6   |  29  |  0.91    Ca    9.1      09 Aug 2020 12:35          A/P: 50F POD #12 s/p right total hip arthroplasty revision     CONTINUE:        1. PT: 50% protected weight bearing right lower extremity - pt may continue to work with PT to do ADLs as long as patient remains asymptomatic   2. DVT prophylaxis: SCDs, AC held due to acute blood loss anemia   3. Pain Control as needed   4. Dispo: pending

## 2020-08-12 LAB
BASOPHILS # BLD AUTO: 0 K/UL — SIGNIFICANT CHANGE UP (ref 0–0.2)
BASOPHILS NFR BLD AUTO: 0 % — SIGNIFICANT CHANGE UP (ref 0–2)
EOSINOPHIL # BLD AUTO: 0.21 K/UL — SIGNIFICANT CHANGE UP (ref 0–0.5)
EOSINOPHIL NFR BLD AUTO: 3.5 % — SIGNIFICANT CHANGE UP (ref 0–6)
HCT VFR BLD CALC: 21.3 % — LOW (ref 34.5–45)
HGB BLD-MCNC: 5.9 G/DL — CRITICAL LOW (ref 11.5–15.5)
LYMPHOCYTES # BLD AUTO: 1.14 K/UL — SIGNIFICANT CHANGE UP (ref 1–3.3)
LYMPHOCYTES # BLD AUTO: 19.3 % — SIGNIFICANT CHANGE UP (ref 13–44)
MCHC RBC-ENTMCNC: 27.7 GM/DL — LOW (ref 32–36)
MCHC RBC-ENTMCNC: 28.1 PG — SIGNIFICANT CHANGE UP (ref 27–34)
MCV RBC AUTO: 101.4 FL — HIGH (ref 80–100)
MONOCYTES # BLD AUTO: 0.21 K/UL — SIGNIFICANT CHANGE UP (ref 0–0.9)
MONOCYTES NFR BLD AUTO: 3.5 % — SIGNIFICANT CHANGE UP (ref 2–14)
NEUTROPHILS # BLD AUTO: 4.32 K/UL — SIGNIFICANT CHANGE UP (ref 1.8–7.4)
NEUTROPHILS NFR BLD AUTO: 71.9 % — SIGNIFICANT CHANGE UP (ref 43–77)
NRBC # BLD: SIGNIFICANT CHANGE UP /100 WBCS (ref 0–0)
PLATELET # BLD AUTO: 323 K/UL — SIGNIFICANT CHANGE UP (ref 150–400)
RBC # BLD: 2.1 M/UL — LOW (ref 3.8–5.2)
RBC # FLD: 23.9 % — HIGH (ref 10.3–14.5)
WBC # BLD: 5.93 K/UL — SIGNIFICANT CHANGE UP (ref 3.8–10.5)
WBC # FLD AUTO: 5.93 K/UL — SIGNIFICANT CHANGE UP (ref 3.8–10.5)

## 2020-08-12 PROCEDURE — 99232 SBSQ HOSP IP/OBS MODERATE 35: CPT

## 2020-08-12 PROCEDURE — 99233 SBSQ HOSP IP/OBS HIGH 50: CPT

## 2020-08-12 RX ADMIN — OXYCODONE HYDROCHLORIDE 10 MILLIGRAM(S): 5 TABLET ORAL at 18:39

## 2020-08-12 RX ADMIN — OXYCODONE HYDROCHLORIDE 10 MILLIGRAM(S): 5 TABLET ORAL at 06:07

## 2020-08-12 RX ADMIN — Medication 650 MILLIGRAM(S): at 11:07

## 2020-08-12 RX ADMIN — Medication 1 GRAM(S): at 17:33

## 2020-08-12 RX ADMIN — Medication 650 MILLIGRAM(S): at 12:36

## 2020-08-12 RX ADMIN — PANTOPRAZOLE SODIUM 40 MILLIGRAM(S): 20 TABLET, DELAYED RELEASE ORAL at 06:06

## 2020-08-12 RX ADMIN — Medication 650 MILLIGRAM(S): at 07:06

## 2020-08-12 RX ADMIN — OXYCODONE HYDROCHLORIDE 10 MILLIGRAM(S): 5 TABLET ORAL at 18:44

## 2020-08-12 RX ADMIN — Medication 650 MILLIGRAM(S): at 18:46

## 2020-08-12 RX ADMIN — PREGABALIN 1000 MICROGRAM(S): 225 CAPSULE ORAL at 11:08

## 2020-08-12 RX ADMIN — Medication 650 MILLIGRAM(S): at 17:33

## 2020-08-12 RX ADMIN — OXYCODONE HYDROCHLORIDE 10 MILLIGRAM(S): 5 TABLET ORAL at 12:58

## 2020-08-12 RX ADMIN — Medication 650 MILLIGRAM(S): at 01:35

## 2020-08-12 RX ADMIN — OXYCODONE HYDROCHLORIDE 10 MILLIGRAM(S): 5 TABLET ORAL at 11:58

## 2020-08-12 RX ADMIN — Medication 650 MILLIGRAM(S): at 00:35

## 2020-08-12 RX ADMIN — Medication 5 MILLIGRAM(S): at 11:07

## 2020-08-12 RX ADMIN — Medication 1 GRAM(S): at 06:07

## 2020-08-12 RX ADMIN — Medication 40 MILLIGRAM(S): at 11:08

## 2020-08-12 RX ADMIN — GABAPENTIN 300 MILLIGRAM(S): 400 CAPSULE ORAL at 21:15

## 2020-08-12 RX ADMIN — OXYCODONE HYDROCHLORIDE 10 MILLIGRAM(S): 5 TABLET ORAL at 07:06

## 2020-08-12 RX ADMIN — QUETIAPINE FUMARATE 50 MILLIGRAM(S): 200 TABLET, FILM COATED ORAL at 21:15

## 2020-08-12 RX ADMIN — Medication 650 MILLIGRAM(S): at 06:06

## 2020-08-12 NOTE — PROGRESS NOTE ADULT - SUBJECTIVE AND OBJECTIVE BOX
Orthopaedic Surgery Progress Note    Patient seen and examined. RIVAS. Patient without complaints. Pain controlled. Denies CP, SOB, N/V, tactile fevers, calf pain.  Pt is s/p Right total hip revision arthroplasty on 7/30  Pt is a Jehovahs witness (refusing blood products) and 2/2 acute blood loss anemia was transferred to SICU on 8/3 for Hgb 4.4 with + hemoccult. Pt was stepped down on 8/5 and on 8/6 c/o headache and blurry vision. Since that time patient was placed on bed rest with PT held until symptoms improved. Patient symptoms have continued to improve daily and she has begun to mobilize with PT  - Hgb today is improved to 5.9.    Vital Signs Last 24 Hrs  T(C): 36.7 (12 Aug 2020 08:14), Max: 37.4 (11 Aug 2020 16:40)  T(F): 98 (12 Aug 2020 08:14), Max: 99.3 (11 Aug 2020 16:40)  HR: 97 (12 Aug 2020 08:14) (86 - 119)  BP: 133/60 (12 Aug 2020 08:14) (109/71 - 133/60)  BP(mean): --  RR: 18 (12 Aug 2020 08:14) (16 - 18)  SpO2: 98% (12 Aug 2020 08:14) (94% - 100%)      Physical Exam:  Pt laying comfortably in bed, NAD.  Skin warm and well perfused, no visible erythema/ecchymoses.  Dressing C/D/I  EHL/FHL/TA/GS 5/5 motor strength bilaterally  SLT in tact to distal bilateral lower extremities  Calves soft and nontender to palpation bilaterally  DP pulses 2+     LABS                        5.9    5.93  )-----------( 323      ( 12 Aug 2020 07:34 )             21.3                              A/P: 50F s/p Right total hip revision arthroplasty on 7/30    CONTINUE:        1. PT: WBAT - low threshold to stop session for symptoms of dizziness/light headedness   2. DVT prophylaxis: held , SCDs  3. Pain Control as needed   4. Dispo: pending  5. Heme and medicine recs appreciated

## 2020-08-12 NOTE — CHART NOTE - NSCHARTNOTEFT_GEN_A_CORE
Admitting Diagnosis:   Patient is a 50y old  Female who presents with a chief complaint of right hip pain/surgery (12 Aug 2020 12:01)    PAST MEDICAL & SURGICAL HISTORY:  Depression  Rheumatoid arthritis  Osteoarthritis  Restless leg  DARIO (obstructive sleep apnea)  GERD (gastroesophageal reflux disease)  Diverticulitis  Diabetes mellitus, type 2  Chronic anticoagulation  Hypercoagulable state  Neuropathy  Pancreatitis  HTN (hypertension)  Pulmonary embolism  DVT (deep venous thrombosis): left leg  History of surgery: right total hip replacement 9/12/2019  History of surgery: 2019 L thr  History of cholecystectomy  History of colon resection: diverticulitis  Abdominal hernia: umbilical  History of sleeve gastrectomy    Current Nutrition Order: CST CHO diet with evening snack     PO Intake: Good (%) [   ]  Fair (50-75%) [ x  ] Poor (<25%) [   ]    GI Issues:   WDL, last BM 8/11  No n/v/d/c noted  No abd distention noted    Pain:  10/10 right leg pain- currently on pain regime.    Skin Integrity:  Surgical incision, alfred score 20  Non-pitting edema right hip  No pressure ulcers noted    Labs:   Glu 116H    Medications:  MEDICATIONS  (STANDING):  acetaminophen   Tablet .. 650 milliGRAM(s) Oral every 6 hours  cyanocobalamin Injectable 1000 MICROGram(s) IntraMuscular daily  FLUoxetine 40 milliGRAM(s) Oral daily  folic acid 5 milliGRAM(s) Oral daily  gabapentin 300 milliGRAM(s) Oral at bedtime  pantoprazole    Tablet 40 milliGRAM(s) Oral before breakfast  polyethylene glycol 3350 17 Gram(s) Oral daily  QUEtiapine 50 milliGRAM(s) Oral at bedtime  sucralfate suspension 1 Gram(s) Oral every 12 hours  sulfaSALAzine 500 milliGRAM(s) Oral daily    MEDICATIONS  (PRN):  HYDROmorphone  Injectable 0.5 milliGRAM(s) IV Push every 4 hours PRN breakthrough pain  ondansetron Injectable 4 milliGRAM(s) IV Push every 6 hours PRN Nausea and/or Vomiting  oxyCODONE    IR 5 milliGRAM(s) Oral every 4 hours PRN Moderate Pain (4 - 6)  oxyCODONE    IR 10 milliGRAM(s) Oral every 4 hours PRN Severe Pain (7 - 10)  oxymetazoline 0.05% Nasal Spray 1 Spray(s) Both Nostrils every 12 hours PRN Congestion  senna 2 Tablet(s) Oral at bedtime PRN Constipation    Weight: 101.8kg    Weight Change: No new weights recorded since admit     Admitted Anthropometrics:  Height: 5'8", IBW 140lbs+/-10%, %%, BMI 34.1     Weight Change:   Pt with a PSH of sleeve gastrectomy (2014) where she weighed 224lbs prior to Sx. Current admitted weight is 224lbs but patient is unsure of current BW as she does not weigh herself. Bed scale weight reading 268lbs. Please obtain updated weight for accuracy and trending.   Nutrition Focused Physical Exam: Completed [  x ]  Unable to complete [   ] No remarkable findings.    Estimated energy needs:   IBW (63.6kg) used for calculations as pt >120% of IBW   Nutrient needs based on [facility] standards of care for maintenance in adults.  Needs adjusted for post-op healing  1590-1908kcal/day (25-30kcal/kg)  64-76g pro/day (1-1.2g pro/kg)  Fluids per team    Subjective:   50F [place]ae[place] with PMHx diverticulitis s/p R hemicolectomy, MO s/p sleeve gastrectomy, DARIO, RA, PE/DVT/SVT on LMWH, DARIO, RA, now s/p R SANDY on 7/30, c/b acute anemia postoperatively. Pt Hgb 10.6 postop to 4.4 today (POD3). Most likely etiology of acute blood loss anemia from large R hip hematoma (7x7x14 cm), but also with +FOBT (no active GI bleed on CTA). Being treated with procrit, B12, and folic acid. Pt continues to have persistently low hemoglobin levels (4.5 this AM). This AM (8/6) she had complaints of nausea and dizziness- taken for stat CTH that showed no intracranial pathology.     Nutrition Diagnosis:  Inadequate energy intake RT poor appetite 2/2 lethargy, nausea, early satiety 2/2 NRB and h/o sleeve AEB consuming ~25% of meals  Active [  ]  Resolved [ X ]    New PES: Increased protein needs RT increased demand for protein intake AEB post-op/wound healing     Goal:  Pt to consistently meet % of estimated needs PO     Recommendations:  1. Nausea management PRN  2. Encourage small frequent meals if experiencing satiety. Please consider liberalizing to regular diet   3. Pain and bowel regimen per team  4. MVI and B12 daily   5. Encourage intake of high iron foods   *paged team to discuss    Education:   small frequent meals, iron-rich foods     Risk Level: High [   ] Moderate [ X  ] Low [   ]. Admitting Diagnosis:   Patient is a 50y old  Female who presents with a chief complaint of right hip pain/surgery (12 Aug 2020 12:01)    PAST MEDICAL & SURGICAL HISTORY:  Depression  Rheumatoid arthritis  Osteoarthritis  Restless leg  DARIO (obstructive sleep apnea)  GERD (gastroesophageal reflux disease)  Diverticulitis  Diabetes mellitus, type 2  Chronic anticoagulation  Hypercoagulable state  Neuropathy  Pancreatitis  HTN (hypertension)  Pulmonary embolism  DVT (deep venous thrombosis): left leg  History of surgery: right total hip replacement 9/12/2019  History of surgery: 2019 L thr  History of cholecystectomy  History of colon resection: diverticulitis  Abdominal hernia: umbilical  History of sleeve gastrectomy    Current Nutrition Order: CST CHO diet with evening snack     PO Intake: Good (%) [   ]  Fair (50-75%) [ x  ] Poor (<25%) [   ]    GI Issues:   WDL, last BM 8/11  No n/v/d/c noted  No abd distention noted    Pain:  10/10 right leg pain- currently on pain regime.    Skin Integrity:  Surgical incision, alfred score 20  Non-pitting edema right hip  No pressure ulcers noted    Labs:   Glu 116H    Medications:  MEDICATIONS  (STANDING):  acetaminophen   Tablet .. 650 milliGRAM(s) Oral every 6 hours  cyanocobalamin Injectable 1000 MICROGram(s) IntraMuscular daily  FLUoxetine 40 milliGRAM(s) Oral daily  folic acid 5 milliGRAM(s) Oral daily  gabapentin 300 milliGRAM(s) Oral at bedtime  pantoprazole    Tablet 40 milliGRAM(s) Oral before breakfast  polyethylene glycol 3350 17 Gram(s) Oral daily  QUEtiapine 50 milliGRAM(s) Oral at bedtime  sucralfate suspension 1 Gram(s) Oral every 12 hours  sulfaSALAzine 500 milliGRAM(s) Oral daily    MEDICATIONS  (PRN):  HYDROmorphone  Injectable 0.5 milliGRAM(s) IV Push every 4 hours PRN breakthrough pain  ondansetron Injectable 4 milliGRAM(s) IV Push every 6 hours PRN Nausea and/or Vomiting  oxyCODONE    IR 5 milliGRAM(s) Oral every 4 hours PRN Moderate Pain (4 - 6)  oxyCODONE    IR 10 milliGRAM(s) Oral every 4 hours PRN Severe Pain (7 - 10)  oxymetazoline 0.05% Nasal Spray 1 Spray(s) Both Nostrils every 12 hours PRN Congestion  senna 2 Tablet(s) Oral at bedtime PRN Constipation    Weight: 101.8kg    Weight Change: No new weights recorded since admit     Admitted Anthropometrics:  Height: 5'8", IBW 140lbs+/-10%, %%, BMI 34.1     Weight Change:   Pt with a PSH of sleeve gastrectomy (2014) where she weighed 224lbs prior to Sx. Current admitted weight is 224lbs but patient is unsure of current BW as she does not weigh herself. Bed scale weight reading 268lbs. Please obtain updated weight for accuracy and trending.   Nutrition Focused Physical Exam: Completed [  x ]  Unable to complete [   ] No remarkable findings.    Estimated energy needs:   IBW (63.6kg) used for calculations as pt >120% of IBW   Nutrient needs based on [facility] standards of care for maintenance in adults.  Needs adjusted for post-op healing  1590-1908kcal/day (25-30kcal/kg)  64-76g pro/day (1-1.2g pro/kg)  Fluids per team    Subjective:   50F Adventism with PMHx diverticulitis s/p R hemicolectomy, MO s/p sleeve gastrectomy, DARIO, RA, PE/DVT/SVT on LMWH, DARIO, RA, now s/p R SANDY on 7/30, c/b acute anemia postoperatively. Pt Hgb 10.6 postop to 4.4 today (POD3). Most likely etiology of acute blood loss anemia from large R hip hematoma (7x7x14 cm), but also with +FOBT (no active GI bleed on CTA). Being treated with procrit, B12, and folic acid. Pt continues to have persistently low hemoglobin levels (4.5 now improved to 5.9). Patient continues to decline transfusion of any blood products in keeping with her Quaker beliefs and is aware of the risks, including death. Currently on B12 and Folic Acid supplementation. On assessment, pt is resting in bed without complaints. Currently on CST CHO diet with evening snack. Pt cont to consume >50% of meals. Cont to encourage adequate PO intake. Reenforcing small frequent meals and Fe rich foods to help with how Hgb- pt receptive. Please see recs below. RD to follow up per protocol.      Nutrition Diagnosis:  Increased protein needs RT increased demand for protein intake AEB post-op/wound healing     Goal:  Pt to consistently meet % of estimated needs PO     Recommendations:  1. Nausea management PRN  2. Encourage small frequent meals if experiencing satiety. Please consider liberalizing to regular diet   3. Pain and bowel regimen per team  4. MVI and B12 daily   5. Encourage intake of high iron foods   >> Start Fe supplement if needed per team  *paged team to discuss    Education:   Reenforced small frequent meals, iron-rich foods     Risk Level: High [   ] Moderate [ X  ] Low [   ].

## 2020-08-12 NOTE — PROGRESS NOTE ADULT - ASSESSMENT
50F Hinduism with obesity s/p sleeve gastrectomy, RA, HTN,  DM, hx of diverticulitis s/p colonic resection, hx of hernia repair, DARIO, RA, PE/DVT, on LMWH (lovenox 100 mg BID), recent L SANDY 2/20  PMH DARIO, GERD, RA, PE (2001), DVT (on loveneox 100 mg BID),  admitted for for right hip revision arthroplasty 7/30. Patient is currently  Post-operative  s/p R revision SANDY. Hematology consulted for drop  hgb of 10.1 on 7/30 to a low of 4.4; presently at 5.9 (8/12) after infusional iron, Procrit, IM B12 and IV folic acid. Patient still symptomatic but is slowly improving..  Anemia due to peewee- operative blood loss, hydration,  GI blood loss (stool guaiac positive) though no active bleeding seen on CT, doubt hemolysis (bilirubin 0.3, haptoglobin not low). Reticulocyte count increasing and hemoglobin beginning to increase.  Patient has declined transfusion of any blood products in keeping with her Taoism beliefs and is aware of the risks, including death. .    Plan  Completed 5 days of Venofer by infusion, continue IV folic acid,  B12 by injection as patient's prior bariatric surgery may prevent oral absorption. S/P Procrit.  Limit blood draw to minimum amount (use pediatric tubes); venipuncture again tomorrow to check CBC and reticulocyte count..  Maintain strict bedrest except for bathroom with assistance only.. SCDs.  .Would not restart Lovenox until hemoglobin is at least 8 grams.

## 2020-08-12 NOTE — PROGRESS NOTE ADULT - SUBJECTIVE AND OBJECTIVE BOX
SUBJECTIVE: Patient seen and examined, states she is continuing to feel better, significantly better than last week. No chest pain/SOB/fever/chills. No issues overnight.     Vital Signs Last 24 Hrs  T(C): 37.2 (12 Aug 2020 05:59), Max: 37.4 (11 Aug 2020 16:40)  T(F): 98.9 (12 Aug 2020 05:59), Max: 99.3 (11 Aug 2020 16:40)  HR: 86 (12 Aug 2020 05:59) (86 - 119)  BP: 132/75 (12 Aug 2020 05:59) (109/71 - 132/75)  RR: 18 (12 Aug 2020 05:59) (16 - 19)  SpO2: 99% (12 Aug 2020 05:59) (94% - 100%)      OBJECTIVE:  Physical Exam:  General: Resting in bed, NAD  Extremities:        LLE: No gross deformity. SILT L2-S1 distribution, symmetric. TA/EHL/FHL/GS motor intact.       RLE: Aquacel with no spotting, not requiring replacement. SILT L2-S1 distribution, symmetric. TA/EHL/FHL/GS motor intact.             Labs:                                      5.5    7.69  )-----------( 333      ( 11 Aug 2020 06:36 )             19.5             A/P :  Pt is a 49yo Female s/p R SANDY revision (acetabulum and femoral head) on 7/30 c/b acute blood loss anemia, hgb slowly improving, pt symptoms resolving  -    Will proceed with Epo, cyanocobalamin, folic acid  -    H+H uptrending, 5.5 hgb today  -    Pain control  -    DVT ppx: Lovenox 100 BID (home dose) being held due to acute blood loss anemia  -    Continue PT as long as PT remains asymptomatic  -    Dispo: Remaining on telemetry

## 2020-08-12 NOTE — PROGRESS NOTE ADULT - ASSESSMENT
50F [place] (declining RBCs), DVT on therapeutic lovenox. DARIO not on CPAP, Obesity, HTN, Depression, GERD, UC here for revision R SANDY w Dr. Ballard 7/30, c/b symptomatic anemia post-operatively w Hgb to 4.4 likely to OR and incisional drainage - transferred to SICU for further monitoring, now transitioned to telemetry s/p EPO and IV Iron x5d, remains symptomatic w hgb increasing    #Acute blood loss anemia -  sxs improving. 5.9 from 5.7. Pt is Anglican and declining blood transfusion. s/p procrit and completing 5d course of IV iron infusion. Heme following. Pt baseline 10-11. Though Guaic positive stools, CT Abd neg for bleeding. Most likely cause from combination of intraoperative loss and post-op loss via wound incision drain (since dc'd)  Post-op state - pain controlled. On bowel regimen and IS. PPX: SCDs. DISPO: HPT  #Headache - resolved. subacute. CT Head unremarkable  #DVT - pt was on therapeutic lovenox pre-op. Currently held in setting of symptomatic anemia. Per heme - recommend hgb >8 to restart  #UC - stable. at baseline. On home sulfasalazine  #GERD - chronic. c/w protonix  #Depression - chronic. c/w fluoxetine and seroquel  #HTN - holding BP meds at this time (amlodipine 5, HCTZ 12.5, Losartan 50). BP at target  #Obesity - 34 outpt f/u    Plan  Pt continues to improve slowly  Discussed w Heme - would like to see more improvement in symptoms and hgb prior to DC. Restart therapeutic lovenox once hgb >8  Continue working w PT  CBC w diff, retic count AM Tomorrow    Dispo: Home w HPT

## 2020-08-12 NOTE — PROGRESS NOTE ADULT - SUBJECTIVE AND OBJECTIVE BOX
The patient was seen and examined.        50F [place] with obesity s/p sleeve gastrectomy, RA, HTN,  DM, hx of diverticulitis s/p colonic resection, hx of hernia repair, DARIO, RA, PE/DVT, on LMWH (lovenox 100 mg BID), recent L SANDY 2/20  PMH DARIO, GERD, RA, PE (2001), DVT (on loveneox 100 mg BID),  admitted for for right hip revision arthroplasty 7/30. Patient is currently  Post-operative  s/p R revision SANDY. Hematology consulted for drop  hgb of 10.1 on 7/30 to a low of 4.4; presently at 5.9 (8/12) after infusional iron, Procrit, IM B12 and IV folic acid. Patient still symptomatic but is slowly improving..  Anemia due to peewee- operative blood loss, hydration,  GI blood loss (stool guaiac positive) though no active bleeding seen on CT, doubt hemolysis (bilirubin 0.3, haptoglobin not low). Reticulocyte count increasing and hemoglobin beginning to increase.  Patient has declined transfusion of any blood products in keeping with her Mandaen beliefs and is aware of the risks, including death. .      .       lisinopril (Other)  verapamil (Other)    Allergies    lisinopril (Other)  verapamil (Other)    Intolerances        Medications:  MEDICATIONS  (STANDING):  acetaminophen   Tablet .. 650 milliGRAM(s) Oral every 6 hours  cyanocobalamin Injectable 1000 MICROGram(s) IntraMuscular daily  FLUoxetine 40 milliGRAM(s) Oral daily  folic acid 5 milliGRAM(s) Oral daily  gabapentin 300 milliGRAM(s) Oral at bedtime  pantoprazole    Tablet 40 milliGRAM(s) Oral before breakfast  polyethylene glycol 3350 17 Gram(s) Oral daily  QUEtiapine 50 milliGRAM(s) Oral at bedtime  sucralfate suspension 1 Gram(s) Oral every 12 hours  sulfaSALAzine 500 milliGRAM(s) Oral daily    MEDICATIONS  (PRN):  HYDROmorphone  Injectable 0.5 milliGRAM(s) IV Push every 4 hours PRN breakthrough pain  ondansetron Injectable 4 milliGRAM(s) IV Push every 6 hours PRN Nausea and/or Vomiting  oxyCODONE    IR 5 milliGRAM(s) Oral every 4 hours PRN Moderate Pain (4 - 6)  oxyCODONE    IR 10 milliGRAM(s) Oral every 4 hours PRN Severe Pain (7 - 10)  oxymetazoline 0.05% Nasal Spray 1 Spray(s) Both Nostrils every 12 hours PRN Congestion  senna 2 Tablet(s) Oral at bedtime PRN Constipation          Interval History:    The patient denies chest pain or SOB at rest. Feeling better.  No nausea/vomiting/fevers/chills/night sweats.  Has fatigue, dizziness with exertion.  Appetite is stable without weight loss.  No abdominal pain/diarrhea/constipation.  No melena or hematochezia.    No dysuria/hematuria.  No history of easy bruising/bleeding.  No gingival bleeding or epistaxis.  Has right hip pain and groin pain, especially after PT..    ROS is otherwise negative.    PHYSICAL EXAM:    T(F): 98 (08-12-20 @ 08:14), Max: 99.3 (08-11-20 @ 16:40)  HR: 94 (08-12-20 @ 11:59) (86 - 119)  BP: 136/70 (08-12-20 @ 11:59) (113/69 - 136/70)  RR: 19 (08-12-20 @ 11:59) (16 - 19)  SpO2: 97% (08-12-20 @ 11:59) (94% - 100%)  Wt(kg): --    Daily     Daily     Gen: well developed, well nourished, comfortable, obese  HEENT: normocephalic/atraumatic, no conjunctival pallor, no scleral icterus, no oral thrush/mucosal bleeding/mucositis  Neck: supple, no masses, no JVD  Cardiovascular: RR, nl S1S2, no murmurs/rubs/gallops  Respiratory: clear air entry b/l  Gastrointestinal: BS+, soft, obese NT/ND, no masses, no splenomegaly, no hepatomegaly, no evidence for ascites  Extremities: no clubbing/cyanosis, right thigh edema, dressing dry,  no calf tenderness  Neurological: no focal deficits  Skin: no rash on visible skin, excessive ecchymoses or petechiae, warm, dry, pale  Lymph Nodes:  no significant peripheral adenopathy   Musculoskeletal:  full ROM  Psychiatric:  mood stable        Labs:                          5.9    5.93  )-----------( 323      ( 12 Aug 2020 07:34 )             21.3     CBC Full  -  ( 12 Aug 2020 07:34 )  WBC Count : 5.93 K/uL  RBC Count : 2.10 M/uL  Hemoglobin : 5.9 g/dL  Hematocrit : 21.3 %  Platelet Count - Automated : 323 K/uL  Mean Cell Volume : 101.4 fl  Mean Cell Hemoglobin : 28.1 pg  Mean Cell Hemoglobin Concentration : 27.7 gm/dL  Auto Neutrophil # : 4.32 K/uL  Auto Lymphocyte # : 1.14 K/uL  Auto Monocyte # : 0.21 K/uL  Auto Eosinophil # : 0.21 K/uL  Auto Basophil # : 0.00 K/uL  Auto Neutrophil % : 71.9 %  Auto Lymphocyte % : 19.3 %  Auto Monocyte % : 3.5 %  Auto Eosinophil % : 3.5 %  Auto Basophil % : 0.0 %                    Other Labs:    Cultures:    Pathology:    Imaging Studies:

## 2020-08-12 NOTE — PROGRESS NOTE ADULT - SUBJECTIVE AND OBJECTIVE BOX
INTERVAL HPI/OVERNIGHT EVENTS: RIVAS O/N    SUBJECTIVE: Patient seen and examined at bedside.   Pt states feeling a bit less lightheaded w PT - able to walk 60ft w PT wo chest pain, dyspnea. However still endorsing some lightheadedness w sitting up and taking deep breaths. BM wo melena/hematochezia. No N/V/Abd pain. Voiding wo issue. No fever, chills, sweats    States cetirizine was for sinus fullness and congestion. Pt states she stopped taking them when sxs improved and has not had recurrence.    OBJECTIVE:    VITAL SIGNS:  ICU Vital Signs Last 24 Hrs  T(C): 37 (12 Aug 2020 13:52), Max: 37.2 (11 Aug 2020 20:43)  T(F): 98.6 (12 Aug 2020 13:52), Max: 98.9 (11 Aug 2020 20:43)  HR: 90 (12 Aug 2020 13:52) (86 - 125)  BP: 139/61 (12 Aug 2020 13:52) (123/66 - 143/79)  BP(mean): --  ABP: --  ABP(mean): --  RR: 19 (12 Aug 2020 13:52) (18 - 19)  SpO2: 100% (12 Aug 2020 13:52) (94% - 100%)        08-11 @ 07:01  -  08-12 @ 07:00  --------------------------------------------------------  IN: 1080 mL / OUT: 1400 mL / NET: -320 mL    08-12 @ 07:01 - 08-12 @ 16:59  --------------------------------------------------------  IN: 0 mL / OUT: 300 mL / NET: -300 mL      CAPILLARY BLOOD GLUCOSE          PHYSICAL EXAM:  GEN: Female in NAD on RA  HEENT: NC/AT, MMM  CV: RRR, no murmurs, no BLE edema.  PULM: nml effort, CTAB  ABD: Soft, NABS, non-tender to palpation  NEURO: Alert, oriented, EOMI  5/5 in BUE strength. R hip 4/5. Sensation intact  PSYCH: Appropriate, conversant  EXT: R lateral hip dressing c/d/i.     MEDICATIONS:  MEDICATIONS  (STANDING):  acetaminophen   Tablet .. 650 milliGRAM(s) Oral every 6 hours  cyanocobalamin Injectable 1000 MICROGram(s) IntraMuscular daily  FLUoxetine 40 milliGRAM(s) Oral daily  folic acid 5 milliGRAM(s) Oral daily  gabapentin 300 milliGRAM(s) Oral at bedtime  pantoprazole    Tablet 40 milliGRAM(s) Oral before breakfast  polyethylene glycol 3350 17 Gram(s) Oral daily  QUEtiapine 50 milliGRAM(s) Oral at bedtime  sucralfate suspension 1 Gram(s) Oral every 12 hours  sulfaSALAzine 500 milliGRAM(s) Oral daily    MEDICATIONS  (PRN):  HYDROmorphone  Injectable 0.5 milliGRAM(s) IV Push every 4 hours PRN breakthrough pain  ondansetron Injectable 4 milliGRAM(s) IV Push every 6 hours PRN Nausea and/or Vomiting  oxyCODONE    IR 5 milliGRAM(s) Oral every 4 hours PRN Moderate Pain (4 - 6)  oxyCODONE    IR 10 milliGRAM(s) Oral every 4 hours PRN Severe Pain (7 - 10)  oxymetazoline 0.05% Nasal Spray 1 Spray(s) Both Nostrils every 12 hours PRN Congestion  senna 2 Tablet(s) Oral at bedtime PRN Constipation      ALLERGIES:  Allergies    lisinopril (Other)  verapamil (Other)    Intolerances        LABS:                        5.9    5.93  )-----------( 323      ( 12 Aug 2020 07:34 )             21.3                 RADIOLOGY & ADDITIONAL TESTS: Reviewed.

## 2020-08-13 LAB
HCT VFR BLD CALC: 24.8 % — LOW (ref 34.5–45)
HGB BLD-MCNC: 6.7 G/DL — CRITICAL LOW (ref 11.5–15.5)
MCHC RBC-ENTMCNC: 27 GM/DL — LOW (ref 32–36)
MCHC RBC-ENTMCNC: 27.9 PG — SIGNIFICANT CHANGE UP (ref 27–34)
MCV RBC AUTO: 103.3 FL — HIGH (ref 80–100)
NRBC # BLD: 4 /100 WBCS — HIGH (ref 0–0)
PLATELET # BLD AUTO: 355 K/UL — SIGNIFICANT CHANGE UP (ref 150–400)
RBC # BLD: 2.4 M/UL — LOW (ref 3.8–5.2)
RBC # BLD: 2.4 M/UL — LOW (ref 3.8–5.2)
RBC # FLD: 23.1 % — HIGH (ref 10.3–14.5)
RETICS #: 318.5 K/UL — HIGH (ref 25–125)
RETICS/RBC NFR: 13.3 % — HIGH (ref 0.5–2.5)
WBC # BLD: 5.77 K/UL — SIGNIFICANT CHANGE UP (ref 3.8–10.5)
WBC # FLD AUTO: 5.77 K/UL — SIGNIFICANT CHANGE UP (ref 3.8–10.5)

## 2020-08-13 PROCEDURE — 99233 SBSQ HOSP IP/OBS HIGH 50: CPT

## 2020-08-13 PROCEDURE — 99232 SBSQ HOSP IP/OBS MODERATE 35: CPT

## 2020-08-13 RX ORDER — FERROUS SULFATE 325(65) MG
325 TABLET ORAL DAILY
Refills: 0 | Status: DISCONTINUED | OUTPATIENT
Start: 2020-08-13 | End: 2020-08-14

## 2020-08-13 RX ORDER — ASCORBIC ACID 60 MG
500 TABLET,CHEWABLE ORAL DAILY
Refills: 0 | Status: DISCONTINUED | OUTPATIENT
Start: 2020-08-13 | End: 2020-08-17

## 2020-08-13 RX ADMIN — OXYCODONE HYDROCHLORIDE 10 MILLIGRAM(S): 5 TABLET ORAL at 21:29

## 2020-08-13 RX ADMIN — Medication 650 MILLIGRAM(S): at 06:55

## 2020-08-13 RX ADMIN — Medication 650 MILLIGRAM(S): at 01:10

## 2020-08-13 RX ADMIN — Medication 5 MILLIGRAM(S): at 09:05

## 2020-08-13 RX ADMIN — QUETIAPINE FUMARATE 50 MILLIGRAM(S): 200 TABLET, FILM COATED ORAL at 21:14

## 2020-08-13 RX ADMIN — Medication 500 MILLIGRAM(S): at 14:13

## 2020-08-13 RX ADMIN — Medication 650 MILLIGRAM(S): at 17:47

## 2020-08-13 RX ADMIN — Medication 40 MILLIGRAM(S): at 09:05

## 2020-08-13 RX ADMIN — OXYCODONE HYDROCHLORIDE 10 MILLIGRAM(S): 5 TABLET ORAL at 22:30

## 2020-08-13 RX ADMIN — PREGABALIN 1000 MICROGRAM(S): 225 CAPSULE ORAL at 11:48

## 2020-08-13 RX ADMIN — Medication 1 GRAM(S): at 05:55

## 2020-08-13 RX ADMIN — Medication 650 MILLIGRAM(S): at 12:29

## 2020-08-13 RX ADMIN — Medication 1 GRAM(S): at 17:38

## 2020-08-13 RX ADMIN — OXYCODONE HYDROCHLORIDE 10 MILLIGRAM(S): 5 TABLET ORAL at 00:11

## 2020-08-13 RX ADMIN — OXYCODONE HYDROCHLORIDE 10 MILLIGRAM(S): 5 TABLET ORAL at 09:05

## 2020-08-13 RX ADMIN — Medication 500 MILLIGRAM(S): at 09:04

## 2020-08-13 RX ADMIN — Medication 325 MILLIGRAM(S): at 14:13

## 2020-08-13 RX ADMIN — PANTOPRAZOLE SODIUM 40 MILLIGRAM(S): 20 TABLET, DELAYED RELEASE ORAL at 05:55

## 2020-08-13 RX ADMIN — Medication 650 MILLIGRAM(S): at 11:48

## 2020-08-13 RX ADMIN — OXYCODONE HYDROCHLORIDE 10 MILLIGRAM(S): 5 TABLET ORAL at 15:00

## 2020-08-13 RX ADMIN — GABAPENTIN 300 MILLIGRAM(S): 400 CAPSULE ORAL at 21:14

## 2020-08-13 RX ADMIN — OXYCODONE HYDROCHLORIDE 10 MILLIGRAM(S): 5 TABLET ORAL at 14:16

## 2020-08-13 RX ADMIN — Medication 650 MILLIGRAM(S): at 00:10

## 2020-08-13 RX ADMIN — OXYCODONE HYDROCHLORIDE 10 MILLIGRAM(S): 5 TABLET ORAL at 10:00

## 2020-08-13 RX ADMIN — Medication 650 MILLIGRAM(S): at 17:39

## 2020-08-13 RX ADMIN — Medication 650 MILLIGRAM(S): at 05:55

## 2020-08-13 RX ADMIN — OXYCODONE HYDROCHLORIDE 10 MILLIGRAM(S): 5 TABLET ORAL at 01:10

## 2020-08-13 NOTE — PROGRESS NOTE ADULT - ASSESSMENT
50F [place] (declining RBCs), DVT on therapeutic lovenox. DARIO not on CPAP, Obesity, HTN, Depression, GERD, UC here for revision R SANDY w Dr. Ballard 7/30, c/b symptomatic anemia post-operatively w Hgb to 4.4 likely to OR and incisional drainage - transferred to SICU for further monitoring, now transitioned to telemetry s/p EPO and IV Iron x5d, remains symptomatic w hgb increasing    #Acute blood loss anemia -  sxs improving. 6.7 from 5.9 from 5.7. Retic 13.3%. Pt is Judaism and declining blood transfusion. s/p procrit and completing 5d course of IV iron infusion. Heme following. Pt baseline 10-11. Though Guaic positive stools, CT Abd neg for bleeding. Most likely cause from combination of intraoperative loss and post-op loss via wound incision drain (since dc'd)  Post-op state - pain controlled. On bowel regimen and IS. PPX: SCDs. DISPO: HPT  #Headache - resolved. subacute. CT Head unremarkable  #DVT - pt was on therapeutic lovenox pre-op. Currently held in setting of symptomatic anemia. Per heme - recommend hgb >8 to restart  #UC - stable. at baseline. On home sulfasalazine  #GERD - chronic. c/w protonix  #Depression - chronic. c/w fluoxetine and seroquel  #HTN - holding BP meds at this time (amlodipine 5, HCTZ 12.5, Losartan 50). BP at target  #Obesity - 34 outpt f/u    Plan  Pt continues to improve slowly  Pt seen jointly w heme-onc today. Possible DC for tomorrow.   Updated wt 106kg today: resume home dose 100u BID Lovenox SQ on DC  Continue working w PT  CBC w diff, retic count AM Tomorrow    Dispo: Home w HPT - anticipated dc possibly tomorrow

## 2020-08-13 NOTE — PROGRESS NOTE ADULT - SUBJECTIVE AND OBJECTIVE BOX
SUBJECTIVE: Patient seen and examined, states she is continuing to feel better, significantly better than last week. No chest pain/SOB/fever/chills. No issues overnight. Worked with PT yesterday, anticipated to do stairs and may potentially clear and go home today.    Vital Signs Last 24 Hrs  T(C): 37.2 (13 Aug 2020 09:02), Max: 37.2 (13 Aug 2020 09:02)  T(F): 99 (13 Aug 2020 09:02), Max: 99 (13 Aug 2020 09:02)  HR: 97 (13 Aug 2020 09:02) (85 - 125)  BP: 134/75 (13 Aug 2020 09:02) (122/67 - 155/84)  BP(mean): --  RR: 16 (13 Aug 2020 09:02) (16 - 19)  SpO2: 98% (13 Aug 2020 09:02) (96% - 100%)      OBJECTIVE:  Physical Exam:  General: Resting in bed, NAD  Extremities:        LLE: No gross deformity. SILT L2-S1 distribution, symmetric. TA/EHL/FHL/GS motor intact.       RLE: Aquacel with no spotting, not requiring replacement. SILT L2-S1 distribution, symmetric. TA/EHL/FHL/GS motor intact.             Labs:                                                          6.7    5.77  )-----------( 355      ( 13 Aug 2020 07:03 )             24.8             A/P :  Pt is a 49yo Female s/p R SANDY revision (acetabulum and femoral head) on 7/30 c/b acute blood loss anemia, hgb slowly improving, pt symptoms resolving. Cont PT  -    Will proceed with Epo, cyanocobalamin, folic acid - cont outpatient  -    H+H uptrending, 6.7 hgb today 8/13  -    Pain control  -    DVT ppx: Lovenox 100 BID (home dose) being held due to acute blood loss anemia  -    Continue PT as long as PT remains asymptomatic, may clear today  -    Dispo: Home pending PT clearance

## 2020-08-13 NOTE — PROGRESS NOTE ADULT - SUBJECTIVE AND OBJECTIVE BOX
INTERVAL HPI/OVERNIGHT EVENTS: RIVAS O/N    SUBJECTIVE: Patient seen and examined at bedside.   Pt overall feels well. Still endorsing some lightheadedness w changes in position and w deep breathing but feels sxs are improving. +BM wo melena/hematochezia. States pain controlled w current medications. Eating wo N/V/Abd pain. No chest pain, dyspnea, fever, cough.      OBJECTIVE:    VITAL SIGNS:  ICU Vital Signs Last 24 Hrs  T(C): 37.1 (13 Aug 2020 14:11), Max: 37.2 (13 Aug 2020 09:02)  T(F): 98.7 (13 Aug 2020 14:11), Max: 99 (13 Aug 2020 09:02)  HR: 84 (13 Aug 2020 14:11) (84 - 117)  BP: 127/85 (13 Aug 2020 14:11) (122/67 - 155/84)  BP(mean): --  ABP: --  ABP(mean): --  RR: 17 (13 Aug 2020 14:11) (16 - 19)  SpO2: 99% (13 Aug 2020 14:11) (96% - 100%)        08-12 @ 07:01  -  08-13 @ 07:00  --------------------------------------------------------  IN: 1080 mL / OUT: 1150 mL / NET: -70 mL    08-13 @ 07:01 - 08-13 @ 14:23  --------------------------------------------------------  IN: 240 mL / OUT: 101 mL / NET: 139 mL      CAPILLARY BLOOD GLUCOSE          PHYSICAL EXAM:  GEN: Female in NAD on RA  HEENT: NC/AT, MMM  CV: RRR, no murmurs, no BLE edema.  PULM: nml effort, CTAB  ABD: Soft, NABS, non-tender to palpation  NEURO: Alert, oriented, EOMI  5/5 in BUE strength. R hip 4/5. Sensation intact  PSYCH: Appropriate, conversant  EXT: R lateral hip dressing c/d/i.       MEDICATIONS:  MEDICATIONS  (STANDING):  acetaminophen   Tablet .. 650 milliGRAM(s) Oral every 6 hours  ascorbic acid 500 milliGRAM(s) Oral daily  cyanocobalamin Injectable 1000 MICROGram(s) IntraMuscular daily  ferrous    sulfate 325 milliGRAM(s) Oral daily  FLUoxetine 40 milliGRAM(s) Oral daily  folic acid 5 milliGRAM(s) Oral daily  gabapentin 300 milliGRAM(s) Oral at bedtime  pantoprazole    Tablet 40 milliGRAM(s) Oral before breakfast  polyethylene glycol 3350 17 Gram(s) Oral daily  QUEtiapine 50 milliGRAM(s) Oral at bedtime  sucralfate suspension 1 Gram(s) Oral every 12 hours  sulfaSALAzine 500 milliGRAM(s) Oral daily    MEDICATIONS  (PRN):  HYDROmorphone  Injectable 0.5 milliGRAM(s) IV Push every 4 hours PRN breakthrough pain  ondansetron Injectable 4 milliGRAM(s) IV Push every 6 hours PRN Nausea and/or Vomiting  oxyCODONE    IR 5 milliGRAM(s) Oral every 4 hours PRN Moderate Pain (4 - 6)  oxyCODONE    IR 10 milliGRAM(s) Oral every 4 hours PRN Severe Pain (7 - 10)  oxymetazoline 0.05% Nasal Spray 1 Spray(s) Both Nostrils every 12 hours PRN Congestion  senna 2 Tablet(s) Oral at bedtime PRN Constipation      ALLERGIES:  Allergies    lisinopril (Other)  verapamil (Other)    Intolerances        LABS:                        6.7    5.77  )-----------( 355      ( 13 Aug 2020 07:03 )             24.8                 RADIOLOGY & ADDITIONAL TESTS: Reviewed.

## 2020-08-13 NOTE — PROGRESS NOTE ADULT - SUBJECTIVE AND OBJECTIVE BOX
Ortho Note    Pt comfortable without complaints, pain controlled  Denies CP, SOB, N/V, numbness/tingling. Reports still mildly lightheaded   out of bed, but has been improving day by day.     Vital Signs Last 24 Hrs  T(C): 37.2 (08-13-20 @ 09:02), Max: 37.2 (08-13-20 @ 09:02)  T(F): 99 (08-13-20 @ 09:02), Max: 99 (08-13-20 @ 09:02)  HR: 97 (08-13-20 @ 09:02) (97 - 97)  BP: 134/75 (08-13-20 @ 09:02) (134/75 - 134/75)  BP(mean): --  RR: 16 (08-13-20 @ 09:02) (16 - 16)  SpO2: 98% (08-13-20 @ 09:02) (98% - 98%)  AVSS    focused exam:  General: Pt Alert and oriented, NAD  DSG- aquacel CDI  Pulses: +2DP, WWP feet  Sensation: SILT BLE  Motor: 5/5 EHL/FHL/TA/GS                          6.7    5.77  )-----------( 355      ( 13 Aug 2020 07:03 )             24.8           A/P: 50yFemale POD#14 s/p right total hip revision  - Hgb - 6.7- improving slowly s/p iron, folate, b12 infusions; will continue PO iron, vitamin B12, folate, and vitamin C; appreciate heme recs  - appreciate internal medicine recs  - Pain Control  - DVT ppx: pharmacological anticoagulation to be held until Hgb > 8.0; continue SCDs  - PT, WBS: 50% PWB RLE   - aggressive I/S, bowel regimen  - dispo: home with HPT pending stabilization of anemia and symptoms    Ortho Pager [phone]

## 2020-08-13 NOTE — PROGRESS NOTE ADULT - SUBJECTIVE AND OBJECTIVE BOX
The patient was seen and examined.      50F Rastafari with obesity s/p sleeve gastrectomy, RA, HTN,  DM, hx of diverticulitis s/p colonic resection, hx of hernia repair, DARIO, RA, PE/DVT, on LMWH (lovenox 100 mg BID), recent L SANDY 2/20  PMH DARIO, GERD, RA, PE (2001), DVT (on loveneox 100 mg BID),  admitted for for right hip revision arthroplasty 7/30. Patient is currently  Post-operative  s/p R revision SANDY. Hematology consulted for drop  hgb of 10.1 on 7/30 to a low of 4.4; presently at 6.7 (8/13) after infusional iron, Procrit, IM B12 and IV folic acid. Patient still symptomatic but is slowly improving..  Anemia due to peewee- operative blood loss, hydration,  GI blood loss (stool guaiac positive) though no active bleeding seen on CT, doubt hemolysis (bilirubin 0.3, haptoglobin not low). Reticulocyte count increasing (13.3% today) and hemoglobin rising.  Patient has declined transfusion of any blood products in keeping with her Mu-ism beliefs and is aware of the risks, including death. .        .       lisinopril (Other)  verapamil (Other)    Allergies    lisinopril (Other)  verapamil (Other)    Intolerances        Medications:  MEDICATIONS  (STANDING):  acetaminophen   Tablet .. 650 milliGRAM(s) Oral every 6 hours  cyanocobalamin Injectable 1000 MICROGram(s) IntraMuscular daily  FLUoxetine 40 milliGRAM(s) Oral daily  folic acid 5 milliGRAM(s) Oral daily  gabapentin 300 milliGRAM(s) Oral at bedtime  pantoprazole    Tablet 40 milliGRAM(s) Oral before breakfast  polyethylene glycol 3350 17 Gram(s) Oral daily  QUEtiapine 50 milliGRAM(s) Oral at bedtime  sucralfate suspension 1 Gram(s) Oral every 12 hours  sulfaSALAzine 500 milliGRAM(s) Oral daily    MEDICATIONS  (PRN):  HYDROmorphone  Injectable 0.5 milliGRAM(s) IV Push every 4 hours PRN breakthrough pain  ondansetron Injectable 4 milliGRAM(s) IV Push every 6 hours PRN Nausea and/or Vomiting  oxyCODONE    IR 5 milliGRAM(s) Oral every 4 hours PRN Moderate Pain (4 - 6)  oxyCODONE    IR 10 milliGRAM(s) Oral every 4 hours PRN Severe Pain (7 - 10)  oxymetazoline 0.05% Nasal Spray 1 Spray(s) Both Nostrils every 12 hours PRN Congestion  senna 2 Tablet(s) Oral at bedtime PRN Constipation          Interval History:    The patient denies chest pain or SOB at rest.  No nausea/vomiting/fevers/chills/night sweats.  has fatigue, some dizziness and SOB with exertion. but improving.  Appetite is stable without weight loss.  No abdominal pain/diarrhea/constipation.  No melena or hematochezia.    No dysuria/hematuria.  No history of easy bruising/bleeding.  No gingival bleeding or epistaxis.  Has R leg pain and leg swelling at surgical site.    ROS is otherwise negative.    PHYSICAL EXAM:    T(F): 99 (08-13-20 @ 09:02), Max: 99 (08-13-20 @ 09:02)  HR: 97 (08-13-20 @ 09:02) (85 - 125)  BP: 134/75 (08-13-20 @ 09:02) (122/67 - 155/84)  RR: 16 (08-13-20 @ 09:02) (16 - 19)  SpO2: 98% (08-13-20 @ 09:02) (96% - 100%)  Wt(kg): --    Daily     Daily     Gen: well developed, well nourished, obese ,comfortable  HEENT: normocephalic/atraumatic, has conjunctival pallor, no scleral icterus, no oral thrush/mucosal bleeding/mucositis  Neck: supple, no masses, no JVD  Cardiovascular: RR, nl S1S2, no murmurs/rubs/gallops  Respiratory: clear air entry b/l  Gastrointestinal: BS+, soft, NT/ND, no masses, no splenomegaly, no hepatomegaly, no evidence for ascites  Extremities: no clubbing/cyanosis, has right thigh edema, no calf tenderness  Neurological: no focal deficits  Skin: no rash on visible skin, excessive ecchymoses or petechiae  Lymph Nodes:  no significant peripheral adenopathy   Musculoskeletal:  full ROM  Psychiatric:  mood stable        Labs:                          6.7    5.77  )-----------( 355      ( 13 Aug 2020 07:03 )             24.8     CBC Full  -  ( 13 Aug 2020 07:03 )  WBC Count : 5.77 K/uL  RBC Count : 2.40 M/uL  Hemoglobin : 6.7 g/dL  Hematocrit : 24.8 %  Platelet Count - Automated : 355 K/uL  Mean Cell Volume : 103.3 fl  Mean Cell Hemoglobin : 27.9 pg  Mean Cell Hemoglobin Concentration : 27.0 gm/dL  Auto Neutrophil # : x  Auto Lymphocyte # : x  Auto Monocyte # : x  Auto Eosinophil # : x  Auto Basophil # : x  Auto Neutrophil % : x  Auto Lymphocyte % : x  Auto Monocyte % : x  Auto Eosinophil % : x  Auto Basophil % : x                    Other Labs:    Cultures:    Pathology:    Imaging Studies:

## 2020-08-13 NOTE — PROGRESS NOTE ADULT - ASSESSMENT
50F Faith with obesity s/p sleeve gastrectomy, RA, HTN,  DM, hx of diverticulitis s/p colonic resection, hx of hernia repair, DARIO, RA, PE/DVT, on LMWH (lovenox 100 mg BID), recent L SANDY 2/20  PMH DARIO, GERD, RA, PE (2001), DVT (on loveneox 100 mg BID),  admitted for for right hip revision arthroplasty 7/30. Patient is currently  Post-operative  s/p R revision SANDY. Hematology consulted for drop  hgb of 10.1 on 7/30 to a low of 4.4; presently at 6.7 (8/13) after infusional iron, Procrit, IM B12 and IV folic acid. Patient still symptomatic but is slowly improving..  Anemia due to peewee- operative blood loss, hydration,  GI blood loss (stool guaiac positive) though no active bleeding seen on CT, doubt hemolysis (bilirubin 0.3, haptoglobin not low). Reticulocyte count increasing (13.3% today) and hemoglobin rising.  Patient has declined transfusion of any blood products in keeping with her Faith beliefs and is aware of the risks, including death. .    Plan  Completed 5 days of Venofer by infusion, continue IV folic acid,  B12 by injection as patient's prior bariatric surgery may prevent oral absorption. S/P Procrit.  Limit blood draw to minimum amount (use pediatric tubes); venipuncture again tomorrow to check CBC and reticulocyte count..  Maintain  bedrest except for bathroom and  PT with assistance only.. SCDs. Continue on oral iron, Vitamin C, folate , and B12.  .Would not restart Lovenox until hemoglobin is at least 8 grams. Discussed in detail with Dr. Kessler.

## 2020-08-14 LAB
HCT VFR BLD CALC: 24.8 % — LOW (ref 34.5–45)
HGB BLD-MCNC: 6.9 G/DL — CRITICAL LOW (ref 11.5–15.5)
MCHC RBC-ENTMCNC: 27.8 GM/DL — LOW (ref 32–36)
MCHC RBC-ENTMCNC: 28.4 PG — SIGNIFICANT CHANGE UP (ref 27–34)
MCV RBC AUTO: 102.1 FL — HIGH (ref 80–100)
NRBC # BLD: 5 /100 WBCS — HIGH (ref 0–0)
PLATELET # BLD AUTO: 377 K/UL — SIGNIFICANT CHANGE UP (ref 150–400)
RBC # BLD: 2.43 M/UL — LOW (ref 3.8–5.2)
RBC # BLD: 2.43 M/UL — LOW (ref 3.8–5.2)
RBC # FLD: 22.5 % — HIGH (ref 10.3–14.5)
RETICS #: 311.8 K/UL — HIGH (ref 25–125)
RETICS/RBC NFR: 12.8 % — HIGH (ref 0.5–2.5)
WBC # BLD: 5.12 K/UL — SIGNIFICANT CHANGE UP (ref 3.8–10.5)
WBC # FLD AUTO: 5.12 K/UL — SIGNIFICANT CHANGE UP (ref 3.8–10.5)

## 2020-08-14 PROCEDURE — 99232 SBSQ HOSP IP/OBS MODERATE 35: CPT

## 2020-08-14 PROCEDURE — 99233 SBSQ HOSP IP/OBS HIGH 50: CPT

## 2020-08-14 RX ORDER — AMLODIPINE BESYLATE 2.5 MG/1
5 TABLET ORAL DAILY
Refills: 0 | Status: DISCONTINUED | OUTPATIENT
Start: 2020-08-15 | End: 2020-08-17

## 2020-08-14 RX ORDER — CETIRIZINE HYDROCHLORIDE 10 MG/1
1 TABLET ORAL
Qty: 0 | Refills: 0 | DISCHARGE

## 2020-08-14 RX ORDER — ACETAMINOPHEN 500 MG
2 TABLET ORAL
Qty: 0 | Refills: 0 | DISCHARGE
Start: 2020-08-14

## 2020-08-14 RX ADMIN — POLYETHYLENE GLYCOL 3350 17 GRAM(S): 17 POWDER, FOR SOLUTION ORAL at 11:19

## 2020-08-14 RX ADMIN — Medication 650 MILLIGRAM(S): at 11:56

## 2020-08-14 RX ADMIN — HYDROMORPHONE HYDROCHLORIDE 0.5 MILLIGRAM(S): 2 INJECTION INTRAMUSCULAR; INTRAVENOUS; SUBCUTANEOUS at 11:45

## 2020-08-14 RX ADMIN — Medication 650 MILLIGRAM(S): at 17:59

## 2020-08-14 RX ADMIN — Medication 5 MILLIGRAM(S): at 11:20

## 2020-08-14 RX ADMIN — Medication 40 MILLIGRAM(S): at 11:20

## 2020-08-14 RX ADMIN — Medication 650 MILLIGRAM(S): at 12:55

## 2020-08-14 RX ADMIN — GABAPENTIN 300 MILLIGRAM(S): 400 CAPSULE ORAL at 21:20

## 2020-08-14 RX ADMIN — Medication 500 MILLIGRAM(S): at 11:20

## 2020-08-14 RX ADMIN — Medication 1 GRAM(S): at 17:59

## 2020-08-14 RX ADMIN — Medication 500 MILLIGRAM(S): at 11:21

## 2020-08-14 RX ADMIN — Medication 650 MILLIGRAM(S): at 18:39

## 2020-08-14 RX ADMIN — HYDROMORPHONE HYDROCHLORIDE 0.5 MILLIGRAM(S): 2 INJECTION INTRAMUSCULAR; INTRAVENOUS; SUBCUTANEOUS at 11:15

## 2020-08-14 RX ADMIN — Medication 650 MILLIGRAM(S): at 07:40

## 2020-08-14 RX ADMIN — Medication 325 MILLIGRAM(S): at 11:20

## 2020-08-14 RX ADMIN — Medication 650 MILLIGRAM(S): at 00:55

## 2020-08-14 RX ADMIN — OXYCODONE HYDROCHLORIDE 10 MILLIGRAM(S): 5 TABLET ORAL at 22:32

## 2020-08-14 RX ADMIN — PANTOPRAZOLE SODIUM 40 MILLIGRAM(S): 20 TABLET, DELAYED RELEASE ORAL at 06:47

## 2020-08-14 RX ADMIN — PREGABALIN 1000 MICROGRAM(S): 225 CAPSULE ORAL at 11:16

## 2020-08-14 RX ADMIN — OXYCODONE HYDROCHLORIDE 10 MILLIGRAM(S): 5 TABLET ORAL at 09:09

## 2020-08-14 RX ADMIN — OXYCODONE HYDROCHLORIDE 10 MILLIGRAM(S): 5 TABLET ORAL at 21:20

## 2020-08-14 RX ADMIN — OXYCODONE HYDROCHLORIDE 10 MILLIGRAM(S): 5 TABLET ORAL at 10:05

## 2020-08-14 RX ADMIN — QUETIAPINE FUMARATE 50 MILLIGRAM(S): 200 TABLET, FILM COATED ORAL at 21:20

## 2020-08-14 RX ADMIN — HYDROMORPHONE HYDROCHLORIDE 0.5 MILLIGRAM(S): 2 INJECTION INTRAMUSCULAR; INTRAVENOUS; SUBCUTANEOUS at 23:36

## 2020-08-14 RX ADMIN — Medication 1 GRAM(S): at 06:47

## 2020-08-14 RX ADMIN — Medication 650 MILLIGRAM(S): at 01:25

## 2020-08-14 RX ADMIN — Medication 650 MILLIGRAM(S): at 06:47

## 2020-08-14 NOTE — PROGRESS NOTE ADULT - SUBJECTIVE AND OBJECTIVE BOX
SUBJECTIVE: Patient seen and examined, states she is continuing to feel better, significantly better than last week. No chest pain/SOB/fever/chills. No issues overnight. Continuing to work with PT, pending clearance as long as pt feels well w/o weakness, dizziness, etc during session, possibly dc home today.      Vital Signs Last 24 Hrs  T(C): 36.7 (14 Aug 2020 07:07), Max: 37.2 (13 Aug 2020 09:02)  T(F): 98.1 (14 Aug 2020 07:07), Max: 99 (13 Aug 2020 09:02)  HR: 85 (14 Aug 2020 07:07) (84 - 117)  BP: 158/92 (14 Aug 2020 07:07) (124/72 - 163/79)  BP(mean): --  RR: 17 (14 Aug 2020 07:07) (16 - 18)  SpO2: 98% (14 Aug 2020 07:07) (98% - 99%)        OBJECTIVE:  Physical Exam:  General: Resting in bed, NAD  Extremities:        LLE: No gross deformity. SILT L2-S1 distribution, symmetric. TA/EHL/FHL/GS motor intact.       RLE: Aquacel with no spotting, not requiring replacement. SILT L2-S1 distribution, symmetric. TA/EHL/FHL/GS motor intact.             Labs:                                                 6.9    5.12  )-----------( 377      ( 14 Aug 2020 07:08 )             24.8                   A/P :  Pt is a 51yo Female s/p R SANDY revision (acetabulum and femoral head) on 7/30 c/b acute blood loss anemia, hgb slowly improving, pt symptoms resolving. H+H continuing uptrending, continue OOB/PT, pending clearance  -    Will proceed with Epo, cyanocobalamin, folic acid - cont outpatient  -    H+H uptrending, 6.9 hgb today 8/14  -    Pain control  -    DVT ppx: Lovenox 100 BID (home dose) being held due to acute blood loss anemia - may resume for home f/u medicine recs  -    Continue PT as long as PT remains asymptomatic, may clear today  -    Dispo: Home pending PT clearance

## 2020-08-14 NOTE — PROVIDER CONTACT NOTE (CRITICAL VALUE NOTIFICATION) - ASSESSMENT
Hemotology called to state pt h/h 5.7/18.5. pt c/o dizziness and lightheadedness.
Hemotology report Hemoglobin 5.7 and hematocrit 18.6
Patient is in bed, denies feeling faint, no SOB
R hip pain managed with current regimen, no dizziness noted overnight. Tolerating well on room air.
PT SOB on exertion, pt tachycardic. /55 HR 94 NSR, RR 20 100% saturation on nonrebreather mask.
Pt gets tachycardic upon activity. Denies SOB
Pt is a+Ox4. VSS. NSR. Pt is asymptomatic.
Tachycardic at times. Denies SOB.
vss afebrile, tachycardic and dyspnea on exertion

## 2020-08-14 NOTE — PROGRESS NOTE ADULT - ASSESSMENT
50F Sikhism (declining RBCs), DVT on therapeutic lovenox. DARIO not on CPAP, Obesity, HTN, Depression, GERD, UC here for revision R SANDY w Dr. Ballard 7/30, c/b symptomatic anemia post-operatively w Hgb to 4.4 likely to OR and incisional drainage - transferred to SICU for further monitoring, now transitioned to telemetry s/p EPO and IV Iron x5d, remains symptomatic w hgb increasing    #Acute blood loss anemia -  Sxs stable. 6.9 from 6.7 from 5.9 from 5.7. Retic 13.3%. Pt is Sikhism and declining blood transfusion. s/p procrit and completing 5d course of IV iron infusion. Heme following. Pt baseline 10-11. Though [place] positive stools, CT Abd neg for bleeding. Most likely cause from combination of intraoperative loss and post-op loss via wound incision drain (since dc'd)  Post-op state - pain controlled. On bowel regimen and IS. PPX: SCDs. DISPO: HPT  #Headache - resolved. subacute. CT Head unremarkable  #DVT - pt was on therapeutic lovenox pre-op. Currently held in setting of symptomatic anemia. Per heme - recommend hgb >8 to restart  #UC - stable. at baseline. On home sulfasalazine  #GERD - chronic. c/w protonix  #Depression - chronic. c/w fluoxetine and seroquel  #HTN - holding BP meds at this time (amlodipine 5, HCTZ 12.5, Losartan 50). BP at target  #Obesity - 34 outpt f/u    Plan  Pt continues to improve slowly  Hold PO iron - bloating may be resulting from constipating effects  Add on iron panel, serum ferritin  If sxs still not improve, can trial PO Omeprazole 40mg daily  Seen jointly with ortho and heme-onc    CBC w diff and retic count in AM  Resume home dose 100u BID Lovenox SQ on DC. Set up home blood draw to be sent to Dr. Vigil's office (CBC w Retic count)  Continue working w PT    Dispo: Home w HPT - anticipated dc possibly tomorrow

## 2020-08-14 NOTE — PROVIDER CONTACT NOTE (CRITICAL VALUE NOTIFICATION) - SITUATION
Low H and H results, Dr Debora Larose informed
Hemoglobin 5.9
Hgb 6.9
Patient's hemoglobin is 5.7, Hematocrit is 18.5.
Pt having chronic anemia, Jehovah witness and refusing blood products
hemoglobin level 6.7
s/p Right hip SANDY revision

## 2020-08-14 NOTE — PROVIDER CONTACT NOTE (CRITICAL VALUE NOTIFICATION) - NAME OF MD/NP/PA/DO NOTIFIED:
Thuan Cazares MD
Dr Debora Larose
Atul Alvarez MD
Dr. Lia Nino
Lia Nino MD
MD Larose Ortho Resident
Mica Fitzgerald MD
Roxanne Benoit NP
Tray Fitzgerald MD

## 2020-08-14 NOTE — ASU PREOP CHECKLIST - BP NONINVASIVE DIASTOLIC (MM HG)
[FreeTextEntry1] : Health care maintenance:\par check blood work, blood drawn in office\par follow up with optometry/ophthalmology and dentist for routine exams (when due and able to go)\par c/w diet, improve exercise\par Tdap given to left deltoid, patient tolerated well, VIS given\par \par Hyperlipidemia:\par c/w diet, improve exercise\par check blood work\par \par Aortic root dilatation:\par f/u with cardiology 
66

## 2020-08-14 NOTE — PROGRESS NOTE ADULT - SUBJECTIVE AND OBJECTIVE BOX
INTERVAL HPI/OVERNIGHT EVENTS: RIVAS O/N    SUBJECTIVE: Patient seen and examined at bedside.   Pt states today she feels a bit bloated at upper abdomen/lower chest. Has not made a BM. No nausea, abd pain. Started on PO Iron. states energy not as high as yesterday. Still endorsing some lightheadedness during transfers. No fever, chest pain, dyspnea. Pain is controlled.     OBJECTIVE:    VITAL SIGNS:  ICU Vital Signs Last 24 Hrs  T(C): 36.6 (14 Aug 2020 13:53), Max: 37.2 (13 Aug 2020 17:20)  T(F): 97.9 (14 Aug 2020 13:53), Max: 98.9 (13 Aug 2020 17:20)  HR: 96 (14 Aug 2020 13:53) (85 - 115)  BP: 140/67 (14 Aug 2020 13:53) (124/72 - 163/79)  BP(mean): --  ABP: --  ABP(mean): --  RR: 17 (14 Aug 2020 13:53) (16 - 18)  SpO2: 97% (14 Aug 2020 13:53) (97% - 99%)        08-13 @ 07:01 - 08-14 @ 07:00  --------------------------------------------------------  IN: 1030 mL / OUT: 401 mL / NET: 629 mL    08-14 @ 07:01 - 08-14 @ 14:41  --------------------------------------------------------  IN: 700 mL / OUT: 0 mL / NET: 700 mL      CAPILLARY BLOOD GLUCOSE          PHYSICAL EXAM:  GEN: Female in NAD on RA  HEENT: NC/AT, MMM  CV: RRR, no murmurs, no BLE edema.  PULM: nml effort, CTAB  ABD: Soft, NABS, non-tender to palpation  NEURO: Alert, oriented, EOMI  5/5 in BUE strength. R hip 4/5. Sensation intact  PSYCH: Appropriate, conversant  EXT: R lateral hip dressing c/d/i.     MEDICATIONS:  MEDICATIONS  (STANDING):  acetaminophen   Tablet .. 650 milliGRAM(s) Oral every 6 hours  ascorbic acid 500 milliGRAM(s) Oral daily  cyanocobalamin Injectable 1000 MICROGram(s) IntraMuscular daily  FLUoxetine 40 milliGRAM(s) Oral daily  folic acid 5 milliGRAM(s) Oral daily  gabapentin 300 milliGRAM(s) Oral at bedtime  pantoprazole    Tablet 40 milliGRAM(s) Oral before breakfast  polyethylene glycol 3350 17 Gram(s) Oral daily  QUEtiapine 50 milliGRAM(s) Oral at bedtime  sucralfate suspension 1 Gram(s) Oral every 12 hours  sulfaSALAzine 500 milliGRAM(s) Oral daily    MEDICATIONS  (PRN):  HYDROmorphone  Injectable 0.5 milliGRAM(s) IV Push every 4 hours PRN breakthrough pain  ondansetron Injectable 4 milliGRAM(s) IV Push every 6 hours PRN Nausea and/or Vomiting  oxyCODONE    IR 5 milliGRAM(s) Oral every 4 hours PRN Moderate Pain (4 - 6)  oxyCODONE    IR 10 milliGRAM(s) Oral every 4 hours PRN Severe Pain (7 - 10)  oxymetazoline 0.05% Nasal Spray 1 Spray(s) Both Nostrils every 12 hours PRN Congestion  senna 2 Tablet(s) Oral at bedtime PRN Constipation      ALLERGIES:  Allergies    lisinopril (Other)  verapamil (Other)    Intolerances        LABS:                        6.9    5.12  )-----------( 377      ( 14 Aug 2020 07:08 )             24.8                 RADIOLOGY & ADDITIONAL TESTS: Reviewed.

## 2020-08-14 NOTE — PROGRESS NOTE ADULT - SUBJECTIVE AND OBJECTIVE BOX
Ortho Note    Pt comfortable without complaints, pain controlled  Denies CP, SOB, N/V, numbness/tingling. Reports feeling more "fatigued"   today, needing to take frequent breaks with PT.    Vital Signs Last 24 Hrs  T(C): 36.8 (08-14-20 @ 09:11), Max: 36.8 (08-14-20 @ 09:11)  T(F): 98.2 (08-14-20 @ 09:11), Max: 98.2 (08-14-20 @ 09:11)  HR: 96 (08-14-20 @ 09:11) (85 - 96)  BP: 146/73 (08-14-20 @ 09:11) (146/73 - 158/92)  BP(mean): --  RR: 17 (08-14-20 @ 09:11) (17 - 17)  SpO2: 97% (08-14-20 @ 09:11) (97% - 98%)  AVSS    focused exam:  General: Pt Alert and oriented, NAD  DSG C/D/I  Pulses: +2DP, WWP feet  Sensation: SILT BLE  Motor: 5/5 EHL/FHL/TA/GS                          6.9    5.12  )-----------( 377      ( 14 Aug 2020 07:08 )             24.8           A/P: 50yFemale POD#14 s/p right total hip revision  - Hgb - 6.9- improving slowly s/p iron, folate infusions, procrit, b12 IM;  continue PO vitamin B12, folate, and vitamin C; appreciate heme recs  - PO iron stopped for c/o abdominal bloating  - antihypertensives held for now to prevent orthosasis/ dizziness; appreciate internal medicine recs on restarting  - Pain Control  - DVT ppx: pharmacological anticoagulation to be held until Hgb > 8.0; continue SCDs  - PT, WBS: 50% PWB RLE   - aggressive I/S, bowel regimen  - dispo: home with HPT pending stabilization of anemia and symptoms; will need close outpatient follow-up and frequent CBC lab draws    Ortho Pager [phone] Ortho Note    Pt comfortable without complaints, pain controlled  Denies CP, SOB, N/V, numbness/tingling. Reports feeling more "fatigued"   today, needing to take frequent breaks with PT.    Vital Signs Last 24 Hrs  T(C): 36.8 (08-14-20 @ 09:11), Max: 36.8 (08-14-20 @ 09:11)  T(F): 98.2 (08-14-20 @ 09:11), Max: 98.2 (08-14-20 @ 09:11)  HR: 96 (08-14-20 @ 09:11) (85 - 96)  BP: 146/73 (08-14-20 @ 09:11) (146/73 - 158/92)  BP(mean): --  RR: 17 (08-14-20 @ 09:11) (17 - 17)  SpO2: 97% (08-14-20 @ 09:11) (97% - 98%)  AVSS    focused exam:  General: Pt Alert and oriented, NAD  DSG C/D/I  Pulses: +2DP, WWP feet  Sensation: SILT BLE  Motor: 5/5 EHL/FHL/TA/GS                          6.9    5.12  )-----------( 377      ( 14 Aug 2020 07:08 )             24.8           A/P: 50yFemale POD#14 s/p right total hip revision  - Hgb - 6.9- improving slowly s/p iron, folate infusions, procrit, b12 IM;  continue PO vitamin B12, folate, and vitamin C; appreciate heme recs  - PO iron stopped for c/o abdominal bloating  - antihypertensives held for now to prevent orthosasis/ dizziness; BPs consistently 1402-150s. Appreciate medicine recs- will restart amlodipine tomorrow. Continuing to hold losartan/hctz and metoprolol for now. Should follow-up with PCP next week and slowly resume other antihypertensives  - Pain Control  - DVT ppx: pharmacological anticoagulation to be held until Hgb > 8.0; continue SCDs  - PT, WBS: 50% PWB RLE   - aggressive I/S, bowel regimen  - dispo: home with HPT pending stabilization of anemia and symptoms; will need close outpatient follow-up and frequent CBC lab draws    Ortho Pager [phone]

## 2020-08-14 NOTE — PROVIDER CONTACT NOTE (CRITICAL VALUE NOTIFICATION) - RECOMMENDATIONS
Continued monitoring
Patient to receive blood transfusion
As per MD no intervention, will continue to keep pt on nonrebreather mask. Pt remains hemodynamically stable.
Will continue to monitor.
close monitoring

## 2020-08-14 NOTE — PROGRESS NOTE ADULT - SUBJECTIVE AND OBJECTIVE BOX
The patient was seen and examined.      50F Amish with obesity s/p sleeve gastrectomy, RA, HTN,  DM, hx of diverticulitis s/p colonic resection, hx of hernia repair, DARIO, RA, PE/DVT, on LMWH (lovenox 100 mg BID), recent L SANDY 2/20  PMH DARIO, GERD, RA, PE (2001), DVT (on loveneox 100 mg BID),  admitted for for right hip revision arthroplasty 7/30. Patient is currently  Post-operative  s/p R revision SANDY. Hematology consulted for drop  hgb of 10.1 on 7/30 to a low of 4.4; presently at 6.9 (8/14) after infusional iron, Procrit, IM B12 and IV folic acid. Patient still symptomatic today, especially after PT... Also complains of chest discomfort which she feels may be gas or reflux.      .       lisinopril (Other)  verapamil (Other)    Allergies    lisinopril (Other)  verapamil (Other)    Intolerances        Medications:  MEDICATIONS  (STANDING):  acetaminophen   Tablet .. 650 milliGRAM(s) Oral every 6 hours  ascorbic acid 500 milliGRAM(s) Oral daily  cyanocobalamin Injectable 1000 MICROGram(s) IntraMuscular daily  ferrous    sulfate 325 milliGRAM(s) Oral daily  FLUoxetine 40 milliGRAM(s) Oral daily  folic acid 5 milliGRAM(s) Oral daily  gabapentin 300 milliGRAM(s) Oral at bedtime  pantoprazole    Tablet 40 milliGRAM(s) Oral before breakfast  polyethylene glycol 3350 17 Gram(s) Oral daily  QUEtiapine 50 milliGRAM(s) Oral at bedtime  sucralfate suspension 1 Gram(s) Oral every 12 hours  sulfaSALAzine 500 milliGRAM(s) Oral daily    MEDICATIONS  (PRN):  HYDROmorphone  Injectable 0.5 milliGRAM(s) IV Push every 4 hours PRN breakthrough pain  ondansetron Injectable 4 milliGRAM(s) IV Push every 6 hours PRN Nausea and/or Vomiting  oxyCODONE    IR 5 milliGRAM(s) Oral every 4 hours PRN Moderate Pain (4 - 6)  oxyCODONE    IR 10 milliGRAM(s) Oral every 4 hours PRN Severe Pain (7 - 10)  oxymetazoline 0.05% Nasal Spray 1 Spray(s) Both Nostrils every 12 hours PRN Congestion  senna 2 Tablet(s) Oral at bedtime PRN Constipation          Interval History:    The patient has some chest discomfort and exertional SOB.  No nausea/vomiting/fevers/chills/night sweats.  Has fatigue, some lightheadedness after PT.  Appetite is stable without weight loss.  No abdominal pain/diarrhea/constipation.  No melena or hematochezia.    No dysuria/hematuria.  No history of easy bruising/bleeding.  No gingival bleeding or epistaxis.  Less R thigh and swelling.    ROS is otherwise negative.    PHYSICAL EXAM:    T(F): 98.2 (08-14-20 @ 09:11), Max: 98.9 (08-13-20 @ 17:20)  HR: 96 (08-14-20 @ 09:11) (84 - 117)  BP: 146/73 (08-14-20 @ 09:11) (124/72 - 163/79)  RR: 17 (08-14-20 @ 09:11) (16 - 18)  SpO2: 97% (08-14-20 @ 09:11) (97% - 99%)  Wt(kg): --    Daily     Daily     Gen: well developed, well nourished, obese, comfortable at rest  HEENT: normocephalic/atraumatic, has conjunctival pallor, no scleral icterus, no oral thrush/mucosal bleeding/mucositis  Neck: supple, no masses, no JVD  Cardiovascular: RR, nl S1S2, no murmurs/rubs/gallops  Respiratory: clear air entry b/l  Gastrointestinal: BS+, soft, NT/ND, no masses, no splenomegaly, no hepatomegaly, no evidence for ascites  Extremities: no clubbing/cyanosis, has R thigh edema, no calf tenderness  Neurological: no focal deficits  Skin: no rash on visible skin, excessive ecchymoses or petechiae  Lymph Nodes:  no significant peripheral adenopathy   Musculoskeletal:  full ROM  Psychiatric:  mood stable        Labs:                          6.9    5.12  )-----------( 377      ( 14 Aug 2020 07:08 )             24.8     CBC Full  -  ( 14 Aug 2020 07:08 )  WBC Count : 5.12 K/uL  RBC Count : 2.43 M/uL  Hemoglobin : 6.9 g/dL  Hematocrit : 24.8 %  Platelet Count - Automated : 377 K/uL  Mean Cell Volume : 102.1 fl  Mean Cell Hemoglobin : 28.4 pg  Mean Cell Hemoglobin Concentration : 27.8 gm/dL  Auto Neutrophil # : x  Auto Lymphocyte # : x  Auto Monocyte # : x  Auto Eosinophil # : x  Auto Basophil # : x  Auto Neutrophil % : x  Auto Lymphocyte % : x  Auto Monocyte % : x  Auto Eosinophil % : x  Auto Basophil % : x                    Other Labs:    Cultures:    Pathology:    Imaging Studies:

## 2020-08-14 NOTE — PROGRESS NOTE ADULT - ASSESSMENT
50F [place] with obesity s/p sleeve gastrectomy, RA, HTN,  DM, hx of diverticulitis s/p colonic resection, hx of hernia repair, DARIO, RA, PE/DVT, on LMWH (lovenox 100 mg BID), recent L SANDY 2/20  PMH DARIO, GERD, RA, PE (2001), DVT (on loveneox 100 mg BID),  admitted for for right hip revision arthroplasty 7/30. Patient is currently  Post-operative  s/p R revision SANDY. Hematology consulted for drop  hgb of 10.1 on 7/30 to a low of 4.4; presently at 6.9 (8/14) after infusional iron, Procrit, IM B12 and IV folic acid. Patient still symptomatic today, especially after PT... Also complains of chest discomfort which she feels may be gas or reflux.  Anemia due to peewee- operative blood loss, hydration,  GI blood loss (stool guaiac positive) though no active bleeding seen on CT, doubt hemolysis (bilirubin 0.3, haptoglobin not low). Reticulocyte count 12.8% today and hemoglobin rising slowly.  Patient has declined transfusion of any blood products in keeping with her Hoahaoism beliefs and is aware of the risks, including death. .    Plan  Completed 5 days of Venofer by infusion, continue IV folic acid,  B12 by injection as patient's prior bariatric surgery may prevent oral absorption. S/P Procrit.  Limit blood draw to minimum amount (use pediatric tubes); venipuncture again tomorrow to check CBC and reticulocyte count. Check iron panel,ferritin,B12,folate (peds tube).  Maintain  bedrest except for bathroom and  PT with assistance only.. SCDs. Continue on oral iron if tolerated, Vitamin C, folate , and B12.  .Would not restart Lovenox until hemoglobin is at least 8 grams. Discussed in detail with Dr. Kessler.

## 2020-08-14 NOTE — PROVIDER CONTACT NOTE (CRITICAL VALUE NOTIFICATION) - ACTION/TREATMENT ORDERED:
provider made aware.
provider made aware. awaiting new orders. will continue to monitor.
Monitoring continued and assisted when ambulating
Provided notified. No interventions at this time.
Provider notified. No interventions at this time.
Continue management as planned
None
close monitoring

## 2020-08-15 DIAGNOSIS — D68.59 OTHER PRIMARY THROMBOPHILIA: ICD-10-CM

## 2020-08-15 DIAGNOSIS — I82.409 ACUTE EMBOLISM AND THROMBOSIS OF UNSPECIFIED DEEP VEINS OF UNSPECIFIED LOWER EXTREMITY: ICD-10-CM

## 2020-08-15 LAB
FERRITIN SERPL-MCNC: 768 NG/ML — HIGH (ref 15–150)
IRON SATN MFR SERPL: 26 % — SIGNIFICANT CHANGE UP (ref 14–50)
IRON SATN MFR SERPL: 73 UG/DL — SIGNIFICANT CHANGE UP (ref 30–160)
TIBC SERPL-MCNC: 283 UG/DL — SIGNIFICANT CHANGE UP (ref 220–430)
UIBC SERPL-MCNC: 210 UG/DL — SIGNIFICANT CHANGE UP (ref 110–370)

## 2020-08-15 PROCEDURE — 99233 SBSQ HOSP IP/OBS HIGH 50: CPT | Mod: GC

## 2020-08-15 RX ADMIN — Medication 650 MILLIGRAM(S): at 05:27

## 2020-08-15 RX ADMIN — AMLODIPINE BESYLATE 5 MILLIGRAM(S): 2.5 TABLET ORAL at 05:27

## 2020-08-15 RX ADMIN — HYDROMORPHONE HYDROCHLORIDE 0.5 MILLIGRAM(S): 2 INJECTION INTRAMUSCULAR; INTRAVENOUS; SUBCUTANEOUS at 20:10

## 2020-08-15 RX ADMIN — ONDANSETRON 4 MILLIGRAM(S): 8 TABLET, FILM COATED ORAL at 09:07

## 2020-08-15 RX ADMIN — OXYCODONE HYDROCHLORIDE 10 MILLIGRAM(S): 5 TABLET ORAL at 22:00

## 2020-08-15 RX ADMIN — Medication 500 MILLIGRAM(S): at 12:04

## 2020-08-15 RX ADMIN — Medication 650 MILLIGRAM(S): at 17:51

## 2020-08-15 RX ADMIN — Medication 40 MILLIGRAM(S): at 12:03

## 2020-08-15 RX ADMIN — PREGABALIN 1000 MICROGRAM(S): 225 CAPSULE ORAL at 12:03

## 2020-08-15 RX ADMIN — OXYCODONE HYDROCHLORIDE 10 MILLIGRAM(S): 5 TABLET ORAL at 17:52

## 2020-08-15 RX ADMIN — Medication 650 MILLIGRAM(S): at 12:03

## 2020-08-15 RX ADMIN — Medication 1 GRAM(S): at 17:51

## 2020-08-15 RX ADMIN — QUETIAPINE FUMARATE 50 MILLIGRAM(S): 200 TABLET, FILM COATED ORAL at 22:01

## 2020-08-15 RX ADMIN — Medication 5 MILLIGRAM(S): at 12:03

## 2020-08-15 RX ADMIN — HYDROMORPHONE HYDROCHLORIDE 0.5 MILLIGRAM(S): 2 INJECTION INTRAMUSCULAR; INTRAVENOUS; SUBCUTANEOUS at 07:41

## 2020-08-15 RX ADMIN — OXYCODONE HYDROCHLORIDE 10 MILLIGRAM(S): 5 TABLET ORAL at 06:50

## 2020-08-15 RX ADMIN — HYDROMORPHONE HYDROCHLORIDE 0.5 MILLIGRAM(S): 2 INJECTION INTRAMUSCULAR; INTRAVENOUS; SUBCUTANEOUS at 08:15

## 2020-08-15 RX ADMIN — OXYCODONE HYDROCHLORIDE 10 MILLIGRAM(S): 5 TABLET ORAL at 07:44

## 2020-08-15 RX ADMIN — Medication 650 MILLIGRAM(S): at 13:00

## 2020-08-15 RX ADMIN — GABAPENTIN 300 MILLIGRAM(S): 400 CAPSULE ORAL at 22:00

## 2020-08-15 RX ADMIN — Medication 650 MILLIGRAM(S): at 00:33

## 2020-08-15 RX ADMIN — Medication 650 MILLIGRAM(S): at 01:44

## 2020-08-15 RX ADMIN — PANTOPRAZOLE SODIUM 40 MILLIGRAM(S): 20 TABLET, DELAYED RELEASE ORAL at 06:00

## 2020-08-15 RX ADMIN — OXYCODONE HYDROCHLORIDE 10 MILLIGRAM(S): 5 TABLET ORAL at 12:01

## 2020-08-15 RX ADMIN — Medication 650 MILLIGRAM(S): at 06:00

## 2020-08-15 RX ADMIN — Medication 650 MILLIGRAM(S): at 18:38

## 2020-08-15 RX ADMIN — OXYCODONE HYDROCHLORIDE 10 MILLIGRAM(S): 5 TABLET ORAL at 13:00

## 2020-08-15 RX ADMIN — HYDROMORPHONE HYDROCHLORIDE 0.5 MILLIGRAM(S): 2 INJECTION INTRAMUSCULAR; INTRAVENOUS; SUBCUTANEOUS at 19:41

## 2020-08-15 RX ADMIN — HYDROMORPHONE HYDROCHLORIDE 0.5 MILLIGRAM(S): 2 INJECTION INTRAMUSCULAR; INTRAVENOUS; SUBCUTANEOUS at 00:59

## 2020-08-15 RX ADMIN — HYDROMORPHONE HYDROCHLORIDE 0.5 MILLIGRAM(S): 2 INJECTION INTRAMUSCULAR; INTRAVENOUS; SUBCUTANEOUS at 15:25

## 2020-08-15 RX ADMIN — HYDROMORPHONE HYDROCHLORIDE 0.5 MILLIGRAM(S): 2 INJECTION INTRAMUSCULAR; INTRAVENOUS; SUBCUTANEOUS at 14:55

## 2020-08-15 RX ADMIN — OXYCODONE HYDROCHLORIDE 10 MILLIGRAM(S): 5 TABLET ORAL at 18:50

## 2020-08-15 RX ADMIN — Medication 1 GRAM(S): at 05:27

## 2020-08-15 NOTE — PROGRESS NOTE ADULT - SUBJECTIVE AND OBJECTIVE BOX
The patient is seen today for Dr. ARTIE Vigil. She reports that she is feeling okay but that she is having trouble eating because of nausea. She otherwise has no complaints. No hip pain. She states that she has been ambulating in the halls without difficulty.

## 2020-08-15 NOTE — PROGRESS NOTE ADULT - SUBJECTIVE AND OBJECTIVE BOX
Patient is a 50y old  Female who presents with a chief complaint of right hip pain/surgery (15 Aug 2020 08:15)      INTERVAL HPI/OVERNIGHT EVENTS:    Pt. seen and examined earlier today  Pt. reports increased energy today; denies CP, SOB, lightheadedness, fatigue  Reports pain controlled    Review of Systems: 12 point review of systems otherwise negative    MEDICATIONS  (STANDING):  acetaminophen   Tablet .. 650 milliGRAM(s) Oral every 6 hours  amLODIPine   Tablet 5 milliGRAM(s) Oral daily  ascorbic acid 500 milliGRAM(s) Oral daily  cyanocobalamin Injectable 1000 MICROGram(s) IntraMuscular daily  FLUoxetine 40 milliGRAM(s) Oral daily  folic acid 5 milliGRAM(s) Oral daily  gabapentin 300 milliGRAM(s) Oral at bedtime  pantoprazole    Tablet 40 milliGRAM(s) Oral before breakfast  polyethylene glycol 3350 17 Gram(s) Oral daily  QUEtiapine 50 milliGRAM(s) Oral at bedtime  sucralfate suspension 1 Gram(s) Oral every 12 hours  sulfaSALAzine 500 milliGRAM(s) Oral daily    MEDICATIONS  (PRN):  HYDROmorphone  Injectable 0.5 milliGRAM(s) IV Push every 4 hours PRN breakthrough pain  ondansetron Injectable 4 milliGRAM(s) IV Push every 6 hours PRN Nausea and/or Vomiting  oxyCODONE    IR 5 milliGRAM(s) Oral every 4 hours PRN Moderate Pain (4 - 6)  oxyCODONE    IR 10 milliGRAM(s) Oral every 4 hours PRN Severe Pain (7 - 10)  oxymetazoline 0.05% Nasal Spray 1 Spray(s) Both Nostrils every 12 hours PRN Congestion  senna 2 Tablet(s) Oral at bedtime PRN Constipation      Allergies    lisinopril (Other)  verapamil (Other)    Intolerances          Vital Signs Last 24 Hrs  T(C): 36.3 (15 Aug 2020 16:43), Max: 36.9 (15 Aug 2020 00:33)  T(F): 97.3 (15 Aug 2020 16:43), Max: 98.5 (15 Aug 2020 00:33)  HR: 79 (15 Aug 2020 16:43) (79 - 110)  BP: 128/80 (15 Aug 2020 16:43) (114/78 - 137/72)  BP(mean): --  RR: 16 (15 Aug 2020 16:43) (16 - 20)  SpO2: 100% (15 Aug 2020 16:43) (96% - 100%)  CAPILLARY BLOOD GLUCOSE          08-14 @ 07:01  -  08-15 @ 07:00  --------------------------------------------------------  IN: 1550 mL / OUT: 300 mL / NET: 1250 mL    08-15 @ 07:01  -  08-15 @ 21:00  --------------------------------------------------------  IN: 1530 mL / OUT: 0 mL / NET: 1530 mL        Physical Exam:  (earlier today)  Daily     Daily   General: comfortable-appearing in NAD  HEENT: no pallor  Extremities:  dressing C/D/I  Skin:  WWP  Neuro:  AAOx3    LABS:                        6.9    5.12  )-----------( 377      ( 14 Aug 2020 07:08 )             24.8

## 2020-08-15 NOTE — PROGRESS NOTE ADULT - ASSESSMENT
The patient is ambulating in the halls daily without difficulty. She notes nausea when trying to eat. No hip pain.

## 2020-08-15 NOTE — PROGRESS NOTE ADULT - ATTENDING COMMENTS
The patient remains comfortable but for nausea when trying to eat. The patient remains comfortable but for nausea when trying to eat. Would give Zofran regularly to see if this can help her to eat. She is encouraged to continue to ambulate in the halls.

## 2020-08-15 NOTE — PROGRESS NOTE ADULT - SUBJECTIVE AND OBJECTIVE BOX
SUBJECTIVE: Patient seen and examined, states she is continuing to feel better, significantly better than last week. No chest pain/SOB/fever/chills. No issues overnight. Continuing to work with PT, pending clearance as long as pt feels well w/o weakness, dizziness, etc during session, possibly dc home today.      Vital Signs Last 24 Hrs  T(C): 36.8 (15 Aug 2020 05:31), Max: 37.1 (14 Aug 2020 20:15)  T(F): 98.3 (15 Aug 2020 05:31), Max: 98.8 (14 Aug 2020 20:15)  HR: 110 (15 Aug 2020 05:31) (84 - 111)  BP: 129/71 (15 Aug 2020 05:31) (129/71 - 158/82)  BP(mean): --  RR: 20 (15 Aug 2020 05:31) (17 - 20)  SpO2: 99% (15 Aug 2020 05:31) (96% - 99%)        OBJECTIVE:  Physical Exam:  General: Resting in bed, NAD  Extremities:        LLE: No gross deformity. SILT L2-S1 distribution, symmetric. TA/EHL/FHL/GS motor intact.       RLE: Aquacel with no spotting, not requiring replacement. SILT L2-S1 distribution, symmetric. TA/EHL/FHL/GS motor intact.             Labs:                                                          6.9    5.12  )-----------( 377      ( 14 Aug 2020 07:08 )             24.8                   A/P :  Pt is a 49yo Female s/p R SANDY revision (acetabulum and femoral head) on 7/30 c/b acute blood loss anemia, hgb slowly improving, pt symptoms resolving. H+H continuing uptrending, continue OOB/PT, pending clearance  -    Will proceed with Epo, cyanocobalamin, folic acid - cont outpatient  -    H+H uptrending, 6.9 hgb  8/14, am labs pending  -    Pain control  -    DVT ppx: Lovenox 100 BID (home dose) being held due to acute blood loss anemia - may resume for home f/u medicine recs  -    Continue PT   -    Dispo: Home pending Heme clearance

## 2020-08-15 NOTE — PROGRESS NOTE ADULT - ASSESSMENT
50F Jew (declining RBCs), DVT on therapeutic lovenox. DARIO not on CPAP, Obesity, HTN, Depression, GERD, UC here for revision R SANDY w Dr. Ballard 7/30, c/b symptomatic anemia post-operatively w Hgb to 4.4 likely to OR and incisional drainage - transferred to SICU for further monitoring, now transitioned to telemetry s/p EPO and IV Iron x5d, remains symptomatic w hgb increasing    #Acute blood loss anemia -  Sxs stable. Pt is Jew and declining blood transfusion. s/p procrit and completing 5d course of IV iron infusion. Heme following. Pt baseline 10-11. Though Guaic positive stools, CT Abd neg for bleeding. Most likely cause from combination of intraoperative loss and post-op loss via wound incision drain (since dc'd)  Post-op state - pain controlled. On bowel regimen and IS. PPX: SCDs. DISPO: HPT  #Headache - resolved. subacute. CT Head unremarkable  #DVT - pt was on therapeutic lovenox pre-op. Currently held in setting of symptomatic anemia. Per heme - recommend hgb >8 to restart  #UC - stable. at baseline. On home sulfasalazine  #GERD - chronic. c/w protonix  #Depression - chronic. c/w fluoxetine and seroquel  #HTN - holding BP meds at this time (amlodipine 5, HCTZ 12.5, Losartan 50). BP at target  #Obesity - 34 outpt f/u    Plan  Pt continues to improve slowly  Hold PO iron - bloating may be resulting from constipating effects  Add on iron panel, serum ferritin  If sxs still not improve, can trial PO Omeprazole 40mg daily  Seen jointly with ortho and heme-onc    CBC w diff and retic count in AM  Resume home dose 100u BID Lovenox SQ on DC. Set up home blood draw to be sent to Dr. Vigil's office (CBC w Retic count)  Continue working w PT    Dispo: Home w HPT - pending improved Hb

## 2020-08-16 LAB
HCT VFR BLD CALC: 30.4 % — LOW (ref 34.5–45)
HGB BLD-MCNC: 8.5 G/DL — LOW (ref 11.5–15.5)
MCHC RBC-ENTMCNC: 28 GM/DL — LOW (ref 32–36)
MCHC RBC-ENTMCNC: 28.1 PG — SIGNIFICANT CHANGE UP (ref 27–34)
MCV RBC AUTO: 100.7 FL — HIGH (ref 80–100)
NRBC # BLD: 2 /100 WBCS — HIGH (ref 0–0)
PLATELET # BLD AUTO: 375 K/UL — SIGNIFICANT CHANGE UP (ref 150–400)
RBC # BLD: 3.02 M/UL — LOW (ref 3.8–5.2)
RBC # FLD: 22.1 % — HIGH (ref 10.3–14.5)
WBC # BLD: 6.43 K/UL — SIGNIFICANT CHANGE UP (ref 3.8–10.5)
WBC # FLD AUTO: 6.43 K/UL — SIGNIFICANT CHANGE UP (ref 3.8–10.5)

## 2020-08-16 RX ORDER — OXYCODONE HYDROCHLORIDE 5 MG/1
1 TABLET ORAL
Qty: 15 | Refills: 0
Start: 2020-08-16 | End: 2020-08-20

## 2020-08-16 RX ORDER — ENOXAPARIN SODIUM 100 MG/ML
1 INJECTION SUBCUTANEOUS
Qty: 30 | Refills: 0
Start: 2020-08-16 | End: 2020-08-30

## 2020-08-16 RX ADMIN — PANTOPRAZOLE SODIUM 40 MILLIGRAM(S): 20 TABLET, DELAYED RELEASE ORAL at 06:21

## 2020-08-16 RX ADMIN — Medication 500 MILLIGRAM(S): at 12:00

## 2020-08-16 RX ADMIN — OXYCODONE HYDROCHLORIDE 10 MILLIGRAM(S): 5 TABLET ORAL at 21:00

## 2020-08-16 RX ADMIN — Medication 650 MILLIGRAM(S): at 00:04

## 2020-08-16 RX ADMIN — HYDROMORPHONE HYDROCHLORIDE 0.5 MILLIGRAM(S): 2 INJECTION INTRAMUSCULAR; INTRAVENOUS; SUBCUTANEOUS at 07:52

## 2020-08-16 RX ADMIN — Medication 650 MILLIGRAM(S): at 08:24

## 2020-08-16 RX ADMIN — HYDROMORPHONE HYDROCHLORIDE 0.5 MILLIGRAM(S): 2 INJECTION INTRAMUSCULAR; INTRAVENOUS; SUBCUTANEOUS at 08:25

## 2020-08-16 RX ADMIN — OXYCODONE HYDROCHLORIDE 10 MILLIGRAM(S): 5 TABLET ORAL at 20:44

## 2020-08-16 RX ADMIN — OXYCODONE HYDROCHLORIDE 10 MILLIGRAM(S): 5 TABLET ORAL at 06:20

## 2020-08-16 RX ADMIN — GABAPENTIN 300 MILLIGRAM(S): 400 CAPSULE ORAL at 21:40

## 2020-08-16 RX ADMIN — Medication 650 MILLIGRAM(S): at 18:49

## 2020-08-16 RX ADMIN — OXYCODONE HYDROCHLORIDE 10 MILLIGRAM(S): 5 TABLET ORAL at 11:30

## 2020-08-16 RX ADMIN — HYDROMORPHONE HYDROCHLORIDE 0.5 MILLIGRAM(S): 2 INJECTION INTRAMUSCULAR; INTRAVENOUS; SUBCUTANEOUS at 21:47

## 2020-08-16 RX ADMIN — HYDROMORPHONE HYDROCHLORIDE 0.5 MILLIGRAM(S): 2 INJECTION INTRAMUSCULAR; INTRAVENOUS; SUBCUTANEOUS at 12:06

## 2020-08-16 RX ADMIN — QUETIAPINE FUMARATE 50 MILLIGRAM(S): 200 TABLET, FILM COATED ORAL at 21:40

## 2020-08-16 RX ADMIN — AMLODIPINE BESYLATE 5 MILLIGRAM(S): 2.5 TABLET ORAL at 06:21

## 2020-08-16 RX ADMIN — Medication 40 MILLIGRAM(S): at 11:59

## 2020-08-16 RX ADMIN — HYDROMORPHONE HYDROCHLORIDE 0.5 MILLIGRAM(S): 2 INJECTION INTRAMUSCULAR; INTRAVENOUS; SUBCUTANEOUS at 00:09

## 2020-08-16 RX ADMIN — OXYCODONE HYDROCHLORIDE 10 MILLIGRAM(S): 5 TABLET ORAL at 08:24

## 2020-08-16 RX ADMIN — OXYCODONE HYDROCHLORIDE 10 MILLIGRAM(S): 5 TABLET ORAL at 10:46

## 2020-08-16 RX ADMIN — HYDROMORPHONE HYDROCHLORIDE 0.5 MILLIGRAM(S): 2 INJECTION INTRAMUSCULAR; INTRAVENOUS; SUBCUTANEOUS at 22:12

## 2020-08-16 RX ADMIN — Medication 5 MILLIGRAM(S): at 11:59

## 2020-08-16 RX ADMIN — HYDROMORPHONE HYDROCHLORIDE 0.5 MILLIGRAM(S): 2 INJECTION INTRAMUSCULAR; INTRAVENOUS; SUBCUTANEOUS at 12:45

## 2020-08-16 RX ADMIN — Medication 650 MILLIGRAM(S): at 12:45

## 2020-08-16 RX ADMIN — Medication 650 MILLIGRAM(S): at 19:30

## 2020-08-16 RX ADMIN — Medication 650 MILLIGRAM(S): at 11:59

## 2020-08-16 RX ADMIN — PREGABALIN 1000 MICROGRAM(S): 225 CAPSULE ORAL at 11:59

## 2020-08-16 RX ADMIN — Medication 1 GRAM(S): at 06:20

## 2020-08-16 RX ADMIN — HYDROMORPHONE HYDROCHLORIDE 0.5 MILLIGRAM(S): 2 INJECTION INTRAMUSCULAR; INTRAVENOUS; SUBCUTANEOUS at 06:15

## 2020-08-16 RX ADMIN — Medication 1 GRAM(S): at 18:49

## 2020-08-16 RX ADMIN — POLYETHYLENE GLYCOL 3350 17 GRAM(S): 17 POWDER, FOR SOLUTION ORAL at 11:59

## 2020-08-16 RX ADMIN — Medication 650 MILLIGRAM(S): at 06:20

## 2020-08-16 RX ADMIN — Medication 500 MILLIGRAM(S): at 11:59

## 2020-08-16 NOTE — PROGRESS NOTE ADULT - SUBJECTIVE AND OBJECTIVE BOX
SUBJECTIVE: Patient seen and examined, states she is continuing to feel better, significantly better than last week. No chest pain/SOB/fever/chills. No issues overnight. Continuing to work with PT, pending clearance as long as pt feels well w/o weakness, dizziness, etc during session, possibly dc home today.      Vital Signs Last 24 Hrs  T(C): 36.6 (16 Aug 2020 05:00), Max: 36.7 (15 Aug 2020 22:00)  T(F): 97.8 (16 Aug 2020 05:00), Max: 98.1 (15 Aug 2020 22:00)  HR: 67 (16 Aug 2020 05:00) (67 - 98)  BP: 135/76 (16 Aug 2020 05:00) (108/50 - 135/76)  BP(mean): --  RR: 15 (16 Aug 2020 05:00) (15 - 18)  SpO2: 94% (16 Aug 2020 05:00) (94% - 100%)      OBJECTIVE:  Physical Exam:  General: Resting in bed, NAD  Extremities:        LLE: No gross deformity. SILT L2-S1 distribution, symmetric. TA/EHL/FHL/GS motor intact.       RLE: Aquacel with no spotting, not requiring replacement. SILT L2-S1 distribution, symmetric. TA/EHL/FHL/GS motor intact.             Labs:                                                          6.9    5.12  )-----------( 377      ( 14 Aug 2020 07:08 )             24.8                   A/P :  Pt is a 51yo Female s/p R SANDY revision (acetabulum and femoral head) on 7/30 c/b acute blood loss anemia, hgb slowly improving, pt symptoms resolving. H+H continuing uptrending, continue OOB/PT, pending clearance  -    Will proceed with Epo, cyanocobalamin, folic acid - cont outpatient  -    H+H uptrending, 6.9 hgb  8/16, am labs pending  -    Pain control  -    DVT ppx: Lovenox 100 BID (home dose) being held due to acute blood loss anemia - may resume for home f/u medicine recs  -    Continue PT   -    Dispo: Home pending Heme clearance

## 2020-08-16 NOTE — PROGRESS NOTE ADULT - SUBJECTIVE AND OBJECTIVE BOX
The patient is seen for Dr. ARTIE Vigil. She looks good and reports that she has been feeling well. She is eating and ambulating without difficulty. She offers no complaints.

## 2020-08-16 NOTE — PROGRESS NOTE ADULT - PROBLEM SELECTOR PLAN 1
No lovenox as per Dr. Vigil until Hb is at least 8 grams.
Lovenox is being held until Hb is 8 or above

## 2020-08-16 NOTE — PROGRESS NOTE ADULT - PROBLEM SELECTOR PLAN 2
As above.
The patient has a history of PE/DVT. Doing well so far.She will not take blood given her Hindu beliefs.

## 2020-08-17 ENCOUNTER — TRANSCRIPTION ENCOUNTER (OUTPATIENT)
Age: 50
End: 2020-08-17

## 2020-08-17 VITALS
TEMPERATURE: 98 F | DIASTOLIC BLOOD PRESSURE: 58 MMHG | SYSTOLIC BLOOD PRESSURE: 117 MMHG | HEART RATE: 86 BPM | OXYGEN SATURATION: 95 % | RESPIRATION RATE: 17 BRPM

## 2020-08-17 LAB
HCT VFR BLD CALC: 26 % — LOW (ref 34.5–45)
HGB BLD-MCNC: 7.4 G/DL — LOW (ref 11.5–15.5)
MCHC RBC-ENTMCNC: 28.5 GM/DL — LOW (ref 32–36)
MCHC RBC-ENTMCNC: 28.6 PG — SIGNIFICANT CHANGE UP (ref 27–34)
MCV RBC AUTO: 100.4 FL — HIGH (ref 80–100)
NRBC # BLD: 1 /100 WBCS — HIGH (ref 0–0)
PLATELET # BLD AUTO: 336 K/UL — SIGNIFICANT CHANGE UP (ref 150–400)
RBC # BLD: 2.59 M/UL — LOW (ref 3.8–5.2)
RBC # FLD: 21.4 % — HIGH (ref 10.3–14.5)
RETICS #: 257 K/UL — HIGH (ref 25–125)
RETICS/RBC NFR: 10 % — HIGH (ref 0.5–2.5)
WBC # BLD: 5.55 K/UL — SIGNIFICANT CHANGE UP (ref 3.8–10.5)
WBC # FLD AUTO: 5.55 K/UL — SIGNIFICANT CHANGE UP (ref 3.8–10.5)

## 2020-08-17 PROCEDURE — 83735 ASSAY OF MAGNESIUM: CPT

## 2020-08-17 PROCEDURE — 93005 ELECTROCARDIOGRAM TRACING: CPT

## 2020-08-17 PROCEDURE — 85025 COMPLETE CBC W/AUTO DIFF WBC: CPT

## 2020-08-17 PROCEDURE — 84439 ASSAY OF FREE THYROXINE: CPT

## 2020-08-17 PROCEDURE — 99233 SBSQ HOSP IP/OBS HIGH 50: CPT

## 2020-08-17 PROCEDURE — C1776: CPT

## 2020-08-17 PROCEDURE — 85730 THROMBOPLASTIN TIME PARTIAL: CPT

## 2020-08-17 PROCEDURE — C1889: CPT

## 2020-08-17 PROCEDURE — 82728 ASSAY OF FERRITIN: CPT

## 2020-08-17 PROCEDURE — 82248 BILIRUBIN DIRECT: CPT

## 2020-08-17 PROCEDURE — 97161 PT EVAL LOW COMPLEX 20 MIN: CPT

## 2020-08-17 PROCEDURE — 84100 ASSAY OF PHOSPHORUS: CPT

## 2020-08-17 PROCEDURE — 86923 COMPATIBILITY TEST ELECTRIC: CPT

## 2020-08-17 PROCEDURE — 70450 CT HEAD/BRAIN W/O DYE: CPT

## 2020-08-17 PROCEDURE — 71045 X-RAY EXAM CHEST 1 VIEW: CPT

## 2020-08-17 PROCEDURE — 97116 GAIT TRAINING THERAPY: CPT

## 2020-08-17 PROCEDURE — 84443 ASSAY THYROID STIM HORMONE: CPT

## 2020-08-17 PROCEDURE — 82247 BILIRUBIN TOTAL: CPT

## 2020-08-17 PROCEDURE — 82040 ASSAY OF SERUM ALBUMIN: CPT

## 2020-08-17 PROCEDURE — 80048 BASIC METABOLIC PNL TOTAL CA: CPT

## 2020-08-17 PROCEDURE — 83010 ASSAY OF HAPTOGLOBIN QUANT: CPT

## 2020-08-17 PROCEDURE — 83540 ASSAY OF IRON: CPT

## 2020-08-17 PROCEDURE — 82962 GLUCOSE BLOOD TEST: CPT

## 2020-08-17 PROCEDURE — 84460 ALANINE AMINO (ALT) (SGPT): CPT

## 2020-08-17 PROCEDURE — 81001 URINALYSIS AUTO W/SCOPE: CPT

## 2020-08-17 PROCEDURE — 72170 X-RAY EXAM OF PELVIS: CPT

## 2020-08-17 PROCEDURE — 80053 COMPREHEN METABOLIC PANEL: CPT

## 2020-08-17 PROCEDURE — 85027 COMPLETE CBC AUTOMATED: CPT

## 2020-08-17 PROCEDURE — 74174 CTA ABD&PLVS W/CONTRAST: CPT

## 2020-08-17 PROCEDURE — 99232 SBSQ HOSP IP/OBS MODERATE 35: CPT

## 2020-08-17 PROCEDURE — 97164 PT RE-EVAL EST PLAN CARE: CPT

## 2020-08-17 PROCEDURE — 84450 TRANSFERASE (AST) (SGOT): CPT

## 2020-08-17 PROCEDURE — 36415 COLL VENOUS BLD VENIPUNCTURE: CPT

## 2020-08-17 PROCEDURE — 83550 IRON BINDING TEST: CPT

## 2020-08-17 PROCEDURE — C1713: CPT

## 2020-08-17 PROCEDURE — 85610 PROTHROMBIN TIME: CPT

## 2020-08-17 PROCEDURE — 85045 AUTOMATED RETICULOCYTE COUNT: CPT

## 2020-08-17 PROCEDURE — 97110 THERAPEUTIC EXERCISES: CPT

## 2020-08-17 RX ORDER — OXYCODONE HYDROCHLORIDE 5 MG/1
5 TABLET ORAL EVERY 4 HOURS
Refills: 0 | Status: DISCONTINUED | OUTPATIENT
Start: 2020-08-17 | End: 2020-08-17

## 2020-08-17 RX ORDER — LOSARTAN/HYDROCHLOROTHIAZIDE 100MG-25MG
1 TABLET ORAL
Qty: 0 | Refills: 0 | DISCHARGE

## 2020-08-17 RX ORDER — METOPROLOL TARTRATE 50 MG
1 TABLET ORAL
Qty: 0 | Refills: 0 | DISCHARGE

## 2020-08-17 RX ORDER — ENOXAPARIN SODIUM 100 MG/ML
0 INJECTION SUBCUTANEOUS
Qty: 0 | Refills: 0 | DISCHARGE

## 2020-08-17 RX ORDER — OXYCODONE HYDROCHLORIDE 5 MG/1
10 TABLET ORAL EVERY 4 HOURS
Refills: 0 | Status: DISCONTINUED | OUTPATIENT
Start: 2020-08-17 | End: 2020-08-17

## 2020-08-17 RX ORDER — DIPHENHYDRAMINE HCL 50 MG
25 CAPSULE ORAL EVERY 4 HOURS
Refills: 0 | Status: DISCONTINUED | OUTPATIENT
Start: 2020-08-17 | End: 2020-08-17

## 2020-08-17 RX ADMIN — Medication 650 MILLIGRAM(S): at 00:53

## 2020-08-17 RX ADMIN — Medication 650 MILLIGRAM(S): at 01:23

## 2020-08-17 RX ADMIN — Medication 650 MILLIGRAM(S): at 12:25

## 2020-08-17 RX ADMIN — Medication 650 MILLIGRAM(S): at 06:08

## 2020-08-17 RX ADMIN — Medication 25 MILLIGRAM(S): at 07:04

## 2020-08-17 RX ADMIN — Medication 40 MILLIGRAM(S): at 12:25

## 2020-08-17 RX ADMIN — PANTOPRAZOLE SODIUM 40 MILLIGRAM(S): 20 TABLET, DELAYED RELEASE ORAL at 07:04

## 2020-08-17 RX ADMIN — Medication 1 GRAM(S): at 05:38

## 2020-08-17 RX ADMIN — OXYCODONE HYDROCHLORIDE 10 MILLIGRAM(S): 5 TABLET ORAL at 12:28

## 2020-08-17 RX ADMIN — AMLODIPINE BESYLATE 5 MILLIGRAM(S): 2.5 TABLET ORAL at 05:38

## 2020-08-17 RX ADMIN — OXYCODONE HYDROCHLORIDE 10 MILLIGRAM(S): 5 TABLET ORAL at 13:15

## 2020-08-17 RX ADMIN — Medication 5 MILLIGRAM(S): at 12:25

## 2020-08-17 RX ADMIN — Medication 500 MILLIGRAM(S): at 12:26

## 2020-08-17 RX ADMIN — Medication 650 MILLIGRAM(S): at 13:15

## 2020-08-17 RX ADMIN — Medication 650 MILLIGRAM(S): at 05:37

## 2020-08-17 RX ADMIN — ONDANSETRON 4 MILLIGRAM(S): 8 TABLET, FILM COATED ORAL at 09:24

## 2020-08-17 RX ADMIN — PREGABALIN 1000 MICROGRAM(S): 225 CAPSULE ORAL at 12:25

## 2020-08-17 RX ADMIN — Medication 500 MILLIGRAM(S): at 12:25

## 2020-08-17 NOTE — PROGRESS NOTE ADULT - ASSESSMENT
50F Buddhist with obesity s/p sleeve gastrectomy, RA, HTN,  DM, hx of diverticulitis s/p colonic resection, hx of hernia repair, DARIO, RA, PE/DVT, on LMWH (lovenox 100 mg BID), recent L SANDY 2/20  PMH DARIO, GERD, RA, PE (2001), DVT (on loveneox 100 mg BID),  admitted for for right hip revision arthroplasty 7/30. Patient is currently  Post-operative  s/p R revision SANDY. Hematology consulted for drop  hgb of 10.1 on 7/30 to a low of 4.4; presently at 7.4 (8/17) after infusional iron, Procrit, IM B12 and IV folic acid. Patient still fatigued but less symptomatic today,   Anemia due to peewee- operative blood loss, hydration,  GI blood loss (stool guaiac positive) though no active bleeding seen on CT,  Reticulocyte count 10% today and hemoglobin rising slowly. Continues to have nausea despite stopping oral iron.  Patient has declined transfusion of any blood products in keeping with her Mandaeism beliefs and is aware of the risks, including death. .    Plan  Completed 5 days of Venofer by infusion, continue IV folic acid,  B12 by injection as patient's prior bariatric surgery may prevent oral absorption. S/P Procrit.  Limit blood draw to minimum amount (use pediatric tubes); venipuncture again tomorrow to check CBC and reticulocyte count. Iron panel, ferritin improved s/p infusions of iron.   SCDs. Continue  Vitamin C, folate , and B12.  .Would not restart Lovenox until hemoglobin is at least 8 grams. .

## 2020-08-17 NOTE — PROGRESS NOTE ADULT - REASON FOR ADMISSION
right hip pain/surgery

## 2020-08-17 NOTE — PROGRESS NOTE ADULT - NSHPATTENDINGPLANDISCUSS_GEN_ALL_CORE
Dr. Kessler, resident, fellow, Dr. Dale.
Dr. Kessler.
Dr. Montez and Dr. Dale
Ortho
Ortho, pt
nurse
Ortho
Ortho - Dr. Ballard, Heme-onc - Dr. Vigil
Ortho - Dr. Ballard; Heme-onc - Dr. Vigil
Ortho, Heme-onc - Dr. Vigil
Ortho, Heme-onc Dr. Vigil
Dr. Dale, ortho resident, nursing staff
Dr. Velasco

## 2020-08-17 NOTE — PROGRESS NOTE ADULT - PROVIDER SPECIALTY LIST ADULT
Gastroenterology
Heme/Onc
Hospitalist
Internal Medicine
Internal Medicine
Orthopedics
SICU
SICU
Hospitalist
Orthopedics
Heme/Onc

## 2020-08-17 NOTE — PROGRESS NOTE ADULT - SUBJECTIVE AND OBJECTIVE BOX
The patient was seen and examined.      50F [place] with obesity s/p sleeve gastrectomy, RA, HTN,  DM, hx of diverticulitis s/p colonic resection, hx of hernia repair, DARIO, RA, PE/DVT, on LMWH (lovenox 100 mg BID), recent L SANDY 2/20  PMH DARIO, GERD, RA, PE (2001), DVT (on loveneox 100 mg BID),  admitted for for right hip revision arthroplasty 7/30. Patient is currently  Post-operative  s/p R revision SANDY. Hematology consulted for drop  hgb of 10.1 on 7/30 to a low of 4.4; presently at 7.4 (8/17) after infusional iron, Procrit, IM B12 and IV folic acid. Patient still fatigued but less symptomatic today,   Anemia due to peewee- operative blood loss, hydration,  GI blood loss (stool guaiac positive) though no active bleeding seen on CT,  Reticulocyte count 10% today and hemoglobin rising slowly. Continues to have nausea despite stopping oral iron.  Patient has declined transfusion of any blood products in keeping with her Yarsanism beliefs and is aware of the risks, including death. .    Plan      .       lisinopril (Other)  verapamil (Other)    Allergies    lisinopril (Other)  verapamil (Other)    Intolerances        Medications:  MEDICATIONS  (STANDING):  acetaminophen   Tablet .. 650 milliGRAM(s) Oral every 6 hours  amLODIPine   Tablet 5 milliGRAM(s) Oral daily  ascorbic acid 500 milliGRAM(s) Oral daily  cyanocobalamin Injectable 1000 MICROGram(s) IntraMuscular daily  FLUoxetine 40 milliGRAM(s) Oral daily  folic acid 5 milliGRAM(s) Oral daily  gabapentin 300 milliGRAM(s) Oral at bedtime  pantoprazole    Tablet 40 milliGRAM(s) Oral before breakfast  polyethylene glycol 3350 17 Gram(s) Oral daily  QUEtiapine 50 milliGRAM(s) Oral at bedtime  sucralfate suspension 1 Gram(s) Oral every 12 hours  sulfaSALAzine 500 milliGRAM(s) Oral daily    MEDICATIONS  (PRN):  diphenhydrAMINE 25 milliGRAM(s) Oral every 4 hours PRN Rash and/or Itching  ondansetron Injectable 4 milliGRAM(s) IV Push every 6 hours PRN Nausea and/or Vomiting  oxyCODONE    IR 5 milliGRAM(s) Oral every 4 hours PRN Moderate Pain (4 - 6)  oxyCODONE    IR 10 milliGRAM(s) Oral every 4 hours PRN Severe Pain (7 - 10)  oxymetazoline 0.05% Nasal Spray 1 Spray(s) Both Nostrils every 12 hours PRN Congestion  senna 2 Tablet(s) Oral at bedtime PRN Constipation          Interval History:    The patient denies chest pain or SOB.  Has nausea/ no vomiting/fevers/chills/night sweats.  Has fatigue, no headaches/dizziness.  Appetite is stable without weight loss.  No abdominal pain/diarrhea/constipation.  No melena or hematochezia.    No dysuria/hematuria.  No history of easy bruising/bleeding.  No gingival bleeding or epistaxis.  Less R leg pain     ROS is otherwise negative.    PHYSICAL EXAM:    T(F): 98.3 (08-17-20 @ 09:00), Max: 99 (08-16-20 @ 20:41)  HR: 95 (08-17-20 @ 09:00) (80 - 95)  BP: 134/87 (08-17-20 @ 09:00) (111/55 - 145/66)  RR: 16 (08-17-20 @ 09:00) (16 - 18)  SpO2: 100% (08-17-20 @ 09:00) (97% - 100%)  Wt(kg): --    Daily     Daily     Gen: well developed, well nourished, comfortable  HEENT: normocephalic/atraumatic, has conjunctival pallor, no scleral icterus, no oral thrush/mucosal bleeding/mucositis  Neck: supple, no masses, no JVD  Cardiovascular: RR, nl S1S2, no murmurs/rubs/gallops  Respiratory: clear air entry b/l  Gastrointestinal: BS+, soft, obese, NT/ND, no masses, no splenomegaly, no hepatomegaly, no evidence for ascites  Extremities: no clubbing/cyanosis, still with R thigh hematoma/ edema, no calf tenderness  Neurological: no focal deficits  Skin: no rash on visible skin, excessive ecchymoses or petechiae  Lymph Nodes:  no significant peripheral adenopathy   Musculoskeletal:  full ROM  Psychiatric:  mood stable        Labs:                          7.4    5.55  )-----------( 336      ( 17 Aug 2020 06:38 )             26.0     CBC Full  -  ( 17 Aug 2020 06:38 )  WBC Count : 5.55 K/uL  RBC Count : 2.59 M/uL  Hemoglobin : 7.4 g/dL  Hematocrit : 26.0 %  Platelet Count - Automated : 336 K/uL  Mean Cell Volume : 100.4 fl  Mean Cell Hemoglobin : 28.6 pg  Mean Cell Hemoglobin Concentration : 28.5 gm/dL  Auto Neutrophil # : x  Auto Lymphocyte # : x  Auto Monocyte # : x  Auto Eosinophil # : x  Auto Basophil # : x  Auto Neutrophil % : x  Auto Lymphocyte % : x  Auto Monocyte % : x  Auto Eosinophil % : x  Auto Basophil % : x                    Other Labs:    Cultures:    Pathology:    Imaging Studies:

## 2020-08-17 NOTE — PROGRESS NOTE ADULT - SUBJECTIVE AND OBJECTIVE BOX
SUBJECTIVE: Patient seen and examined, states she feels well. No chest pain/SOB/fever/chills. No issues overnight. Continuing to work with PT, labs improving, plan for DC home today.      Vital Signs Last 24 Hrs  T(C): 36.8 (17 Aug 2020 05:15), Max: 37.2 (16 Aug 2020 20:41)  T(F): 98.3 (17 Aug 2020 05:15), Max: 99 (16 Aug 2020 20:41)  HR: 85 (17 Aug 2020 05:35) (80 - 95)  BP: 145/66 (17 Aug 2020 05:35) (111/55 - 145/66)  BP(mean): --  RR: 18 (17 Aug 2020 05:35) (16 - 18)  SpO2: 97% (17 Aug 2020 05:35) (97% - 99%)      OBJECTIVE:  Physical Exam:  General: Resting in bed, NAD  Extremities:        LLE: No gross deformity. SILT L2-S1 distribution, symmetric. TA/EHL/FHL/GS motor intact.       RLE: Aquacel with no spotting, not requiring replacement. SILT L2-S1 distribution, symmetric. TA/EHL/FHL/GS motor intact.             Labs:                                                        8.5    6.43  )-----------( 375      ( 16 Aug 2020 13:33 )             30.4             A/P :  Pt is a 49yo Female s/p R SANDY revision (acetabulum and femoral head) on 7/30 c/b acute blood loss anemia, hgb slowly improving, pt symptoms resolving. H+H continuing uptrending, continue OOB/PT, pending clearance  -    Will proceed with Epo, cyanocobalamin, folic acid - cont outpatient  -    H+H uptrending, 8.5 8/16, am labs pending  -    Pain control  -    DVT ppx: Lovenox 100 BID (home dose) being held due to acute blood loss anemia - may resume for home f/u medicine recs  -    Continue PT   -    Dispo: Home pending Heme clearance

## 2020-08-17 NOTE — PROGRESS NOTE ADULT - ASSESSMENT
50F Rastafarian (declining RBCs), DVT on therapeutic lovenox. DARIO not on CPAP, Obesity, HTN, Depression, GERD, UC here for revision R SANDY w Dr. Ballard 7/30, c/b symptomatic anemia post-operatively w Hgb to 4.4 likely to OR and incisional drainage - transferred to SICU for further monitoring, now transitioned to telemetry s/p EPO and IV Iron x5d, sxs resolved w hgb continuing to increase    #Acute blood loss anemia -  Hgb 7.4 from 8.5. No evidence of blood loss and symptoms remain stable.  Pt is Rastafarian and declining blood transfusion. s/p procrit and completing 5d course of IV iron infusion. Heme following. Pt baseline 10. Though [place] positive stools, CT Abd neg for bleeding. Most likely cause from combination of intraoperative loss and post-op loss via wound incision drain (since dc'd)  #Post-op state - pain controlled. On bowel regimen and IS. PPX: SCDs. DISPO: HPT  #Headache - resolved. subacute. CT Head unremarkable  #DVT - pt was on therapeutic lovenox pre-op. Currently held in setting of symptomatic anemia. Per heme - recommend hgb >8 to restart  #UC - stable. at baseline. On home sulfasalazine  #GERD - chronic. c/w protonix  #Depression - chronic. c/w fluoxetine and seroquel  #HTN - Restarted on amlodipine 5. Continue to hold HCTZ 12.5 and Losartan 50.  #Obesity - 34 outpt f/u    Plan  From medical standpoint, patient optimized for DC home today  Pt to have repeat CBC and retic on Wed 8/19 - Home draw w results to be faxed to Dr. Vigil  Defer home dose 100u BID Lovenox SQ until hgb >8 and approved by heme-onc    Dispo: HPT

## 2020-08-17 NOTE — DISCHARGE NOTE NURSING/CASE MANAGEMENT/SOCIAL WORK - PATIENT PORTAL LINK FT
You can access the FollowMyHealth Patient Portal offered by [facility] by registering at the following website: [email or web address]. By joining [employer]’s FollowMyHealth portal, you will also be able to view your health information using other applications (apps) compatible with our system.

## 2020-08-17 NOTE — DISCHARGE NOTE NURSING/CASE MANAGEMENT/SOCIAL WORK - NSDCFUADDAPPT_GEN_ALL_CORE_FT
Please follow-up with Dr. Monica Vigil (the hematologist who was seeing you at [facility]). Her number is provided on this page. Your blood work will be faxed to her weekly.  Additionally follow-up with your primary care provider next week.

## 2020-08-17 NOTE — PROGRESS NOTE ADULT - SUBJECTIVE AND OBJECTIVE BOX
INTERVAL HPI/OVERNIGHT EVENTS: RIVAS O/N    SUBJECTIVE: Patient seen and examined at bedside.   Pt not able to return home yesterday because she did not have a ride. Noted to have drop in hgb from overnight. Endorses brown BMs. Eating/drinking well. Some nausea this AM wo abd pain. No dysuria, fever, chest pain, dyspnea. No lightheadedness or dizziness when ambulating to bathroom.    OBJECTIVE:    VITAL SIGNS:  ICU Vital Signs Last 24 Hrs  T(C): 36.6 (17 Aug 2020 14:01), Max: 37.2 (16 Aug 2020 20:41)  T(F): 97.9 (17 Aug 2020 14:01), Max: 99 (16 Aug 2020 20:41)  HR: 86 (17 Aug 2020 14:01) (80 - 95)  BP: 117/58 (17 Aug 2020 14:01) (117/58 - 145/66)  BP(mean): --  ABP: --  ABP(mean): --  RR: 17 (17 Aug 2020 14:01) (16 - 18)  SpO2: 95% (17 Aug 2020 14:01) (95% - 100%)        08-16 @ 07:01  -  08-17 @ 07:00  --------------------------------------------------------  IN: 1040 mL / OUT: 300 mL / NET: 740 mL    08-17 @ 07:01  -  08-17 @ 15:09  --------------------------------------------------------  IN: 0 mL / OUT: 400 mL / NET: -400 mL      CAPILLARY BLOOD GLUCOSE          PHYSICAL EXAM:  GEN: Female in NAD on RA  HEENT: NC/AT, MMM  CV: RRR, no murmurs, no BLE edema.  PULM: nml effort, CTAB  ABD: Soft, NABS, non-tender to palpation  NEURO: Alert, oriented, EOMI  5/5 in BUE strength. R hip 4/5. Sensation intact  PSYCH: Appropriate, conversant  EXT: R lateral hip dressing c/d/i.     MEDICATIONS:  MEDICATIONS  (STANDING):  acetaminophen   Tablet .. 650 milliGRAM(s) Oral every 6 hours  amLODIPine   Tablet 5 milliGRAM(s) Oral daily  ascorbic acid 500 milliGRAM(s) Oral daily  cyanocobalamin Injectable 1000 MICROGram(s) IntraMuscular daily  FLUoxetine 40 milliGRAM(s) Oral daily  folic acid 5 milliGRAM(s) Oral daily  gabapentin 300 milliGRAM(s) Oral at bedtime  pantoprazole    Tablet 40 milliGRAM(s) Oral before breakfast  polyethylene glycol 3350 17 Gram(s) Oral daily  QUEtiapine 50 milliGRAM(s) Oral at bedtime  sucralfate suspension 1 Gram(s) Oral every 12 hours  sulfaSALAzine 500 milliGRAM(s) Oral daily    MEDICATIONS  (PRN):  diphenhydrAMINE 25 milliGRAM(s) Oral every 4 hours PRN Rash and/or Itching  ondansetron Injectable 4 milliGRAM(s) IV Push every 6 hours PRN Nausea and/or Vomiting  oxyCODONE    IR 5 milliGRAM(s) Oral every 4 hours PRN Moderate Pain (4 - 6)  oxyCODONE    IR 10 milliGRAM(s) Oral every 4 hours PRN Severe Pain (7 - 10)  oxymetazoline 0.05% Nasal Spray 1 Spray(s) Both Nostrils every 12 hours PRN Congestion  senna 2 Tablet(s) Oral at bedtime PRN Constipation      ALLERGIES:  Allergies    lisinopril (Other)  verapamil (Other)    Intolerances        LABS:                        7.4    5.55  )-----------( 336      ( 17 Aug 2020 06:38 )             26.0                 RADIOLOGY & ADDITIONAL TESTS: Reviewed.

## 2020-08-19 ENCOUNTER — LABORATORY RESULT (OUTPATIENT)
Age: 50
End: 2020-08-19

## 2020-08-20 VITALS
HEART RATE: 117 BPM | RESPIRATION RATE: 18 BRPM | OXYGEN SATURATION: 98 % | HEIGHT: 68 IN | TEMPERATURE: 98 F | SYSTOLIC BLOOD PRESSURE: 153 MMHG | WEIGHT: 220.02 LBS | DIASTOLIC BLOOD PRESSURE: 108 MMHG

## 2020-08-20 LAB
BASOPHILS # BLD AUTO: 0.02 K/UL — SIGNIFICANT CHANGE UP (ref 0–0.2)
BASOPHILS NFR BLD AUTO: 0.3 % — SIGNIFICANT CHANGE UP (ref 0–2)
EOSINOPHIL # BLD AUTO: 0.18 K/UL — SIGNIFICANT CHANGE UP (ref 0–0.5)
EOSINOPHIL NFR BLD AUTO: 2.6 % — SIGNIFICANT CHANGE UP (ref 0–6)
HCT VFR BLD CALC: 28.2 % — LOW (ref 34.5–45)
HGB BLD-MCNC: 8.1 G/DL — LOW (ref 11.5–15.5)
IMM GRANULOCYTES NFR BLD AUTO: 0.9 % — SIGNIFICANT CHANGE UP (ref 0–1.5)
LYMPHOCYTES # BLD AUTO: 1.21 K/UL — SIGNIFICANT CHANGE UP (ref 1–3.3)
LYMPHOCYTES # BLD AUTO: 17.2 % — SIGNIFICANT CHANGE UP (ref 13–44)
MCHC RBC-ENTMCNC: 28.2 PG — SIGNIFICANT CHANGE UP (ref 27–34)
MCHC RBC-ENTMCNC: 28.7 GM/DL — LOW (ref 32–36)
MCV RBC AUTO: 98.3 FL — SIGNIFICANT CHANGE UP (ref 80–100)
MONOCYTES # BLD AUTO: 0.71 K/UL — SIGNIFICANT CHANGE UP (ref 0–0.9)
MONOCYTES NFR BLD AUTO: 10.1 % — SIGNIFICANT CHANGE UP (ref 2–14)
NEUTROPHILS # BLD AUTO: 4.87 K/UL — SIGNIFICANT CHANGE UP (ref 1.8–7.4)
NEUTROPHILS NFR BLD AUTO: 68.9 % — SIGNIFICANT CHANGE UP (ref 43–77)
NRBC # BLD: 0 /100 WBCS — SIGNIFICANT CHANGE UP (ref 0–0)
PLATELET # BLD AUTO: 340 K/UL — SIGNIFICANT CHANGE UP (ref 150–400)
RBC # BLD: 2.87 M/UL — LOW (ref 3.8–5.2)
RBC # FLD: 19.9 % — HIGH (ref 10.3–14.5)
WBC # BLD: 7.05 K/UL — SIGNIFICANT CHANGE UP (ref 3.8–10.5)
WBC # FLD AUTO: 7.05 K/UL — SIGNIFICANT CHANGE UP (ref 3.8–10.5)

## 2020-08-20 RX ORDER — MORPHINE SULFATE 50 MG/1
4 CAPSULE, EXTENDED RELEASE ORAL ONCE
Refills: 0 | Status: DISCONTINUED | OUTPATIENT
Start: 2020-08-20 | End: 2020-08-20

## 2020-08-20 RX ADMIN — MORPHINE SULFATE 4 MILLIGRAM(S): 50 CAPSULE, EXTENDED RELEASE ORAL at 23:49

## 2020-08-20 NOTE — ED ADULT NURSE NOTE - OBJECTIVE STATEMENT
Pt reports L leg pain to L posterior calf/ behind L knee since this morning. Pt reports hx of DVTs, started on Lovenox this morning. Pt denies any numbness/tingling to extremities, no weakness noted. No swelling/redness noted to L leg. Pt denies CP/SOB, no cough, no fever/chills, no known exposure to coid. pt reports pain is 6/10 at rest and 10/10 with ambulation, palpation, and movement. Pt states she took muscle relaxant at 1500 to see if it would help relive pain and states it did not help. Pt Aox4. Ambulates with cane.

## 2020-08-20 NOTE — ED PROVIDER NOTE - CARE PLAN
Principal Discharge DX:	Acute deep vein thrombosis (DVT) of calf muscle vein of left lower extremity

## 2020-08-20 NOTE — ED PROVIDER NOTE - OBJECTIVE STATEMENT
50F hx htn, DVT/PE, dm, DARIO, OA, anemia, c/o left leg pain. pt states pain to back of left calf and behind knee.  ongoing since yesterday.  pt is s/p revision of R THR with Dr. Ballard 7/30.  pt hospital course complicated by anemia (pt refused transfusion during admission).  lovenox was hold d/t anemia. pt states restarted lovenox this morning.  no chest pain, no SOB.  pt ambulating with cane. states starts PT tomorrow.  taking oxycodone at home without relief.  pain worse with palpation.  no fevers, no redness, no swelling.

## 2020-08-20 NOTE — ED ADULT NURSE NOTE - PSH
Abdominal hernia  umbilical  History of cholecystectomy    History of colon resection  diverticulitis  History of sleeve gastrectomy    History of surgery  right total hip replacement 9/12/2019  History of surgery  2019 L thr

## 2020-08-20 NOTE — ED ADULT TRIAGE NOTE - OTHER COMPLAINTS
hx of R THR, dc last Monday, c/o pain to L lower leg. Per patient she has prior hx of DVT, on Lovenox at home, no swelling noted.

## 2020-08-20 NOTE — ED ADULT NURSE NOTE - NSIMPLEMENTINTERV_GEN_ALL_ED
Implemented All Fall with Harm Risk Interventions:  [place] to call system. Call bell, personal items and telephone within reach. Instruct patient to call for assistance. Room bathroom lighting operational. Non-slip footwear when patient is off stretcher. Physically safe environment: no spills, clutter or unnecessary equipment. Stretcher in lowest position, wheels locked, appropriate side rails in place. Provide visual cue, wrist band, yellow gown, etc. Monitor gait and stability. Monitor for mental status changes and reorient to person, place, and time. Review medications for side effects contributing to fall risk. Reinforce activity limits and safety measures with patient and family. Provide visual clues: red socks.

## 2020-08-20 NOTE — ED PROVIDER NOTE - PHYSICAL EXAMINATION
LLE - no swelling, 1+DP b/l, no erythema, compartments soft. TTP left calf and popliteal fossa, FROM    RLE - incision intact, no warmth

## 2020-08-20 NOTE — ED PROVIDER NOTE - PROGRESS NOTE DETAILS
+thrombus in L intramuscular calf vein.  pt seen by ortho. spoke with Dr. Vigil - recommend admission for pt for observation now that she restarted the lovenox. states her hemoglobin was 8.7. concern for rebleeding and worsening anemia.  will give dilaudid for pain mgmt.

## 2020-08-20 NOTE — ED PROVIDER NOTE - CLINICAL SUMMARY MEDICAL DECISION MAKING FREE TEXT BOX
LLE pain, hx of DVTs in past, just restarted lovenox this morning, no evidence of infection, compartments soft  -check labs  -US  -morphine LLE pain, hx of DVTs in past, just restarted lovenox this morning, no evidence of infection, compartments soft. no chest pain, no SOB, no resp distress, speaking in full sentences. no hypoxia.  -check labs  -US  -morphine

## 2020-08-21 ENCOUNTER — INPATIENT (INPATIENT)
Facility: HOSPITAL | Age: 50
LOS: 0 days | Discharge: HOME | DRG: 300 | End: 2020-08-21
Attending: INTERNAL MEDICINE | Admitting: STUDENT IN AN ORGANIZED HEALTH CARE EDUCATION/TRAINING PROGRAM
Payer: COMMERCIAL

## 2020-08-21 ENCOUNTER — TRANSCRIPTION ENCOUNTER (OUTPATIENT)
Age: 50
End: 2020-08-21

## 2020-08-21 ENCOUNTER — APPOINTMENT (OUTPATIENT)
Dept: OPHTHALMOLOGY | Facility: CLINIC | Age: 50
End: 2020-08-21

## 2020-08-21 VITALS
HEART RATE: 86 BPM | OXYGEN SATURATION: 95 % | DIASTOLIC BLOOD PRESSURE: 85 MMHG | TEMPERATURE: 98 F | RESPIRATION RATE: 18 BRPM | SYSTOLIC BLOOD PRESSURE: 133 MMHG

## 2020-08-21 DIAGNOSIS — I10 ESSENTIAL (PRIMARY) HYPERTENSION: ICD-10-CM

## 2020-08-21 DIAGNOSIS — F32.9 MAJOR DEPRESSIVE DISORDER, SINGLE EPISODE, UNSPECIFIED: ICD-10-CM

## 2020-08-21 DIAGNOSIS — Z90.49 ACQUIRED ABSENCE OF OTHER SPECIFIED PARTS OF DIGESTIVE TRACT: Chronic | ICD-10-CM

## 2020-08-21 DIAGNOSIS — K46.9 UNSPECIFIED ABDOMINAL HERNIA WITHOUT OBSTRUCTION OR GANGRENE: Chronic | ICD-10-CM

## 2020-08-21 DIAGNOSIS — K51.90 ULCERATIVE COLITIS, UNSPECIFIED, WITHOUT COMPLICATIONS: ICD-10-CM

## 2020-08-21 DIAGNOSIS — K21.9 GASTRO-ESOPHAGEAL REFLUX DISEASE WITHOUT ESOPHAGITIS: ICD-10-CM

## 2020-08-21 DIAGNOSIS — M06.9 RHEUMATOID ARTHRITIS, UNSPECIFIED: ICD-10-CM

## 2020-08-21 DIAGNOSIS — Z98.890 OTHER SPECIFIED POSTPROCEDURAL STATES: Chronic | ICD-10-CM

## 2020-08-21 DIAGNOSIS — I82.4Z2 ACUTE EMBOLISM AND THROMBOSIS OF UNSPECIFIED DEEP VEINS OF LEFT DISTAL LOWER EXTREMITY: ICD-10-CM

## 2020-08-21 DIAGNOSIS — R63.8 OTHER SYMPTOMS AND SIGNS CONCERNING FOOD AND FLUID INTAKE: ICD-10-CM

## 2020-08-21 DIAGNOSIS — G25.81 RESTLESS LEGS SYNDROME: ICD-10-CM

## 2020-08-21 DIAGNOSIS — Z90.3 ACQUIRED ABSENCE OF STOMACH [PART OF]: Chronic | ICD-10-CM

## 2020-08-21 LAB
A1C WITH ESTIMATED AVERAGE GLUCOSE RESULT: 5 % — SIGNIFICANT CHANGE UP (ref 4–5.6)
ALBUMIN SERPL ELPH-MCNC: 3.5 G/DL — SIGNIFICANT CHANGE UP (ref 3.3–5)
ALP SERPL-CCNC: 94 U/L — SIGNIFICANT CHANGE UP (ref 40–120)
ALT FLD-CCNC: 12 U/L — SIGNIFICANT CHANGE UP (ref 10–45)
ANION GAP SERPL CALC-SCNC: 12 MMOL/L — SIGNIFICANT CHANGE UP (ref 5–17)
ANION GAP SERPL CALC-SCNC: 12 MMOL/L — SIGNIFICANT CHANGE UP (ref 5–17)
APTT BLD: 29.6 SEC — SIGNIFICANT CHANGE UP (ref 27.5–35.5)
AST SERPL-CCNC: 21 U/L — SIGNIFICANT CHANGE UP (ref 10–40)
BILIRUB SERPL-MCNC: 0.3 MG/DL — SIGNIFICANT CHANGE UP (ref 0.2–1.2)
BLD GP AB SCN SERPL QL: NEGATIVE — SIGNIFICANT CHANGE UP
BUN SERPL-MCNC: 11 MG/DL — SIGNIFICANT CHANGE UP (ref 7–23)
BUN SERPL-MCNC: 11 MG/DL — SIGNIFICANT CHANGE UP (ref 7–23)
CALCIUM SERPL-MCNC: 9.8 MG/DL — SIGNIFICANT CHANGE UP (ref 8.4–10.5)
CALCIUM SERPL-MCNC: 9.9 MG/DL — SIGNIFICANT CHANGE UP (ref 8.4–10.5)
CHLORIDE SERPL-SCNC: 103 MMOL/L — SIGNIFICANT CHANGE UP (ref 96–108)
CHLORIDE SERPL-SCNC: 103 MMOL/L — SIGNIFICANT CHANGE UP (ref 96–108)
CO2 SERPL-SCNC: 24 MMOL/L — SIGNIFICANT CHANGE UP (ref 22–31)
CO2 SERPL-SCNC: 26 MMOL/L — SIGNIFICANT CHANGE UP (ref 22–31)
CREAT SERPL-MCNC: 0.85 MG/DL — SIGNIFICANT CHANGE UP (ref 0.5–1.3)
CREAT SERPL-MCNC: 0.89 MG/DL — SIGNIFICANT CHANGE UP (ref 0.5–1.3)
ESTIMATED AVERAGE GLUCOSE: 97 MG/DL — SIGNIFICANT CHANGE UP (ref 68–114)
GLUCOSE SERPL-MCNC: 136 MG/DL — HIGH (ref 70–99)
GLUCOSE SERPL-MCNC: 138 MG/DL — HIGH (ref 70–99)
HCT VFR BLD CALC: 29.9 % — LOW (ref 34.5–45)
HGB BLD-MCNC: 8.4 G/DL — LOW (ref 11.5–15.5)
INR BLD: 1.11 — SIGNIFICANT CHANGE UP (ref 0.88–1.16)
MAGNESIUM SERPL-MCNC: 1.9 MG/DL — SIGNIFICANT CHANGE UP (ref 1.6–2.6)
MCHC RBC-ENTMCNC: 27.7 PG — SIGNIFICANT CHANGE UP (ref 27–34)
MCHC RBC-ENTMCNC: 28.1 GM/DL — LOW (ref 32–36)
MCV RBC AUTO: 98.7 FL — SIGNIFICANT CHANGE UP (ref 80–100)
NRBC # BLD: 0 /100 WBCS — SIGNIFICANT CHANGE UP (ref 0–0)
PHOSPHATE SERPL-MCNC: 4.2 MG/DL — SIGNIFICANT CHANGE UP (ref 2.5–4.5)
PLATELET # BLD AUTO: 370 K/UL — SIGNIFICANT CHANGE UP (ref 150–400)
POTASSIUM SERPL-MCNC: 4.4 MMOL/L — SIGNIFICANT CHANGE UP (ref 3.5–5.3)
POTASSIUM SERPL-MCNC: 4.6 MMOL/L — SIGNIFICANT CHANGE UP (ref 3.5–5.3)
POTASSIUM SERPL-SCNC: 4.4 MMOL/L — SIGNIFICANT CHANGE UP (ref 3.5–5.3)
POTASSIUM SERPL-SCNC: 4.6 MMOL/L — SIGNIFICANT CHANGE UP (ref 3.5–5.3)
PROT SERPL-MCNC: 7.2 G/DL — SIGNIFICANT CHANGE UP (ref 6–8.3)
PROTHROM AB SERPL-ACNC: 13.2 SEC — SIGNIFICANT CHANGE UP (ref 10.6–13.6)
RBC # BLD: 3.03 M/UL — LOW (ref 3.8–5.2)
RBC # FLD: 19.9 % — HIGH (ref 10.3–14.5)
RH IG SCN BLD-IMP: POSITIVE — SIGNIFICANT CHANGE UP
SARS-COV-2 RNA SPEC QL NAA+PROBE: SIGNIFICANT CHANGE UP
SODIUM SERPL-SCNC: 139 MMOL/L — SIGNIFICANT CHANGE UP (ref 135–145)
SODIUM SERPL-SCNC: 141 MMOL/L — SIGNIFICANT CHANGE UP (ref 135–145)
WBC # BLD: 6.73 K/UL — SIGNIFICANT CHANGE UP (ref 3.8–10.5)
WBC # FLD AUTO: 6.73 K/UL — SIGNIFICANT CHANGE UP (ref 3.8–10.5)

## 2020-08-21 PROCEDURE — 99285 EMERGENCY DEPT VISIT HI MDM: CPT | Mod: 25

## 2020-08-21 PROCEDURE — 96375 TX/PRO/DX INJ NEW DRUG ADDON: CPT

## 2020-08-21 PROCEDURE — 84100 ASSAY OF PHOSPHORUS: CPT

## 2020-08-21 PROCEDURE — 71045 X-RAY EXAM CHEST 1 VIEW: CPT

## 2020-08-21 PROCEDURE — 80048 BASIC METABOLIC PNL TOTAL CA: CPT

## 2020-08-21 PROCEDURE — 71045 X-RAY EXAM CHEST 1 VIEW: CPT | Mod: 26

## 2020-08-21 PROCEDURE — 83036 HEMOGLOBIN GLYCOSYLATED A1C: CPT

## 2020-08-21 PROCEDURE — 36415 COLL VENOUS BLD VENIPUNCTURE: CPT

## 2020-08-21 PROCEDURE — 93005 ELECTROCARDIOGRAM TRACING: CPT

## 2020-08-21 PROCEDURE — 85025 COMPLETE CBC W/AUTO DIFF WBC: CPT

## 2020-08-21 PROCEDURE — 93010 ELECTROCARDIOGRAM REPORT: CPT

## 2020-08-21 PROCEDURE — 86850 RBC ANTIBODY SCREEN: CPT

## 2020-08-21 PROCEDURE — 80053 COMPREHEN METABOLIC PANEL: CPT

## 2020-08-21 PROCEDURE — 83735 ASSAY OF MAGNESIUM: CPT

## 2020-08-21 PROCEDURE — 86901 BLOOD TYPING SEROLOGIC RH(D): CPT

## 2020-08-21 PROCEDURE — 93971 EXTREMITY STUDY: CPT | Mod: 26,LT

## 2020-08-21 PROCEDURE — 87635 SARS-COV-2 COVID-19 AMP PRB: CPT

## 2020-08-21 PROCEDURE — 85610 PROTHROMBIN TIME: CPT

## 2020-08-21 PROCEDURE — 99223 1ST HOSP IP/OBS HIGH 75: CPT | Mod: GC

## 2020-08-21 PROCEDURE — 85730 THROMBOPLASTIN TIME PARTIAL: CPT

## 2020-08-21 PROCEDURE — 93971 EXTREMITY STUDY: CPT

## 2020-08-21 PROCEDURE — 96374 THER/PROPH/DIAG INJ IV PUSH: CPT

## 2020-08-21 PROCEDURE — 85027 COMPLETE CBC AUTOMATED: CPT

## 2020-08-21 RX ORDER — METOPROLOL TARTRATE 50 MG
1 TABLET ORAL
Qty: 0 | Refills: 0 | DISCHARGE

## 2020-08-21 RX ORDER — METOPROLOL TARTRATE 50 MG
50 TABLET ORAL DAILY
Refills: 0 | Status: DISCONTINUED | OUTPATIENT
Start: 2020-08-21 | End: 2020-08-21

## 2020-08-21 RX ORDER — AMLODIPINE BESYLATE 2.5 MG/1
5 TABLET ORAL DAILY
Refills: 0 | Status: DISCONTINUED | OUTPATIENT
Start: 2020-08-21 | End: 2020-08-21

## 2020-08-21 RX ORDER — FOLIC ACID 0.8 MG
1 TABLET ORAL DAILY
Refills: 0 | Status: DISCONTINUED | OUTPATIENT
Start: 2020-08-21 | End: 2020-08-21

## 2020-08-21 RX ORDER — QUETIAPINE FUMARATE 200 MG/1
50 TABLET, FILM COATED ORAL AT BEDTIME
Refills: 0 | Status: DISCONTINUED | OUTPATIENT
Start: 2020-08-21 | End: 2020-08-21

## 2020-08-21 RX ORDER — POTASSIUM CHLORIDE 20 MEQ
1 PACKET (EA) ORAL
Qty: 0 | Refills: 0 | DISCHARGE

## 2020-08-21 RX ORDER — GABAPENTIN 400 MG/1
300 CAPSULE ORAL AT BEDTIME
Refills: 0 | Status: DISCONTINUED | OUTPATIENT
Start: 2020-08-21 | End: 2020-08-21

## 2020-08-21 RX ORDER — ENOXAPARIN SODIUM 100 MG/ML
100 INJECTION SUBCUTANEOUS EVERY 12 HOURS
Refills: 0 | Status: DISCONTINUED | OUTPATIENT
Start: 2020-08-21 | End: 2020-08-21

## 2020-08-21 RX ORDER — HYDROCHLOROTHIAZIDE 25 MG
12.5 TABLET ORAL DAILY
Refills: 0 | Status: DISCONTINUED | OUTPATIENT
Start: 2020-08-21 | End: 2020-08-21

## 2020-08-21 RX ORDER — HYDROMORPHONE HYDROCHLORIDE 2 MG/ML
0.5 INJECTION INTRAMUSCULAR; INTRAVENOUS; SUBCUTANEOUS ONCE
Refills: 0 | Status: DISCONTINUED | OUTPATIENT
Start: 2020-08-21 | End: 2020-08-21

## 2020-08-21 RX ORDER — SUCRALFATE 1 G
10 TABLET ORAL
Qty: 0 | Refills: 0 | DISCHARGE

## 2020-08-21 RX ORDER — ROPINIROLE 8 MG/1
10 TABLET, FILM COATED, EXTENDED RELEASE ORAL AT BEDTIME
Refills: 0 | Status: DISCONTINUED | OUTPATIENT
Start: 2020-08-21 | End: 2020-08-21

## 2020-08-21 RX ORDER — FLUOXETINE HCL 10 MG
40 CAPSULE ORAL DAILY
Refills: 0 | Status: DISCONTINUED | OUTPATIENT
Start: 2020-08-21 | End: 2020-08-21

## 2020-08-21 RX ORDER — ENOXAPARIN SODIUM 100 MG/ML
100 INJECTION SUBCUTANEOUS ONCE
Refills: 0 | Status: COMPLETED | OUTPATIENT
Start: 2020-08-21 | End: 2020-08-21

## 2020-08-21 RX ORDER — LOSARTAN POTASSIUM 100 MG/1
50 TABLET, FILM COATED ORAL DAILY
Refills: 0 | Status: DISCONTINUED | OUTPATIENT
Start: 2020-08-21 | End: 2020-08-21

## 2020-08-21 RX ORDER — PANTOPRAZOLE SODIUM 20 MG/1
40 TABLET, DELAYED RELEASE ORAL
Refills: 0 | Status: DISCONTINUED | OUTPATIENT
Start: 2020-08-21 | End: 2020-08-21

## 2020-08-21 RX ORDER — SULFASALAZINE 500 MG
500 TABLET ORAL DAILY
Refills: 0 | Status: DISCONTINUED | OUTPATIENT
Start: 2020-08-21 | End: 2020-08-21

## 2020-08-21 RX ORDER — IBUPROFEN 200 MG
400 TABLET ORAL ONCE
Refills: 0 | Status: DISCONTINUED | OUTPATIENT
Start: 2020-08-21 | End: 2020-08-21

## 2020-08-21 RX ADMIN — Medication 500 MILLIGRAM(S): at 12:16

## 2020-08-21 RX ADMIN — HYDROMORPHONE HYDROCHLORIDE 0.5 MILLIGRAM(S): 2 INJECTION INTRAMUSCULAR; INTRAVENOUS; SUBCUTANEOUS at 03:05

## 2020-08-21 RX ADMIN — MORPHINE SULFATE 4 MILLIGRAM(S): 50 CAPSULE, EXTENDED RELEASE ORAL at 00:20

## 2020-08-21 RX ADMIN — ENOXAPARIN SODIUM 100 MILLIGRAM(S): 100 INJECTION SUBCUTANEOUS at 04:19

## 2020-08-21 RX ADMIN — HYDROMORPHONE HYDROCHLORIDE 0.5 MILLIGRAM(S): 2 INJECTION INTRAMUSCULAR; INTRAVENOUS; SUBCUTANEOUS at 09:07

## 2020-08-21 RX ADMIN — HYDROMORPHONE HYDROCHLORIDE 0.5 MILLIGRAM(S): 2 INJECTION INTRAMUSCULAR; INTRAVENOUS; SUBCUTANEOUS at 07:03

## 2020-08-21 RX ADMIN — Medication 1 MILLIGRAM(S): at 12:16

## 2020-08-21 RX ADMIN — HYDROMORPHONE HYDROCHLORIDE 0.5 MILLIGRAM(S): 2 INJECTION INTRAMUSCULAR; INTRAVENOUS; SUBCUTANEOUS at 06:33

## 2020-08-21 RX ADMIN — PANTOPRAZOLE SODIUM 40 MILLIGRAM(S): 20 TABLET, DELAYED RELEASE ORAL at 06:33

## 2020-08-21 RX ADMIN — HYDROMORPHONE HYDROCHLORIDE 0.5 MILLIGRAM(S): 2 INJECTION INTRAMUSCULAR; INTRAVENOUS; SUBCUTANEOUS at 03:29

## 2020-08-21 RX ADMIN — Medication 40 MILLIGRAM(S): at 12:16

## 2020-08-21 RX ADMIN — Medication 50 MILLIGRAM(S): at 08:49

## 2020-08-21 RX ADMIN — HYDROMORPHONE HYDROCHLORIDE 0.5 MILLIGRAM(S): 2 INJECTION INTRAMUSCULAR; INTRAVENOUS; SUBCUTANEOUS at 09:39

## 2020-08-21 NOTE — DISCHARGE NOTE PROVIDER - NSDCCPCAREPLAN_GEN_ALL_CORE_FT
PRINCIPAL DISCHARGE DIAGNOSIS  Diagnosis: Acute deep vein thrombosis (DVT) of calf muscle vein of left lower extremity  Assessment and Plan of Treatment: You came to the hospital after the sudden-onset pain in your left calf and were found to have a DVT in your calf muscle. Although you regularly take lovenox twice a day to prevent the development of these clots, the lovenox had been discontinued because of your surgery and prolonged hospital stay, and your not being on anticoagulation left you vulnerable to the development of a DVT. We treated you with lovenox in the hospital and spoke with Dr. Vigil who agreed that you will be on lifelong anticoagulation with lovenox. You are not being discharged with any new medications, as you currently have a 30-day supply of lovenox and we did not change or adjust any of your other medications. You have a followup appointment with Dr. Melissa next week on *******. PRINCIPAL DISCHARGE DIAGNOSIS  Diagnosis: Acute deep vein thrombosis (DVT) of calf muscle vein of left lower extremity  Assessment and Plan of Treatment: You came to the hospital after the sudden-onset pain in your left calf and were found to have a DVT in your calf muscle. Although you regularly take lovenox twice a day to prevent the development of these clots, the lovenox had been discontinued because of your surgery and prolonged hospital stay, and your not being on anticoagulation left you vulnerable to the development of a DVT. We treated you with lovenox in the hospital and spoke with Dr. Vigil who agreed that you will be on lifelong anticoagulation with lovenox. You are not being discharged with any new medications, as you currently have a 30-day supply of lovenox and we did not change or adjust any of your other medications. You have a followup appointment with Dr. Melissa next week on 8/26 at 9:40.

## 2020-08-21 NOTE — H&P ADULT - NSHPLABSRESULTS_GEN_ALL_CORE
.  LABS:                         8.1    7.05  )-----------( 340      ( 20 Aug 2020 23:40 )             28.2     08-20    139  |  103  |  11  ----------------------------<  138<H>  4.4   |  24  |  0.89    Ca    9.8      20 Aug 2020 23:40    TPro  7.2  /  Alb  3.5  /  TBili  0.3  /  DBili  x   /  AST  21  /  ALT  12  /  AlkPhos  94  08-20    PT/INR - ( 20 Aug 2020 23:40 )   PT: 13.2 sec;   INR: 1.11          PTT - ( 20 Aug 2020 23:40 )  PTT:29.6 sec              RADIOLOGY, EKG & ADDITIONAL TESTS: Reviewed.

## 2020-08-21 NOTE — H&P ADULT - HISTORY OF PRESENT ILLNESS
50F Episcopalian (declining RBCs), DVT on therapeutic lovenox, DARIO not on CPAP, Obesity, HTN, Depression, GERD, UC, and recent revision R SANDY w Dr. Ballard 7/30 c/b symptomatic anemia post-operatively w Hgb to 4.4 d/t Op losses and I&D s/p EPO and IV Iron x5d presents w sharp/nonradiating left calf pain (8/10). The pain is worsened w/movement and relieved w/rest and oxycodone.     In the ED: given 4mg morphine IVP, Dilaudid 0.5mg IVP  Initial vital signs: T: 98.4 F, HR: 117, BP: 153/108, R: 18, SpO2: 98% on RA  Labs: significant for hgb 8.1  Imaging:  LE Duplex- left intramuscular calf DVT  CXR: WNL  EKG: NSR    Pt seen and examined at bedside, Patient denies: fevers, chills, myalgias, dizziness, weakness, HA, dysphagia, dysarthria, changes in vision, CP/chest discomfort, palpitations, SOB, cough, PND, orthopnea, N/V/D/C, abdominal pain, dysuria, urinary urgency/increased frequency, changes in bowel movements, melena, hematochezia, LE edema, or joint pain. ROS otherwise negative. 50F Restoration (declining RBCs), DVT/splenic vein thrombosis/PE on therapeutic Lovenox DARIO not on CPAP, Obesity, HTN, Depression, GERD, UC, and recent revision R SANDY w Dr. Ballard 7/30 c/b symptomatic anemia post-operatively w Hgb to 4.4 d/t Op losses and I&D s/p EPO and IV Iron x5d presents w sharp/nonradiating left calf pain (8/10). The pain is worsened w/movement and relieved w/rest and oxycodone. Of note, the pt was seen by heme/onc Dr. Nolasco at [facility] prior to seeing Dr. Vigil, both doctors have performed a full workup with no findings according to the pt. On ROS, Patient denies: fevers, chills, myalgias, dizziness, weakness, HA, dysphagia, dysarthria, changes in vision, CP/chest discomfort, palpitations, SOB, cough, PND, orthopnea, N/V/D/C, abdominal pain, dysuria, urinary urgency/increased frequency, changes in bowel movements, melena, hematochezia, LE edema, or joint pain. ROS otherwise negative.	    In the ED: given 4mg morphine IVP, Dilaudid 0.5mg IVP  Initial vital signs: T: 98.4 F, HR: 117, BP: 153/108, R: 18, SpO2: 98% on RA  Labs: significant for hgb 8.1  Imaging:  LE Duplex- left intramuscular calf DVT  CXR: WNL  EKG: NSR    Pt seen and examined at bedside, Patient denies: fevers, chills, myalgias, dizziness, weakness, HA, dysphagia, dysarthria, changes in vision, CP/chest discomfort, palpitations, SOB, cough, PND, orthopnea, N/V/D/C, abdominal pain, dysuria, urinary urgency/increased frequency, changes in bowel movements, melena, hematochezia, LE edema, or joint pain. ROS otherwise negative.

## 2020-08-21 NOTE — H&P ADULT - NSHPPHYSICALEXAM_GEN_ALL_CORE
.  VITAL SIGNS:  T(F): 98 (08-21-20 @ 03:17), Max: 98.4 (08-20-20 @ 22:34)  HR: 100 (08-21-20 @ 03:17) (97 - 117)  BP: 124/80 (08-21-20 @ 03:17) (113/76 - 153/108)  BP(mean): --  RR: 18 (08-21-20 @ 03:17) (16 - 18)  SpO2: 96% (08-21-20 @ 03:17) (96% - 98%)    PHYSICAL EXAM:    Constitutional: WDWN resting comfortably in bed; NAD  HEENT: NC/AT, PERRL, EOMI, anicteric sclera, no nasal discharge; uvula midline, no oropharyngeal erythema or exudates; MMM  Neck: supple; no JVD or thyromegaly  Respiratory: unlabored breathing, CTA B/L; no W/Rhonchi/Crackles, no retractions or use of accessory muscles   Cardiac: +S1/S2; RRR; no M/R/G; No ventricular heaves, PMI non-displaced  Gastrointestinal: soft, NT/ND; no rebound or guarding; +BSx4  Genitourinary: normal external genitalia  Back: spine midline, no bony tenderness or step-offs; no CVAT B/L  Extremities: WWP, no clubbing or cyanosis; no peripheral edema  Musculoskeletal: NROM x4; no joint swelling, tenderness or erythema  Vascular: 2+ radial, DP/PT pulses B/L  Dermatologic: skin warm, dry and intact; no rashes, wounds, or scars  Lymphatic: no submandibular or cervical LAD  Neurologic: AAOx3; CNII-XII grossly intact; no focal deficits  Psychiatric: affect and characteristics of appearance, verbalizations, behaviors are appropriate, denies SI/HI/AH/VH .  VITAL SIGNS:  T(F): 98 (08-21-20 @ 03:17), Max: 98.4 (08-20-20 @ 22:34)  HR: 100 (08-21-20 @ 03:17) (97 - 117)  BP: 124/80 (08-21-20 @ 03:17) (113/76 - 153/108)  BP(mean): --  RR: 18 (08-21-20 @ 03:17) (16 - 18)  SpO2: 96% (08-21-20 @ 03:17) (96% - 98%)    PHYSICAL EXAM:    Constitutional: Obese woman, WDWN resting comfortably in bed; NAD  HEENT: NC/AT, PERRL, EOMI, anicteric sclera, no nasal discharge; uvula midline, no oropharyngeal erythema or exudates; MMM  Neck: supple; no JVD or thyromegaly  Respiratory: unlabored breathing, CTA B/L; no W/Rhonchi/Crackles, no retractions or use of accessory muscles   Cardiac: +S1/S2; RRR; no M/R/G; No ventricular heaves, PMI non-displaced  Gastrointestinal: soft, NT/ND; no rebound or guarding; +BSx4  Genitourinary: normal external genitalia  Back: spine midline, no bony tenderness or step-offs; no CVAT B/L  Extremities: WWP, no clubbing or cyanosis; no peripheral edema  Musculoskeletal: Right lateral scar near greater trochanter (clean, dry, intact), NROM x4; no joint swelling or erythema, LLE calf tenderness however no erythema or edema   Vascular: 2+ radial, DP/PT pulses B/L  Dermatologic: skin warm, dry and intact; no rashes, wounds, or scars  Lymphatic: no submandibular or cervical LAD  Neurologic: AAOx3; CNII-XII grossly intact; no focal deficits  Psychiatric: affect and characteristics of appearance, verbalizations, behaviors are appropriate, denies SI/HI/AH/VH

## 2020-08-21 NOTE — H&P ADULT - ASSESSMENT
50F Denominational (declining RBCs), DVT on therapeutic lovenox, DARIO not on CPAP, Obesity, HTN, Depression, GERD, UC, and recent revision R SANDY w Dr. Ballard 7/30 c/b symptomatic anemia post-operatively w Hgb to 4.4 d/t hematoma s/p EPO and IV Iron x5d presents w sharp/nonradiating left calf pain (8/10 found to have RLE chronic DVT and new LLE intramuscular calf DVT. Admitted to medicine for further management .

## 2020-08-21 NOTE — H&P ADULT - ATTENDING COMMENTS
Admitted for new acute DVT in left intramuscular calf vein, although BTK DVT's usually don't need to be AC given patient's history is definitely indicated, pt needs lifelong AC. In the setting of recent hospital post-op course with anemia patient needs to be closely monitored for bleeding-pt is aware of this. CBC already planned to be drawn next week on Wednesday at home.  Close follow up with Hematologist as outpatient.  Continue with LMWH BID at home (pt has one month supply at home).  A1C of 5.0 --> no DM or Pre-DM  Sleep apnea got better after sleeve gastrectomy was done  BMI of 33.5 --> Obesity.  Rest as above.  Stable for discharge home today. Admitted for new acute DVT in left intramuscular calf vein, although BTK DVT's usually don't need to be AC given patient's history is definitely indicated, pt needs lifelong AC. In the setting of recent hospital post-op course with anemia patient needs to be closely monitored for bleeding-pt is aware of this. CBC already planned to be drawn next week on Wednesday at home.  Close follow up with Hematologist as outpatient.  Continue with LMWH BID at home (pt has one month supply at home).  A1C of 5.0 --> no DM or Pre-DM  Sleep apnea got better after sleeve gastrectomy was done  BMI of 33.5 --> Obesity.  Rest as above.  Stable for discharge home today with Hematology, PMD and Orthopedics follow up as outpatient.

## 2020-08-21 NOTE — DISCHARGE NOTE PROVIDER - NSDCFUSCHEDAPPT_GEN_ALL_CORE_FT
VELÁSQUEZ, TABBY ; 08/21/2020 ; NPP Ophthal [address] [facility] ; 08/25/2020 ; NPP OrthoSurg [address] [facility] ; 08/26/2020 ; NPP Intmed [address] [facility] ; 09/14/2020 ; NPP OB[address]  VELÁSQUEZ, TABBY ; 08/25/2020 ; NPP OrthoSurg [address] [facility] ; 08/26/2020 ; NPP Intmed [address] [facility] ; 09/14/2020 ; NPP OB/ [address]  VELÁSQUEZ, TABBY ; 08/25/2020 ; NPP OrthoSurg [address] [facility] ; 08/26/2020 ; NPP Intmed [address] [facility] ; 09/14/2020 ; NPP OB/ [address]  VELÁSQUEZ, TABBY ; 08/25/2020 ; NPP OrthoSurg [address] [facility] ; 08/26/2020 ; NPP Intmed [address] [facility] ; 09/14/2020 ; NPP OB/ [address]

## 2020-08-21 NOTE — H&P ADULT - PROBLEM SELECTOR PLAN 8
Fluids: not indicated   Electrolytes: Mg>2, K>4  Nutrition:  No IVF currently needed, replete lytes PRN  Prophylaxis: lovenox full AC   Activity: AAT, OOBTC  GI: PPI  C: FC  Dispo: Admit to [facility]

## 2020-08-21 NOTE — H&P ADULT - PROBLEM SELECTOR PLAN 1
hx of DVT and PE on lovenox 100mg BID recently restarted yesterday after s/p revision of SANDY, H&H steady at 8.1 from 7.4 on previous admission, Duplex LE DVT on admission significant for LLE intramuscular calf vein DVT  -c/w lovenox sub q 100mg BID  -Tylenol 650mg q6h PRN and tramadol 25mg BID PRN control

## 2020-08-21 NOTE — DISCHARGE NOTE PROVIDER - CARE PROVIDER_API CALL
Abbie Melissa  INTERNAL MEDICINE  [address] [address]  Phone: [phone]  Fax: [phone]  Follow Up Time: 1 week    Monica Vigil  [facility]  [address], [address]  [place] [address]  Phone: [phone]  Fax: [phone]  Follow Up Time:     Bolivar Ballard)  Orthopaedic Surgery  [address] [place] [address]  Phone: [phone]  Fax: [phone]  Follow Up Time: 1 week

## 2020-08-21 NOTE — H&P ADULT - PROBLEM SELECTOR PLAN 6
hx of depression on seroquel and fluoxetine, qtc 444 on admission    -c/w Seroquel 50mg qd at bedtime and fluoxetine 40mg qd hx of depression on Seroquel and Fluoxetine, qtc 444 on admission    -c/w Seroquel 50mg qd at bedtime and fluoxetine 40mg qd

## 2020-08-21 NOTE — H&P ADULT - PROBLEM SELECTOR PLAN 7
hx of restless leg syndrome  -c/w ropinrole 5mg BID hx of restless leg syndrome  -c/w Ropinirole 5mg BID

## 2020-08-21 NOTE — H&P ADULT - PROBLEM SELECTOR PLAN 5
hx of chronic GERd on PPI  -c/w protonix 40mg daily hx of chronic GERd on PPI  -c/w protonix PO 40mg daily

## 2020-08-21 NOTE — DISCHARGE NOTE PROVIDER - HOSPITAL COURSE
#Discharge: do not delete        Patient is 49yo F Judaism with past medical history of LLE DVTx2 on chronic anticoagulation, HTN, diverticulitis, T2DM, GERD, pancreatitis, DARIO, RA, neuropathy, restless leg syndrome, depression, obesity     Presented with pain behind her left knee and in her left calf, found to have a LLE DVT.    Problem List/Main Diagnoses (system-based):    1 - distal LLE DVT - on chronic anticoagulation         Inpatient treatment course:     New medications:     Labs to be followed outpatient:     Exam to be followed outpatient: #Discharge: do not delete        Patient is 49yo F Orthodoxy (not accepting blood products) with past medical history of LLE DVTx2 on chronic anticoagulation, HTN, ulcerative colitis, diverticulitis, T2DM, GERD, pancreatitis, DARIO, RA, neuropathy, restless leg syndrome, depression, obesity     Presented with pain behind her left knee and in her left calf, found to have a LLE DVT.    Problem List/Main Diagnoses (system-based):    1 - unprovoked distal LLE DVT - presented with 1d sudden onset pain in the posterior Left knee and calf, high suspicion for DVT with her PMH of PE and DVTx2, confirmed DVT with LLE doppler US. Patient has been on and off of anticoagulation including coumadin, xarelto, lopvenox. Most recently on lovenox 100mg BID after developing a splenic vein thrombosis while on xarelto. Had been on coumadin prior to xarelto. Has been on lovenox for approximately 1 year but it was recently discontinued when she was admitted to [facility] on 7/28/2020 for revision of a L hip arthroplasty on 7/30/2020. She was hospitalized for the surgery for 2weeks during which she was admitted to the ICU due to an abrupt drop in Hb from 10.1 to 4.4. Her Hb kathrine slowly and she was DCd with Hb 7.4. This admission 8/20/2020 Hb 8.1. Was treated in the ED with 4mg morphine IVP, dilaudid 0.5mg x2, lovenox 100mg, pantoprazole 40mg. She follows with Dr. Vigil who was consulted and recommended DCing the patient on 8/21 on lovenox.        2 - HTN -  continued losartan-HCTZ, amlodipine, metoprolol succinate ER        3 - GERD - gave pantoprazole 40mg        4 - Ulcerative colitis - continued sulfasalazine         5 - RA - monitored        6 - Depression - continued seroquel, fluoxetine        7 - Restless leg syndrome - continued ropinarole         ED treatment course: 4mg morphine IVP, dilaudid 0.5mg x2, lovenox 100mg, pantoprazole 40mg, LE duplex US showing LLE intramuscular calf DVT    New medications: patient has lovenox rx already, no new medications prescribed this hospitalization    Labs to be followed outpatient: A1C    Exam to be followed outpatient: appt with PCP Dr. Melissa #Discharge: do not delete        Patient is 49yo F Rastafari (does not accept blood products) with past medical history of LLE DVTx2 on chronic anticoagulation, HTN, ulcerative colitis, diverticulitis, T2DM, GERD, pancreatitis, DARIO, RA, neuropathy, restless leg syndrome, depression, obesity     Presented with pain behind her left knee and in her left calf, found to have a LLE DVT.    Problem List/Main Diagnoses (system-based):    1 - unprovoked distal LLE DVT - presented with 1d sudden onset pain in the posterior Left knee and calf, high suspicion for DVT with her PMH of PE and DVTx2, confirmed DVT with LLE doppler US. Patient has been on and off of anticoagulation including coumadin, xarelto, lopvenox. Most recently on lovenox 100mg BID after developing a splenic vein thrombosis while on xarelto. Had been on coumadin prior to xarelto. Has been on lovenox for approximately 1 year but it was recently discontinued when she was admitted to [facility] on 7/28/2020 for revision of a L hip arthroplasty on 7/30/2020. She was hospitalized for the surgery for 2weeks during which she was admitted to the ICU due to an abrupt drop in Hb from 10.1 to 4.4. Her Hb kathrine slowly and she was DCd with Hb 7.4. This admission 8/20/2020 Hb 8.1. Was treated in the ED with 4mg morphine IVP, dilaudid 0.5mg x2, lovenox 100mg, pantoprazole 40mg. She follows with Dr. Vigil who was consulted and recommended DCing the patient on 8/21 on lovenox.        2 - HTN -  continued losartan-HCTZ, amlodipine, metoprolol succinate ER        3 - GERD - gave pantoprazole 40mg        4 - Ulcerative colitis - continued sulfasalazine        5 - T2DM - received diagnosis at some point in the past but 8/21/2020 A1C 5 so may not be diabetic anymore        6 - RA - monitored        7 - Depression - continued seroquel, fluoxetine        8 - Restless leg syndrome - continued ropinarole         ED treatment course: 4mg morphine IVP, dilaudid 0.5mg x2, lovenox 100mg, pantoprazole 40mg, LE duplex US showing LLE intramuscular calf DVT    New medications: patient has lovenox rx already, no new medications prescribed this hospitalization    Labs to be followed outpatient:    Exam to be followed outpatient: appt with PCP Dr. Melissa #Discharge: do not delete        Patient is 49yo F Denominational (does not accept blood products) with past medical history of LLE DVTx2 on chronic anticoagulation, HTN, ulcerative colitis, diverticulitis, T2DM, GERD, pancreatitis, DARIO, RA, neuropathy, restless leg syndrome, depression, obesity     Presented with pain behind her left knee and in her left calf, found to have a LLE DVT.    Problem List/Main Diagnoses (system-based):    1 - unprovoked distal LLE DVT - presented with 1d sudden onset pain in the posterior Left knee and calf, high suspicion for DVT with her PMH of PE and DVTx2, confirmed DVT with LLE doppler US. Patient has been on and off of anticoagulation including coumadin, xarelto, lopvenox. Most recently on lovenox 100mg BID after developing a splenic vein thrombosis while on xarelto. Had been on coumadin prior to xarelto. Has been on lovenox for approximately 1 year but it was recently discontinued when she was admitted to [facility] on 7/28/2020 for revision of a L hip arthroplasty on 7/30/2020. She was hospitalized for the surgery for 2weeks during which she was admitted to the ICU due to an abrupt drop in Hb from 10.1 to 4.4. Her Hb kathrine slowly and she was DCd with Hb 7.4. This admission 8/20/2020 Hb 8.1. Was treated in the ED with 4mg morphine IVP, dilaudid 0.5mg x2, lovenox 100mg, pantoprazole 40mg. She follows with Dr. Vigil who was consulted and recommended DCing the patient on 8/21 on lovenox.        2 - HTN -  continued losartan-HCTZ, amlodipine, metoprolol succinate ER        3 - GERD - gave pantoprazole 40mg        4 - Ulcerative colitis - continued sulfasalazine        5 - T2DM - received diagnosis at some point in the past but 8/21/2020 A1C 5 so may not be diabetic anymore        6 - RA - monitored        7 - Depression - continued seroquel, fluoxetine        8 - Restless leg syndrome - continued ropinarole         ED treatment course: 4mg morphine IVP, dilaudid 0.5mg x2, lovenox 100mg, pantoprazole 40mg, LE duplex US showing LLE intramuscular calf DVT    New medications: patient has lovenox rx already, no new medications prescribed this hospitalization    Labs to be followed outpatient: CBC next week-already planned to be drawn at home from before    Exam to be followed outpatient: appt with PCP Dr. Melissa and Dr Marie-Hematologist

## 2020-08-21 NOTE — DISCHARGE NOTE PROVIDER - CARE PROVIDERS DIRECT ADDRESSES
[email or web address]

## 2020-08-21 NOTE — DISCHARGE NOTE PROVIDER - NSDCFUADDAPPT_GEN_ALL_CORE_FT
Please follow up with Dr. Melissa, your PCP, on 8/26 at 9:40 AM. You should get your CBC (complete blood count) checked at this appointment.

## 2020-08-21 NOTE — DISCHARGE NOTE PROVIDER - INSTRUCTIONS
Please enjoy a healthy, balanced diet high in fruits, vegetables, lean proteins and whole grains. Avoid highly processed foods and foods high in sugar.

## 2020-08-21 NOTE — DISCHARGE NOTE PROVIDER - PROVIDER TOKENS
PROVIDER:[TOKEN:[[id number]],FOLLOWUP:[1 week]],PROVIDER:[TOKEN:[[id number]]],PROVIDER:[TOKEN:[[id number]],FOLLOWUP:[1 week]]

## 2020-08-21 NOTE — DISCHARGE NOTE PROVIDER - NSDCMRMEDTOKEN_GEN_ALL_CORE_FT
acetaminophen 325 mg oral tablet: 2 tab(s) orally every 6 hours, as needed for mild pain  amLODIPine 5 mg oral tablet: 1 tab(s) orally once a day  cyanocobalamin 1000 mcg/mL injectable solution: injectable once a month  cyclobenzaprine 5 mg oral tablet: 1 tab(s) orally 3 times a day, As needed, Muscle Spasm  folic acid 1 mg oral tablet: 1 tab(s) orally once a day  gabapentin 300 mg oral tablet: 1 tab(s) orally once a day (at bedtime)  losartan-hydrochlorothiazide 50mg-12.5mg oral tablet: 1 tab(s) orally once a day    DO NOT RESTART UNTIL FOLLOW-UP WITH YOUR PRIMARY CARE PROVIDER.  Lovenox 100 mg/mL injectable solution: 1 milligram(s) subcutaneously every 12 hours MDD:2  Lovenox 100 mg/mL injectable solution: injectable every 12 hours  prescription provided outpatient by your primary care provider.    DO NOT RESTART UNTIL CONFIRM WITH DR. GONZALEZ (HEMATOLOGY).  YOUR HEMOGLOBIN SHOULD BE ABOVE 8.0 TO RESTART LOVENOX.  Metoprolol Tartrate 50 mg oral tablet: 1 tab(s) orally once a day  omeprazole 40 mg oral delayed release capsule: 1 cap(s) orally once a day  PROzac 40 mg oral capsule: 1 cap(s) orally once a day  rOPINIRole 5 mg oral tablet: 2 tab(s) orally once a day (at bedtime)  SEROquel 50 mg oral tablet: 1 tab(s) orally once a day (at bedtime)  sulfaSALAzine 500 mg oral tablet: 1 tab(s) orally once a day acetaminophen 325 mg oral tablet: 2 tab(s) orally every 6 hours, as needed for mild pain  amLODIPine 5 mg oral tablet: 1 tab(s) orally once a day  cyanocobalamin 1000 mcg/mL injectable solution: injectable once a month  cyclobenzaprine 5 mg oral tablet: 1 tab(s) orally 3 times a day, As needed, Muscle Spasm  folic acid 1 mg oral tablet: 1 tab(s) orally once a day  gabapentin 300 mg oral tablet: 1 tab(s) orally once a day (at bedtime)  losartan-hydrochlorothiazide 50mg-12.5mg oral tablet: 1 tab(s) orally once a day    DO NOT RESTART UNTIL FOLLOW-UP WITH YOUR PRIMARY CARE PROVIDER.  Lovenox 100 mg/mL injectable solution: injectable every 12 hours  prescription provided outpatient by your primary care provider.    DO NOT RESTART UNTIL CONFIRM WITH DR. GONZALEZ (HEMATOLOGY).  YOUR HEMOGLOBIN SHOULD BE ABOVE 8.0 TO RESTART LOVENOX.  Metoprolol Tartrate 50 mg oral tablet: 1 tab(s) orally once a day  omeprazole 40 mg oral delayed release capsule: 1 cap(s) orally once a day  PROzac 40 mg oral capsule: 1 cap(s) orally once a day  rOPINIRole 5 mg oral tablet: 2 tab(s) orally once a day (at bedtime)  SEROquel 50 mg oral tablet: 1 tab(s) orally once a day (at bedtime)  sulfaSALAzine 500 mg oral tablet: 1 tab(s) orally once a day

## 2020-08-21 NOTE — DISCHARGE NOTE NURSING/CASE MANAGEMENT/SOCIAL WORK - PATIENT PORTAL LINK FT
You can access the FollowMyHealth Patient Portal offered by [facility] by registering at the following website: [email or web address]. By joining [employer]’s FollowMyHealth portal, you will also be able to view your health information using other applications (apps) compatible with our system.

## 2020-08-24 ENCOUNTER — LABORATORY RESULT (OUTPATIENT)
Age: 50
End: 2020-08-24

## 2020-08-25 ENCOUNTER — RX RENEWAL (OUTPATIENT)
Age: 50
End: 2020-08-25

## 2020-08-25 ENCOUNTER — APPOINTMENT (OUTPATIENT)
Dept: ORTHOPEDIC SURGERY | Facility: CLINIC | Age: 50
End: 2020-08-25
Payer: MEDICAID

## 2020-08-25 VITALS — TEMPERATURE: 97.4 F

## 2020-08-25 PROCEDURE — 99024 POSTOP FOLLOW-UP VISIT: CPT

## 2020-08-26 ENCOUNTER — APPOINTMENT (OUTPATIENT)
Dept: INTERNAL MEDICINE | Facility: CLINIC | Age: 50
End: 2020-08-26
Payer: MEDICAID

## 2020-08-26 VITALS
DIASTOLIC BLOOD PRESSURE: 94 MMHG | SYSTOLIC BLOOD PRESSURE: 140 MMHG | TEMPERATURE: 98.9 F | HEIGHT: 68 IN | BODY MASS INDEX: 34.56 KG/M2 | HEART RATE: 107 BPM | WEIGHT: 228 LBS | OXYGEN SATURATION: 99 %

## 2020-08-26 DIAGNOSIS — I82.462 ACUTE EMBOLISM AND THROMBOSIS OF LEFT CALF MUSCULAR VEIN: ICD-10-CM

## 2020-08-26 DIAGNOSIS — G25.81 RESTLESS LEGS SYNDROME: ICD-10-CM

## 2020-08-26 DIAGNOSIS — F32.9 MAJOR DEPRESSIVE DISORDER, SINGLE EPISODE, UNSPECIFIED: ICD-10-CM

## 2020-08-26 DIAGNOSIS — G47.33 OBSTRUCTIVE SLEEP APNEA (ADULT) (PEDIATRIC): ICD-10-CM

## 2020-08-26 DIAGNOSIS — I10 ESSENTIAL (PRIMARY) HYPERTENSION: ICD-10-CM

## 2020-08-26 DIAGNOSIS — Z79.01 LONG TERM (CURRENT) USE OF ANTICOAGULANTS: ICD-10-CM

## 2020-08-26 DIAGNOSIS — Z86.711 PERSONAL HISTORY OF PULMONARY EMBOLISM: ICD-10-CM

## 2020-08-26 DIAGNOSIS — Z86.718 PERSONAL HISTORY OF OTHER VENOUS THROMBOSIS AND EMBOLISM: ICD-10-CM

## 2020-08-26 DIAGNOSIS — K51.90 ULCERATIVE COLITIS, UNSPECIFIED, WITHOUT COMPLICATIONS: ICD-10-CM

## 2020-08-26 DIAGNOSIS — E11.40 TYPE 2 DIABETES MELLITUS WITH DIABETIC NEUROPATHY, UNSPECIFIED: ICD-10-CM

## 2020-08-26 DIAGNOSIS — D64.9 ANEMIA, UNSPECIFIED: ICD-10-CM

## 2020-08-26 DIAGNOSIS — K21.9 GASTRO-ESOPHAGEAL REFLUX DISEASE WITHOUT ESOPHAGITIS: ICD-10-CM

## 2020-08-26 DIAGNOSIS — M06.9 RHEUMATOID ARTHRITIS, UNSPECIFIED: ICD-10-CM

## 2020-08-26 PROCEDURE — 99495 TRANSJ CARE MGMT MOD F2F 14D: CPT | Mod: 25

## 2020-08-26 PROCEDURE — 96372 THER/PROPH/DIAG INJ SC/IM: CPT

## 2020-08-26 RX ORDER — ESOMEPRAZOLE MAGNESIUM 40 MG/1
40 CAPSULE, DELAYED RELEASE ORAL DAILY
Qty: 90 | Refills: 0 | Status: COMPLETED | COMMUNITY
Start: 2020-06-25 | End: 2020-08-26

## 2020-08-26 RX ADMIN — CYANOCOBALAMIN 1000 MCG/ML: 1000 INJECTION, SOLUTION INTRAMUSCULAR at 00:00

## 2020-08-27 NOTE — ASSESSMENT
[FreeTextEntry1] : Assessment\par S/p revision of right hip here for 1st post operative visit. \par \par Plan\par Status post right total hip, incision is healing well. She will continue weightbearing and hip precautions until 5 weeks, weightbearing as tolerated after that and restrictions lifted. \par \par Will start physical therapy\par Will refill pain medication \par Will refer to Dr. Bhatti for pain management \par \par All medical record entries made by the PA/Scribe/Fellow are at my, Dr. Bolivar Ballard's direction and personally dictated by me on 08/25/2020]. I have reviewed the chart and agree that the record accurately reflects my personal performance of the history, physical exam, assessment, and plan. I have also personally directed reviewed, and agreed with the chart.\par \par F/U in 10 week anniversary with x-rays, all questions answered.\par \par \par \par

## 2020-08-27 NOTE — REVIEW OF SYSTEMS
[Joint Pain] : joint pain [Negative] : Heme/Lymph [Joint Stiffness] : no joint stiffness [Arthralgia] : no arthralgia [Joint Swelling] : no joint swelling

## 2020-08-27 NOTE — HISTORY OF PRESENT ILLNESS
[de-identified] : 49 y/o F, s/p right hip revision on 07/30/20 here for 1st post operative visit. Pt continues to feel pain in the right hip but is overall doing great. She is requesting a refill of her pain medication.

## 2020-08-27 NOTE — PHYSICAL EXAM
[de-identified] : General: Not in acute distress, dressed appropriately, sitting on examination table\par Skin: Warm and dry, normal turgor, no rashes\par Neurological: AOx3, Cranial nerves grossly in tact\par Psych: Mood and affect appropriate\par \par Right Hip: Well healed surgical incision. No swelling edema erythema redness or drainage. Nontender. ROM: Not assessed. 5/5 strength. Normal gait. Ambulates with a cane. \par \par  [de-identified] : No imaging today.\par

## 2020-08-31 ENCOUNTER — APPOINTMENT (OUTPATIENT)
Dept: HEMATOLOGY ONCOLOGY | Facility: CLINIC | Age: 50
End: 2020-08-31
Payer: MEDICAID

## 2020-08-31 VITALS
TEMPERATURE: 97.5 F | BODY MASS INDEX: 35.46 KG/M2 | DIASTOLIC BLOOD PRESSURE: 82 MMHG | HEART RATE: 113 BPM | WEIGHT: 234 LBS | SYSTOLIC BLOOD PRESSURE: 134 MMHG | HEIGHT: 68 IN | OXYGEN SATURATION: 96 %

## 2020-08-31 PROCEDURE — 36415 COLL VENOUS BLD VENIPUNCTURE: CPT

## 2020-08-31 PROCEDURE — 99214 OFFICE O/P EST MOD 30 MIN: CPT | Mod: 25

## 2020-08-31 RX ORDER — CYANOCOBALAMIN 1000 UG/ML
1000 INJECTION INTRAMUSCULAR; SUBCUTANEOUS
Qty: 0 | Refills: 0 | Status: COMPLETED | OUTPATIENT
Start: 2020-08-26

## 2020-08-31 NOTE — REVIEW OF SYSTEMS
[Night Sweats] : night sweats [Fatigue] : fatigue [Joint Pain] : joint pain [Joint Stiffness] : joint stiffness [Easy Bleeding] : a tendency for easy bleeding [Easy Bruising] : a tendency for easy bruising [Negative] : Allergic/Immunologic [Recent Change In Weight] : ~T no recent weight change [Fever] : no fever [Chills] : no chills [FreeTextEntry3] : slight conjunctival pallor [de-identified] : healing scar right thigh [de-identified] : hematoma post operatively [FreeTextEntry9] : right hip, groin Pain, left groin pain

## 2020-08-31 NOTE — HISTORY OF PRESENT ILLNESS
[de-identified] : Patient is a 50 year old female Restorationism with a history of obesity s/p sleeve gastrectomy, rheumatoid arthritis, hypertension, diabetes mellitus, GERD, diverticulitis s/p colonic resection, s/p hernia repair, obstructive sleep apnea, pulmonary embolism (2001), s/p left SANDY in 02/2020, who presents for hematologic follow-up following hospitalization at [facility] for severe anemia (hemoglobin 4.4) due to acute post operative blood loss, B12 deficiency, iron deficiency, and GI blood loss following right hip arthroplasty revision on 7/30/2020. Patient declined transfusion of any blood products in keeping with her Jehovah's witness beliefs. She then completed 5 days Venofer by infusion, IV folic acid,  B12 by injection and also received Procrit . She admits to stopping Lovenox on her own for one month prior to her surgery. Anticoagulation was held for some time postoperatively due to severe anemia of blood loss, guaiac positive stool and refusal to accept blood transfusions. Post discharge, she developed intermuscular calf vein thrombosis of the left calf. Symptoms have resolved as patient resumed Lovenox. Patient's most recent blood results from 08/24/2020 revealed a hemoglobin of 9.0 and hematocrit 32.9. Complains of bilateral groin pain and right hip pain at incision site. Denies bleeding, dizziness, fevers, chills, or sweats.

## 2020-08-31 NOTE — REASON FOR VISIT
[Follow-Up Visit] : a follow-up visit for [FreeTextEntry2] : Iron deficiency anemia, blood loss anemia, B12 deficiency, Fatigue, History of DVT, History of PE

## 2020-08-31 NOTE — PHYSICAL EXAM
[Obese] : obese [Normal] : affect appropriate [de-identified] : tachycardia [de-identified] : distal lower extremity edema [de-identified] : obese, striae, ecchymoses of abdominal wall [de-identified] : u [de-identified] : ecchymoses at injection sites of abdominal wall, surgical scar right hip

## 2020-08-31 NOTE — ASSESSMENT
[FreeTextEntry1] : Patient is a 50 year old female Jainism with a history of obesity s/p sleeve gastrectomy, rheumatoid arthritis, hypertension, diabetes mellitus, GERD, diverticulitis s/p colonic resection, s/p hernia repair, obstructive sleep apnea, pulmonary embolism (2001), s/p left SANDY in 02/2020, who presents for hematologic follow-up following hospitalization at [facility] for severe anemia (hemoglobin 4.4) due acute blood loss, B12 deficiency, iron deficiency, , and GI blood loss, following right hip arthroplasty revision on 7/30/2020. She remains on therapeutic doses of Lovenox.Will continue to monitor. Have ordered B12 and folate, CBC, CMP, ferritin, iron panel, and reticulocyte count. Patient was advised to call office to discuss results and next appointment date.

## 2020-09-01 ENCOUNTER — LABORATORY RESULT (OUTPATIENT)
Age: 50
End: 2020-09-01

## 2020-09-01 ENCOUNTER — TRANSCRIPTION ENCOUNTER (OUTPATIENT)
Age: 50
End: 2020-09-01

## 2020-09-01 LAB
ALBUMIN SERPL ELPH-MCNC: 4.1 G/DL
ALP BLD-CCNC: 96 U/L
ALT SERPL-CCNC: 20 U/L
ANION GAP SERPL CALC-SCNC: 15 MMOL/L
AST SERPL-CCNC: 23 U/L
BASOPHILS # BLD AUTO: 0.01 K/UL
BASOPHILS NFR BLD AUTO: 0.2 %
BILIRUB SERPL-MCNC: <0.2 MG/DL
BUN SERPL-MCNC: 12 MG/DL
CALCIUM SERPL-MCNC: 9.5 MG/DL
CHLORIDE SERPL-SCNC: 102 MMOL/L
CO2 SERPL-SCNC: 22 MMOL/L
CREAT SERPL-MCNC: 0.98 MG/DL
EOSINOPHIL # BLD AUTO: 0.36 K/UL
EOSINOPHIL NFR BLD AUTO: 7.5 %
FERRITIN SERPL-MCNC: 255 NG/ML
FOLATE SERPL-MCNC: >20 NG/ML
GLUCOSE SERPL-MCNC: 113 MG/DL
HCT VFR BLD CALC: 33.5 %
HGB BLD-MCNC: 9.4 G/DL
IMM GRANULOCYTES NFR BLD AUTO: 0.8 %
IRON SATN MFR SERPL: 29 %
IRON SERPL-MCNC: 79 UG/DL
LYMPHOCYTES # BLD AUTO: 1.34 K/UL
LYMPHOCYTES NFR BLD AUTO: 27.8 %
MAN DIFF?: NORMAL
MCHC RBC-ENTMCNC: 28.1 GM/DL
MCHC RBC-ENTMCNC: 28.1 PG
MCV RBC AUTO: 100.3 FL
MONOCYTES # BLD AUTO: 0.51 K/UL
MONOCYTES NFR BLD AUTO: 10.6 %
NEUTROPHILS # BLD AUTO: 2.56 K/UL
NEUTROPHILS NFR BLD AUTO: 53.1 %
PLATELET # BLD AUTO: 356 K/UL
POTASSIUM SERPL-SCNC: 4.3 MMOL/L
PROT SERPL-MCNC: 7 G/DL
RBC # BLD: 3.34 M/UL
RBC # BLD: 3.34 M/UL
RBC # FLD: 18 %
RETICS # AUTO: 4.2 %
RETICS AGGREG/RBC NFR: 138.4 K/UL
SODIUM SERPL-SCNC: 139 MMOL/L
TIBC SERPL-MCNC: 275 UG/DL
UIBC SERPL-MCNC: 196 UG/DL
VIT B12 SERPL-MCNC: 1334 PG/ML
WBC # FLD AUTO: 4.82 K/UL

## 2020-09-10 ENCOUNTER — RX RENEWAL (OUTPATIENT)
Age: 50
End: 2020-09-10

## 2020-09-14 ENCOUNTER — APPOINTMENT (OUTPATIENT)
Dept: OBGYN | Facility: CLINIC | Age: 50
End: 2020-09-14
Payer: MEDICAID

## 2020-09-14 VITALS
BODY MASS INDEX: 35.01 KG/M2 | SYSTOLIC BLOOD PRESSURE: 130 MMHG | DIASTOLIC BLOOD PRESSURE: 70 MMHG | WEIGHT: 231 LBS | TEMPERATURE: 98.4 F | HEIGHT: 68 IN

## 2020-09-14 DIAGNOSIS — Z01.419 ENCOUNTER FOR GYNECOLOGICAL EXAMINATION (GENERAL) (ROUTINE) W/OUT ABNORMAL FINDINGS: ICD-10-CM

## 2020-09-14 PROCEDURE — 99386 PREV VISIT NEW AGE 40-64: CPT

## 2020-09-14 PROCEDURE — 87075 CULTR BACTERIA EXCEPT BLOOD: CPT

## 2020-09-17 ENCOUNTER — APPOINTMENT (OUTPATIENT)
Dept: HEMATOLOGY ONCOLOGY | Facility: CLINIC | Age: 50
End: 2020-09-17
Payer: MEDICAID

## 2020-09-17 VITALS
SYSTOLIC BLOOD PRESSURE: 132 MMHG | OXYGEN SATURATION: 99 % | TEMPERATURE: 97.8 F | HEIGHT: 68 IN | HEART RATE: 96 BPM | DIASTOLIC BLOOD PRESSURE: 70 MMHG | WEIGHT: 235 LBS | BODY MASS INDEX: 35.61 KG/M2

## 2020-09-17 DIAGNOSIS — R73.09 OTHER ABNORMAL GLUCOSE: ICD-10-CM

## 2020-09-17 LAB — HPV HIGH+LOW RISK DNA PNL CVX: NOT DETECTED

## 2020-09-17 PROCEDURE — 36415 COLL VENOUS BLD VENIPUNCTURE: CPT

## 2020-09-17 PROCEDURE — 99213 OFFICE O/P EST LOW 20 MIN: CPT | Mod: 25

## 2020-09-17 NOTE — REASON FOR VISIT
[Follow-Up Visit] : a follow-up visit for [FreeTextEntry2] : blood loss anemia, iron deficiency anemia, fatigue, B12 deficiency

## 2020-09-17 NOTE — HISTORY OF PRESENT ILLNESS
[de-identified] : Patient is a 50 year old female Religion with a history of obesity s/p sleeve gastrectomy, rheumatoid arthritis, hypertension, diabetes mellitus, GERD, diverticulitis s/p colonic resection, s/p hernia repair, obstructive sleep apnea, pulmonary embolism (2001), s/p left SANDY in 02/2020, who presents for hematologic follow-up of blood loss anemia, iron deficiency anemia, and fatigue following right SANDY in 07/2020. Patient's most recent blood results from 09/01/2020 revealed a hemoglobin of 9.4 and hematocrit 33.5. Since the last visit, patient saw her gynecologist, Dr. Delfin De La Torre, and says that she requires surgery for uterine  prolapse, but that she will postpone this for the time being. She states she is feeling less tired now. She has not taken oral iron supplements but received infusional iron while hospitalized. Complains of shortness of breath on exertion and joint pain. Denies recent infection, fevers, chills, or sweats.

## 2020-09-17 NOTE — END OF VISIT
[FreeTextEntry3] : All medical record entries made by the Scribe were at my, Dr. Monica Vigil, direction and personally dictated by me on 09/17/2020. I have reviewed the chart and agree that the record accurately reflects my personal performance of the history, physical exam, assessment and plan. I have also personally directed, reviewed, and agreed with the chart.

## 2020-09-17 NOTE — REVIEW OF SYSTEMS
[Fatigue] : fatigue [SOB on Exertion] : shortness of breath during exertion [Joint Pain] : joint pain [Joint Stiffness] : joint stiffness [Negative] : Allergic/Immunologic [Fever] : no fever [Chills] : no chills [Wheezing] : no wheezing [Cough] : no cough [FreeTextEntry2] : feeling less tired [FreeTextEntry9] : s/p right hip replacement. Goes to PT

## 2020-09-17 NOTE — ASSESSMENT
[FreeTextEntry1] : Patient is a 50 year old female Holiness with a history of obesity s/p sleeve gastrectomy, rheumatoid arthritis, hypertension, diabetes mellitus, GERD, diverticulitis s/p colonic resection, s/p hernia repair, obstructive sleep apnea, pulmonary embolism (2001), s/p left SANDY in 02/2020, who presents for hematologic follow-up of blood loss anemia, iron deficiency anemia, and fatigue following right SANDY 07/2020. She remains on therapeutic doses of Lovenox. Have advised patient to start "gentle" iron. Will continue to monitor. Have ordered CBC, CMP, ferritin, iron panel, reticulocyte count, and A1CG ( glucose level slightly elevated on last CMP). Patient was advised to call office to discuss results and next appointment date.

## 2020-09-17 NOTE — PHYSICAL EXAM
[Obese] : obese [Normal] : affect appropriate [de-identified] : tachycardia [de-identified] : distal lower extremity edema [de-identified] : obese, striae, ecchymoses of abdominal wall [de-identified] : ecchymoses at injection sites of abdominal wall, surgical scar right hip

## 2020-09-18 ENCOUNTER — LABORATORY RESULT (OUTPATIENT)
Age: 50
End: 2020-09-18

## 2020-09-18 LAB
ALBUMIN SERPL ELPH-MCNC: 4.3 G/DL
ALP BLD-CCNC: 85 U/L
ALT SERPL-CCNC: 14 U/L
ANION GAP SERPL CALC-SCNC: 12 MMOL/L
AST SERPL-CCNC: 19 U/L
BASOPHILS # BLD AUTO: 0.03 K/UL
BASOPHILS NFR BLD AUTO: 0.5 %
BILIRUB SERPL-MCNC: <0.2 MG/DL
BUN SERPL-MCNC: 7 MG/DL
CALCIUM SERPL-MCNC: 10 MG/DL
CHLORIDE SERPL-SCNC: 102 MMOL/L
CO2 SERPL-SCNC: 27 MMOL/L
CREAT SERPL-MCNC: 0.74 MG/DL
EOSINOPHIL # BLD AUTO: 0.26 K/UL
EOSINOPHIL NFR BLD AUTO: 4.5 %
ESTIMATED AVERAGE GLUCOSE: 108 MG/DL
FERRITIN SERPL-MCNC: 149 NG/ML
GLUCOSE SERPL-MCNC: 113 MG/DL
HBA1C MFR BLD HPLC: 5.4 %
HCT VFR BLD CALC: 37.3 %
HGB BLD-MCNC: 11.2 G/DL
IMM GRANULOCYTES NFR BLD AUTO: 1 %
IRON SATN MFR SERPL: 17 %
IRON SERPL-MCNC: 53 UG/DL
LYMPHOCYTES # BLD AUTO: 1.89 K/UL
LYMPHOCYTES NFR BLD AUTO: 32.6 %
MAN DIFF?: NORMAL
MCHC RBC-ENTMCNC: 27.9 PG
MCHC RBC-ENTMCNC: 30 GM/DL
MCV RBC AUTO: 92.8 FL
MONOCYTES # BLD AUTO: 0.72 K/UL
MONOCYTES NFR BLD AUTO: 12.4 %
NEUTROPHILS # BLD AUTO: 2.83 K/UL
NEUTROPHILS NFR BLD AUTO: 49 %
PLATELET # BLD AUTO: 310 K/UL
POTASSIUM SERPL-SCNC: 4.4 MMOL/L
PROT SERPL-MCNC: 7.2 G/DL
RBC # BLD: 4.02 M/UL
RBC # BLD: 4.02 M/UL
RBC # FLD: 16.1 %
RETICS # AUTO: 2.1 %
RETICS AGGREG/RBC NFR: 84.8 K/UL
SODIUM SERPL-SCNC: 141 MMOL/L
TIBC SERPL-MCNC: 317 UG/DL
UIBC SERPL-MCNC: 264 UG/DL
WBC # FLD AUTO: 5.79 K/UL

## 2020-09-23 ENCOUNTER — APPOINTMENT (OUTPATIENT)
Dept: INTERNAL MEDICINE | Facility: CLINIC | Age: 50
End: 2020-09-23
Payer: MEDICAID

## 2020-09-23 ENCOUNTER — NON-APPOINTMENT (OUTPATIENT)
Age: 50
End: 2020-09-23

## 2020-09-23 VITALS
HEART RATE: 110 BPM | DIASTOLIC BLOOD PRESSURE: 85 MMHG | SYSTOLIC BLOOD PRESSURE: 120 MMHG | TEMPERATURE: 97.6 F | HEIGHT: 68 IN | OXYGEN SATURATION: 98 % | BODY MASS INDEX: 36.07 KG/M2 | WEIGHT: 238 LBS

## 2020-09-23 DIAGNOSIS — R07.89 OTHER CHEST PAIN: ICD-10-CM

## 2020-09-23 PROCEDURE — 99214 OFFICE O/P EST MOD 30 MIN: CPT | Mod: 25

## 2020-09-23 PROCEDURE — 93000 ELECTROCARDIOGRAM COMPLETE: CPT

## 2020-09-23 RX ORDER — OXYCODONE AND ACETAMINOPHEN 5; 325 MG/1; MG/1
5-325 TABLET ORAL
Qty: 40 | Refills: 0 | Status: COMPLETED | COMMUNITY
Start: 2020-06-01 | End: 2020-09-23

## 2020-09-23 RX ORDER — TRAMADOL HYDROCHLORIDE 50 MG/1
50 TABLET, COATED ORAL
Qty: 15 | Refills: 0 | Status: COMPLETED | COMMUNITY
Start: 2020-05-18 | End: 2020-09-23

## 2020-09-25 ENCOUNTER — NON-APPOINTMENT (OUTPATIENT)
Age: 50
End: 2020-09-25

## 2020-09-25 ENCOUNTER — APPOINTMENT (OUTPATIENT)
Dept: OPHTHALMOLOGY | Facility: CLINIC | Age: 50
End: 2020-09-25
Payer: MEDICAID

## 2020-09-25 PROCEDURE — 92286 ANT SGM IMG I&R SPECLR MIC: CPT

## 2020-09-25 PROCEDURE — 92002 INTRM OPH EXAM NEW PATIENT: CPT

## 2020-09-25 PROCEDURE — 92134 CPTRZ OPH DX IMG PST SGM RTA: CPT

## 2020-10-01 ENCOUNTER — OUTPATIENT (OUTPATIENT)
Dept: OUTPATIENT SERVICES | Facility: HOSPITAL | Age: 50
LOS: 1 days | End: 2020-10-01

## 2020-10-01 DIAGNOSIS — Z98.890 OTHER SPECIFIED POSTPROCEDURAL STATES: Chronic | ICD-10-CM

## 2020-10-01 DIAGNOSIS — Z90.3 ACQUIRED ABSENCE OF STOMACH [PART OF]: Chronic | ICD-10-CM

## 2020-10-01 DIAGNOSIS — Z90.49 ACQUIRED ABSENCE OF OTHER SPECIFIED PARTS OF DIGESTIVE TRACT: Chronic | ICD-10-CM

## 2020-10-01 DIAGNOSIS — K46.9 UNSPECIFIED ABDOMINAL HERNIA WITHOUT OBSTRUCTION OR GANGRENE: Chronic | ICD-10-CM

## 2020-10-02 ENCOUNTER — RX RENEWAL (OUTPATIENT)
Age: 50
End: 2020-10-02

## 2020-10-06 NOTE — PHYSICAL EXAM
[Appropriately responsive] : appropriately responsive [Alert] : alert [No Acute Distress] : no acute distress [No Lymphadenopathy] : no lymphadenopathy [Regular Rate Rhythm] : regular rate rhythm [Clear to Auscultation B/L] : clear to auscultation bilaterally [No Murmurs] : no murmurs [Soft] : soft [Non-tender] : non-tender [Non-distended] : non-distended [No HSM] : No HSM [No Lesions] : no lesions [No Mass] : no mass [Oriented x3] : oriented x3 [Examination Of The Breasts] : a normal appearance [Breast Nipple Inversion] : inverted [No Masses] : no breast masses were palpable [No Bleeding] : There was no active vaginal bleeding [Cystocele] : a cystocele [Rectocele] : a rectocele [Rectal Prolapse] : A prolapsed rectum was noted [Normal] : normal [Gross Blood] : showed gross blood [Normal Brown Stool] : was normal and brown [Lax] : was lax [FreeTextEntry6] : large,pendulos [Occult Blood Positive] : was negative for occult blood analysis

## 2020-10-06 NOTE — HISTORY OF PRESENT ILLNESS
[LMP unknown] : LMP unknown [] : postmenopausal [Y] : Positive pregnancy history [TextBox_4] : Annual Exam  [Mammogramdate] : 2018 [PapSmeardate] : 2019 [ColonoscopyDate] : 2019 [PGxTotal] : 10 [PGxPremature] : 8 [PGHxABInduced] : 2

## 2020-10-06 NOTE — DISCUSSION/SUMMARY
[FreeTextEntry1] : Ms. INDIRA VELÁSQUEZ is a 50 year year old who presents today for an Annual Exam. TABBY complaints.about dysuria, and prolapse rectum.and urinary incontinence...\par urine c/s done\par -referral given for breast imaging, pelvic ultrasound & bone density\par -pap smear done\par -vitamine and supplements reviewed\par -she had been advised to follow up in one (1) year . evaluation & management.\par

## 2020-10-07 ENCOUNTER — TRANSCRIPTION ENCOUNTER (OUTPATIENT)
Age: 50
End: 2020-10-07

## 2020-10-13 ENCOUNTER — RX RENEWAL (OUTPATIENT)
Age: 50
End: 2020-10-13

## 2020-10-13 ENCOUNTER — APPOINTMENT (OUTPATIENT)
Dept: ORTHOPEDIC SURGERY | Facility: CLINIC | Age: 50
End: 2020-10-13
Payer: MEDICAID

## 2020-10-13 PROCEDURE — 99024 POSTOP FOLLOW-UP VISIT: CPT

## 2020-10-13 RX ORDER — BENZOYL PEROXIDE 5 G/100G
5 GEL TOPICAL DAILY
Qty: 90 | Refills: 2 | Status: ACTIVE | COMMUNITY
Start: 2019-06-27 | End: 1900-01-01

## 2020-10-14 NOTE — ASSESSMENT
[FreeTextEntry1] : Assessment\par S/p right hip revision on 07/30/20, 2nd post operative visit\par \par Plan\par Continue with physical therapy, focus on streghtening of the right hip \par Will give pain medication \par \par F/U in 1 month

## 2020-10-14 NOTE — HISTORY OF PRESENT ILLNESS
[de-identified] : 49 y/o F, s/p right hip revision on 07/30/20 here for 2nd post operative visit. Complaining of constant pain, described as dull/achy, rated 7/10. Continues to attend physical therapy, has attended 4 sessions so far. Does at home strengthening exercises at home regularly. Pain is worse from going to a sit to stand position. Pt is not taking anything for the pain. Currently on blood thinners. No recent injections.

## 2020-10-14 NOTE — END OF VISIT
[FreeTextEntry3] : By signing my name below, I, Rachelle Andersen, attest that this documentation has been prepared under the direction and in the presence of Greg Prince PA-C.

## 2020-10-14 NOTE — PHYSICAL EXAM
[de-identified] : General: Not in acute distress, dressed appropriately, sitting on examination table\par Skin: Warm and dry, normal turgor, no rashes\par Neurological: AOx3, Cranial nerves grossly in tact\par Psych: Mood and affect appropriate\par \par Right Hip: Well healed surgical incision. No swelling edema erythema redness or drainage.Non- Tender. Pian with internal rotation. 5/5 strength. Normal gait. Ambulates with a cane. \par  [de-identified] : No imaging today.\par

## 2020-10-20 ENCOUNTER — APPOINTMENT (OUTPATIENT)
Dept: HEART AND VASCULAR | Facility: CLINIC | Age: 50
End: 2020-10-20
Payer: MEDICAID

## 2020-10-20 VITALS
SYSTOLIC BLOOD PRESSURE: 129 MMHG | WEIGHT: 254 LBS | HEIGHT: 68 IN | TEMPERATURE: 99 F | HEART RATE: 95 BPM | BODY MASS INDEX: 38.49 KG/M2 | DIASTOLIC BLOOD PRESSURE: 85 MMHG | OXYGEN SATURATION: 97 %

## 2020-10-20 VITALS — SYSTOLIC BLOOD PRESSURE: 119 MMHG | DIASTOLIC BLOOD PRESSURE: 84 MMHG

## 2020-10-20 DIAGNOSIS — R60.0 LOCALIZED EDEMA: ICD-10-CM

## 2020-10-20 PROCEDURE — 99205 OFFICE O/P NEW HI 60 MIN: CPT

## 2020-10-20 PROCEDURE — 93000 ELECTROCARDIOGRAM COMPLETE: CPT

## 2020-10-22 ENCOUNTER — APPOINTMENT (OUTPATIENT)
Dept: RADIOLOGY | Facility: CLINIC | Age: 50
End: 2020-10-22

## 2020-10-22 ENCOUNTER — APPOINTMENT (OUTPATIENT)
Dept: ULTRASOUND IMAGING | Facility: CLINIC | Age: 50
End: 2020-10-22
Payer: MEDICAID

## 2020-10-22 ENCOUNTER — RESULT REVIEW (OUTPATIENT)
Age: 50
End: 2020-10-22

## 2020-10-22 ENCOUNTER — APPOINTMENT (OUTPATIENT)
Dept: MAMMOGRAPHY | Facility: CLINIC | Age: 50
End: 2020-10-22

## 2020-10-22 ENCOUNTER — OUTPATIENT (OUTPATIENT)
Dept: OUTPATIENT SERVICES | Facility: HOSPITAL | Age: 50
LOS: 1 days | End: 2020-10-22

## 2020-10-22 ENCOUNTER — APPOINTMENT (OUTPATIENT)
Dept: ULTRASOUND IMAGING | Facility: CLINIC | Age: 50
End: 2020-10-22

## 2020-10-22 DIAGNOSIS — Z90.49 ACQUIRED ABSENCE OF OTHER SPECIFIED PARTS OF DIGESTIVE TRACT: Chronic | ICD-10-CM

## 2020-10-22 DIAGNOSIS — Z90.3 ACQUIRED ABSENCE OF STOMACH [PART OF]: Chronic | ICD-10-CM

## 2020-10-22 DIAGNOSIS — K46.9 UNSPECIFIED ABDOMINAL HERNIA WITHOUT OBSTRUCTION OR GANGRENE: Chronic | ICD-10-CM

## 2020-10-22 DIAGNOSIS — Z98.890 OTHER SPECIFIED POSTPROCEDURAL STATES: Chronic | ICD-10-CM

## 2020-10-22 PROCEDURE — 77063 BREAST TOMOSYNTHESIS BI: CPT | Mod: 26

## 2020-10-22 PROCEDURE — 76856 US EXAM PELVIC COMPLETE: CPT | Mod: 26

## 2020-10-22 PROCEDURE — 76830 TRANSVAGINAL US NON-OB: CPT | Mod: 26

## 2020-10-22 PROCEDURE — 77067 SCR MAMMO BI INCL CAD: CPT | Mod: 26

## 2020-10-22 PROCEDURE — 76641 ULTRASOUND BREAST COMPLETE: CPT | Mod: 26,50

## 2020-10-22 PROCEDURE — 77085 DXA BONE DENSITY AXL VRT FX: CPT | Mod: 26

## 2020-10-23 ENCOUNTER — APPOINTMENT (OUTPATIENT)
Dept: OPHTHALMOLOGY | Facility: CLINIC | Age: 50
End: 2020-10-23

## 2020-10-27 ENCOUNTER — NON-APPOINTMENT (OUTPATIENT)
Age: 50
End: 2020-10-27

## 2020-10-29 ENCOUNTER — TRANSCRIPTION ENCOUNTER (OUTPATIENT)
Age: 50
End: 2020-10-29

## 2020-11-02 ENCOUNTER — APPOINTMENT (OUTPATIENT)
Dept: GASTROENTEROLOGY | Facility: CLINIC | Age: 50
End: 2020-11-02
Payer: MEDICAID

## 2020-11-02 ENCOUNTER — APPOINTMENT (OUTPATIENT)
Dept: OBGYN | Facility: CLINIC | Age: 50
End: 2020-11-02

## 2020-11-02 VITALS
DIASTOLIC BLOOD PRESSURE: 87 MMHG | RESPIRATION RATE: 14 BRPM | BODY MASS INDEX: 38.65 KG/M2 | SYSTOLIC BLOOD PRESSURE: 125 MMHG | HEART RATE: 96 BPM | HEIGHT: 68 IN | TEMPERATURE: 97.5 F | OXYGEN SATURATION: 98 % | WEIGHT: 255 LBS

## 2020-11-02 PROCEDURE — 99072 ADDL SUPL MATRL&STAF TM PHE: CPT

## 2020-11-02 PROCEDURE — 99204 OFFICE O/P NEW MOD 45 MIN: CPT | Mod: 25

## 2020-11-03 ENCOUNTER — TRANSCRIPTION ENCOUNTER (OUTPATIENT)
Age: 50
End: 2020-11-03

## 2020-11-03 RX ORDER — CETIRIZINE HYDROCHLORIDE 10 MG/1
10 TABLET, COATED ORAL
Qty: 90 | Refills: 1 | Status: ACTIVE | COMMUNITY
Start: 2019-06-28 | End: 1900-01-01

## 2020-11-04 LAB
ALBUMIN SERPL ELPH-MCNC: 4.5 G/DL
ALP BLD-CCNC: 90 U/L
ALT SERPL-CCNC: 10 U/L
ANION GAP SERPL CALC-SCNC: 11 MMOL/L
APTT BLD: 36.4 SEC
AST SERPL-CCNC: 14 U/L
BASOPHILS # BLD AUTO: 0.02 K/UL
BASOPHILS NFR BLD AUTO: 0.4 %
BILIRUB SERPL-MCNC: <0.2 MG/DL
BUN SERPL-MCNC: 16 MG/DL
CALCIUM SERPL-MCNC: 9.7 MG/DL
CHLORIDE SERPL-SCNC: 102 MMOL/L
CO2 SERPL-SCNC: 26 MMOL/L
CREAT SERPL-MCNC: 0.9 MG/DL
EOSINOPHIL # BLD AUTO: 0.19 K/UL
EOSINOPHIL NFR BLD AUTO: 4.1 %
FERRITIN SERPL-MCNC: 50 NG/ML
FOLATE SERPL-MCNC: >20 NG/ML
GLUCOSE SERPL-MCNC: 106 MG/DL
HCT VFR BLD CALC: 41.8 %
HGB BLD-MCNC: 12.6 G/DL
IMM GRANULOCYTES NFR BLD AUTO: 0.4 %
INR PPP: 1.04 RATIO
IRON SATN MFR SERPL: 14 %
IRON SERPL-MCNC: 50 UG/DL
LYMPHOCYTES # BLD AUTO: 1.91 K/UL
LYMPHOCYTES NFR BLD AUTO: 40.9 %
MAN DIFF?: NORMAL
MCHC RBC-ENTMCNC: 27.5 PG
MCHC RBC-ENTMCNC: 30.1 GM/DL
MCV RBC AUTO: 91.1 FL
MONOCYTES # BLD AUTO: 0.57 K/UL
MONOCYTES NFR BLD AUTO: 12.2 %
NEUTROPHILS # BLD AUTO: 1.96 K/UL
NEUTROPHILS NFR BLD AUTO: 42 %
PLATELET # BLD AUTO: 290 K/UL
POTASSIUM SERPL-SCNC: 4.3 MMOL/L
PROT SERPL-MCNC: 7.3 G/DL
PT BLD: 12.2 SEC
RBC # BLD: 4.59 M/UL
RBC # BLD: 4.59 M/UL
RBC # FLD: 15.2 %
RETICS # AUTO: 1.5 %
RETICS AGGREG/RBC NFR: 66.6 K/UL
SODIUM SERPL-SCNC: 139 MMOL/L
TIBC SERPL-MCNC: 358 UG/DL
UIBC SERPL-MCNC: 308 UG/DL
VIT B12 SERPL-MCNC: 591 PG/ML
WBC # FLD AUTO: 4.67 K/UL

## 2020-11-05 NOTE — CONSULT LETTER
[Dear  ___] : Dear  [unfilled], [Consult Letter:] : I had the pleasure of evaluating your patient, [unfilled]. [Please see my note below.] : Please see my note below. [Consult Closing:] : Thank you very much for allowing me to participate in the care of this patient.  If you have any questions, please do not hesitate to contact me. [Sincerely,] : Sincerely, [FreeTextEntry3] : Danuta Bowers M.D. \par \par [facility]\par IBD program\par Department of Gastroenterology\par

## 2020-11-05 NOTE — ASSESSMENT
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 \par \par Rectal bleeding, abdominal pain (below belly button) revlieved after BMs. She has a history of diverticulitis s/p colon resection and feels many of these symptoms started after that surgery. \par - plan for colonoscopy (check colonic biopsies given ongoing diarrhea)\par - Hold lovenox 24 hours prior to procedure (check with hematologist to make sure this is ok)\par - stool studies\par - records from prior surgery (Dr. Valente at [facility])\par - Biopsy results from prior EGD/Colon with Chanel/Constanza (she had a large duodenal nodule removed preivously) \par  pre-procedure labs already in chart (recently had labs with Dr. Vigil)\par \par Anemia\par - now resolved, recent hgb normal\par - recent iron studies wnl\par \par \par Loose diarrhea: could this be infectious in nature. Could also be overflow incontinence? Has constipation at other times\par - stool studies\par \par Constipation and sensation of incomplete evacuation. Of note patient has a history of 8 . Could be a component of pelvic dysfunction and may need to consider possibility of prolapse as well (she notes that she feels something). \par - consider ARM and possible defecography in future\par - for now has complaint of diarrhea and will hold off laxatives\par \par GERD, epigastric discomfort, and history of large duodenal polyp that was removed by advanced endoscopist\par - will do EGD at time of endoscopy to evaluate the GERD but also to assess because of the history of the large polyp\par \par Recurrent DVT/PE\par - is on lovenox, discussed she will need to ensure safe to hold AC x 24 hrs prior to the procedure\par \par Follow up post procedure\par \par Addendum: will contact the patient to notify her that will request cardiologist clearance prior to procedure because she has been having dyspnea and CP and was recently seen Oct 20 by cardiologist

## 2020-11-05 NOTE — PHYSICAL EXAM
[General Appearance - Alert] : alert [General Appearance - In No Acute Distress] : in no acute distress [General Appearance - Well Nourished] : well nourished [General Appearance - Well Developed] : well developed [General Appearance - Well-Appearing] : healthy appearing [Sclera] : the sclera and conjunctiva were normal [PERRL With Normal Accommodation] : pupils were equal in size, round, and reactive to light [Extraocular Movements] : extraocular movements were intact [Outer Ear] : the ears and nose were normal in appearance [Hearing Threshold Finger Rub Not Cascade] : hearing was normal [Both Tympanic Membranes Were Examined] : both tympanic membranes were normal [Neck Appearance] : the appearance of the neck was normal [Neck Cervical Mass (___cm)] : no neck mass was observed [Respiration, Rhythm And Depth] : normal respiratory rhythm and effort [Exaggerated Use Of Accessory Muscles For Inspiration] : no accessory muscle use [Apical Impulse] : the apical impulse was normal [Heart Rate And Rhythm] : heart rate was normal and rhythm regular [Heart Sounds] : normal S1 and S2 [Edema] : there was no peripheral edema [Bowel Sounds] : normal bowel sounds [Abdomen Soft] : soft [Abdomen Tenderness] : non-tender [Abdomen Mass (___ Cm)] : no abdominal mass palpated [Cervical Lymph Nodes Enlarged Posterior Bilaterally] : posterior cervical [Cervical Lymph Nodes Enlarged Anterior Bilaterally] : anterior cervical [No CVA Tenderness] : no ~M costovertebral angle tenderness [Involuntary Movements] : no involuntary movements were seen [Skin Color & Pigmentation] : normal skin color and pigmentation [Skin Turgor] : normal skin turgor [] : no rash [Skin Lesions] : no skin lesions [No Focal Deficits] : no focal deficits [Oriented To Time, Place, And Person] : oriented to person, place, and time [Impaired Insight] : insight and judgment were intact [Affect] : the affect was normal

## 2020-11-10 ENCOUNTER — APPOINTMENT (OUTPATIENT)
Dept: ORTHOPEDIC SURGERY | Facility: CLINIC | Age: 50
End: 2020-11-10
Payer: MEDICAID

## 2020-11-10 DIAGNOSIS — M47.816 SPONDYLOSIS W/OUT MYELOPATHY OR RADICULOPATHY, LUMBAR REGION: ICD-10-CM

## 2020-11-10 DIAGNOSIS — M54.5 LOW BACK PAIN: ICD-10-CM

## 2020-11-10 DIAGNOSIS — G89.29 LOW BACK PAIN: ICD-10-CM

## 2020-11-10 PROCEDURE — 99212 OFFICE O/P EST SF 10 MIN: CPT

## 2020-11-10 PROCEDURE — 99072 ADDL SUPL MATRL&STAF TM PHE: CPT

## 2020-11-11 PROBLEM — M47.816 DEGENERATIVE JOINT DISEASE (DJD) OF LUMBAR SPINE: Status: ACTIVE | Noted: 2019-10-25

## 2020-11-11 PROBLEM — M54.5 CHRONIC LOW BACK PAIN: Status: ACTIVE | Noted: 2019-11-21

## 2020-11-11 NOTE — HISTORY OF PRESENT ILLNESS
[Procedure: ___] : status post [unfilled] [___ Months Post Op] : [unfilled] months post op [5] : the patient reports pain that is 5/10 in severity [Healed] : healed [Neuro Intact] : an unremarkable neurological exam [Xray (Date:___)] : [unfilled] Xray -  [Doing Well] : is doing well [None] : None [Chills] : no chills [Constipation] : no constipation [Fever] : no fever [Nausea] : no nausea [Vomiting] : no vomiting [Erythema] : not erythematous [Discharge] : absent of discharge [Swelling] : not swollen [Dehiscence] : not dehisced [de-identified] : Pt is s/p RTHA revision on 7/30/20 doing well. Pt has discontinued PT and is working on exercises at home, she no longer uses a walker or narcotic pain medication. Pt does use a cane for long distances. Her strength, ability to navigate stairs and walk long distances is improving and she is happy with her progress although she still complains of groin pain mainly with stairs. Pain was aggravated by recent PT session.  [de-identified] : hip ROM 0-110, -ve log roll, mild TTP over greater trochanter and over psoas tendon, -ve SLR [de-identified] : Right hip xrays show no fracture/disloation, signs of loosening. Implants remain in good position. [de-identified] : 50 y.o female 3 months s/p RTHA revision progressing as expected.  [de-identified] : Continue home exercises and formal PT as tolerated. F/u in 3 months for re-evaluation.\par All medical record entries made by the PA/Scribe/Fellow are at my, Dr. Bolivar Ballard's direction and personally dictated by me on 11/10/2020]. I have reviewed the chart and agree that the record accurately reflects my personal performance of the history, physical exam, assessment, and plan. I have also personally directed reviewed, and agreed with the chart.\par

## 2020-11-18 ENCOUNTER — APPOINTMENT (OUTPATIENT)
Dept: INTERNAL MEDICINE | Facility: CLINIC | Age: 50
End: 2020-11-18

## 2020-12-01 ENCOUNTER — RESULT CHARGE (OUTPATIENT)
Age: 50
End: 2020-12-01

## 2020-12-01 DIAGNOSIS — N39.0 URINARY TRACT INFECTION, SITE NOT SPECIFIED: ICD-10-CM

## 2020-12-02 ENCOUNTER — APPOINTMENT (OUTPATIENT)
Dept: INTERNAL MEDICINE | Facility: CLINIC | Age: 50
End: 2020-12-02

## 2020-12-02 ENCOUNTER — APPOINTMENT (OUTPATIENT)
Dept: UROGYNECOLOGY | Facility: CLINIC | Age: 50
End: 2020-12-02
Payer: MEDICAID

## 2020-12-02 VITALS
BODY MASS INDEX: 37.89 KG/M2 | SYSTOLIC BLOOD PRESSURE: 140 MMHG | DIASTOLIC BLOOD PRESSURE: 90 MMHG | WEIGHT: 250 LBS | TEMPERATURE: 96.6 F | HEIGHT: 68 IN

## 2020-12-02 PROCEDURE — 99072 ADDL SUPL MATRL&STAF TM PHE: CPT

## 2020-12-02 PROCEDURE — 51798 US URINE CAPACITY MEASURE: CPT

## 2020-12-02 PROCEDURE — 99205 OFFICE O/P NEW HI 60 MIN: CPT

## 2020-12-02 NOTE — HISTORY OF PRESENT ILLNESS
[FreeTextEntry1] : The pt is a 49 y/o P7, LMP >1 yr, with UV prolapse for 6 months, with a bothersome level of 8 /10.\par She leaks urine with coughing, laughing and urgency , bothersome level 8/10\par She wears 4 PPD\par She feels incomplete emptying of her bladder.\par She voids every hr with a varied volume and has nocturia 2-3 with a varied volume\par Her liquid intake consists of 4 bottles of water/day.\par She has a BM 3-10x with varied volume . She is being evaluated by GI\par She denies gross hematuria, recurrent UTIs, hx of nephrolithiaisis\par Her last PAP was 2019 , and she denies a hx of abnormal PAP.\par The last time she had intercourse was 10 yrs due to lack of partner . \par Her abdominal surgical hx is significant for colon resection for diverticulitis, bariatric surgery and lost 100 lbs in 2015, repair of umbilical hernia and ? "hole in her intestine, bilateral hip replacement surgery  . She reports no surgical complications.\par She has ? endometriosis and is scheduled for MRI\par \par

## 2020-12-02 NOTE — PHYSICAL EXAM
[No Acute Distress] : in no acute distress [Well developed] : well developed [Well Nourished] : ~L well nourished [Good Hygeine] : demonstrates good hygeine [Oriented x3] : oriented to person, place, and time [Normal Memory] : ~T memory was ~L unimpaired [Normal Mood/Affect] : mood and affect are normal [Normal Lung Sounds] : the lungs were clear to auscultation [Respirations regular] : ~T respiratory rate was regular [Rate & Rhythm Regular] : ~T heart rate and rhythm were normal [No Edema] : ~T edema was not present [Supple] : ~T the neck demonstrated no ~M decrease in suppleness [Thyroid Normal] : the thyroid ~T showed no abnormalities [Symmetrical] : the neck was ~L symmetrical [Scar] : a scar was noted [Normal Gait] : gait was normal [Labia Majora] : were normal [Labia Minora] : were normal [Bartholin's Gland] : both Bartholin's glands were normal  [Normal Appearance] : general appearance was normal [No Bleeding] : there was no active vaginal bleeding [Normal] : no abnormalities [1] : 1 [Aa ____] : Aa [unfilled] [Ba ____] : Ba [unfilled] [C ____] : C [unfilled] [TVL ____] : TVL  [unfilled] [Ap ____] : Ap [unfilled] [Bp ____] : Bp [unfilled] [D ____] : D [unfilled] [] : III [Post Void Residual ____ml] : post void residual was [unfilled] ml [Exam Deferred] : was deferred [Anxiety] : patient is not anxious [Cough] : no cough [Dyspnea] : no dyspnea [Murmurs] : no murmurs were heard [Varicose Veins] : no varicose veins observed [Tracheal Deviation] : no tracheal deviation observed [Mass] : no ~M [unfilled] neck mass was observed [Mass (___ Cm)] : no ~M [unfilled] abdominal mass was palpated [Tenderness] : ~T no ~M abdominal tenderness observed [Distended] : not distended [Hernia] : no hernia observed

## 2020-12-02 NOTE — ASSESSMENT
[FreeTextEntry1] : Today's findings were discussed with the pt and written pamphlets were provided for vaginal prolapse and urinary incontinence.  After discussing various options of treatment, she opted to proceed with surgery.   She will be scheduled for urodynamics.  \par If surgery, I would recommend CHAPITO, BSO, colopexy and possible TVT.  I will consult with GS for VICTOR M.\par She is being evaluated by Dr Thrasher for endometrial hyperplasia vs endometriosis.\par \par \par

## 2020-12-04 ENCOUNTER — RX RENEWAL (OUTPATIENT)
Age: 50
End: 2020-12-04

## 2020-12-04 LAB — BACTERIA UR CULT: NORMAL

## 2020-12-07 ENCOUNTER — OUTPATIENT (OUTPATIENT)
Dept: OUTPATIENT SERVICES | Facility: HOSPITAL | Age: 50
LOS: 1 days | End: 2020-12-07

## 2020-12-07 ENCOUNTER — RESULT REVIEW (OUTPATIENT)
Age: 50
End: 2020-12-07

## 2020-12-07 ENCOUNTER — APPOINTMENT (OUTPATIENT)
Dept: CT IMAGING | Facility: CLINIC | Age: 50
End: 2020-12-07
Payer: MEDICAID

## 2020-12-07 DIAGNOSIS — Z90.49 ACQUIRED ABSENCE OF OTHER SPECIFIED PARTS OF DIGESTIVE TRACT: Chronic | ICD-10-CM

## 2020-12-07 DIAGNOSIS — Z98.890 OTHER SPECIFIED POSTPROCEDURAL STATES: Chronic | ICD-10-CM

## 2020-12-07 DIAGNOSIS — Z90.3 ACQUIRED ABSENCE OF STOMACH [PART OF]: Chronic | ICD-10-CM

## 2020-12-07 DIAGNOSIS — K46.9 UNSPECIFIED ABDOMINAL HERNIA WITHOUT OBSTRUCTION OR GANGRENE: Chronic | ICD-10-CM

## 2020-12-07 PROCEDURE — 75574 CT ANGIO HRT W/3D IMAGE: CPT | Mod: 26

## 2020-12-07 PROCEDURE — 71275 CT ANGIOGRAPHY CHEST: CPT | Mod: 26

## 2020-12-08 ENCOUNTER — NON-APPOINTMENT (OUTPATIENT)
Age: 50
End: 2020-12-08

## 2020-12-10 ENCOUNTER — NON-APPOINTMENT (OUTPATIENT)
Age: 50
End: 2020-12-10

## 2020-12-10 NOTE — END OF VISIT
[] : Fellow [FreeTextEntry3] : I was physically present for the key portions of the evaluation and management service provided. I agree with the above history, physical, and plan which I have reviewed and edited where appropriate. I have examined the patient and plan was discussed with the patient as well. \par \par \par

## 2020-12-10 NOTE — ADDENDUM
[FreeTextEntry1] : After reviewing CTA results, patient is stable to proceed to upcoming colonoscopy without further work-up. Please see visit note for my full evaluation.\par

## 2020-12-10 NOTE — PHYSICAL EXAM
[General Appearance - Well Developed] : well developed [Normal Appearance] : normal appearance [No Deformities] : no deformities [Well Groomed] : well groomed [General Appearance - Well Nourished] : well nourished [General Appearance - In No Acute Distress] : no acute distress [Normal Conjunctiva] : the conjunctiva exhibited no abnormalities [Normal Oral Mucosa] : normal oral mucosa [Eyelids - No Xanthelasma] : the eyelids demonstrated no xanthelasmas [No Oral Pallor] : no oral pallor [No Oral Cyanosis] : no oral cyanosis [Murmurs] : no murmurs present [Heart Sounds] : normal S1 and S2 [Heart Rate And Rhythm] : heart rate and rhythm were normal [Respiration, Rhythm And Depth] : normal respiratory rhythm and effort [] : no respiratory distress [Auscultation Breath Sounds / Voice Sounds] : lungs were clear to auscultation bilaterally [Exaggerated Use Of Accessory Muscles For Inspiration] : no accessory muscle use [Oriented To Time, Place, And Person] : oriented to person, place, and time [Mood] : the mood was normal [Affect] : the affect was normal [No Anxiety] : not feeling anxious [FreeTextEntry1] : + 2 LE edema

## 2020-12-10 NOTE — HISTORY OF PRESENT ILLNESS
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 \par \par She reports symptoms of mid-sternal, non-radiating chest "tightness", dizziness, shortness of breath and LE edema since she got out the hospital (July 30th to Aug 18th) at [facility] for hip surgery (post-op course c/b blood loss anemia, required procrit and iron). Her symptoms occur at rest and with exertion-more prominent with exertion. She also reports cough when asleep at night time. She says her symptoms have been constant, not improved or progressed.No prior hx of MI/CVA. Father had MI at 68. \par \par For her diet, she usually drinks whole milk everyday, eats meat, mashed potatoes, vegetables/fruits everyday. 1-2x month eats red meat. No fast food or fried food. Denies any regular etoh use. \par \par Fhx: daughter, sister and uncle all have had DVT/PE\par \par Last lipid panel 2019: , , HDL 67, LDL 47. Was prescribed statin in the past, but no longer is taking. \par \par Do you have polycystic ovarian syndrome?	no	\par Have you ever been pregnant?  		If so, how many times? 10, 7 children \par Did you have any complications during pregnancy? no\par Did you have any postpartum complications? no \par Have you had any miscarriages? no	If so, how many? \par Have you gone through menopause? perimenopausal If yes, at what age? \par Did you receive hormone replacement?	 no \par \par \par

## 2020-12-14 ENCOUNTER — APPOINTMENT (OUTPATIENT)
Dept: DISASTER EMERGENCY | Facility: CLINIC | Age: 50
End: 2020-12-14

## 2020-12-21 ENCOUNTER — TRANSCRIPTION ENCOUNTER (OUTPATIENT)
Age: 50
End: 2020-12-21

## 2020-12-23 PROBLEM — Z01.419 ENCOUNTER FOR ANNUAL ROUTINE GYNECOLOGICAL EXAMINATION: Status: RESOLVED | Noted: 2020-09-14 | Resolved: 2020-12-23

## 2020-12-30 ENCOUNTER — APPOINTMENT (OUTPATIENT)
Dept: GASTROENTEROLOGY | Facility: HOSPITAL | Age: 50
End: 2020-12-30

## 2021-01-13 ENCOUNTER — TRANSCRIPTION ENCOUNTER (OUTPATIENT)
Age: 51
End: 2021-01-13

## 2021-01-13 RX ORDER — ATORVASTATIN CALCIUM 20 MG/1
20 TABLET, FILM COATED ORAL DAILY
Qty: 90 | Refills: 3 | Status: ACTIVE | COMMUNITY
Start: 2021-01-13 | End: 1900-01-01

## 2021-01-13 RX ORDER — ROSUVASTATIN CALCIUM 10 MG/1
10 TABLET, FILM COATED ORAL
Qty: 30 | Refills: 5 | Status: DISCONTINUED | COMMUNITY
Start: 2020-12-10 | End: 2021-01-13

## 2021-01-15 ENCOUNTER — TRANSCRIPTION ENCOUNTER (OUTPATIENT)
Age: 51
End: 2021-01-15

## 2021-01-21 RX ORDER — GABAPENTIN 300 MG/1
300 CAPSULE ORAL
Qty: 540 | Refills: 1 | Status: ACTIVE | COMMUNITY
Start: 2019-06-28 | End: 1900-01-01

## 2021-02-02 ENCOUNTER — APPOINTMENT (OUTPATIENT)
Dept: UROGYNECOLOGY | Facility: CLINIC | Age: 51
End: 2021-02-02

## 2021-02-02 DIAGNOSIS — N39.46 MIXED INCONTINENCE: ICD-10-CM

## 2021-02-10 ENCOUNTER — APPOINTMENT (OUTPATIENT)
Dept: UROGYNECOLOGY | Facility: CLINIC | Age: 51
End: 2021-02-10

## 2021-02-12 ENCOUNTER — APPOINTMENT (OUTPATIENT)
Dept: OPHTHALMOLOGY | Facility: CLINIC | Age: 51
End: 2021-02-12

## 2021-02-16 ENCOUNTER — APPOINTMENT (OUTPATIENT)
Dept: UROGYNECOLOGY | Facility: CLINIC | Age: 51
End: 2021-02-16
Payer: MEDICAID

## 2021-02-16 ENCOUNTER — NON-APPOINTMENT (OUTPATIENT)
Age: 51
End: 2021-02-16

## 2021-02-16 PROCEDURE — 51784 ANAL/URINARY MUSCLE STUDY: CPT

## 2021-02-16 PROCEDURE — 51741 ELECTRO-UROFLOWMETRY FIRST: CPT

## 2021-02-16 PROCEDURE — 99072 ADDL SUPL MATRL&STAF TM PHE: CPT

## 2021-02-16 PROCEDURE — 51729 CYSTOMETROGRAM W/VP&UP: CPT

## 2021-02-16 PROCEDURE — 51797 INTRAABDOMINAL PRESSURE TEST: CPT

## 2021-02-16 PROCEDURE — 51798 US URINE CAPACITY MEASURE: CPT

## 2021-02-24 ENCOUNTER — TRANSCRIPTION ENCOUNTER (OUTPATIENT)
Age: 51
End: 2021-02-24

## 2021-02-24 DIAGNOSIS — K64.8 OTHER HEMORRHOIDS: ICD-10-CM

## 2021-02-25 ENCOUNTER — APPOINTMENT (OUTPATIENT)
Dept: UROGYNECOLOGY | Facility: CLINIC | Age: 51
End: 2021-02-25
Payer: MEDICAID

## 2021-02-25 ENCOUNTER — NON-APPOINTMENT (OUTPATIENT)
Age: 51
End: 2021-02-25

## 2021-02-25 ENCOUNTER — TRANSCRIPTION ENCOUNTER (OUTPATIENT)
Age: 51
End: 2021-02-25

## 2021-02-25 ENCOUNTER — LABORATORY RESULT (OUTPATIENT)
Age: 51
End: 2021-02-25

## 2021-02-25 DIAGNOSIS — R32 UNSPECIFIED URINARY INCONTINENCE: ICD-10-CM

## 2021-02-25 PROCEDURE — 58100 BIOPSY OF UTERUS LINING: CPT

## 2021-02-25 PROCEDURE — 99212 OFFICE O/P EST SF 10 MIN: CPT | Mod: 25

## 2021-02-25 PROCEDURE — 99072 ADDL SUPL MATRL&STAF TM PHE: CPT

## 2021-02-25 PROCEDURE — 51725 SIMPLE CYSTOMETROGRAM: CPT

## 2021-02-25 NOTE — PROCEDURE
[Endometrial Biopsy] : an Endometrial Biopsy [Specify ___] : with [unfilled] [Patient] : the patient [Consent Obtained] : written consent was obtained prior to the procedure and is detailed in the patient's record [Allergies Reviewed] : Allergies reviewed [None] : none [Betadine] : betadine [Yes] : Specimen sent to Pathology [No Complications] : none [Tolerated Well] : the patient tolerated the procedure well [Post procedure instructions and information given] : post procedure instructions and information given and reviewed with patient. [Normal] : with normal results [FreeTextEntry1] : No ANNIE with prolapse reduction after filling with 300 cc

## 2021-02-25 NOTE — ASSESSMENT
[FreeTextEntry1] : EMBx perfomred\par Simple CMG: neg for ANNIE\par Plan for telehealth in 2 wks to discuss the result and surgical plan

## 2021-02-25 NOTE — HISTORY OF PRESENT ILLNESS
[FreeTextEntry1] : The pt is here for a f/u after UDS.\par \par -ANNIE, +DO, neg complete emptying\par Q-Tip ?\par \par MR of pelvis: endometrial lining 5 mm, cystic structure in the endometrium, chico ovarian cyst likley benign, mildly enlarged uterus, no fibroids\par \par She reports symptoms of ANNIE and would like to have surgery to tx both UI and vaginal prolapse

## 2021-03-02 ENCOUNTER — NON-APPOINTMENT (OUTPATIENT)
Age: 51
End: 2021-03-02

## 2021-03-03 ENCOUNTER — NON-APPOINTMENT (OUTPATIENT)
Age: 51
End: 2021-03-03

## 2021-03-10 ENCOUNTER — NON-APPOINTMENT (OUTPATIENT)
Age: 51
End: 2021-03-10

## 2021-03-10 ENCOUNTER — APPOINTMENT (OUTPATIENT)
Dept: INTERNAL MEDICINE | Facility: CLINIC | Age: 51
End: 2021-03-10
Payer: MEDICAID

## 2021-03-10 VITALS
SYSTOLIC BLOOD PRESSURE: 111 MMHG | DIASTOLIC BLOOD PRESSURE: 75 MMHG | WEIGHT: 269 LBS | OXYGEN SATURATION: 97 % | HEART RATE: 95 BPM | BODY MASS INDEX: 40.77 KG/M2 | TEMPERATURE: 96.5 F | HEIGHT: 68 IN

## 2021-03-10 DIAGNOSIS — L29.9 PRURITUS, UNSPECIFIED: ICD-10-CM

## 2021-03-10 DIAGNOSIS — G47.00 INSOMNIA, UNSPECIFIED: ICD-10-CM

## 2021-03-10 DIAGNOSIS — R30.0 DYSURIA: ICD-10-CM

## 2021-03-10 PROCEDURE — 99072 ADDL SUPL MATRL&STAF TM PHE: CPT

## 2021-03-10 PROCEDURE — 99214 OFFICE O/P EST MOD 30 MIN: CPT | Mod: 25

## 2021-03-10 PROCEDURE — 93000 ELECTROCARDIOGRAM COMPLETE: CPT

## 2021-03-10 RX ORDER — HYDROMORPHONE HYDROCHLORIDE 2 MG/1
2 TABLET ORAL
Qty: 24 | Refills: 0 | Status: COMPLETED | COMMUNITY
Start: 2020-06-29 | End: 2021-03-10

## 2021-03-10 RX ORDER — CIPROFLOXACIN HYDROCHLORIDE 500 MG/1
500 TABLET, FILM COATED ORAL TWICE DAILY
Qty: 10 | Refills: 0 | Status: COMPLETED | COMMUNITY
Start: 2021-03-02 | End: 2021-03-10

## 2021-03-10 RX ORDER — OXYCODONE AND ACETAMINOPHEN 5; 325 MG/1; MG/1
5-325 TABLET ORAL EVERY 6 HOURS
Qty: 40 | Refills: 0 | Status: COMPLETED | COMMUNITY
Start: 2020-08-28 | End: 2021-03-10

## 2021-03-10 RX ORDER — QUETIAPINE FUMARATE 100 MG/1
100 TABLET ORAL
Qty: 30 | Refills: 0 | Status: COMPLETED | COMMUNITY
Start: 2020-07-15 | End: 2021-03-10

## 2021-03-12 ENCOUNTER — APPOINTMENT (OUTPATIENT)
Dept: UROGYNECOLOGY | Facility: CLINIC | Age: 51
End: 2021-03-12
Payer: MEDICAID

## 2021-03-12 DIAGNOSIS — N81.4 UTEROVAGINAL PROLAPSE, UNSPECIFIED: ICD-10-CM

## 2021-03-12 PROCEDURE — 99214 OFFICE O/P EST MOD 30 MIN: CPT | Mod: 95

## 2021-03-12 NOTE — HISTORY OF PRESENT ILLNESS
[FreeTextEntry1] : The pt has multiple issues:\par 1) cystic endometrium, s/P EMA\par endometrial bx: endocervical tissue, no endometrial tissue\par 2) ovarian cyst\par appears benign\par 3) UI.\par DO, -ANNIE\par \par

## 2021-03-12 NOTE — ASSESSMENT
[FreeTextEntry1] : For sacrocolpopexy, we discussed both abdominal route of surgery as she has had multiple abdominal surgeries in the past. I will invole GS for VICTOR M.  The pt agreed with the approach and understood.  Questions were answered.\par Of note, she is Jahovah's witness and will only take cetain products but not any blood products.  She is willing to die rather than to receive blood.\par Her hct is 38 from yesterday.\par \par \par

## 2021-03-16 LAB
25(OH)D3 SERPL-MCNC: 23 NG/ML
ALBUMIN SERPL ELPH-MCNC: 4 G/DL
ALP BLD-CCNC: 80 U/L
ALT SERPL-CCNC: 12 U/L
ANION GAP SERPL CALC-SCNC: 13 MMOL/L
APPEARANCE: CLEAR
AST SERPL-CCNC: 18 U/L
BACTERIA UR CULT: NORMAL
BACTERIA: NEGATIVE
BASOPHILS # BLD AUTO: 0.02 K/UL
BASOPHILS NFR BLD AUTO: 0.3 %
BILIRUB SERPL-MCNC: <0.2 MG/DL
BILIRUBIN URINE: NEGATIVE
BLOOD URINE: NEGATIVE
BUN SERPL-MCNC: 15 MG/DL
CALCIUM SERPL-MCNC: 9.1 MG/DL
CHLORIDE SERPL-SCNC: 102 MMOL/L
CHOLEST SERPL-MCNC: 151 MG/DL
CO2 SERPL-SCNC: 23 MMOL/L
COLOR: NORMAL
CREAT SERPL-MCNC: 0.96 MG/DL
CREAT SPEC-SCNC: 151 MG/DL
EOSINOPHIL # BLD AUTO: 0.19 K/UL
EOSINOPHIL NFR BLD AUTO: 2.7 %
ESTIMATED AVERAGE GLUCOSE: 157 MG/DL
FERRITIN SERPL-MCNC: 24 NG/ML
FOLATE SERPL-MCNC: 7.9 NG/ML
GLUCOSE QUALITATIVE U: NEGATIVE
GLUCOSE SERPL-MCNC: 212 MG/DL
HBA1C MFR BLD HPLC: 7.1 %
HCT VFR BLD CALC: 38.7 %
HDLC SERPL-MCNC: 48 MG/DL
HGB BLD-MCNC: 11.6 G/DL
HYALINE CASTS: 1 /LPF
IMM GRANULOCYTES NFR BLD AUTO: 1.1 %
IRON SATN MFR SERPL: 12 %
IRON SERPL-MCNC: 44 UG/DL
KETONES URINE: NEGATIVE
LDLC SERPL CALC-MCNC: 31 MG/DL
LEUKOCYTE ESTERASE URINE: NEGATIVE
LYMPHOCYTES # BLD AUTO: 1.94 K/UL
LYMPHOCYTES NFR BLD AUTO: 27.1 %
MAN DIFF?: NORMAL
MCHC RBC-ENTMCNC: 27.8 PG
MCHC RBC-ENTMCNC: 30 GM/DL
MCV RBC AUTO: 92.6 FL
MICROALBUMIN 24H UR DL<=1MG/L-MCNC: <1.2 MG/DL
MICROALBUMIN/CREAT 24H UR-RTO: NORMAL MG/G
MICROSCOPIC-UA: NORMAL
MONOCYTES # BLD AUTO: 0.63 K/UL
MONOCYTES NFR BLD AUTO: 8.8 %
NEUTROPHILS # BLD AUTO: 4.3 K/UL
NEUTROPHILS NFR BLD AUTO: 60 %
NITRITE URINE: NEGATIVE
NONHDLC SERPL-MCNC: 103 MG/DL
PH URINE: 6
PLATELET # BLD AUTO: 255 K/UL
POTASSIUM SERPL-SCNC: 4 MMOL/L
PROT SERPL-MCNC: 6.9 G/DL
PROTEIN URINE: NEGATIVE
RBC # BLD: 4.18 M/UL
RBC # FLD: 14.2 %
RED BLOOD CELLS URINE: 2 /HPF
SODIUM SERPL-SCNC: 138 MMOL/L
SPECIFIC GRAVITY URINE: 1.02
SQUAMOUS EPITHELIAL CELLS: 2 /HPF
TIBC SERPL-MCNC: 378 UG/DL
TRIGL SERPL-MCNC: 362 MG/DL
TSH SERPL-ACNC: 0.46 UIU/ML
UIBC SERPL-MCNC: 333 UG/DL
UROBILINOGEN URINE: NORMAL
VIT B12 SERPL-MCNC: 1190 PG/ML
WBC # FLD AUTO: 7.16 K/UL
WHITE BLOOD CELLS URINE: 1 /HPF

## 2021-03-17 ENCOUNTER — NON-APPOINTMENT (OUTPATIENT)
Age: 51
End: 2021-03-17

## 2021-03-17 ENCOUNTER — OUTPATIENT (OUTPATIENT)
Dept: OUTPATIENT SERVICES | Facility: HOSPITAL | Age: 51
LOS: 1 days | Discharge: HOME | End: 2021-03-17
Payer: COMMERCIAL

## 2021-03-17 ENCOUNTER — RESULT REVIEW (OUTPATIENT)
Age: 51
End: 2021-03-17

## 2021-03-17 ENCOUNTER — APPOINTMENT (OUTPATIENT)
Dept: GASTROENTEROLOGY | Facility: HOSPITAL | Age: 51
End: 2021-03-17

## 2021-03-17 DIAGNOSIS — Z90.49 ACQUIRED ABSENCE OF OTHER SPECIFIED PARTS OF DIGESTIVE TRACT: Chronic | ICD-10-CM

## 2021-03-17 DIAGNOSIS — Z90.3 ACQUIRED ABSENCE OF STOMACH [PART OF]: Chronic | ICD-10-CM

## 2021-03-17 DIAGNOSIS — K46.9 UNSPECIFIED ABDOMINAL HERNIA WITHOUT OBSTRUCTION OR GANGRENE: Chronic | ICD-10-CM

## 2021-03-17 DIAGNOSIS — Z98.890 OTHER SPECIFIED POSTPROCEDURAL STATES: Chronic | ICD-10-CM

## 2021-03-17 PROCEDURE — 88305 TISSUE EXAM BY PATHOLOGIST: CPT

## 2021-03-17 PROCEDURE — 43235 EGD DIAGNOSTIC BRUSH WASH: CPT

## 2021-03-17 PROCEDURE — 88305 TISSUE EXAM BY PATHOLOGIST: CPT | Mod: 26

## 2021-03-17 PROCEDURE — 45380 COLONOSCOPY AND BIOPSY: CPT

## 2021-03-17 PROCEDURE — 43239 EGD BIOPSY SINGLE/MULTIPLE: CPT

## 2021-03-18 ENCOUNTER — TRANSCRIPTION ENCOUNTER (OUTPATIENT)
Age: 51
End: 2021-03-18

## 2021-03-18 LAB — SURGICAL PATHOLOGY STUDY: SIGNIFICANT CHANGE UP

## 2021-03-22 ENCOUNTER — RX RENEWAL (OUTPATIENT)
Age: 51
End: 2021-03-22

## 2021-03-22 ENCOUNTER — NON-APPOINTMENT (OUTPATIENT)
Age: 51
End: 2021-03-22

## 2021-03-23 ENCOUNTER — RX RENEWAL (OUTPATIENT)
Age: 51
End: 2021-03-23

## 2021-03-23 ENCOUNTER — APPOINTMENT (OUTPATIENT)
Dept: INTERNAL MEDICINE | Facility: CLINIC | Age: 51
End: 2021-03-23
Payer: MEDICAID

## 2021-03-23 DIAGNOSIS — R19.7 DIARRHEA, UNSPECIFIED: ICD-10-CM

## 2021-03-23 PROCEDURE — 99441: CPT

## 2021-03-23 RX ORDER — DICLOFENAC SODIUM 10 MG/G
1 GEL TOPICAL DAILY
Qty: 1 | Refills: 0 | Status: COMPLETED | COMMUNITY
Start: 2020-09-15 | End: 2021-03-23

## 2021-03-28 NOTE — HISTORY OF PRESENT ILLNESS
[FreeTextEntry1] : A1c in diabetic range [de-identified] : Was on metformin in the past for prediabetes, she doesn't want to take again if she doesn't need to. Admits to definitely having more snack food during COVID

## 2021-04-06 ENCOUNTER — APPOINTMENT (OUTPATIENT)
Dept: COLORECTAL SURGERY | Facility: CLINIC | Age: 51
End: 2021-04-06
Payer: MEDICAID

## 2021-04-06 VITALS
DIASTOLIC BLOOD PRESSURE: 81 MMHG | HEART RATE: 76 BPM | SYSTOLIC BLOOD PRESSURE: 118 MMHG | BODY MASS INDEX: 40.92 KG/M2 | HEIGHT: 68 IN | TEMPERATURE: 97.9 F | WEIGHT: 270 LBS

## 2021-04-06 DIAGNOSIS — K66.0 PERITONEAL ADHESIONS (POSTPROCEDURAL) (POSTINFECTION): ICD-10-CM

## 2021-04-06 DIAGNOSIS — Z01.818 ENCOUNTER FOR OTHER PREPROCEDURAL EXAMINATION: ICD-10-CM

## 2021-04-06 PROCEDURE — 99072 ADDL SUPL MATRL&STAF TM PHE: CPT

## 2021-04-06 PROCEDURE — 46600 DIAGNOSTIC ANOSCOPY SPX: CPT

## 2021-04-06 PROCEDURE — 99204 OFFICE O/P NEW MOD 45 MIN: CPT | Mod: 25

## 2021-04-06 NOTE — REVIEW OF SYSTEMS
[Constipation] : constipation [Arthralgias] : arthralgias [Joint Pain] : joint pain [Sleep Disturbances] : sleep disturbances [Negative] : Heme/Lymph [FreeTextEntry7] : bloating

## 2021-04-06 NOTE — PHYSICAL EXAM
[FreeTextEntry1] : Medical assistant present for duration of physical examination\par \par General no acute distress, alert and oriented\par Psych calm, pleasant demeanor, responding appropriately to question\par Well nourished\par Comfortable in chair\par Ambulating with assistance of cane\par Nonlabored breathing\par Abdomen obese, soft nontender, well healed upper abdominal surgical incisions (laparoscopic), periumbilical surgical incision, no incisions visualized in lower abdomen\par \par Anorectal Exam:\par Inspection no erythema, induration or fluctuance, no skin excoriation, no fissure, soft external hemorrhoids nonthrombosed\par Hemorrhoidal cushions engorged without full thickness prolapse on valsalva\par GUIDO nontender, no masses palpated, no blood on gloved finger, good tone at rest, moderate squeeze on command\par \par Procedure: Anoscopy\par \par Pre procedure Diagnosis: hemorrhoids, vaginal prolapse\par Post procedure Diagnosis: hemorrhoids, vaginal prolapse\par Anesthesia: none\par Estimated blood loss: none\par Specimen: none\par Complications: none\par \par Consent obtained. Anoscopy was performed by passing a lighted anoscope with lubricant jelly into the anal canal and the entire anal mucosal surface was inspected. Findings included no fissure, mild internal hemorrhoids, no visible masses or lesions\par \par Patient tolerated examination and procedure well.\par \par \par \par

## 2021-04-06 NOTE — HISTORY OF PRESENT ILLNESS
[FreeTextEntry1] : 51yo female referred by Dr Marjan Rome for initial evaluation. Dr Rome is planning urogynecologic surgery, is requesting assistance at time of surgery with lysis of adhesions.\par \par Patient is preop for Dr Marjan Rome for sacrocolpopexy via abdominal route for urinary incontinence and vaginal prolapse. \par \par PMH significant for HTN, diabetes, hypercoagulable state (hx of DVT/PE) on Lovenox, pancreatic IPMN, chronic GERD\par \par Followed by Heme Dr Vigil, who manages patient's anticoagulation\par Per chart review, patient is on lifelong anticoagulation, h/o recurrent DVT/PE, splenic vein thrombosis\par \par Patient is a [employer]ism, per chart review, patient will only take "certain products but not any blood products." She admits to seeing bloodless medicine program at [facility] for prior surgeries, however reports she didn't lose a lot of blood after abdominal surgeries and no interventions required. She states she has a card at home stating which services she agrees to and will research [employer]. She declines self banking.\par \par Had colonoscopy and EGD March 2021 with GI Dr Bowers for evaluation of rectal bleeding. Lovenox held prior to colonoscopy/EGD\par EGD with biopsies of duodenum and stomach - normal\par Colonoscopy with random biopsies of colon - normal; one polyp removed\par Final Diagnosis\par 1. Duodenum, biopsy:\par - Duodenal mucosa with well formed villi and pigmented\par material in lamina propria histiocytes.\par \par 2. Stomach, biopsy:\par - Gastric oxyntic and antral-type mucosa without significant\par pathology.\par - Negative for H. pylori.\par \par 3. Right colon, biopsy:\par - Unremarkable colonic mucosa\par \par 4. Left colon, biopsy:\par - Unremarkable colonic mucosa.\par \par 5. Sigmoid, 17cm; polypectomy:\par - Tubular adenoma\par \par Per chart review, her past surgical history is significant for colon resection for diverticulitis at [facility] 2017, bariatric surgery (sleeve gastrectomy) and lost 100 lbs in 2015, emergency repair of umbilical hernia w/ mesh (2018) and repair of ? "hole in her intestine at [facility], bilateral hip replacement surgery (9/2019, 2/2020) and most recently a R sided revision (July 2020 at [facility]).\par \par Pt reports vaginal prolapse has been present since Summer 2020 which occurs w/ every BM and urination. She does not believe she has rectal prolapse, although admits to some swollen tissue which she attributes to hemorrhoids. She has been prescribed hemorrhoid suppositories in the past, however medication wasn't covered by insurance.\par She admits to sharp rectal pain during BMs for the last 6 months. \par hx of BRBPR noted in toilet bowl w/ every BM end of January 2021 and associated with burning pain. Patient started Prep H and Tuck's w/ temporary improvement in symptoms until she experienced hard stools and c/o "cut" sensation. This cycle continued until mid February. Pt took OTC Dulcolax, Miralax or glycerin suppository as needed for constipation w/ improvement.\par Now, sharp pain occurs w/ every BM and immediately resolves afterwards\par + scant BRB noted on TP yesterday\par She has not been using any topical medications for the last month. She denies hx of rectal procedures\par BH: 6 times daily w/ incomplete emptying. Admits to straining despite stool being soft/formed at present\par Admits to constipation when she eats cheese\par Reports adequate dietary fiber and water intake\par Denies fiber supplementation\par Denies ASA use. She takes Aleve once a week for overall joint/muscle pain\par

## 2021-04-06 NOTE — ASSESSMENT
[FreeTextEntry1] : Exam findings, prior history and diagnosis were discussed at length with patient.\par Patient referred by Dr Marjan Rome for assistance with open lysis of adhesions at the time of her planned sacrocolpopexy\par \par The associated risks, benefits, alternatives of surgery have been outlined discussed and reviewed with the patient. These risks including but not limited to bleeding, infection, injury to adjacent organs, incisional dehiscence, incisional hernia, as well as the risk of heart and lung complications, stroke, DVT/PE and death were detailed. All questions were answered, the patient expressed understanding and consents to the planned procedure. \par \par Recommend preoperative medical optimization:\par - medical clearance; cardiac clearance\par - heme clearance/recommendations regarding anticoagulation\par - vascular evaluation ?role for IVC filter\par \par Will request prior surgical records from outside hospitals to review\par \par Patient will consider her options regarding blood products and transfusion in relation to surgery.\par Has been seen by a bloodless medicine program at [facility] previously.\par She states she would like to learn more about [employer].\par She states she "bled out" postoperatively after her orthopedic surgery to a Hgb as low as 4. We discussed preoperative banking of her own blood could be considered, if patient agreeable, as a supportive measure if postoperative hemorrhage were to occur.\par She states she will continue to consider her planning options with regards to her beliefs and will inform us of her decision of how she would like to proceed.\par \par Appropriate literature regarding surgery and post operative treatment/complications and enhanced recovery pathway has been detailed and reviewed. Consent was obtained. All questions were answered. Any family member can contact our office for further discussion with the patient's permission.

## 2021-04-12 ENCOUNTER — NON-APPOINTMENT (OUTPATIENT)
Age: 51
End: 2021-04-12

## 2021-04-15 ENCOUNTER — APPOINTMENT (OUTPATIENT)
Dept: GASTROENTEROLOGY | Facility: CLINIC | Age: 51
End: 2021-04-15

## 2021-04-20 ENCOUNTER — APPOINTMENT (OUTPATIENT)
Dept: RHEUMATOLOGY | Facility: CLINIC | Age: 51
End: 2021-04-20

## 2021-04-23 ENCOUNTER — TRANSCRIPTION ENCOUNTER (OUTPATIENT)
Age: 51
End: 2021-04-23

## 2021-04-26 ENCOUNTER — APPOINTMENT (OUTPATIENT)
Dept: VASCULAR SURGERY | Facility: CLINIC | Age: 51
End: 2021-04-26
Payer: MEDICAID

## 2021-04-26 VITALS
DIASTOLIC BLOOD PRESSURE: 100 MMHG | WEIGHT: 270 LBS | HEIGHT: 68 IN | SYSTOLIC BLOOD PRESSURE: 154 MMHG | BODY MASS INDEX: 40.92 KG/M2 | HEART RATE: 122 BPM | OXYGEN SATURATION: 95 % | TEMPERATURE: 97.8 F

## 2021-04-26 PROCEDURE — 99072 ADDL SUPL MATRL&STAF TM PHE: CPT

## 2021-04-26 PROCEDURE — 99213 OFFICE O/P EST LOW 20 MIN: CPT

## 2021-04-26 NOTE — ASSESSMENT
[FreeTextEntry1] : 51 y/o F with history of recurrent DVT and PE, on lifelong anticoagulation. Referred for vascular work up prior to upcoming abdominal surgery.\par \par Plan:\par 1) Will obtain repeat venous duplex to assess for DVT.\par 2) Will discuss with primary surgeon how long AC needs to be held for, and will discuss option of IVC filter depending on surgery that is planned.\par 3) I will call patient following this and discuss. [Arterial/Venous Disease] : arterial/venous disease

## 2021-04-26 NOTE — HISTORY OF PRESENT ILLNESS
[FreeTextEntry1] : 51 y/o F referred by Dr Stroud. Due to undergo lysis of adhesions in June 2021. Referred as has a history of DVT and PE. Is on lifelong lovenox, as had a prior DVT while taking anticoagulation. At this time she complains of chronic right leg swelling. Does not have SOB or CP at this time.

## 2021-04-26 NOTE — PHYSICAL EXAM
[2+] : left 2+ [Ankle Swelling (On Exam)] : present [Ankle Swelling On The Right] : mild [Varicose Veins Of Lower Extremities] : not present [] : not present [No Rash or Lesion] : No rash or lesion [Alert] : alert [Oriented to Person] : oriented to person [Oriented to Place] : oriented to place [Oriented to Time] : oriented to time [Calm] : calm [de-identified] : Ambulatory. Obese.

## 2021-05-05 ENCOUNTER — RX RENEWAL (OUTPATIENT)
Age: 51
End: 2021-05-05

## 2021-05-10 ENCOUNTER — APPOINTMENT (OUTPATIENT)
Dept: INTERNAL MEDICINE | Facility: CLINIC | Age: 51
End: 2021-05-10
Payer: MEDICAID

## 2021-05-10 VITALS
TEMPERATURE: 97.3 F | DIASTOLIC BLOOD PRESSURE: 86 MMHG | OXYGEN SATURATION: 97 % | HEART RATE: 96 BPM | SYSTOLIC BLOOD PRESSURE: 147 MMHG

## 2021-05-10 DIAGNOSIS — Z01.812 ENCOUNTER FOR PREPROCEDURAL LABORATORY EXAMINATION: ICD-10-CM

## 2021-05-10 DIAGNOSIS — R05 COUGH: ICD-10-CM

## 2021-05-10 DIAGNOSIS — I87.2 VENOUS INSUFFICIENCY (CHRONIC) (PERIPHERAL): ICD-10-CM

## 2021-05-10 DIAGNOSIS — R93.89 ABNORMAL FINDINGS ON DIAGNOSTIC IMAGING OF OTHER SPECIFIED BODY STRUCTURES: ICD-10-CM

## 2021-05-10 DIAGNOSIS — D68.59 OTHER PRIMARY THROMBOPHILIA: ICD-10-CM

## 2021-05-10 DIAGNOSIS — M06.9 RHEUMATOID ARTHRITIS, UNSPECIFIED: ICD-10-CM

## 2021-05-10 DIAGNOSIS — Z01.810 ENCOUNTER FOR PREPROCEDURAL CARDIOVASCULAR EXAMINATION: ICD-10-CM

## 2021-05-10 PROCEDURE — 99214 OFFICE O/P EST MOD 30 MIN: CPT | Mod: 25

## 2021-05-10 PROCEDURE — 99072 ADDL SUPL MATRL&STAF TM PHE: CPT

## 2021-05-10 RX ORDER — POTASSIUM CHLORIDE 750 MG/1
10 TABLET, EXTENDED RELEASE ORAL DAILY
Qty: 90 | Refills: 1 | Status: COMPLETED | COMMUNITY
Start: 2019-06-03 | End: 2021-05-10

## 2021-05-10 RX ORDER — TRIAMCINOLONE ACETONIDE 1 MG/G
0.1 OINTMENT TOPICAL
Qty: 1 | Refills: 1 | Status: ACTIVE | COMMUNITY
Start: 2021-05-10 | End: 1900-01-01

## 2021-05-12 ENCOUNTER — APPOINTMENT (OUTPATIENT)
Dept: UROGYNECOLOGY | Facility: CLINIC | Age: 51
End: 2021-05-12
Payer: MEDICAID

## 2021-05-12 DIAGNOSIS — Z86.718 PERSONAL HISTORY OF OTHER VENOUS THROMBOSIS AND EMBOLISM: ICD-10-CM

## 2021-05-12 DIAGNOSIS — N32.81 OVERACTIVE BLADDER: ICD-10-CM

## 2021-05-12 PROCEDURE — 99213 OFFICE O/P EST LOW 20 MIN: CPT

## 2021-05-12 PROCEDURE — 99072 ADDL SUPL MATRL&STAF TM PHE: CPT

## 2021-05-12 NOTE — HISTORY OF PRESENT ILLNESS
[FreeTextEntry1] : The pt is here for a f/u for UV prolapse\par Her UDS showed -ANNIE, +DO.  simple CMG also showed -ANNIE.\par Urine cx: neg\par EMBx: neg malignacy\par Her hx is sig for bowel resection and umbilical her renetta repair.\par She also has in on anti-coagulation for thromboembolic dz.\par She is currenty being evaluated for poss filter prior to undergoing surgery.\par Hb AiC 8.2

## 2021-05-12 NOTE — ASSESSMENT
[FreeTextEntry1] : The pt is scheduled to see vascular this month. She may need a filter.\par She is also scheduled to see her PCP.\par When she is cleared by both, she will be scheduled for prolapse repair.\par Pt is in agreement.

## 2021-05-17 ENCOUNTER — APPOINTMENT (OUTPATIENT)
Dept: HEART AND VASCULAR | Facility: CLINIC | Age: 51
End: 2021-05-17
Payer: MEDICAID

## 2021-05-17 ENCOUNTER — OUTPATIENT (OUTPATIENT)
Dept: OUTPATIENT SERVICES | Facility: HOSPITAL | Age: 51
LOS: 1 days | End: 2021-05-17

## 2021-05-17 ENCOUNTER — APPOINTMENT (OUTPATIENT)
Dept: RADIOLOGY | Facility: CLINIC | Age: 51
End: 2021-05-17
Payer: MEDICAID

## 2021-05-17 ENCOUNTER — RESULT REVIEW (OUTPATIENT)
Age: 51
End: 2021-05-17

## 2021-05-17 ENCOUNTER — APPOINTMENT (OUTPATIENT)
Dept: ULTRASOUND IMAGING | Facility: CLINIC | Age: 51
End: 2021-05-17
Payer: MEDICAID

## 2021-05-17 VITALS
SYSTOLIC BLOOD PRESSURE: 120 MMHG | DIASTOLIC BLOOD PRESSURE: 86 MMHG | BODY MASS INDEX: 42.44 KG/M2 | WEIGHT: 280 LBS | HEIGHT: 68 IN | HEART RATE: 86 BPM

## 2021-05-17 VITALS — TEMPERATURE: 97.6 F

## 2021-05-17 DIAGNOSIS — Z90.49 ACQUIRED ABSENCE OF OTHER SPECIFIED PARTS OF DIGESTIVE TRACT: Chronic | ICD-10-CM

## 2021-05-17 DIAGNOSIS — Z98.890 OTHER SPECIFIED POSTPROCEDURAL STATES: Chronic | ICD-10-CM

## 2021-05-17 DIAGNOSIS — K46.9 UNSPECIFIED ABDOMINAL HERNIA WITHOUT OBSTRUCTION OR GANGRENE: Chronic | ICD-10-CM

## 2021-05-17 DIAGNOSIS — Z01.818 ENCOUNTER FOR OTHER PREPROCEDURAL EXAMINATION: ICD-10-CM

## 2021-05-17 DIAGNOSIS — R06.00 DYSPNEA, UNSPECIFIED: ICD-10-CM

## 2021-05-17 DIAGNOSIS — Z90.3 ACQUIRED ABSENCE OF STOMACH [PART OF]: Chronic | ICD-10-CM

## 2021-05-17 DIAGNOSIS — Z79.01 LONG TERM (CURRENT) USE OF ANTICOAGULANTS: ICD-10-CM

## 2021-05-17 DIAGNOSIS — R07.9 CHEST PAIN, UNSPECIFIED: ICD-10-CM

## 2021-05-17 LAB
ALBUMIN SERPL ELPH-MCNC: 4.1 G/DL
ALP BLD-CCNC: 94 U/L
ALT SERPL-CCNC: 15 U/L
ANION GAP SERPL CALC-SCNC: 13 MMOL/L
APPEARANCE: CLEAR
APTT BLD: 35.1 SEC
AST SERPL-CCNC: 16 U/L
BACTERIA UR CULT: NORMAL
BACTERIA: ABNORMAL
BASOPHILS # BLD AUTO: 0.03 K/UL
BASOPHILS NFR BLD AUTO: 0.5 %
BILIRUB SERPL-MCNC: 0.2 MG/DL
BILIRUBIN URINE: NEGATIVE
BLOOD URINE: NEGATIVE
BUN SERPL-MCNC: 13 MG/DL
CALCIUM SERPL-MCNC: 9.7 MG/DL
CHLORIDE SERPL-SCNC: 103 MMOL/L
CO2 SERPL-SCNC: 22 MMOL/L
COLOR: YELLOW
CREAT SERPL-MCNC: 0.86 MG/DL
EOSINOPHIL # BLD AUTO: 0.32 K/UL
EOSINOPHIL NFR BLD AUTO: 5.4 %
ESTIMATED AVERAGE GLUCOSE: 189 MG/DL
GLUCOSE QUALITATIVE U: NEGATIVE
GLUCOSE SERPL-MCNC: 137 MG/DL
HBA1C MFR BLD HPLC: 8.2 %
HCT VFR BLD CALC: 40.3 %
HGB BLD-MCNC: 12 G/DL
HYALINE CASTS: 4 /LPF
IMM GRANULOCYTES NFR BLD AUTO: 1 %
INR PPP: 1.15 RATIO
KETONES URINE: NEGATIVE
LEUKOCYTE ESTERASE URINE: NEGATIVE
LYMPHOCYTES # BLD AUTO: 1.84 K/UL
LYMPHOCYTES NFR BLD AUTO: 31.1 %
MAN DIFF?: NORMAL
MCHC RBC-ENTMCNC: 26 PG
MCHC RBC-ENTMCNC: 29.8 GM/DL
MCV RBC AUTO: 87.4 FL
MICROSCOPIC-UA: NORMAL
MONOCYTES # BLD AUTO: 0.62 K/UL
MONOCYTES NFR BLD AUTO: 10.5 %
NEUTROPHILS # BLD AUTO: 3.04 K/UL
NEUTROPHILS NFR BLD AUTO: 51.5 %
NITRITE URINE: NEGATIVE
PH URINE: 6
PLATELET # BLD AUTO: 257 K/UL
POTASSIUM SERPL-SCNC: 4 MMOL/L
PROT SERPL-MCNC: 7.1 G/DL
PROTEIN URINE: NORMAL
PT BLD: 13.6 SEC
RBC # BLD: 4.61 M/UL
RBC # FLD: 14.3 %
RED BLOOD CELLS URINE: 2 /HPF
SODIUM SERPL-SCNC: 138 MMOL/L
SPECIFIC GRAVITY URINE: 1.02
SQUAMOUS EPITHELIAL CELLS: 5 /HPF
URINE COMMENTS: NORMAL
UROBILINOGEN URINE: NORMAL
WBC # FLD AUTO: 5.91 K/UL
WHITE BLOOD CELLS URINE: 3 /HPF

## 2021-05-17 PROCEDURE — 93970 EXTREMITY STUDY: CPT | Mod: 26

## 2021-05-17 PROCEDURE — 71046 X-RAY EXAM CHEST 2 VIEWS: CPT | Mod: 26

## 2021-05-17 PROCEDURE — 99214 OFFICE O/P EST MOD 30 MIN: CPT

## 2021-05-17 PROCEDURE — 93306 TTE W/DOPPLER COMPLETE: CPT

## 2021-05-17 PROCEDURE — 93000 ELECTROCARDIOGRAM COMPLETE: CPT

## 2021-05-17 PROCEDURE — 99072 ADDL SUPL MATRL&STAF TM PHE: CPT

## 2021-05-17 RX ORDER — BLOOD SUGAR DIAGNOSTIC
STRIP MISCELLANEOUS
Qty: 2 | Refills: 3 | Status: ACTIVE | COMMUNITY
Start: 2021-05-17 | End: 1900-01-01

## 2021-05-17 RX ORDER — OXYBUTYNIN CHLORIDE 5 MG/1
5 TABLET, EXTENDED RELEASE ORAL DAILY
Refills: 0 | Status: DISCONTINUED | COMMUNITY
Start: 2019-06-28 | End: 2021-05-17

## 2021-05-17 RX ORDER — BLOOD-GLUCOSE METER
W/DEVICE EACH MISCELLANEOUS
Qty: 1 | Refills: 1 | Status: ACTIVE | COMMUNITY
Start: 2021-05-17 | End: 1900-01-01

## 2021-05-17 RX ORDER — BLOOD-GLUCOSE METER
70 EACH MISCELLANEOUS
Qty: 2 | Refills: 3 | Status: ACTIVE | COMMUNITY
Start: 2021-05-17 | End: 1900-01-01

## 2021-05-17 RX ORDER — LIDOCAINE 5 G/100G
5 OINTMENT TOPICAL
Qty: 1 | Refills: 2 | Status: DISCONTINUED | COMMUNITY
Start: 2020-05-26 | End: 2021-05-17

## 2021-05-17 RX ORDER — CLOTRIMAZOLE 10 MG/G
1 CREAM TOPICAL TWICE DAILY
Qty: 1 | Refills: 1 | Status: DISCONTINUED | COMMUNITY
Start: 2019-06-27 | End: 2021-05-17

## 2021-05-17 RX ORDER — NEOMYCIN SULFATE 500 MG/1
500 TABLET ORAL
Qty: 6 | Refills: 0 | Status: DISCONTINUED | COMMUNITY
Start: 2021-04-06 | End: 2021-05-17

## 2021-05-17 RX ORDER — METOCLOPRAMIDE 5 MG/1
5 TABLET ORAL EVERY 8 HOURS
Qty: 90 | Refills: 1 | Status: DISCONTINUED | COMMUNITY
Start: 2020-02-14 | End: 2021-05-17

## 2021-05-17 RX ORDER — AMLODIPINE BESYLATE 2.5 MG/1
2.5 TABLET ORAL
Qty: 90 | Refills: 1 | Status: DISCONTINUED | COMMUNITY
Start: 2021-03-23 | End: 2021-05-17

## 2021-05-17 RX ORDER — BLOOD GLUCOSE CONTROL HIGH,LOW
EACH MISCELLANEOUS
Qty: 1 | Refills: 1 | Status: ACTIVE | COMMUNITY
Start: 2021-05-17 | End: 1900-01-01

## 2021-05-17 NOTE — PHYSICAL EXAM
[Normal Conjunctiva] : normal conjunctiva [Normal Venous Pressure] : normal venous pressure [No Carotid Bruit] : no carotid bruit [Normal S1, S2] : normal S1, S2 [No Murmur] : no murmur [No Rub] : no rub [Clear Lung Fields] : clear lung fields [Good Air Entry] : good air entry [No Respiratory Distress] : no respiratory distress  [Soft] : abdomen soft [Non Tender] : non-tender [No Masses/organomegaly] : no masses/organomegaly [Normal Gait] : normal gait [No Clubbing] : no clubbing [No Cyanosis] : no cyanosis [No Rash] : no rash [No Skin Lesions] : no skin lesions [Moves all extremities] : moves all extremities [No Focal Deficits] : no focal deficits [Normal Speech] : normal speech [Alert and Oriented] : alert and oriented [Normal memory] : normal memory [de-identified] : morbidly obese [de-identified] : b/L LE edema 1+, R>L

## 2021-05-17 NOTE — DISCUSSION/SUMMARY
[FreeTextEntry1] : 50 y.o F with PMHx of HTN, obesity s/p sleeve gastrectomy (2015), DARIO, recurrent DVT/PE (on lovenox), hx of splenic vein thrombosis, MICAELA (Jehovah witness), RA (on sulfasalazine), former tobacco abuse (1/2 ppd ~35 years, quit 6 years ago) presents to the office today for follow up. \par \par 1. Pre-op evaluation \par - planning to have CHAPITO, BSO and lysis of adhesion on 6/1/21 \par - Surgeons: Dr. Neetu Velasquez, Dr. Marjan Rome]\par - pt has moderate CV risk for the planned surgeries \par - no further testing required from CV stand point before the planned surgery \par \par 2. HTN \par - /86 mmHg \par - will continue current BP regimen, Amlodipine 5 mg, Losartan-HCTZ 50-12.5, Metoprolol ER 50 mg \par - she is also taking Furosemide 40 mg daily \par - will adjust BP med in next visit to reduce pill burden \par \par 3. DM and morbid obesity \par - last A1c 8.2% \par - was started on Metformin  mg by PCP \par - given her body weight and CV benefit, she would benefit from GLP-1 agonist, could be used as a first line agent in her case \par - will address this in the future visit \par \par 4. Primary CV Prevention, HLD \par - , , LDL 31 \par - low LDL-C might be due to the high TG which is used in the calculation of LDL-C \par - continue Lipitor 20 mg daily \par - lifestyle changes including diet and exercise discussed at length \par \par 5. Chronic DVT/PT \par - continue Lovenox \par - will defer the timing of perioperative withholding of Lovenox to the hematologist \par \par \par

## 2021-05-17 NOTE — REVIEW OF SYSTEMS
[Fever] : no fever [Headache] : no headache [Chills] : no chills [Feeling Fatigued] : not feeling fatigued [Blurry Vision] : no blurred vision [Seeing Double (Diplopia)] : no diplopia [Earache] : no earache [Discharge From Ears] : no discharge from the ears [Sore Throat] : no sore throat [Sinus Pressure] : no sinus pressure [SOB] : no shortness of breath [Dyspnea on exertion] : not dyspnea during exertion [Chest Discomfort] : no chest discomfort [Leg Claudication] : no intermittent leg claudication [Palpitations] : no palpitations [Orthopnea] : no orthopnea [Syncope] : no syncope [Cough] : no cough [Wheezing] : no wheezing [Abdominal Pain] : no abdominal pain [Nausea] : no nausea [Vomiting] : no vomiting [Change in Appetite] : no change in appetite [Change In The Stool] : no change in stool [Dysphagia] : no dysphagia [Constipation] : no constipation [Blood in Stool] : no blood in stool [Dysuria] : no dysuria [Pelvic Pain] : no pelvic pain [Joint Pain] : no joint pain [Joint Swelling] : no joint swelling [Myalgia] : no myalgia [Rash] : no rash [Itching] : no itching [Change In Color Of Skin] : change in skin color [Skin Lesions] : no skin lesions [Telangiectasias] : no telangiectasias [Dizziness] : no dizziness [Tremor] : no tremor was seen [Convulsions] : no convulsions [Numbness (Hypoesthesia)] : no numbness [Tingling (Paresthesia)] : no tingling [Weakness] : no weakness [Confusion] : no confusion was observed [Memory Lapses Or Loss] : no memory lapses or loss [Depression] : no depression [Under Stress] : not under stress [Suicidal] : not suicidal [Easy Bleeding] : no tendency for easy bleeding [Swollen Glands] : no swollen glands [Easy Bruising] : no tendency for easy bruising

## 2021-05-17 NOTE — HISTORY OF PRESENT ILLNESS
[FreeTextEntry1] : Surgeons: \par Dr. Neetu Velasquez \par Dr. 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 \par \par She is planning to have CHAPITO + BSO along with open lysis of adhesion on 6/1/2020. She currently walks about 2 blocks on most day w/o any SOB on exertion or palpiation; she can climb one flight of stairs before she gets SOB. She reports swelling of b/l LE; denies any orthopena or PND. She has gained ~60 Lb during the pandemics; was recently started on Metformin by her PCP for DM (A1c 8.2%); she hasn't picked up the Metformin yet. She states she is taking other medication as prescribed. \par \par \par \par \par

## 2021-05-21 ENCOUNTER — APPOINTMENT (OUTPATIENT)
Dept: INTERNAL MEDICINE | Facility: CLINIC | Age: 51
End: 2021-05-21

## 2021-05-24 ENCOUNTER — APPOINTMENT (OUTPATIENT)
Dept: HEMATOLOGY ONCOLOGY | Facility: CLINIC | Age: 51
End: 2021-05-24
Payer: MEDICAID

## 2021-05-24 VITALS
WEIGHT: 285 LBS | OXYGEN SATURATION: 97 % | BODY MASS INDEX: 43.19 KG/M2 | HEIGHT: 68 IN | DIASTOLIC BLOOD PRESSURE: 80 MMHG | HEART RATE: 86 BPM | SYSTOLIC BLOOD PRESSURE: 122 MMHG | TEMPERATURE: 97.1 F

## 2021-05-24 DIAGNOSIS — Z53.1 PROCEDURE AND TREATMENT NOT CARRIED OUT BECAUSE OF PATIENT'S DECISION FOR REASONS OF BELIEF AND GROUP PRESSURE: ICD-10-CM

## 2021-05-24 DIAGNOSIS — D50.0 IRON DEFICIENCY ANEMIA SECONDARY TO BLOOD LOSS (CHRONIC): ICD-10-CM

## 2021-05-24 LAB
RBC # BLD: 4.61 M/UL
RETICS # AUTO: 2.4 %
RETICS AGGREG/RBC NFR: 108.8 K/UL

## 2021-05-24 PROCEDURE — 96372 THER/PROPH/DIAG INJ SC/IM: CPT

## 2021-05-24 PROCEDURE — 99215 OFFICE O/P EST HI 40 MIN: CPT | Mod: 25

## 2021-05-24 RX ORDER — CYANOCOBALAMIN 1000 UG/ML
1000 INJECTION INTRAMUSCULAR; SUBCUTANEOUS
Qty: 0 | Refills: 0 | Status: COMPLETED | OUTPATIENT
Start: 2021-05-24

## 2021-05-24 RX ADMIN — CYANOCOBALAMIN 0 MCG/ML: 1000 INJECTION INTRAMUSCULAR; SUBCUTANEOUS at 00:00

## 2021-05-24 NOTE — HISTORY OF PRESENT ILLNESS
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 She was last seen in the office in 09/2020. Patient's most recent blood results from May 2021, ordered by internist Dr. Abbie Melissa, revealed hemoglobin 12.0, hematocrit 40.3, WBC 5.91. The CMP was significant for elevated glucose at 137. The INR was 1.15 and PTT 35.1. Since the last visit, patient had an endometrial biopsy in February and had an EGD and colonoscopy in March which revealed one polyp and was otherwise normal. She saw Dr. Jhonatan Tomlin in late April for possible temporary filter, but patient states she was  told  there were no clots present and that this procedure may not be necessary..  .Patient states she takes folic acid supplements, but that she has not been taking oral iron because it has caused constipation. Patient complains of intermittent hemorrhoidal bleeding. Denies recent infection, fevers, chills, or sweats. Of note, patient has not yet received Covid-19 vaccine.She is scheduled for  surgery on June 1 with Dr. Marjan Rome and Dr. Neetu Stroud.

## 2021-05-24 NOTE — REASON FOR VISIT
[Follow-Up Visit] : a follow-up visit for [FreeTextEntry2] : Iron deficiency anemia, Anemia of blood loss, B12 deficiency, History of pulmonary emboli, splenic vein thrombosis, recurrent DVT, Nondenominational

## 2021-05-24 NOTE — END OF VISIT
[FreeTextEntry3] : All medical record entries made by the Scribe were at my, Dr. Monica Vigil, direction and personally dictated by me on 05/24/2021. I have reviewed the chart and agree that the record accurately reflects my personal performance of the history, physical exam, assessment and plan. I have also personally directed, reviewed, and agreed with the chart.

## 2021-05-24 NOTE — REVIEW OF SYSTEMS
[Fatigue] : fatigue [Recent Change In Weight] : ~T recent weight change [Abdominal Pain] : abdominal pain [Constipation] : constipation [Easy Bruising] : a tendency for easy bruising [Negative] : Allergic/Immunologic [Fever] : no fever [Chills] : no chills [Night Sweats] : no night sweats [Vomiting] : no vomiting [Diarrhea] : no diarrhea [FreeTextEntry2] : gained weight [FreeTextEntry7] : abdominal pain at Lovenox injection sites [de-identified] : e

## 2021-05-24 NOTE — PHYSICAL EXAM
[Obese] : obese [Normal] : affect appropriate [de-identified] : distal lower extremity edema [de-identified] : obese, striae, ecchymoses of abdominal wall [de-identified] : ecchymoses at injection sites of abdominal wall, surgical scars  hips

## 2021-05-25 LAB
ACANTHOCYTES BLD QL SMEAR: SLIGHT
BASOPHILS # BLD AUTO: 0.15 K/UL
BASOPHILS # BLD AUTO: 0.15 K/UL
BASOPHILS NFR BLD AUTO: 2.6 %
BASOPHILS NFR BLD AUTO: 2.6 %
BURR CELLS BLD QL SMEAR: PRESENT
DACRYOCYTES BLD QL SMEAR: SLIGHT
EOSINOPHIL # BLD AUTO: 0.25 K/UL
EOSINOPHIL # BLD AUTO: 0.25 K/UL
EOSINOPHIL NFR BLD AUTO: 4.3 %
EOSINOPHIL NFR BLD AUTO: 4.3 %
FERRITIN SERPL-MCNC: 29 NG/ML
FOLATE SERPL-MCNC: 9.3 NG/ML
GIANT PLATELETS BLD QL SMEAR: PRESENT
HCT VFR BLD CALC: 39.4 %
HGB BLD-MCNC: 12.1 G/DL
HYPOCHROMIA BLD QL SMEAR: SLIGHT
IRON SATN MFR SERPL: 16 %
IRON SERPL-MCNC: 66 UG/DL
LYMPHOCYTES # BLD AUTO: 1.62 K/UL
LYMPHOCYTES # BLD AUTO: 1.62 K/UL
LYMPHOCYTES NFR BLD AUTO: 27.6 %
LYMPHOCYTES NFR BLD AUTO: 27.6 %
MAN DIFF?: NORMAL
MCHC RBC-ENTMCNC: 26.2 PG
MCHC RBC-ENTMCNC: 30.7 GM/DL
MCV RBC AUTO: 85.5 FL
MONOCYTES # BLD AUTO: 0.71 K/UL
MONOCYTES # BLD AUTO: 0.71 K/UL
MONOCYTES NFR BLD AUTO: 12.1 %
MONOCYTES NFR BLD AUTO: 12.1 %
MSMEAR: NORMAL
NEUTROPHILS # BLD AUTO: 3.13 K/UL
NEUTROPHILS # BLD AUTO: 3.13 K/UL
NEUTROPHILS NFR BLD AUTO: 53.4 %
NEUTROPHILS NFR BLD AUTO: 53.4 %
OVALOCYTES BLD QL SMEAR: SLIGHT
PLAT MORPH BLD: ABNORMAL
PLATELET # BLD AUTO: 273 K/UL
POIKILOCYTOSIS BLD QL SMEAR: SLIGHT
POLYCHROMASIA BLD QL SMEAR: SLIGHT
RBC # BLD: 4.61 M/UL
RBC # FLD: 14.4 %
RBC BLD AUTO: ABNORMAL
SCHISTOCYTES BLD QL SMEAR: SLIGHT
TIBC SERPL-MCNC: 414 UG/DL
UIBC SERPL-MCNC: 348 UG/DL
VIT B12 SERPL-MCNC: 795 PG/ML
WBC # FLD AUTO: 5.86 K/UL

## 2021-05-26 ENCOUNTER — NON-APPOINTMENT (OUTPATIENT)
Age: 51
End: 2021-05-26

## 2021-06-01 ENCOUNTER — APPOINTMENT (OUTPATIENT)
Dept: UROGYNECOLOGY | Facility: HOSPITAL | Age: 51
End: 2021-06-01

## 2021-06-01 ENCOUNTER — APPOINTMENT (OUTPATIENT)
Dept: COLORECTAL SURGERY | Facility: HOSPITAL | Age: 51
End: 2021-06-01

## 2021-06-07 ENCOUNTER — TRANSCRIPTION ENCOUNTER (OUTPATIENT)
Age: 51
End: 2021-06-07

## 2021-06-08 ENCOUNTER — APPOINTMENT (OUTPATIENT)
Dept: INTERNAL MEDICINE | Facility: CLINIC | Age: 51
End: 2021-06-08
Payer: MEDICAID

## 2021-06-08 ENCOUNTER — APPOINTMENT (OUTPATIENT)
Dept: UROGYNECOLOGY | Facility: CLINIC | Age: 51
End: 2021-06-08

## 2021-06-08 PROCEDURE — 99441: CPT

## 2021-06-14 ENCOUNTER — RX RENEWAL (OUTPATIENT)
Age: 51
End: 2021-06-14

## 2021-06-23 ENCOUNTER — APPOINTMENT (OUTPATIENT)
Dept: CARDIOLOGY | Facility: CLINIC | Age: 51
End: 2021-06-23
Payer: MEDICAID

## 2021-06-23 ENCOUNTER — APPOINTMENT (OUTPATIENT)
Dept: INTERNAL MEDICINE | Facility: CLINIC | Age: 51
End: 2021-06-23
Payer: MEDICAID

## 2021-06-23 DIAGNOSIS — E11.9 TYPE 2 DIABETES MELLITUS W/OUT COMPLICATIONS: ICD-10-CM

## 2021-06-23 DIAGNOSIS — E78.1 PURE HYPERGLYCERIDEMIA: ICD-10-CM

## 2021-06-23 DIAGNOSIS — I10 ESSENTIAL (PRIMARY) HYPERTENSION: ICD-10-CM

## 2021-06-23 DIAGNOSIS — E78.5 HYPERLIPIDEMIA, UNSPECIFIED: ICD-10-CM

## 2021-06-23 DIAGNOSIS — E66.01 MORBID (SEVERE) OBESITY DUE TO EXCESS CALORIES: ICD-10-CM

## 2021-06-23 PROCEDURE — 99441: CPT

## 2021-06-23 PROCEDURE — 97802 MEDICAL NUTRITION INDIV IN: CPT | Mod: 95

## 2021-06-30 PROBLEM — E11.9 DIABETES MELLITUS TYPE 2, CONTROLLED: Status: ACTIVE | Noted: 2021-03-23

## 2021-07-01 ENCOUNTER — APPOINTMENT (OUTPATIENT)
Dept: UROGYNECOLOGY | Facility: CLINIC | Age: 51
End: 2021-07-01

## 2021-07-06 ENCOUNTER — RX RENEWAL (OUTPATIENT)
Age: 51
End: 2021-07-06

## 2021-08-31 ENCOUNTER — RX RENEWAL (OUTPATIENT)
Age: 51
End: 2021-08-31

## 2021-08-31 ENCOUNTER — TRANSCRIPTION ENCOUNTER (OUTPATIENT)
Age: 51
End: 2021-08-31

## 2021-08-31 ENCOUNTER — APPOINTMENT (OUTPATIENT)
Dept: INTERNAL MEDICINE | Facility: CLINIC | Age: 51
End: 2021-08-31

## 2021-08-31 RX ORDER — LOSARTAN POTASSIUM AND HYDROCHLOROTHIAZIDE 12.5; 5 MG/1; MG/1
50-12.5 TABLET ORAL
Qty: 90 | Refills: 1 | Status: ACTIVE | COMMUNITY
Start: 2019-05-23 | End: 1900-01-01

## 2021-09-15 NOTE — PATIENT PROFILE ADULT - NSPROPTRIGHTREPNAME_GEN_A__NUR
[FreeTextEntry1] : Dulce same

## 2021-09-16 ENCOUNTER — TRANSCRIPTION ENCOUNTER (OUTPATIENT)
Age: 51
End: 2021-09-16

## 2021-09-16 RX ORDER — OMEPRAZOLE 40 MG/1
40 CAPSULE, DELAYED RELEASE ORAL
Qty: 90 | Refills: 0 | Status: ACTIVE | COMMUNITY
Start: 2019-08-27 | End: 1900-01-01

## 2021-09-21 ENCOUNTER — RX RENEWAL (OUTPATIENT)
Age: 51
End: 2021-09-21

## 2021-10-01 ENCOUNTER — TRANSCRIPTION ENCOUNTER (OUTPATIENT)
Age: 51
End: 2021-10-01

## 2021-10-01 RX ORDER — FAMOTIDINE 40 MG/1
40 TABLET, FILM COATED ORAL
Qty: 90 | Refills: 1 | Status: ACTIVE | COMMUNITY
Start: 2020-03-18 | End: 1900-01-01

## 2021-10-06 PROBLEM — Z53.1 REFUSAL OF BLOOD TRANSFUSIONS AS PATIENT IS JEHOVAH'S WITNESS: Status: ACTIVE | Noted: 2020-08-31

## 2021-10-12 ENCOUNTER — RX RENEWAL (OUTPATIENT)
Age: 51
End: 2021-10-12

## 2021-10-21 ENCOUNTER — RX RENEWAL (OUTPATIENT)
Age: 51
End: 2021-10-21

## 2021-10-21 ENCOUNTER — TRANSCRIPTION ENCOUNTER (OUTPATIENT)
Age: 51
End: 2021-10-21

## 2021-10-21 RX ORDER — METOPROLOL SUCCINATE 50 MG/1
50 TABLET, EXTENDED RELEASE ORAL DAILY
Qty: 90 | Refills: 1 | Status: ACTIVE | COMMUNITY
Start: 2019-06-28 | End: 1900-01-01

## 2021-10-25 ENCOUNTER — RX RENEWAL (OUTPATIENT)
Age: 51
End: 2021-10-25

## 2021-12-15 ENCOUNTER — RX RENEWAL (OUTPATIENT)
Age: 51
End: 2021-12-15

## 2022-01-10 NOTE — H&P ADULT - PROBLEM SELECTOR PLAN 4
hx of HTn on norvac 5mg, losartan 50mg-12.5mg HCTZ  -c/w norvasc 5mg qd, losartan 50mg qd and 12.5mg HCTZ hx of HTN on Norvasc 5mg, losartan 50mg-12.5mg HCTZ, and metoprolol tartrate 50mg qd   -c/w nor vasc 5mg qd, losartan 50mg qd,12.5mg HCTZ, and metoprolol tartrate 50mg qd hx of HTN on Norvasc 5mg, losartan 50mg-12.5mg HCTZ, and metoprolol tartrate 50mg qd   -c/w nor vasc 5mg qd, losartan 50mg qd,12.5mg HCTZ, and metoprolol succinate 50mg qd DISPLAY PLAN FREE TEXT

## 2022-01-31 ENCOUNTER — RESULT REVIEW (OUTPATIENT)
Age: 52
End: 2022-01-31

## 2022-03-15 ENCOUNTER — RX RENEWAL (OUTPATIENT)
Age: 52
End: 2022-03-15

## 2022-03-17 ENCOUNTER — RX RENEWAL (OUTPATIENT)
Age: 52
End: 2022-03-17

## 2022-04-07 ENCOUNTER — RX RENEWAL (OUTPATIENT)
Age: 52
End: 2022-04-07

## 2022-04-19 NOTE — PROCEDURE NOTE - NSPROCDETAILS_GEN_ALL_CORE
location identified, draped/prepped, sterile technique used, needle inserted/introduced/ultrasound guidance/Seldinger technique/connected to a pressurized flush line/hemostasis with direct pressure, dressing applied/sutured in place/all materials/supplies accounted for at end of procedure/positive blood return obtained via catheter Adult

## 2022-04-25 ENCOUNTER — RX RENEWAL (OUTPATIENT)
Age: 52
End: 2022-04-25

## 2022-05-12 ENCOUNTER — RX RENEWAL (OUTPATIENT)
Age: 52
End: 2022-05-12

## 2022-06-28 ENCOUNTER — RX RENEWAL (OUTPATIENT)
Age: 52
End: 2022-06-28

## 2022-07-08 NOTE — PATIENT PROFILE ADULT - NSPROMUTINFOINDIVIDFT_GEN_A_NUR
[facility], [address]    [place], [address]   [phone]                                              Cardiology Office Follow up  Guerrero Zaragoza, 46 y o  female  YOB: 1968  MRN: [id number] Encounter: [id number]      PCP - Leopoldo Corrigan, DO    Assessment and Plan  Non-ischemic cardiomyopathy, LVEF 40-45%  Echo - 1/19/22 - DM 1%, grade 1 DD  Echo - 9/12/19 ([facility] report)- LVEF 40-44%, mildly dilated LV, mild diffuse hypokinesis, grade 1 diastolic dysfunction no significant valvular abnormalities  LHC - 5/2018 - mid LAD 40%, otherwise mild atherosclerosis  Chronic LBBB  Non-obstructive CAD  mLAD 40% in 2018  GERD  Diabetes Mellitus Type 2   Has neuropathy, and possibly autonomic neuropathy as well with some orthostatic hypotension as well  Hyperlipidemia  Nocturnal hypoxia, on overnight O2 therapy  Active smoker  1 PPD  Previously quit with chantix, but resumed smoking due to her son's smoking, and people around her smoking  Depression/PTSD  Overweight, Body mass index is 26 98 kg/m²       Plan  Chest /abdominal pain  She continues to have recurrent chest pain, radiating on both sides of her lower chest, mainly on the left, occurring mainly at rest and without any clear exertional component  There was concern for this being related to GERD and she has already undergone EGD and colonoscopy earlier this year, which revealed only mild inflammation and was not felt to be the cause of the symptoms  She continues to have symptoms despite being on PPI & was again in the ED for this last month - and was ruled out for ACS and sent back to Cardiology for evaluation  Unclear etiology - ?CAD v thoracic spinal nerve compression  He does have moderate CAD involving LAD previously, and this could have certainly progressed  Will get a nuclear Lexiscan stress test to evaluate this further, and rule out/in CAD as the cause    Non-ischemic cardiomyopathy, LVEF 40-45%  Stable, euvolemic  Continue Bisoprolol 5 mg daily  Not on ACE/ARB due to low BP  Previously had passed out with same  Not on diuretics and remains euvolemic    CAD, Non-obstructive  Catheterization in 2018 after some abnormalities on nuclear stress test had revealed 40% lad stenosis and she has been treated medically  Continue Bisoprolol 5, rosuvastatin 20 mg    Hypertension  Blood pressure recently well controlled, 118/70  Continue bisoprolol 5 mg    Hyperlipidemia  Lipid panel - 7/15/20 - , , HDL 40,    She has not yet completed the lipid panel that was previously requested  Check lipid panel now  Continue rosuvastatin 20 mg daily in the interim        No results found for this visit on 07/08/22  Orders Placed This Encounter   Procedures    Lipid Panel with Direct LDL reflex     Return in about 3 months (around 10/8/2022), or if symptoms worsen or fail to improve  History of Present Illness   46 y o  female comes in for annual cardiac follow-up, after being sent back by her pulmonologist    She used to see Dr Pily Smith last year, but is now being established with me  She has a history of a left bundle branch block,  And had an echo and a nuclear stress test done in May 2018, which revealed mildly reduced LVEF along with  A medium sized anterior defect with some reversibility  As a result she was referred for a cardiac catheterization which showed nonobstructive coronary artery disease with 40% stenosis of mid LAD  She was managed medically and has had no symptoms over the past year  She has been doing fairly well over the past year, and does not have any active complaints  She additionally follows with a pulmonologist at [facility], who had ordered an echocardiogram last month  This revealed an LVEF of 40-44%, and as a result she was asked to follow up again with her cardiologist for further evaluation      Interval history - 8/20/2020  She comes back for follow-up  I previously saw her for a tele visit in March  She has been doing fairly well overall  Patient was unclear about her medications, but was eventually able to show me her my chart from [facility] which showed a completely different list of medications  It appears she is not on bisoprolol, losartan or atorvastatin at present  She reports having symptoms of dizziness on standing up for a few seconds, and a couple of months ago, she was started on irbesartan 150, after which she reportedly passed out  As a result she stopped taking the same  Interval history - 11/23/2020  He comes back for follow-up after 3 months  She has been doing well overall and does not report any active complains of chest pain, shortness of breath, palpitations  She has not had any further episodes of dizziness or passing out since being on her current antihypertensive regimen  Interval history - 11/22/2021  She comes back for follow-up after 1 year  She has been doing well overall and continues to be free of chest pain  She does report mild a patent palpitations, but no persistent symptoms  No acute complains of shortness of breath, and overall doing well and remains active  No further complains of dizziness, near syncope or syncope  She does report some heartburn-like chest discomfort earlier this year, but states that she has run out of her PPI  Interval history - 1/19/2022  She comes back for follow-up for an earlier visit within 2 months  She has been having ongoing complains of epigastric discomfort with radiation to the center of her chest   It also radiates on both sides on the lower side of chest   It seems to occurr at rest and is not worsened with exertion  She has been taking mylanta, without much help, but PPIs did provide some relief  No current complains of shortness of breath, palpitations, dizziness    She continues to have musculoskeletal and neuropathic pain, for which she takes multiple pain medications including diclofenac, ibuprofen and naproxen  Interval history - 2022  She comes back for follow-up after about 6 months  She followed up with GI and completed EGD and colonoscopy which did not reveal any major findings  She has been treated for GERD, but has continued to have symptoms of chest pain, and ended up again in the ED for the same  She was again ruled out for ACS and is now back to see Cardiology for further evaluation  Most of her symptoms continue to be while at rest and without any clear triggers  No complains of nausea, vomiting or diaphoresis  No complains of palpitations, dizziness, near-syncope or syncope        Historical Information   Past Medical History:   Diagnosis Date    Angina pectoris ([place] [address] )     recent    Anxiety     Arthritis     Asthma     Carpal tunnel syndrome     Chronic headaches     [facility]     Diabetes ([place] [address] )     Diabetes mellitus ([place] [address] )     GERD (gastroesophageal reflux disease)     Headache     Hyperlipidemia     Hypertension     Kidney stone     Left bundle branch block     LBBB    [facility]     Myocardial infarction ([place] [address] )     Neuropathy     PTSD (post-traumatic stress disorder)     RLS (restless legs syndrome)     Shortness of breath      Past Surgical History:   Procedure Laterality Date    ANGIOPLASTY      BREAST SURGERY  2016    Reduction    CARDIAC CATHETERIZATION  2018     Mid LAD: There was a tubular 40 % stenosis     SECTION      COLONOSCOPY      HYSTERECTOMY  2018    Complete    KIDNEY STONE SURGERY      LAPAROSCOPY      TN COLONOSCOPY FLX DX W/COLLJ SPEC WHEN PFRMD N/A 2017    Procedure: EGD AND COLONOSCOPY;  Surgeon: Lorie Johnson MD;  [facility]; Service: Gastroenterology    TN ESOPHAGOGASTRODUODENOSCOPY TRANSORAL DIAGNOSTIC N/A 10/10/2018    Procedure: ESOPHAGOGASTRODUODENOSCOPY (EGD);   Surgeon: Asha Tovar MD;  Location: [facility];   Service: Gastroenterology    RI LAPAROSCOPY W TOT HYSTERECTUTERUS <=250 Rami[address] Flake TUBE/OVARY N/A 5/22/2018    Procedure: ROBOTIC ASSISTED TOTAL LAPAROSCOPIC HYSTERECTOMY, BILATERAL SALPINGOOPHERECTOMY,;  Surgeon: Britney Edwards MD;  Location:  MAIN OR;  Service: Gynecology Oncology    UPPER GASTROINTESTINAL ENDOSCOPY      WRIST SURGERY Right      Family History   Problem Relation Age of Onset    Diabetes Mother    Mavis Cousin Breast cancer Mother 39    Lung cancer Father 47    Diabetes Sister     Brain cancer Maternal Aunt 67    Breast cancer Other 25     Current Outpatient Medications on File Prior to Visit   Medication Sig Dispense Refill    albuterol (PROVENTIL HFA,VENTOLIN HFA) 90 mcg/act inhaler Inhale 2 puffs every 6 (six) hours as needed for wheezing      ARIPiprazole (ABILIFY) 2 mg tablet Take 2 mg by mouth daily        BD Pen Needle Dayna 2nd Gen 32G X 4 MM MISC       bisoprolol (ZEBETA) 5 mg tablet Take 1 tablet (5 mg total) by mouth daily 90 tablet 1    buPROPion (WELLBUTRIN XL) 300 mg 24 hr tablet Take 300 mg by mouth every morning        diclofenac (VOLTAREN) 75 mg EC tablet Take 75 mg by mouth 2 (two) times a day        ibuprofen (MOTRIN) 200 mg tablet Take 200 mg by mouth every 6 (six) hours as needed for mild pain      insulin glargine (LANTUS SOLOSTAR) 100 units/mL injection pen Inject 26 Units under the skin daily at bedtime      insulin lispro (HumaLOG) 100 units/mL injection Inject 10 Units under the skin 3 (three) times a day before meals      linaCLOtide (Linzess) 290 MCG CAPS Take 1 capsule by mouth in the morning 90 capsule 3    methocarbamol (ROBAXIN) 750 mg tablet Take 500 mg by mouth every 6 (six) hours as needed for muscle spasms        metoclopramide (REGLAN) 5 mg/5 mL oral solution Take 5 mL (5 mg total) by mouth 3 (three) times a day 445 mL 1    naproxen (NAPROSYN) 375 mg tablet Take 1 tablet (375 mg total) by mouth 2 (two) times a day with meals 20 tablet 0    NovoLOG FlexPen 100 units/mL injection pen       ondansetron (ZOFRAN) 4 mg tablet Take 1 tablet (4 mg total) by mouth every 8 (eight) hours as needed for nausea or vomiting 20 tablet 0    oxyCODONE (ROXICODONE) 5 immediate release tablet Take 5 mg by mouth 3 (three) times a day as needed      pantoprazole (PROTONIX) 40 mg tablet take 1 tablet by mouth twice a day 60 tablet 1    potassium chloride SA (KLOR-CON M15) 15 MEQ tablet Take 15 mEq by mouth 2 (two) times a day        pregabalin (LYRICA) 75 mg capsule Take 75 mg by mouth 2 (two) times a day        repaglinide (PRANDIN) 1 mg tablet Take 1 mg by mouth 3 (three) times a day before meals        rosuvastatin (CRESTOR) 20 MG tablet Take 20 mg by mouth daily        Spiriva Respimat 2 5 MCG/ACT AERS inhaler       zolpidem (AMBIEN) 10 mg tablet Take 10 mg by mouth daily at bedtime as needed for sleep      naloxone (NARCAN) 4 mg/0 1 mL nasal spray ADMINISTER A SINGLE SPRAY OF NARCAN IN ONE NOSTRIL REPEAT AFTER 3 MINUTES IF NO OR MINIMAL RESPONSE (Patient not taking: No sig reported)      nystatin (MYCOSTATIN) ointment Apply topically 2 (two) times a day (Patient not taking: No sig reported) 30 g 0    polyethylene glycol (GOLYTELY) 4000 mL solution Take 4,000 mL by mouth once for 1 dose 4000 mL 0    QUEtiapine (SEROquel) 200 mg tablet Take 150 mg by mouth daily at bedtime   (Patient not taking: No sig reported)      sucralfate (CARAFATE) 1 g/10 mL suspension Take 10 mL (1 g total) by mouth 4 (four) times a day 414 mL 0    [DISCONTINUED] linaCLOtide (Linzess) 145 MCG CAPS Take 1 capsule (145 mcg total) by mouth every morning (Patient not taking: No sig reported) 30 capsule 2     No current facility-administered medications on file prior to visit       Allergies   Allergen Reactions    Lisinopril Cough    Losartan Dizziness    Other Hives     Surgical tape    Prednisone Other (See Comments)     Thrush       Social History     Socioeconomic History    Marital status: /Civil Union     Spouse name: None    Number of children: None    Years of education: None    Highest education level: None   Occupational History    Occupation: Healthcare provider    Tobacco Use    Smoking status: Current Every Day Smoker     Packs/day: 1 00     Years: 48 00     Pack years: 48 00     Start date: 1/1/1973    Smokeless tobacco: Never Used    Tobacco comment: pt  smoked this am 10/10   Vaping Use    Vaping Use: Never used   Substance and Sexual Activity    Alcohol use: No     Comment: Rarely consumes alcohol - As per [facility]     Drug use: Yes     Frequency: 7 0 times per week     Types: Marijuana     Comment: medical marijuana  last use over a month    Sexual activity: None   Other Topics Concern    None   Social History Narrative    None     Social Determinants of Health     Financial Resource Strain: Not on file   Food Insecurity: Not on file   Transportation Needs: Not on file   Physical Activity: Not on file   Stress: Not on file   Social Connections: Not on file   Intimate Partner Violence: Not on file   Housing Stability: Not on file        Review of Systems   All other systems reviewed and are negative  Vitals:  Vitals:    07/08/22 1057   BP: 118/70   Pulse: 68   Weight: 64 8 kg (142 lb 12 8 oz)     BMI - Body mass index is 26 98 kg/m²  Wt Readings from Last 7 Encounters:   07/08/22 64 6 kg (142 lb 6 4 oz)   07/08/22 64 8 kg (142 lb 12 8 oz)   06/29/22 64 kg (141 lb)   05/10/22 68 9 kg (151 lb 12 8 oz)   04/05/22 70 kg (154 lb 6 4 oz)   02/24/22 68 9 kg (151 lb 14 4 oz)   01/20/22 70 8 kg (156 lb)       Physical Exam  Vitals and nursing note reviewed  Constitutional:       General: She is not in acute distress  Appearance: Normal appearance  She is well-developed  She is not ill-appearing  HENT:      Head: Normocephalic and atraumatic  Nose: No congestion  Eyes:      General: No scleral icterus  Conjunctiva/sclera: Conjunctivae normal    Neck:      Vascular: No carotid bruit or JVD  Cardiovascular:      Rate and Rhythm: Normal rate and regular rhythm  Pulses: Normal pulses  Heart sounds: Normal heart sounds  No murmur heard  No friction rub  No gallop  Pulmonary:      Effort: Pulmonary effort is normal  No respiratory distress  Breath sounds: Normal breath sounds  No rales  Abdominal:      General: There is no distension  Palpations: Abdomen is soft  Tenderness: There is no abdominal tenderness  Musculoskeletal:         General: No swelling or tenderness  Cervical back: Neck supple  Right lower leg: No edema  Left lower leg: No edema  Skin:     General: Skin is warm  Neurological:      General: No focal deficit present  Mental Status: She is alert and oriented to person, place, and time  Mental status is at baseline  Psychiatric:         Mood and Affect: Mood normal          Behavior: Behavior normal          Thought Content:  Thought content normal        Labs:  CBC:   Lab Results   Component Value Date    WBC 8 99 06/29/2022    RBC 6 18 (H) 06/29/2022    HGB 17 4 (H) 06/29/2022    HCT 52 3 (H) 06/29/2022    MCV 85 06/29/2022     06/29/2022    RDW 13 1 06/29/2022       CMP:   Lab Results   Component Value Date    K 3 9 06/29/2022    CL 92 (L) 06/29/2022    CO2 30 06/29/2022    BUN 20 06/29/2022    CREATININE 0 83 06/29/2022    EGFR 80 06/29/2022    CALCIUM 9 6 06/29/2022    AST 17 06/29/2022    ALT 20 06/29/2022    ALKPHOS 109 (H) 06/29/2022       Magnesium:  Lab Results   Component Value Date    MG 1 7 (L) 06/29/2022       Lipid Profile:   No results found for: CHOL, HDL, TRIG, LDLCALC    Thyroid Studies: No results found for: TNU2RCQUONYX, T3FREE, FREET4, L9LMZOA, R2LAOUP    No components found for: brotips [place]    Lab Results   Component Value Date    INR 0 98 10/18/2019    INR 0 89 05/09/2018   5    Imaging: Ct Abdomen Pelvis With Contrast    Result Date: 10/18/2019  Narrative: CT ABDOMEN AND PELVIS WITH IV CONTRAST INDICATION:   lower abd pain, diarrhea  COMPARISON:  None  TECHNIQUE:  CT examination of the abdomen and pelvis was performed  Axial, sagittal, and coronal 2D reformatted images were created from the source data and submitted for interpretation  Radiation dose length product (DLP) for this visit:  500 mGy-cm   This examination, like all CT scans performed in the [facility], was performed utilizing techniques to minimize radiation dose exposure, including the use of iterative reconstruction and automated exposure control  IV Contrast:  100 mL of iohexol (OMNIPAQUE) Enteric Contrast:  Enteric contrast was not administered  FINDINGS: ABDOMEN LOWER CHEST:  No clinically significant abnormality identified in the visualized lower chest  LIVER/BILIARY TREE: The liver is mildly enlarged  Diffusely decreased hepatic attenuation suggesting steatosis  No intra or extrahepatic biliary ductal dilation GALLBLADDER:  No calcified gallstones  No pericholecystic inflammatory change  SPLEEN:  Unremarkable  PANCREAS:  Unremarkable  ADRENAL GLANDS:  Unremarkable  KIDNEYS/URETERS: No hydronephrosis  No suspicious renal lesion  Left renal lower pole focal cortical loss, noting a 3 mm dystrophic calcification  STOMACH AND BOWEL: Circumferential wall thickening of the mid to distal transverse colon sparing the splenic flexure with pericolonic fat stranding in keeping with acute colitis (series 2, images 31-41)  No disproportionate dilation of small or large bowel loops  APPENDIX:  No findings to suggest appendicitis  ABDOMINOPELVIC CAVITY:  No ascites or free intraperitoneal air  No lymphadenopathy  VESSELS:  Unremarkable for patient's age  PELVIS REPRODUCTIVE ORGANS:  Status post hysterectomy  No adnexal mass  URINARY BLADDER:  Unremarkable  ABDOMINAL WALL/INGUINAL REGIONS:  Unremarkable   OSSEOUS STRUCTURES:  No acute fracture or destructive osseous lesion  Impression: 1  Acute uncomplicated colitis of of the mid to distal transverse colon sparing the splenic flexure, favored to be infectious  2   Mild hepatomegaly and hepatic steatosis  Workstation performed: [id number]       Cardiac testing:   Results for orders placed during the hospital encounter of 18   Echo complete with contrast if indicated    Narrative [address], [address] [facility]  [phone]    Transthoracic Echocardiogram  2D, M-mode, Doppler, and Color Doppler    Study date:  03-May-2018    Patient: Jamaica Castillo  MR number: [id number]  Account number: [de-identified]  : 1968  Age: 47 years  Gender: Female  Status: Outpatient  Location: [facility]  Height: 61 in  Weight: 170 lb  BP: 112/ 78 mmHg    Indications: Dyspnea  Diagnoses: R06 09 - Other forms of dyspnea    Sonographer:  Phyliss Hammans,, RCS  Interpreting Physician:  Massimo Sosa MD  Primary Physician:  Vicki Swartz DO  Referring Physician:  Massimo Sosa MD  Group:  [facility]    SUMMARY    LEFT VENTRICLE:  Ejection fraction was estimated to be 40 %  Dyssynchronous septal motion likely secondary to LBBB  There was mild diffuse hypokinesis  MITRAL VALVE:  There was trace regurgitation  HISTORY: PRIOR HISTORY: LBBB Myocardial infarction  Risk factors: hypertension, oral hypoglycemic-treated diabetes, a history of current cigarette use (within the last month), and a family history of coronary artery disease  PROCEDURE: The study was performed in the [address] [place] [address]  This was a routine study  The transthoracic approach was used  The study included complete 2D imaging, M-mode, complete spectral Doppler, and color Doppler  The  heart rate was 82 bpm, at the start of the study  Image quality was adequate  LEFT VENTRICLE: Size was normal  Ejection fraction was estimated to be 40 %  Dyssynchronous septal motion likely secondary to LBBB  There was mild diffuse hypokinesis  DOPPLER: There was an increased relative contribution of atrial  contraction to ventricular filling  RIGHT VENTRICLE: The size was normal  Systolic function was normal  Wall thickness was normal     LEFT ATRIUM: Size was normal     RIGHT ATRIUM: Size was normal     MITRAL VALVE: Valve structure was normal  There was normal leaflet separation  DOPPLER: The transmitral velocity was within the normal range  There was no evidence for stenosis  There was trace regurgitation  AORTIC VALVE: The valve was not well visualized  DOPPLER: There was no evidence for stenosis  TRICUSPID VALVE: The valve structure was normal  There was normal leaflet separation  DOPPLER: The transtricuspid velocity was within the normal range  There was no evidence for stenosis  There was no regurgitation  PULMONIC VALVE: Not well visualized  PERICARDIUM: There was no pericardial effusion  The pericardium was normal in appearance  AORTA: The root exhibited normal size  SYSTEM MEASUREMENT TABLES    Apical four chamber  4 chamber Left Atrium Volume Index; Planimetry; End Systole; Apical four chamber;: 9 19 cm2  Left Ventricular End Diastolic Volume; Method of Disks, Single Plane; Apical four chamber;: 71 9 ml  Left Ventricular End Systolic Volume; Method of Disks, Single Plane; Apical four chamber;: 50 6 ml  Right Atrium Systolic Area; Planimetry; End Systole; Apical four chamber;: 8 11 cm2  Right Ventricular Internal Diastolic Dimension; End Diastole; Apical four chamber;: 20 6 mm  TAPSE: 27 4 mm    Apical two chamber  Left Ventricular End Diastolic Volume; Method of Disks, Single Plane; Apical two chamber;: 105 3 ml  Left Ventricular End Systolic Volume; Method of Disks, Single Plane; Apical two chamber;: 35 7 ml    Unspecified Scan Mode  Aortic Root Diameter; End Systole;: 29 4 mm  Left atrial diameter;  End Diastole;: 29 7 mm  Cardiac Output; Teichholz; Systole;: 5 91 L/min  Interventricular Septum Diastolic Thickness; Teichholz; End Diastole;: 8 5 mm  Left Ventricle Internal End Diastolic Dimension; Teichholz;: 51 8 mm  Left Ventricle Internal Systolic Dimension; Teichholz; End Systole;: 37 7 mm  Left Ventricle Posterior Wall Diastolic Thickness; Teichholz; End Diastole;: 8 mm  Left Ventricular Ejection Fraction; Teichholz;: 52 2 %  Left Ventricular End Diastolic Volume; Teichholz;: 127 2 ml  Left Ventricular End Systolic Volume; Teichholz;: 60 8 ml  Stroke volume; Teichholz; Systole;: 66 4 ml  Mitral Valve Area; Area by Pressure Half-Time; Systole;: 3 49 cm2  Mitral Valve E to A Ratio; Systole;: 0 69    [facility] Accredited [facility]chocardiography [facility]    Prepared and electronically signed by    Harsh Estevez MD  Signed [id number] 11:54:33       No results found for this or any previous visit  No results found for this or any previous visit  No results found for this or any previous visit  peewee-op care no

## 2022-08-05 ENCOUNTER — RX RENEWAL (OUTPATIENT)
Age: 52
End: 2022-08-05

## 2022-10-27 ENCOUNTER — OFFICE (OUTPATIENT)
Dept: URBAN - METROPOLITAN AREA CLINIC 76 | Facility: CLINIC | Age: 52
Setting detail: OPHTHALMOLOGY
End: 2022-10-27
Payer: COMMERCIAL

## 2022-10-27 DIAGNOSIS — E11.9: ICD-10-CM

## 2022-10-27 DIAGNOSIS — H40.013: ICD-10-CM

## 2022-10-27 DIAGNOSIS — H25.13: ICD-10-CM

## 2022-10-27 DIAGNOSIS — H20.813: ICD-10-CM

## 2022-10-27 DIAGNOSIS — H52.4: ICD-10-CM

## 2022-10-27 PROCEDURE — 92015 DETERMINE REFRACTIVE STATE: CPT | Performed by: OPHTHALMOLOGY

## 2022-10-27 PROCEDURE — 92133 CPTRZD OPH DX IMG PST SGM ON: CPT | Performed by: OPHTHALMOLOGY

## 2022-10-27 PROCEDURE — 92083 EXTENDED VISUAL FIELD XM: CPT | Performed by: OPHTHALMOLOGY

## 2022-10-27 PROCEDURE — 92004 COMPRE OPH EXAM NEW PT 1/>: CPT | Performed by: OPHTHALMOLOGY

## 2022-10-27 PROCEDURE — 76514 ECHO EXAM OF EYE THICKNESS: CPT | Performed by: OPHTHALMOLOGY

## 2022-10-27 ASSESSMENT — VISUAL ACUITY
OS_BCVA: 20/100
OD_BCVA: 20/30

## 2022-10-27 ASSESSMENT — KERATOMETRY
OD_AXISANGLE_DEGREES: 108
OD_K2POWER_DIOPTERS: 42.00
OS_AXISANGLE_DEGREES: 066
OD_K1POWER_DIOPTERS: 40.00
OS_K2POWER_DIOPTERS: 41.50
OS_K1POWER_DIOPTERS: 39.50

## 2022-10-27 ASSESSMENT — CORNEAL DYSTROPHY - POSTERIOR
OD_POSTERIORDYSTROPHY: FUCHS GUTTATA
OS_POSTERIORDYSTROPHY: FUCHS GUTTATA

## 2022-10-27 ASSESSMENT — PACHYMETRY
OS_CT_CORRECTION: 2
OD_CT_UM: 522
OD_CT_CORRECTION: 1
OS_CT_UM: 512

## 2022-10-27 ASSESSMENT — TONOMETRY
OD_IOP_MMHG: 17
OS_IOP_MMHG: 15

## 2022-10-27 ASSESSMENT — CONFRONTATIONAL VISUAL FIELD TEST (CVF)
OS_FINDINGS: FULL
OD_FINDINGS: FULL

## 2022-11-25 ENCOUNTER — RX RENEWAL (OUTPATIENT)
Age: 52
End: 2022-11-25

## 2022-11-25 RX ORDER — AMLODIPINE BESYLATE 5 MG/1
5 TABLET ORAL DAILY
Qty: 90 | Refills: 1 | Status: ACTIVE | COMMUNITY
Start: 2019-06-04 | End: 1900-01-01

## 2023-01-23 ENCOUNTER — EMERGENCY (EMERGENCY)
Facility: HOSPITAL | Age: 53
LOS: 1 days | Discharge: HOME | End: 2023-01-23
Admitting: EMERGENCY MEDICINE
Payer: MEDICAID

## 2023-01-23 VITALS
DIASTOLIC BLOOD PRESSURE: 89 MMHG | HEART RATE: 77 BPM | TEMPERATURE: 99 F | SYSTOLIC BLOOD PRESSURE: 137 MMHG | WEIGHT: 261.03 LBS | OXYGEN SATURATION: 95 % | HEIGHT: 68 IN | RESPIRATION RATE: 16 BRPM

## 2023-01-23 DIAGNOSIS — Z98.890 OTHER SPECIFIED POSTPROCEDURAL STATES: Chronic | ICD-10-CM

## 2023-01-23 DIAGNOSIS — Z90.49 ACQUIRED ABSENCE OF OTHER SPECIFIED PARTS OF DIGESTIVE TRACT: Chronic | ICD-10-CM

## 2023-01-23 DIAGNOSIS — K46.9 UNSPECIFIED ABDOMINAL HERNIA WITHOUT OBSTRUCTION OR GANGRENE: Chronic | ICD-10-CM

## 2023-01-23 DIAGNOSIS — Z90.3 ACQUIRED ABSENCE OF STOMACH [PART OF]: Chronic | ICD-10-CM

## 2023-01-23 LAB
ALBUMIN SERPL ELPH-MCNC: 3.6 G/DL — SIGNIFICANT CHANGE UP (ref 3.4–5)
ALP SERPL-CCNC: 86 U/L — SIGNIFICANT CHANGE UP (ref 40–120)
ALT FLD-CCNC: 18 U/L — SIGNIFICANT CHANGE UP (ref 12–42)
ANION GAP SERPL CALC-SCNC: 6 MMOL/L — LOW (ref 9–16)
APTT BLD: 29.1 SEC — SIGNIFICANT CHANGE UP (ref 27.5–35.5)
AST SERPL-CCNC: 29 U/L — SIGNIFICANT CHANGE UP (ref 15–37)
BASOPHILS # BLD AUTO: 0.03 K/UL — SIGNIFICANT CHANGE UP (ref 0–0.2)
BASOPHILS NFR BLD AUTO: 0.5 % — SIGNIFICANT CHANGE UP (ref 0–2)
BILIRUB SERPL-MCNC: 0.4 MG/DL — SIGNIFICANT CHANGE UP (ref 0.2–1.2)
BUN SERPL-MCNC: 14 MG/DL — SIGNIFICANT CHANGE UP (ref 7–23)
CALCIUM SERPL-MCNC: 8.9 MG/DL — SIGNIFICANT CHANGE UP (ref 8.5–10.5)
CHLORIDE SERPL-SCNC: 109 MMOL/L — HIGH (ref 96–108)
CO2 SERPL-SCNC: 29 MMOL/L — SIGNIFICANT CHANGE UP (ref 22–31)
CREAT SERPL-MCNC: 0.82 MG/DL — SIGNIFICANT CHANGE UP (ref 0.5–1.3)
D DIMER BLD IA.RAPID-MCNC: <187 NG/ML DDU — SIGNIFICANT CHANGE UP
EGFR: 85 ML/MIN/1.73M2 — SIGNIFICANT CHANGE UP
EOSINOPHIL # BLD AUTO: 0.26 K/UL — SIGNIFICANT CHANGE UP (ref 0–0.5)
EOSINOPHIL NFR BLD AUTO: 4.6 % — SIGNIFICANT CHANGE UP (ref 0–6)
GLUCOSE SERPL-MCNC: 108 MG/DL — HIGH (ref 70–99)
HCT VFR BLD CALC: 37.7 % — SIGNIFICANT CHANGE UP (ref 34.5–45)
HGB BLD-MCNC: 11.7 G/DL — SIGNIFICANT CHANGE UP (ref 11.5–15.5)
IMM GRANULOCYTES NFR BLD AUTO: 0.4 % — SIGNIFICANT CHANGE UP (ref 0–0.9)
INR BLD: 1.02 — SIGNIFICANT CHANGE UP (ref 0.88–1.16)
LYMPHOCYTES # BLD AUTO: 2.23 K/UL — SIGNIFICANT CHANGE UP (ref 1–3.3)
LYMPHOCYTES # BLD AUTO: 39.8 % — SIGNIFICANT CHANGE UP (ref 13–44)
MCHC RBC-ENTMCNC: 27 PG — SIGNIFICANT CHANGE UP (ref 27–34)
MCHC RBC-ENTMCNC: 31 GM/DL — LOW (ref 32–36)
MCV RBC AUTO: 86.9 FL — SIGNIFICANT CHANGE UP (ref 80–100)
MONOCYTES # BLD AUTO: 0.48 K/UL — SIGNIFICANT CHANGE UP (ref 0–0.9)
MONOCYTES NFR BLD AUTO: 8.6 % — SIGNIFICANT CHANGE UP (ref 2–14)
NEUTROPHILS # BLD AUTO: 2.59 K/UL — SIGNIFICANT CHANGE UP (ref 1.8–7.4)
NEUTROPHILS NFR BLD AUTO: 46.1 % — SIGNIFICANT CHANGE UP (ref 43–77)
NRBC # BLD: 0 /100 WBCS — SIGNIFICANT CHANGE UP (ref 0–0)
NT-PROBNP SERPL-SCNC: 186 PG/ML — SIGNIFICANT CHANGE UP
PLATELET # BLD AUTO: 251 K/UL — SIGNIFICANT CHANGE UP (ref 150–400)
POTASSIUM SERPL-MCNC: 4.3 MMOL/L — SIGNIFICANT CHANGE UP (ref 3.5–5.3)
POTASSIUM SERPL-SCNC: 4.3 MMOL/L — SIGNIFICANT CHANGE UP (ref 3.5–5.3)
PROT SERPL-MCNC: 7.5 G/DL — SIGNIFICANT CHANGE UP (ref 6.4–8.2)
PROTHROM AB SERPL-ACNC: 11.8 SEC — SIGNIFICANT CHANGE UP (ref 10.5–13.4)
RBC # BLD: 4.34 M/UL — SIGNIFICANT CHANGE UP (ref 3.8–5.2)
RBC # FLD: 13.5 % — SIGNIFICANT CHANGE UP (ref 10.3–14.5)
SODIUM SERPL-SCNC: 144 MMOL/L — SIGNIFICANT CHANGE UP (ref 132–145)
TROPONIN I, HIGH SENSITIVITY RESULT: 18.6 NG/L — SIGNIFICANT CHANGE UP
WBC # BLD: 5.61 K/UL — SIGNIFICANT CHANGE UP (ref 3.8–10.5)
WBC # FLD AUTO: 5.61 K/UL — SIGNIFICANT CHANGE UP (ref 3.8–10.5)

## 2023-01-23 PROCEDURE — 99285 EMERGENCY DEPT VISIT HI MDM: CPT

## 2023-01-23 PROCEDURE — 93971 EXTREMITY STUDY: CPT | Mod: 26,LT

## 2023-01-23 NOTE — ED PROVIDER NOTE - OBJECTIVE STATEMENT
PMHX RA, depression, DM, GERD, DVT L leg 4-5x in the past last in 2021 after hip surgery, PE in 2002. noncompliant with lovenox as she had been told that she cannot take it in combination with her NSAIDs presents with L leg pain x one month.  has chronic BL pedal edema, denies any worsening edema.

## 2023-01-23 NOTE — ED PROVIDER NOTE - PATIENT PORTAL LINK FT
You can access the FollowMyHealth Patient Portal offered by [facility] by registering at the following website: [email or web address]. By joining [employer]’s FollowMyHealth portal, you will also be able to view your health information using other applications (apps) compatible with our system.

## 2023-01-23 NOTE — ED PROVIDER NOTE - CLINICAL SUMMARY MEDICAL DECISION MAKING FREE TEXT BOX
PMHx PE/DVT noncompliant with lovenox, RA , DM presents with L leg pain x one month. will do US to r/o DVT, check labs and continue to monitor

## 2023-01-23 NOTE — ED PROVIDER NOTE - CARE PROVIDER_API CALL
Bolivar Gomez)  PhysicalRehab Medicine  [address] [place] [address]  Phone: [phone]  Fax: [phone]  Follow Up Time:

## 2023-01-25 DIAGNOSIS — M79.605 PAIN IN LEFT LEG: ICD-10-CM

## 2023-01-25 DIAGNOSIS — Z86.711 PERSONAL HISTORY OF PULMONARY EMBOLISM: ICD-10-CM

## 2023-01-25 DIAGNOSIS — R60.0 LOCALIZED EDEMA: ICD-10-CM

## 2023-01-25 DIAGNOSIS — G25.81 RESTLESS LEGS SYNDROME: ICD-10-CM

## 2023-01-25 DIAGNOSIS — Z91.14 PATIENT'S OTHER NONCOMPLIANCE WITH MEDICATION REGIMEN: ICD-10-CM

## 2023-01-25 DIAGNOSIS — Z86.718 PERSONAL HISTORY OF OTHER VENOUS THROMBOSIS AND EMBOLISM: ICD-10-CM

## 2023-01-25 DIAGNOSIS — Z88.1 ALLERGY STATUS TO OTHER ANTIBIOTIC AGENTS STATUS: ICD-10-CM

## 2023-01-25 DIAGNOSIS — Z79.01 LONG TERM (CURRENT) USE OF ANTICOAGULANTS: ICD-10-CM

## 2023-01-25 DIAGNOSIS — F32.A DEPRESSION, UNSPECIFIED: ICD-10-CM

## 2023-01-25 DIAGNOSIS — Z96.641 PRESENCE OF RIGHT ARTIFICIAL HIP JOINT: ICD-10-CM

## 2023-01-25 DIAGNOSIS — E11.40 TYPE 2 DIABETES MELLITUS WITH DIABETIC NEUROPATHY, UNSPECIFIED: ICD-10-CM

## 2023-01-25 DIAGNOSIS — Z88.8 ALLERGY STATUS TO OTHER DRUGS, MEDICAMENTS AND BIOLOGICAL SUBSTANCES: ICD-10-CM

## 2023-01-25 DIAGNOSIS — M06.9 RHEUMATOID ARTHRITIS, UNSPECIFIED: ICD-10-CM

## 2023-01-25 DIAGNOSIS — M19.90 UNSPECIFIED OSTEOARTHRITIS, UNSPECIFIED SITE: ICD-10-CM

## 2023-01-25 DIAGNOSIS — I49.8 OTHER SPECIFIED CARDIAC ARRHYTHMIAS: ICD-10-CM

## 2023-01-25 DIAGNOSIS — M79.652 PAIN IN LEFT THIGH: ICD-10-CM

## 2023-02-05 NOTE — OCCUPATIONAL THERAPY INITIAL EVALUATION ADULT - VISUAL ACUITY
Upon arrival to room immediately c/o dental pain with infection and is presently taking antibiotic for \"infection\" and awaiting approval for oral surgery. C/o neck pain radiating from face bilaterally and then to chest.    patient wears reading glasses normal sinus rhythm

## 2023-02-08 NOTE — PROGRESS NOTE ADULT - BACK
After 5 days, if symptoms worse or mucous consistently colored then start zpak
not examined
not examined

## 2023-06-07 ENCOUNTER — OUTPATIENT (OUTPATIENT)
Dept: OUTPATIENT SERVICES | Facility: HOSPITAL | Age: 53
LOS: 1 days | End: 2023-06-07
Payer: MEDICAID

## 2023-06-07 ENCOUNTER — RESULT REVIEW (OUTPATIENT)
Age: 53
End: 2023-06-07

## 2023-06-07 ENCOUNTER — APPOINTMENT (OUTPATIENT)
Dept: ORTHOPEDIC SURGERY | Facility: CLINIC | Age: 53
End: 2023-06-07
Payer: MEDICAID

## 2023-06-07 VITALS
OXYGEN SATURATION: 98 % | HEART RATE: 67 BPM | SYSTOLIC BLOOD PRESSURE: 148 MMHG | WEIGHT: 248 LBS | BODY MASS INDEX: 37.59 KG/M2 | DIASTOLIC BLOOD PRESSURE: 77 MMHG | HEIGHT: 68 IN

## 2023-06-07 DIAGNOSIS — Z96.643 AFTERCARE FOLLOWING JOINT REPLACEMENT SURGERY: ICD-10-CM

## 2023-06-07 DIAGNOSIS — Z90.3 ACQUIRED ABSENCE OF STOMACH [PART OF]: Chronic | ICD-10-CM

## 2023-06-07 DIAGNOSIS — K46.9 UNSPECIFIED ABDOMINAL HERNIA WITHOUT OBSTRUCTION OR GANGRENE: Chronic | ICD-10-CM

## 2023-06-07 DIAGNOSIS — M16.12 UNILATERAL PRIMARY OSTEOARTHRITIS, LEFT HIP: ICD-10-CM

## 2023-06-07 DIAGNOSIS — Z96.641 AFTERCARE FOLLOWING JOINT REPLACEMENT SURGERY: ICD-10-CM

## 2023-06-07 DIAGNOSIS — Z98.890 OTHER SPECIFIED POSTPROCEDURAL STATES: Chronic | ICD-10-CM

## 2023-06-07 DIAGNOSIS — Z90.49 ACQUIRED ABSENCE OF OTHER SPECIFIED PARTS OF DIGESTIVE TRACT: Chronic | ICD-10-CM

## 2023-06-07 DIAGNOSIS — Z47.1 AFTERCARE FOLLOWING JOINT REPLACEMENT SURGERY: ICD-10-CM

## 2023-06-07 DIAGNOSIS — M16.11 UNILATERAL PRIMARY OSTEOARTHRITIS, RIGHT HIP: ICD-10-CM

## 2023-06-07 DIAGNOSIS — Z96.642 AFTERCARE FOLLOWING JOINT REPLACEMENT SURGERY: ICD-10-CM

## 2023-06-07 PROCEDURE — 73521 X-RAY EXAM HIPS BI 2 VIEWS: CPT | Mod: 26

## 2023-06-07 PROCEDURE — 99214 OFFICE O/P EST MOD 30 MIN: CPT

## 2023-06-07 PROCEDURE — 72020 X-RAY EXAM OF SPINE 1 VIEW: CPT

## 2023-06-07 PROCEDURE — 73521 X-RAY EXAM HIPS BI 2 VIEWS: CPT

## 2023-06-07 PROCEDURE — 72020 X-RAY EXAM OF SPINE 1 VIEW: CPT | Mod: 26

## 2023-06-07 NOTE — REVIEW OF SYSTEMS
[Joint Pain] : joint pain [Joint Stiffness] : joint stiffness [Joint Swelling] : joint swelling [Chills] : chills [Feeling Tired] : fatigue [Negative] : Heme/Lymph

## 2023-06-07 NOTE — END OF VISIT
[FreeTextEntry3] : All medical record entries made by the Scribe were at my, Dr. Jimmy Lake, direction and personally dictated by me on 06/07/2023. I have reviewed the chart and agree that the record accurately reflects my personal performance of the history, physical exam, assessment and plan. I have also personally directed, reviewed, and agreed with the chart.

## 2023-06-07 NOTE — PHYSICAL EXAM
[de-identified] : General appearance: well nourished and hydrated, pleasant, alert and oriented x 3, cooperative. \par HEENT: normocephalic, EOM intact, wearing mask, external auditory canal clear.  \par Cardiovascular: no lower leg edema, no varicosities, dorsalis pedis pulses palpable and symmetric.  \par Lymphatics: no palpable lymphadenopathy, no lymphedema.  \par Neurologic: sensation is normal, no muscle weakness in upper or lower extremities, patella tendon reflexes present and symmetric.  \par Dermatologic: skin moist, warm, no rash.  \par Spine: cervical spine with normal lordosis and painless range of motion, thoracic spine with normal kyphosis and painless range of motion, lumbosacral spine with normal lordosis and painless range of motion.  Marked tenderness to palpation along midline spine and paraspinal musculature.  Sacroiliac joints tender bilaterally, right greater than left. Negative SLR and crossed SLR tests bilaterally.\par Gait: presents using a cane and a waddling gait pattern without specific antalgia. She is able to perform single leg stance on the left but not on the right.  \par \par Limb lengths: clinically RLE approximately 3-5 mm shorter than the left. Of note: there is asymmetric knee alignment of the left knee in relative valgus compared to the right knee. \par \par Left hip:\par - Focal soft tissue swelling: none\par - Ecchymosis: none\par - Erythema: none\par - Wounds: well healed lateral incision, benign appearing. \par - Tenderness: mild to moderate TTP along the left greater trochanter, none along the distal 2/3 of the IT band or along the anterior aspect of the groin. \par - ROM: \par   - Flexion: 120\par   - Extension: 0\par   - Adduction: 30\par   - Abduction: 60\par   - Internal rotation in 90 degrees of hip flexion: 35\par   - External rotation in 90 degrees of hip flexion: 75\par - MERRILL: negative\par - FADIR: negative\par - Primo: negative\par - Stinchfield: positive \par - Flexor power: 4+/5\par - Abductor power: 5/5\par \par Right hip: \par - Focal soft tissue swelling: none\par - Ecchymosis: none\par - Erythema: none\par - Wounds: well healed lateral incision, benign appearing \par - Tenderness: marked lateral trochanteric TTP, no TTP to ischial tuberosity\par - ROM: \par   - Flexion: 120\par   - Extension: 0\par   - Adduction: 30\par   - Abduction: 50\par   - Internal rotation in 90 degrees of hip flexion: 45\par   - External rotation in 90 degrees of hip flexion: 50\par - MERRILL: painful\par - FADIR: negative\par - Primo: negative\par - Stinchfield: positive \par - Flexor power: 4+/5\par - Abductor power: 5/5 [de-identified] : A lateral view of the lumbosacral spine, weightbearing AP pelvis, and 2 additional views (frog lateral and false profile) of the bilateral hips were interpreted by me and reviewed with the patient.\par \par Location of imaging: [facility] \par Date of exam: 06/07/2023\par \par Lumbar spine --\par Alignment: some loss of normal lordosis\par Spondylosis: mild to moderate multilevel spondylosis\par Listhesis: grade 1 anterolisthesis at L4/5\par Posterior elements: moderate multilevel facet arthrosis\par \par Pelvic alignment: normal\par \par Bilateral hips -- SANDY which all appear to be well fixed and in reasonable alignment without evidence of mechanical complication. Vascular clips are noted projecting over the right hemipelvis.

## 2023-06-07 NOTE — HISTORY OF PRESENT ILLNESS
[6] : a current pain level of 6/10 [Constant] : ~He/She~ states the symptoms seem to be constant [Bending] : worsened by bending [Sitting] : worsened by sitting [Walking] : worsened by walking [Knee Flexion] : worsened with knee flexion [Knee Extension] : worsened with knee extension [Rest] : relieved by rest [de-identified] : R SANDY, 9/12/19, Dr. Ballard\par Right acetabulum and femoral head revision, 7/30/20, Dr. Ballard\dora L SANDY, 2/20/20, Dr. Pollard \par 06/07/2023: 52 y/o female presenting for b/l hip pain, right worse than left. Both hips were replaced recently by Dr. Ballard, and she underwent a right acetabular revision shortly after her primary procedure for persistent pain and possible loosening. She has been having bilateral hip pain since the time of surgery. She has a feeling of her right hip being "displaced" with walking and feels like she has to pop her hip back into place, though she denies ever having experienced a dislocation event of either hip at any time. She notes anterior groin pain and lateral hip pain with radiation down the the midthigh, worse with switching from seated to standing and walking. She reports that these symptoms predated her surgeries and appear to have only gotten worse since the operations. She notes a walking distance limitation of less than 1 block with the use of a cane. She rates her pain as 8/10 and has been taking Aleve daily 4x a day. She has not had PT since her surgeries. \par \par Patient notes a history of having b/l tibial osteotomies when she was 1 years old to correct bowleg deformities. Following the osteotomies, she was treated in a cast. She also reports DM controlled at an A1c of 6.6 without medication, PE as well as multiple unprovoked DVTs (was recommended to stay on lifelong therapeutic anticoagulation but admits she self-discontinued about a year ago so that she could use Aleve; she reports that she is planning to follow up soon with Dr. Vigil), b/l LE neuropathy, bariatric sleeve in 2014. She is allergic to lisinopril and enalapril which cause coughing. She lives on a second floor walk[place] apartment complex.  [de-identified] : pt sates pain is dull and achy

## 2023-06-07 NOTE — DISCUSSION/SUMMARY
[de-identified] : 52 y/o female s/p b/l THAs and revision right SANDY with right greater than left trochanteric bursitis, lumbar spondylosis, and suspected lumbar spinal stenosis as well as severe right sacroiliitis. \par - I reassured her that there does not appear to be any evidence of any component failure on her imaging and I do not anticipate the need for any surgical management for the foreseeable future. We will begin a conservative management program including PT and HEP. I recommended that she continue to take Tylenol and gabapentin as needed for pain. I do not think we should initiate NSAIDs at this point given that she will almost certainly get restarted on full dose anticoagulation at her upcoming appointment with Dr. Vigil. \par - In order to better investigate the low back symptoms, we will obtain a noncontrast lumbar spine MRI and I will also refer her to pain management after the MRI for ongoing management. Finally, in order to r/o PJI, we will obtain serum CBC, CRP, and ESR today. If the inflammatory markers are elevated, then we may consider b/l hip aspirations by IR. \par - Otherwise, she will f/u in 2 months with no new XR needed. Once infections are ruled out, it may be reasonable to consider trochanteric bursal injections pending her clinical progress over time.

## 2023-06-14 ENCOUNTER — NON-APPOINTMENT (OUTPATIENT)
Age: 53
End: 2023-06-14

## 2023-06-14 LAB
CRP SERPL-MCNC: 10 MG/L
ERYTHROCYTE [SEDIMENTATION RATE] IN BLOOD BY WESTERGREN METHOD: 22 MM/HR

## 2023-06-15 DIAGNOSIS — Z96.643 PRESENCE OF ARTIFICIAL HIP JOINT, BILATERAL: ICD-10-CM

## 2023-06-15 DIAGNOSIS — M54.50 LOW BACK PAIN, UNSPECIFIED: ICD-10-CM

## 2023-06-15 DIAGNOSIS — M43.17 SPONDYLOLISTHESIS, LUMBOSACRAL REGION: ICD-10-CM

## 2023-06-15 DIAGNOSIS — M51.36 OTHER INTERVERTEBRAL DISC DEGENERATION, LUMBAR REGION: ICD-10-CM

## 2023-06-15 DIAGNOSIS — Z47.1 AFTERCARE FOLLOWING JOINT REPLACEMENT SURGERY: ICD-10-CM

## 2023-06-15 DIAGNOSIS — M47.816 SPONDYLOSIS WITHOUT MYELOPATHY OR RADICULOPATHY, LUMBAR REGION: ICD-10-CM

## 2023-07-06 ENCOUNTER — APPOINTMENT (OUTPATIENT)
Dept: HEMATOLOGY ONCOLOGY | Facility: CLINIC | Age: 53
End: 2023-07-06
Payer: MEDICAID

## 2023-07-06 VITALS
DIASTOLIC BLOOD PRESSURE: 90 MMHG | HEIGHT: 68 IN | OXYGEN SATURATION: 96 % | SYSTOLIC BLOOD PRESSURE: 150 MMHG | WEIGHT: 226 LBS | HEART RATE: 85 BPM | BODY MASS INDEX: 34.25 KG/M2 | TEMPERATURE: 96.7 F

## 2023-07-06 DIAGNOSIS — Z86.718 PERSONAL HISTORY OF OTHER VENOUS THROMBOSIS AND EMBOLISM: ICD-10-CM

## 2023-07-06 DIAGNOSIS — I82.890 ACUTE EMBOLISM AND THROMBOSIS OF OTHER SPECIFIED VEINS: ICD-10-CM

## 2023-07-06 DIAGNOSIS — E53.8 DEFICIENCY OF OTHER SPECIFIED B GROUP VITAMINS: ICD-10-CM

## 2023-07-06 DIAGNOSIS — D50.9 IRON DEFICIENCY ANEMIA, UNSPECIFIED: ICD-10-CM

## 2023-07-06 DIAGNOSIS — Z86.711 PERSONAL HISTORY OF PULMONARY EMBOLISM: ICD-10-CM

## 2023-07-06 LAB
APTT BLD: 31.5 SEC
ERYTHROCYTE [SEDIMENTATION RATE] IN BLOOD BY WESTERGREN METHOD: 20 MM/HR
INR PPP: 1.02
PT BLD: 12.1 SEC

## 2023-07-06 PROCEDURE — 99214 OFFICE O/P EST MOD 30 MIN: CPT | Mod: 25

## 2023-07-06 RX ORDER — METFORMIN ER 500 MG 500 MG/1
500 TABLET ORAL
Qty: 90 | Refills: 1 | Status: DISCONTINUED | COMMUNITY
Start: 2021-05-14 | End: 2023-07-06

## 2023-07-06 RX ORDER — HYDROCORTISONE ACETATE 25 MG/1
25 SUPPOSITORY RECTAL
Qty: 1 | Refills: 0 | Status: DISCONTINUED | COMMUNITY
Start: 2021-02-24 | End: 2023-07-06

## 2023-07-06 RX ORDER — HYDROXYZINE HYDROCHLORIDE 25 MG/1
25 TABLET ORAL 3 TIMES DAILY
Qty: 30 | Refills: 1 | Status: DISCONTINUED | COMMUNITY
Start: 2020-05-21 | End: 2023-07-06

## 2023-07-06 RX ORDER — TRAZODONE HYDROCHLORIDE 50 MG/1
50 TABLET ORAL
Refills: 0 | Status: DISCONTINUED | COMMUNITY
Start: 2021-03-10 | End: 2023-07-06

## 2023-07-06 RX ORDER — FOLIC ACID 1 MG/1
1 TABLET ORAL DAILY
Qty: 90 | Refills: 1 | Status: DISCONTINUED | COMMUNITY
Start: 2019-06-28 | End: 2023-07-06

## 2023-07-06 RX ORDER — FLUTICASONE PROPIONATE 50 UG/1
50 SPRAY, METERED NASAL
Qty: 1 | Refills: 2 | Status: DISCONTINUED | COMMUNITY
Start: 2020-01-24 | End: 2023-07-06

## 2023-07-06 RX ORDER — SUCRALFATE 1 G/10ML
1 SUSPENSION ORAL 3 TIMES DAILY
Qty: 900 | Refills: 2 | Status: DISCONTINUED | COMMUNITY
Start: 2019-06-28 | End: 2023-07-06

## 2023-07-06 RX ORDER — ROPINIROLE 5 MG/1
5 TABLET, FILM COATED ORAL
Qty: 180 | Refills: 1 | Status: DISCONTINUED | COMMUNITY
Start: 2019-06-28 | End: 2023-07-06

## 2023-07-06 RX ORDER — METRONIDAZOLE 500 MG/1
500 TABLET ORAL
Qty: 6 | Refills: 0 | Status: DISCONTINUED | COMMUNITY
Start: 2021-04-06 | End: 2023-07-06

## 2023-07-06 RX ORDER — BENZONATATE 100 MG/1
100 CAPSULE ORAL 3 TIMES DAILY
Qty: 21 | Refills: 0 | Status: DISCONTINUED | COMMUNITY
Start: 2021-05-10 | End: 2023-07-06

## 2023-07-06 RX ORDER — SULFASALAZINE 500 MG/1
500 TABLET ORAL
Refills: 0 | Status: DISCONTINUED | COMMUNITY
Start: 2019-10-25 | End: 2023-07-06

## 2023-07-06 RX ORDER — FUROSEMIDE 40 MG/1
40 TABLET ORAL
Qty: 7 | Refills: 0 | Status: DISCONTINUED | COMMUNITY
Start: 2020-09-23 | End: 2023-07-06

## 2023-07-06 RX ORDER — ENOXAPARIN SODIUM 100 MG/ML
100 INJECTION SUBCUTANEOUS
Qty: 18 | Refills: 1 | Status: DISCONTINUED | COMMUNITY
Start: 2019-07-26 | End: 2023-07-06

## 2023-07-06 RX ORDER — FLUOXETINE HYDROCHLORIDE 40 MG/1
40 CAPSULE ORAL
Qty: 90 | Refills: 0 | Status: DISCONTINUED | COMMUNITY
Start: 2020-07-15 | End: 2023-07-06

## 2023-07-06 RX ORDER — CYCLOBENZAPRINE HYDROCHLORIDE 5 MG/1
5 TABLET, FILM COATED ORAL 3 TIMES DAILY
Qty: 120 | Refills: 1 | Status: DISCONTINUED | COMMUNITY
Start: 2019-12-24 | End: 2023-07-06

## 2023-07-06 NOTE — ASSESSMENT
[FreeTextEntry1] : Patient is a 53 year old female [employer] with a history of obesity s/p sleeve gastrectomy, rheumatoid arthritis, hypertension, diet-controlled diabetes mellitus, GERD, IPMN, diverticulitis, s/p colonic resection, s/p hernia repair, obstructive sleep apnea, Recurrent DVT, pulmonary embolism (2001), splenic vein thrombosis while on Xarelto, s/p left THR in 02/2020, severe anemia following right THR in 07/2020, who presents after 2 years for hematologic follow-up. Patient was last seen in this office May 2021.  In September 2022, the patient took herself off of her long-term anticoagulation with Lovenox.  Had lengthy discussion with patient regarding her past history of recurrent DVT, pulmonary embolism, and splenic vein thrombosis as well as her strong family history of thrombotic events and the recommendation for continued Lovenox treatment given the circumstances.  Patient states she will give this some consideration.  Have ordered Activated Partial Thromboplastin Time, Prothrombin Time and INR, Plasma, B12 and folate, CBC with differential, CMP, ferritin, iron panel, manual differential and sed rate. Venipuncture was performed in the office today. Patient was advised to call office to discuss results and further recommendations.

## 2023-07-06 NOTE — PHYSICAL EXAM
[Obese] : obese [Normal] : affect appropriate [de-identified] : distal lower extremity edema [de-identified] : obese, striae [de-identified] : , surgical scars  hips

## 2023-07-06 NOTE — HISTORY OF PRESENT ILLNESS
[de-identified] : Patient is a 53 year old female Yarsani with a history of obesity s/p sleeve gastrectomy, rheumatoid arthritis, hypertension, diet-controlled diabetes mellitus, GERD, IPMN, diverticulitis, s/p colonic resection, s/p hernia repair, obstructive sleep apnea ,recurrent DVT, pulmonary embolism (2001), splenic vein thrombosis while on Xarelto, s/p left Total hip replacement in 02/2020, severe anemia following right THR in 07/2020 requiring intravenous iron and prolonged hospitalization, who presents for hematologic follow-up.  Patient has had multiple evaluations in the past for underlying hypercoagulable state which have essentially been nondiagnostic.  There is a strong family history of thrombotic events (patient's sister, patient's daughter, paternal uncle).  The patient was last seen in this office May 2021. Patient's blood results from May 2021, revealed the patient has a WBC of 5.86, hemoglobin of 12.1, hematocrit of 39.4 and platelet count of 273,000. Iron panel reveals a low normal saturation of 16% and a ferritin of 29. B12 was 795  and folic acid 9.3. Blood results in June, 2023 revealed a white blood count of 8.46, hemoglobin of 13.7, hematocrit of 43.2 and platelet count of 269,000. Patient had been on long-term anticoagulation with Lovenox up until September 2022 at which time she discontinued the injections on her own, due to increased hip pain requiring chronic daily use of NSAIDs. Since the last visit, patient has lost 59 pounds. Today, the patient complains of pain in both hips, loss of appetite and feeling cold. Patient no longer menstruates. Denies fatigue, chest pain, shortness of breath, vision changes, fevers, sweats or recent or frequent infections.

## 2023-07-06 NOTE — REVIEW OF SYSTEMS
[Fatigue] : fatigue [Recent Change In Weight] : ~T recent weight change [Joint Pain] : joint pain [Joint Stiffness] : joint stiffness [Negative] : Allergic/Immunologic [Fever] : no fever [Chills] : no chills [Night Sweats] : no night sweats [Vision Problems] : no vision problems [Nosebleeds] : no nosebleeds [Chest Pain] : no chest pain [Lower Ext Edema] : lower extremity edema [Shortness Of Breath] : no shortness of breath [Cough] : no cough [Abdominal Pain] : abdominal pain [Dizziness] : no dizziness [Easy Bleeding] : no tendency for easy bleeding [Easy Bruising] : no tendency for easy bruising [FreeTextEntry2] : Lost over 50 pounds, has decreased appetite [FreeTextEntry7] : Occasional left sided discomfort [FreeTextEntry9] : Both hips. Is s/p bilateral hip replacement, right hip redo.

## 2023-07-06 NOTE — REASON FOR VISIT
[Follow-Up Visit] : a follow-up visit for [FreeTextEntry2] : History of pulmonary embolism, history of recurrent DVT, history of splenic vein thrombosis, iron deficiency, B12 deficiency

## 2023-07-07 LAB
ALBUMIN SERPL ELPH-MCNC: 4.5 G/DL
ALP BLD-CCNC: 109 U/L
ALT SERPL-CCNC: 22 U/L
ANION GAP SERPL CALC-SCNC: 13 MMOL/L
AST SERPL-CCNC: 20 U/L
BASOPHILS # BLD AUTO: 0 K/UL
BASOPHILS NFR BLD AUTO: 0 %
BILIRUB SERPL-MCNC: 0.3 MG/DL
BUN SERPL-MCNC: 10 MG/DL
CALCIUM SERPL-MCNC: 9.9 MG/DL
CHLORIDE SERPL-SCNC: 106 MMOL/L
CO2 SERPL-SCNC: 25 MMOL/L
CREAT SERPL-MCNC: 0.8 MG/DL
EGFR: 88 ML/MIN/1.73M2
EOSINOPHIL # BLD AUTO: 0.39 K/UL
EOSINOPHIL NFR BLD AUTO: 4.4 %
FOLATE SERPL-MCNC: 15.6 NG/ML
GIANT PLATELETS BLD QL SMEAR: PRESENT
GLUCOSE SERPL-MCNC: 132 MG/DL
IRON SATN MFR SERPL: 18 %
IRON SERPL-MCNC: 67 UG/DL
LYMPHOCYTES # BLD AUTO: 2.86 K/UL
LYMPHOCYTES NFR BLD AUTO: 32.4 %
MONOCYTES # BLD AUTO: 0.39 K/UL
MONOCYTES NFR BLD AUTO: 4.4 %
MSMEAR: NORMAL
NEUTROPHILS # BLD AUTO: 5.19 K/UL
NEUTROPHILS NFR BLD AUTO: 58.8 %
PLAT MORPH BLD: ABNORMAL
POTASSIUM SERPL-SCNC: 4.2 MMOL/L
PROT SERPL-MCNC: 7.5 G/DL
RBC BLD AUTO: NORMAL
SODIUM SERPL-SCNC: 144 MMOL/L
TIBC SERPL-MCNC: 364 UG/DL
UIBC SERPL-MCNC: 297 UG/DL
VIT B12 SERPL-MCNC: 693 PG/ML

## 2023-07-09 ENCOUNTER — EMERGENCY (EMERGENCY)
Facility: HOSPITAL | Age: 53
LOS: 1 days | Discharge: HOME | End: 2023-07-09
Attending: EMERGENCY MEDICINE | Admitting: EMERGENCY MEDICINE
Payer: MEDICAID

## 2023-07-09 VITALS
RESPIRATION RATE: 16 BRPM | HEART RATE: 81 BPM | DIASTOLIC BLOOD PRESSURE: 80 MMHG | OXYGEN SATURATION: 98 % | TEMPERATURE: 99 F | SYSTOLIC BLOOD PRESSURE: 151 MMHG

## 2023-07-09 VITALS
WEIGHT: 223.99 LBS | DIASTOLIC BLOOD PRESSURE: 82 MMHG | RESPIRATION RATE: 18 BRPM | SYSTOLIC BLOOD PRESSURE: 160 MMHG | TEMPERATURE: 99 F | HEART RATE: 116 BPM | HEIGHT: 68 IN

## 2023-07-09 DIAGNOSIS — K46.9 UNSPECIFIED ABDOMINAL HERNIA WITHOUT OBSTRUCTION OR GANGRENE: Chronic | ICD-10-CM

## 2023-07-09 DIAGNOSIS — Z90.49 ACQUIRED ABSENCE OF OTHER SPECIFIED PARTS OF DIGESTIVE TRACT: Chronic | ICD-10-CM

## 2023-07-09 DIAGNOSIS — Z90.3 ACQUIRED ABSENCE OF STOMACH [PART OF]: Chronic | ICD-10-CM

## 2023-07-09 DIAGNOSIS — Z98.890 OTHER SPECIFIED POSTPROCEDURAL STATES: Chronic | ICD-10-CM

## 2023-07-09 LAB
ALBUMIN SERPL ELPH-MCNC: 3.7 G/DL — SIGNIFICANT CHANGE UP (ref 3.4–5)
ALP SERPL-CCNC: 110 U/L — SIGNIFICANT CHANGE UP (ref 40–120)
ALT FLD-CCNC: 21 U/L — SIGNIFICANT CHANGE UP (ref 12–42)
ANION GAP SERPL CALC-SCNC: 11 MMOL/L — SIGNIFICANT CHANGE UP (ref 9–16)
APPEARANCE UR: ABNORMAL
AST SERPL-CCNC: 19 U/L — SIGNIFICANT CHANGE UP (ref 15–37)
BACTERIA # UR AUTO: ABNORMAL /HPF
BASOPHILS # BLD AUTO: 0.04 K/UL — SIGNIFICANT CHANGE UP (ref 0–0.2)
BASOPHILS NFR BLD AUTO: 0.4 % — SIGNIFICANT CHANGE UP (ref 0–2)
BILIRUB SERPL-MCNC: 0.4 MG/DL — SIGNIFICANT CHANGE UP (ref 0.2–1.2)
BILIRUB UR-MCNC: NEGATIVE — SIGNIFICANT CHANGE UP
BUN SERPL-MCNC: 8 MG/DL — SIGNIFICANT CHANGE UP (ref 7–23)
CALCIUM SERPL-MCNC: 9.1 MG/DL — SIGNIFICANT CHANGE UP (ref 8.5–10.5)
CHLORIDE SERPL-SCNC: 106 MMOL/L — SIGNIFICANT CHANGE UP (ref 96–108)
CO2 SERPL-SCNC: 27 MMOL/L — SIGNIFICANT CHANGE UP (ref 22–31)
COLOR SPEC: YELLOW — SIGNIFICANT CHANGE UP
CREAT SERPL-MCNC: 0.81 MG/DL — SIGNIFICANT CHANGE UP (ref 0.5–1.3)
D DIMER BLD IA.RAPID-MCNC: 385 NG/ML DDU — HIGH
DIFF PNL FLD: NEGATIVE — SIGNIFICANT CHANGE UP
EGFR: 86 ML/MIN/1.73M2 — SIGNIFICANT CHANGE UP
EOSINOPHIL # BLD AUTO: 0.31 K/UL — SIGNIFICANT CHANGE UP (ref 0–0.5)
EOSINOPHIL NFR BLD AUTO: 2.9 % — SIGNIFICANT CHANGE UP (ref 0–6)
EPI CELLS # UR: PRESENT
FERRITIN SERPL-MCNC: 49 NG/ML
GLUCOSE SERPL-MCNC: 111 MG/DL — HIGH (ref 70–99)
GLUCOSE UR QL: NEGATIVE MG/DL — SIGNIFICANT CHANGE UP
HCG SERPL-ACNC: 4 MIU/ML — SIGNIFICANT CHANGE UP
HCT VFR BLD CALC: 42.4 % — SIGNIFICANT CHANGE UP (ref 34.5–45)
HGB BLD-MCNC: 13.3 G/DL — SIGNIFICANT CHANGE UP (ref 11.5–15.5)
IMM GRANULOCYTES NFR BLD AUTO: 0.4 % — SIGNIFICANT CHANGE UP (ref 0–0.9)
KETONES UR-MCNC: ABNORMAL MG/DL
LEUKOCYTE ESTERASE UR-ACNC: ABNORMAL
LYMPHOCYTES # BLD AUTO: 38.9 % — SIGNIFICANT CHANGE UP (ref 13–44)
LYMPHOCYTES # BLD AUTO: 4.09 K/UL — HIGH (ref 1–3.3)
MCHC RBC-ENTMCNC: 28.5 PG — SIGNIFICANT CHANGE UP (ref 27–34)
MCHC RBC-ENTMCNC: 31.4 GM/DL — LOW (ref 32–36)
MCV RBC AUTO: 90.8 FL — SIGNIFICANT CHANGE UP (ref 80–100)
MONOCYTES # BLD AUTO: 0.88 K/UL — SIGNIFICANT CHANGE UP (ref 0–0.9)
MONOCYTES NFR BLD AUTO: 8.4 % — SIGNIFICANT CHANGE UP (ref 2–14)
NEUTROPHILS # BLD AUTO: 5.15 K/UL — SIGNIFICANT CHANGE UP (ref 1.8–7.4)
NEUTROPHILS NFR BLD AUTO: 49 % — SIGNIFICANT CHANGE UP (ref 43–77)
NITRITE UR-MCNC: NEGATIVE — SIGNIFICANT CHANGE UP
NRBC # BLD: 0 /100 WBCS — SIGNIFICANT CHANGE UP (ref 0–0)
PH UR: 7 — SIGNIFICANT CHANGE UP (ref 5–8)
PLATELET # BLD AUTO: 280 K/UL — SIGNIFICANT CHANGE UP (ref 150–400)
POTASSIUM SERPL-MCNC: 3.6 MMOL/L — SIGNIFICANT CHANGE UP (ref 3.5–5.3)
POTASSIUM SERPL-SCNC: 3.6 MMOL/L — SIGNIFICANT CHANGE UP (ref 3.5–5.3)
PROT SERPL-MCNC: 7.8 G/DL — SIGNIFICANT CHANGE UP (ref 6.4–8.2)
PROT UR-MCNC: NEGATIVE MG/DL — SIGNIFICANT CHANGE UP
RBC # BLD: 4.67 M/UL — SIGNIFICANT CHANGE UP (ref 3.8–5.2)
RBC # FLD: 15.6 % — HIGH (ref 10.3–14.5)
RBC CASTS # UR COMP ASSIST: 0 /HPF — SIGNIFICANT CHANGE UP (ref 0–4)
SODIUM SERPL-SCNC: 144 MMOL/L — SIGNIFICANT CHANGE UP (ref 132–145)
SP GR SPEC: 1.02 — SIGNIFICANT CHANGE UP (ref 1–1.03)
TROPONIN I, HIGH SENSITIVITY RESULT: 22 NG/L — SIGNIFICANT CHANGE UP
TSH SERPL-MCNC: 0.72 UIU/ML — SIGNIFICANT CHANGE UP (ref 0.36–3.74)
UROBILINOGEN FLD QL: 1 MG/DL — SIGNIFICANT CHANGE UP (ref 0.2–1)
WBC # BLD: 10.51 K/UL — HIGH (ref 3.8–10.5)
WBC # FLD AUTO: 10.51 K/UL — HIGH (ref 3.8–10.5)
WBC UR QL: 0 /HPF — SIGNIFICANT CHANGE UP (ref 0–5)

## 2023-07-09 PROCEDURE — 99285 EMERGENCY DEPT VISIT HI MDM: CPT

## 2023-07-09 PROCEDURE — 73030 X-RAY EXAM OF SHOULDER: CPT | Mod: 26,LT

## 2023-07-09 PROCEDURE — 71046 X-RAY EXAM CHEST 2 VIEWS: CPT | Mod: 26

## 2023-07-09 PROCEDURE — 71275 CT ANGIOGRAPHY CHEST: CPT | Mod: 26

## 2023-07-09 RX ORDER — METHOCARBAMOL 500 MG/1
1500 TABLET, FILM COATED ORAL ONCE
Refills: 0 | Status: COMPLETED | OUTPATIENT
Start: 2023-07-09 | End: 2023-07-09

## 2023-07-09 RX ORDER — OXYCODONE AND ACETAMINOPHEN 5; 325 MG/1; MG/1
1 TABLET ORAL
Qty: 9 | Refills: 0
Start: 2023-07-09 | End: 2023-07-11

## 2023-07-09 RX ORDER — KETOROLAC TROMETHAMINE 30 MG/ML
15 SYRINGE (ML) INJECTION ONCE
Refills: 0 | Status: DISCONTINUED | OUTPATIENT
Start: 2023-07-09 | End: 2023-07-09

## 2023-07-09 RX ORDER — IBUPROFEN 200 MG
1 TABLET ORAL
Qty: 20 | Refills: 0
Start: 2023-07-09 | End: 2023-07-13

## 2023-07-09 RX ORDER — LIDOCAINE 4 G/100G
1 CREAM TOPICAL ONCE
Refills: 0 | Status: COMPLETED | OUTPATIENT
Start: 2023-07-09 | End: 2023-07-09

## 2023-07-09 RX ORDER — METHOCARBAMOL 500 MG/1
2 TABLET, FILM COATED ORAL
Qty: 30 | Refills: 0
Start: 2023-07-09 | End: 2023-07-13

## 2023-07-09 RX ADMIN — METHOCARBAMOL 1500 MILLIGRAM(S): 500 TABLET, FILM COATED ORAL at 13:01

## 2023-07-09 RX ADMIN — LIDOCAINE 1 PATCH: 4 CREAM TOPICAL at 13:14

## 2023-07-09 RX ADMIN — Medication 15 MILLIGRAM(S): at 13:14

## 2023-07-09 NOTE — ED ADULT TRIAGE NOTE - CHIEF COMPLAINT QUOTE
nonradiating chest pressure since this am. no sob no dyspnea, denies any n/v nad. additionally c/o >2months of left upper back pain mildly relieved with ibuprofen, last dose yesterday. pain dull but worsens with movement. denies any injury trauma ambulatory with cane at baseline. flat affect.

## 2023-07-09 NOTE — ED PROVIDER NOTE - CARE PROVIDER_API CALL
Jatin Michele  Orthopaedic Surgery  [address] [place] [address], [address]  [place] [address]  Phone: [phone]  Fax: [phone]  Follow Up Time:

## 2023-07-09 NOTE — ED PROVIDER NOTE - MUSCULOSKELETAL, MLM
Spine appears normal, range of motion is not limited, no midline pain, when ranging the L shoulder pt has pain w posterior movement, normal neurovasc exam

## 2023-07-09 NOTE — ED PROVIDER NOTE - PROGRESS NOTE DETAILS
pt is well appearing and tolerating po while waiting for results. Patient with a slightly elevated D-dimer which prompted during CTA to rule out PE.  CTA normal.  Otherwise normal troponin unremarkable rest of the labs.  Suspect muscular strain component.  Will prescribe Motrin/Robaxin and limited supply of Percocet for breakthrough pain.  Encouraged patient to follow-up with orthopedics as likely pain is musculoskeletal.  Encouraged to return to emergency room if any worsening symptoms. Urine sample with some signs of contamination but patient asymptomatic.  Added a urine culture and if any abnormal results patient should be called at home.

## 2023-07-09 NOTE — ED PROVIDER NOTE - CLINICAL SUMMARY MEDICAL DECISION MAKING FREE TEXT BOX
Patient with left upper back pain radiating to the shoulder for several months.  Pain description and examination most likely with MSK etiology but since patient has history of hypertension and has had prior thrombosis will work-up including troponin, dimer, chest x-ray.  Will treat symptomatically with dose of anti-inflammatory and muscle relaxer and reassess.  Patient is well-appearing during evaluation alert oriented speaking complete sentences and in no distress.

## 2023-07-09 NOTE — ED PROVIDER NOTE - PATIENT PORTAL LINK FT
You can access the FollowMyHealth Patient Portal offered by [facility] by registering at the following website: [email or web address]. By joining [facility]’s FollowMyHealth portal, you will also be able to view your health information using other applications (apps) compatible with our system.

## 2023-07-09 NOTE — ED PROVIDER NOTE - WR INTERPRETATION 1
OPERATIVE NOTE    PREOPERATIVE DIAGNOSIS  Hypertrophic scar of nose after mohs surgery for Basal Cell Carcinoma    INDICATION FOR PROCEDURE  Treatment    DATE OF SURGERY  1/6/2022    SURGEON  Rani Rosado MD    POSTOPERATIVE DIAGNOSIS  Same as preoperative diagnosis    PROCEDURE PERFORMED  Ablative fractional photothermolyis (TotalFX)    Prior to the procedure, final verification of the patient’s identity and correct marked surgical site was performed.     PREOPERATIVE COUNSELING  We discussed the use of a CO2 laser and its associated risks and benefits including hyperpigmentation, hypopigmentation, scar, infection, accidental eye injury, need for multiple treatments, and failure to respond. Verbal and written informed consent obtained. I believe this is medically necessary as she has failed intralesional kenalog multiple times.    PREOPERATIVE MEDICATIONS  None    ANESTHESIA  Lidocaine 5% ointment applied topically for 30 min  and 1% Lidocaine with Epinephrine 1:100,000 via intraorbital nerve block and direct infiltration    DESCRIPTION OF PROCEDURE   The skin was prepped with chlorhexidine and then washed off with water.  Eye protection was placed.   Prior to the treatment, at test spot was performed on the right nasal sidewall which showed appropriate laser endpoints. The UltraPulse CO2 laser was used with the Deep FX microscanner, shape square, scan size 10, pulses 1, density 10%, pulse energy 20 mJ, to treat the scar on the nose.  It was then used with the UltraScan CPG handpiece at 100 mJ, pattern hexagon, size 7, density 3, to treat the entire right side of the nose.    ESTIMATED BLOOD LOSS  Minimal    COMPLICATIONS  None    WOUND CARE  Routine    Dictating Provider  Rani Rosado MD  1/6/2022 9:36 AM                
MSK XR negative - No fracture, No dislocation, No foreign body

## 2023-07-09 NOTE — ED ADULT NURSE NOTE - NSFALLRISKINTERV_ED_ALL_ED

## 2023-07-09 NOTE — ED ADULT NURSE NOTE - OBJECTIVE STATEMENT
pt c/o L sided chest pressure radiating to L upper back/shoulder area that worsens with certain movement. pt states shes had this pain for "months" and hasn't sought treatment but pain is worsening and sharper today. denies SOB, denies cough/recent sickness, denies L arm numbness/tingling, denies jaw pain. resp even and unlabored, a+ox4, steady gait with cane.

## 2023-07-09 NOTE — ED PROVIDER NOTE - OBJECTIVE STATEMENT
53-year-old female patient, history of hypertension on losartan, metoprolol, amlodipine.  Patient also with history with bilateral hip replacements with subsequent revisions given arthritis.  Baseline he is walking with a cane.  Patient also has had 1 episode of PE and 1 episode of DVT in the past but currently not on anticoagulation as both episodes causes including a recent surgery.  Patient presents for several months of pain on the left upper back/shoulder that has responded to ibuprofen as needed.  Patient does not remember any trauma to the area.  Most recently pain became more sharp and patient got more concerned and decided to come to the emergency room.  Patient states she has had episodes of shortness of breath intermittently since.  No leg swelling or calf pain.  No pain in the chest at this time.  Pain radiates from the left upper back into the left neck and left arm.  No paresthesias no numbness no focal weakness.  No abdominal pain nausea vomiting or diarrhea.

## 2023-07-12 DIAGNOSIS — Z86.718 PERSONAL HISTORY OF OTHER VENOUS THROMBOSIS AND EMBOLISM: ICD-10-CM

## 2023-07-12 DIAGNOSIS — R06.02 SHORTNESS OF BREATH: ICD-10-CM

## 2023-07-12 DIAGNOSIS — I51.7 CARDIOMEGALY: ICD-10-CM

## 2023-07-12 DIAGNOSIS — Z96.643 PRESENCE OF ARTIFICIAL HIP JOINT, BILATERAL: ICD-10-CM

## 2023-07-12 DIAGNOSIS — M54.89 OTHER DORSALGIA: ICD-10-CM

## 2023-07-12 DIAGNOSIS — M25.512 PAIN IN LEFT SHOULDER: ICD-10-CM

## 2023-07-12 DIAGNOSIS — Z86.711 PERSONAL HISTORY OF PULMONARY EMBOLISM: ICD-10-CM

## 2023-07-12 DIAGNOSIS — Z88.8 ALLERGY STATUS TO OTHER DRUGS, MEDICAMENTS AND BIOLOGICAL SUBSTANCES: ICD-10-CM

## 2023-07-12 DIAGNOSIS — I10 ESSENTIAL (PRIMARY) HYPERTENSION: ICD-10-CM

## 2023-07-12 DIAGNOSIS — R79.89 OTHER SPECIFIED ABNORMAL FINDINGS OF BLOOD CHEMISTRY: ICD-10-CM

## 2023-08-02 ENCOUNTER — APPOINTMENT (OUTPATIENT)
Dept: ORTHOPEDIC SURGERY | Facility: CLINIC | Age: 53
End: 2023-08-02
Payer: MEDICAID

## 2023-08-02 VITALS
SYSTOLIC BLOOD PRESSURE: 141 MMHG | HEART RATE: 63 BPM | WEIGHT: 220 LBS | BODY MASS INDEX: 33.34 KG/M2 | OXYGEN SATURATION: 98 % | HEIGHT: 68 IN | DIASTOLIC BLOOD PRESSURE: 83 MMHG

## 2023-08-02 DIAGNOSIS — M70.61 TROCHANTERIC BURSITIS, RIGHT HIP: ICD-10-CM

## 2023-08-02 DIAGNOSIS — M46.1 SACROILIITIS, NOT ELSEWHERE CLASSIFIED: ICD-10-CM

## 2023-08-02 PROCEDURE — 99214 OFFICE O/P EST MOD 30 MIN: CPT | Mod: 25

## 2023-08-02 PROCEDURE — 20610 DRAIN/INJ JOINT/BURSA W/O US: CPT | Mod: RT

## 2023-08-08 NOTE — PHYSICAL EXAM
[de-identified] : General appearance: well nourished and hydrated, pleasant, alert and oriented x 3, cooperative.  HEENT: normocephalic, EOM intact, wearing mask, external auditory canal clear.   Cardiovascular: no lower leg edema, no varicosities, dorsalis pedis pulses palpable and symmetric.   Lymphatics: no palpable lymphadenopathy, no lymphedema.   Neurologic: sensation is normal, no muscle weakness in upper or lower extremities, patella tendon reflexes present and symmetric.   Dermatologic: skin moist, warm, no rash.   Spine: cervical spine with normal lordosis and painless range of motion, thoracic spine with normal kyphosis and painless range of motion, lumbosacral spine with normal lordosis and painless range of motion.  Moderate to severe tenderness to palpation along midline spine and paraspinal musculature.  Sacroiliac joints moderately to severely tender bilaterally, right greater than left. Negative SLR and crossed SLR tests bilaterally. Gait: presents using a cane and a waddling gait pattern without specific antalgia. She is able to perform single leg stance on the left but not on the right.    Limb lengths: clinically RLE approximately 3-5 mm shorter than the left. Of note: there is asymmetric knee alignment of the left knee in relative valgus compared to the right knee.   Left hip: - Focal soft tissue swelling: none - Ecchymosis: none - Erythema: none - Wounds: well healed lateral incision, benign appearing.  - Tenderness: mild to moderate TTP along the left greater trochanter, none along the distal 2/3 of the IT band or along the anterior aspect of the groin.  - ROM:    - Flexion: 120   - Extension: 0   - Adduction: 30   - Abduction: 60   - Internal rotation in 90 degrees of hip flexion: 35   - External rotation in 90 degrees of hip flexion: 75 - MERRILL: negative - FADIR: negative - Primo: negative - Stinchfield: positive  - Flexor power: 4+/5 - Abductor power: 5/5  Right hip:  - Focal soft tissue swelling: none - Ecchymosis: none - Erythema: none - Wounds: well healed lateral incision, benign appearing  - Tenderness: Marked TTP about the lateral aspect of the trochanter and cord-like trochanteric bursitis is palpable underneath. She does not have TTP about the distal 75% of the iliotibial band. She had no tenderness at the ischial tuberosity or along the pelvic brim.  - ROM:    - Flexion: 120   - Extension: 0   - Adduction: 30   - Abduction: 50   - Internal rotation in 90 degrees of hip flexion: 45   - External rotation in 90 degrees of hip flexion: 50 - MERRILL: painful - FADIR: negative - Primo: negative - Stinchfield: positive  - Flexor power: 4+/5 - Abductor power: 5/5 [de-identified] : We reviewed the results of her lumbar spine MRI taken [facility] on June 27, 2023.  - This demonstrates grade 1 anterolisthesis of L4-5 as well as mild foraminal narrowing bilaterally at L3-4 and  mild right-sided foraminal narrowing with right L5 nerve root contact at L5-S1.  - No central stenosis is noted at any level.

## 2023-08-08 NOTE — PROCEDURE
[de-identified] : Procedure: Greater trochanteric bursa injection Laterality: Right Indication: Inflammation/bursitis - discussed with patient Skin prep: alcohol and chlorhexidine Anesthesia: ethyl chloride spray Needle: 22-gauge spinal Approach: lateral Aspiration: none Injectate: 4cc of 2% lidocaine, 4cc of 0.5% bupivacaine, and 1cc of 40mg/mL triamcinolone Dressing: Band-aid Complications: None

## 2023-08-08 NOTE — HISTORY OF PRESENT ILLNESS
[de-identified] : R SANDY, 9/12/19, Dr. Ballard Right acetabulum and femoral head revision, 7/30/20, Dr. Ballard L SANDY, 2/20/20, Dr. Ballard  08/02/2023: 53 y.o female following up for a painful bilateral total hip replacement, right worse than left. We last saw her two months ago and indicated her for further diagnostic measures including lumbar spine MRI, serum labs, and bilateral hip aspirations. She was able to do the serum labs and the lumbar spine MRI but she found the directions for the hip aspiration to be confusing and was not able to have them done. She did also follow-up with Dr. Vigil and after a full discussion, she decided not to pursue any further anticoagulation. She continues to complain of right lateral hip pain with the sensation of a "dead leg" of the right lower extremity and much less severe left hip pain. She continues to ambulate with the use of a cane.  06/07/2023: 54 y/o female presenting for b/l hip pain, right worse than left. Both hips were replaced recently by Dr. Ballard, and she underwent a right acetabular revision shortly after her primary procedure for persistent pain and possible loosening. She has been having bilateral hip pain since the time of surgery. She has a feeling of her right hip being "displaced" with walking and feels like she has to pop her hip back into place, though she denies ever having experienced a dislocation event of either hip at any time. She notes anterior groin pain and lateral hip pain with radiation down the the midthigh, worse with switching from seated to standing and walking. She reports that these symptoms predated her surgeries and appear to have only gotten worse since the operations. She notes a walking distance limitation of less than 1 block with the use of a cane. She rates her pain as 8/10 and has been taking Aleve daily 4x a day. She has not had PT since her surgeries.   Patient notes a history of having b/l tibial osteotomies when she was 1 years old to correct bowleg deformities. Following the osteotomies, she was treated in a cast. She also reports DM controlled at an A1c of 6.6 without medication, PE as well as multiple unprovoked DVTs (was recommended to stay on lifelong therapeutic anticoagulation but admits she self-discontinued about a year ago so that she could use Aleve; she reports that she is planning to follow up soon with Dr. Vigil), b/l LE neuropathy, bariatric sleeve in 2014. She is allergic to lisinopril and enalapril which cause coughing. She lives on a second floor walk apartment complex.

## 2023-08-08 NOTE — DISCUSSION/SUMMARY
[de-identified] : 52 y/o female status post bilateral total hip arthroplasties with right trochanteric brusitis as well as lumbar spondylosis and spondylolisthesis with right greater than left sacrailiitis and possible right lumbar radiculopathy.  - I no longer think that hip aspirations are necessary after seeing her normalization of ESR.  - It appears to me that her residual pain at this time is a combination of right hip trochanteric bursitis and right lumbar radiculopathy as well as right sacroiliitis.  - I recommended that we move forward with management in the form of physical therapy, home exercise program, Tylenol and Meloxicam as well topical analgesics as needed.  - I administered a right trochanteric bursal injection today and provided her with another referral to pain management since our own pain management physicians are not participating in her insurance. I recommend that she contact [facility] to find a participating management physician to consider lumbar or right sacroiliac injections.  - She'll follow-up in three months with no new X-rays needed. We may consider a repeat trochanteric bursal injection at that time considering her clinical progress.

## 2023-08-08 NOTE — END OF VISIT
[FreeTextEntry3] : Documented by Remington Henry acting as a scribe for Dr. Jimmy Lake. 08/06/2023   All medical record entries made by the Scribe were at my, Dr. Jimmy Lake, direction and personally dictated by me on 08/06/2023 . I have reviewed the chart and agree that the record accurately reflects my personal performance of the history, physical exam, assessment and plan. I have also personally directed, reviewed, and agreed with the chart.

## 2023-08-25 NOTE — PHYSICAL THERAPY INITIAL EVALUATION ADULT - ADDITIONAL COMMENTS
Emergency Department Provider Note       PCP: No primary care provider on file. Age: 28 y.o. Sex: male     DISPOSITION Decision To Discharge 08/25/2023 04:54:28 PM       ICD-10-CM    1. Thoracic myofascial strain, initial encounter  S29.019A           Medical Decision Making     Complexity of Problems Addressed:  1 acute uncomplicated injury    Data Reviewed and Analyzed:  I independently ordered and reviewed each unique test.             Discussion of management or test interpretation. Patient presents with thoracic likely muscle strain. He does have some mechanisms that could relate to the reason for having this. He worked his back out yesterday and also lifted up his 60 pound dog today. I doubt emergent cause for back pain such as cauda equina or spinal epidural abscess. He has no red flags. In addition there is no chest pain or any other neurological symptoms to suggest aortic dissection. Tenderness was reproducible on exam at that thoracic musculature area. Patient felt much improved on repeat exam.  I feel it is reasonable to do expectant management and symptomatic control with strict return precautions for any worsening symptoms. Risk of Complications and/or Morbidity of Patient Management:  Prescription drug management performed. Drug therapy given requiring intensive monitoring for toxicity. Shared medical decision making was utilized in creating the patients health plan today. History      Uriel Galarza is a 28 y.o. male who presents to the Emergency Department with chief complaint of    Chief Complaint   Patient presents with    Back Pain      Patient describing pain in his mid thoracic area. He says pain started around 10 AM this morning. He said he did  his 60 pound dog around 8 AM.  Did not have any pain at that time but slowly started developing a couple hours later.   He says it was not severe but then he sat down and placed a heating pad and got up and the department visit:  Medications   ketorolac (TORADOL) injection 30 mg (30 mg IntraMUSCular Given 8/25/23 1511)   HYDROcodone-acetaminophen (NORCO) 5-325 MG per tablet 1 tablet (1 tablet Oral Given 8/25/23 1512)       New Prescriptions    CYCLOBENZAPRINE (FLEXERIL) 5 MG TABLET    Take 1 tablet by mouth 2 times daily as needed for Muscle spasms    IBUPROFEN (ADVIL;MOTRIN) 600 MG TABLET    Take 1 tablet by mouth 4 times daily as needed for Pain        History reviewed. No pertinent past medical history. History reviewed. No pertinent surgical history. Social History     Socioeconomic History    Marital status:      Spouse name: None    Number of children: None    Years of education: None    Highest education level: None   Tobacco Use    Smoking status: Never    Smokeless tobacco: Never   Substance and Sexual Activity    Alcohol use: Yes    Drug use: Never        Previous Medications    No medications on file        No results found for any visits on 08/25/23. No orders to display                     Voice dictation software was used during the making of this note. This software is not perfect and grammatical and other typographical errors may be present. This note has not been completely proofread for errors.      Layton Rubio,   08/25/23 [id number] Patient lives with 22 y.o son and 21 y.o daughter in private house with a flight of stairs to enter. Ambulates with SC (only outdoors) at baseline. Reports 2 falls for past 3 months due to 'my right leg giving out'.

## 2023-08-30 NOTE — PATIENT PROFILE ADULT - NSPROGENOTHERPROVIDER_GEN_A_NUR
Detail Level: Detailed
Quality 130: Documentation Of Current Medications In The Medical Record: Current Medications Documented
Quality 431: Preventive Care And Screening: Unhealthy Alcohol Use - Screening: Patient not identified as an unhealthy alcohol user when screened for unhealthy alcohol use using a systematic screening method
Quality 226: Preventive Care And Screening: Tobacco Use: Screening And Cessation Intervention: Patient screened for tobacco use and is an ex/non-smoker
none

## 2023-09-12 ENCOUNTER — NON-APPOINTMENT (OUTPATIENT)
Age: 53
End: 2023-09-12

## 2023-09-13 ENCOUNTER — APPOINTMENT (OUTPATIENT)
Dept: PHYSICAL MEDICINE AND REHAB | Facility: CLINIC | Age: 53
End: 2023-09-13
Payer: MEDICAID

## 2023-09-13 VITALS
DIASTOLIC BLOOD PRESSURE: 89 MMHG | HEART RATE: 73 BPM | SYSTOLIC BLOOD PRESSURE: 146 MMHG | BODY MASS INDEX: 33.34 KG/M2 | WEIGHT: 220 LBS | RESPIRATION RATE: 16 BRPM | OXYGEN SATURATION: 96 % | HEIGHT: 68 IN | TEMPERATURE: 97.3 F

## 2023-09-13 DIAGNOSIS — M16.9 OSTEOARTHRITIS OF HIP, UNSPECIFIED: ICD-10-CM

## 2023-09-13 DIAGNOSIS — M21.069 VALGUS DEFORMITY, NOT ELSEWHERE CLASSIFIED, UNSPECIFIED KNEE: ICD-10-CM

## 2023-09-13 PROCEDURE — 99204 OFFICE O/P NEW MOD 45 MIN: CPT

## 2023-10-25 ENCOUNTER — APPOINTMENT (OUTPATIENT)
Dept: ORTHOPEDIC SURGERY | Facility: CLINIC | Age: 53
End: 2023-10-25

## 2023-11-10 ENCOUNTER — APPOINTMENT (OUTPATIENT)
Dept: PHYSICAL MEDICINE AND REHAB | Facility: CLINIC | Age: 53
End: 2023-11-10

## 2023-11-13 RX ORDER — MELOXICAM 7.5 MG/1
7.5 TABLET ORAL DAILY
Qty: 30 | Refills: 2 | Status: ACTIVE | COMMUNITY
Start: 2023-08-02 | End: 1900-01-01

## 2023-11-26 ENCOUNTER — EMERGENCY (EMERGENCY)
Facility: HOSPITAL | Age: 53
LOS: 1 days | Discharge: HOME | End: 2023-11-26
Attending: EMERGENCY MEDICINE | Admitting: EMERGENCY MEDICINE
Payer: MEDICAID

## 2023-11-26 VITALS
SYSTOLIC BLOOD PRESSURE: 153 MMHG | DIASTOLIC BLOOD PRESSURE: 81 MMHG | TEMPERATURE: 99 F | RESPIRATION RATE: 18 BRPM | HEART RATE: 65 BPM | OXYGEN SATURATION: 97 %

## 2023-11-26 VITALS
HEART RATE: 94 BPM | SYSTOLIC BLOOD PRESSURE: 182 MMHG | DIASTOLIC BLOOD PRESSURE: 105 MMHG | OXYGEN SATURATION: 98 % | TEMPERATURE: 99 F | WEIGHT: 188.05 LBS | HEIGHT: 68 IN | RESPIRATION RATE: 17 BRPM

## 2023-11-26 DIAGNOSIS — R42 DIZZINESS AND GIDDINESS: ICD-10-CM

## 2023-11-26 DIAGNOSIS — R30.0 DYSURIA: ICD-10-CM

## 2023-11-26 DIAGNOSIS — F32.A DEPRESSION, UNSPECIFIED: ICD-10-CM

## 2023-11-26 DIAGNOSIS — Z87.891 PERSONAL HISTORY OF NICOTINE DEPENDENCE: ICD-10-CM

## 2023-11-26 DIAGNOSIS — Z90.3 ACQUIRED ABSENCE OF STOMACH [PART OF]: Chronic | ICD-10-CM

## 2023-11-26 DIAGNOSIS — Z86.718 PERSONAL HISTORY OF OTHER VENOUS THROMBOSIS AND EMBOLISM: ICD-10-CM

## 2023-11-26 DIAGNOSIS — Z88.8 ALLERGY STATUS TO OTHER DRUGS, MEDICAMENTS AND BIOLOGICAL SUBSTANCES: ICD-10-CM

## 2023-11-26 DIAGNOSIS — I10 ESSENTIAL (PRIMARY) HYPERTENSION: ICD-10-CM

## 2023-11-26 DIAGNOSIS — Z98.890 OTHER SPECIFIED POSTPROCEDURAL STATES: Chronic | ICD-10-CM

## 2023-11-26 DIAGNOSIS — Z88.1 ALLERGY STATUS TO OTHER ANTIBIOTIC AGENTS STATUS: ICD-10-CM

## 2023-11-26 DIAGNOSIS — Z86.711 PERSONAL HISTORY OF PULMONARY EMBOLISM: ICD-10-CM

## 2023-11-26 DIAGNOSIS — E11.9 TYPE 2 DIABETES MELLITUS WITHOUT COMPLICATIONS: ICD-10-CM

## 2023-11-26 DIAGNOSIS — Z90.49 ACQUIRED ABSENCE OF OTHER SPECIFIED PARTS OF DIGESTIVE TRACT: Chronic | ICD-10-CM

## 2023-11-26 DIAGNOSIS — K46.9 UNSPECIFIED ABDOMINAL HERNIA WITHOUT OBSTRUCTION OR GANGRENE: Chronic | ICD-10-CM

## 2023-11-26 LAB
ALBUMIN SERPL ELPH-MCNC: 3.2 G/DL — LOW (ref 3.4–5)
ALBUMIN SERPL ELPH-MCNC: 3.2 G/DL — LOW (ref 3.4–5)
ALP SERPL-CCNC: 96 U/L — SIGNIFICANT CHANGE UP (ref 40–120)
ALP SERPL-CCNC: 96 U/L — SIGNIFICANT CHANGE UP (ref 40–120)
ALT FLD-CCNC: 10 U/L — LOW (ref 12–42)
ALT FLD-CCNC: 10 U/L — LOW (ref 12–42)
ANION GAP SERPL CALC-SCNC: 7 MMOL/L — LOW (ref 9–16)
ANION GAP SERPL CALC-SCNC: 7 MMOL/L — LOW (ref 9–16)
APPEARANCE UR: CLEAR — SIGNIFICANT CHANGE UP
APPEARANCE UR: CLEAR — SIGNIFICANT CHANGE UP
AST SERPL-CCNC: 34 U/L — SIGNIFICANT CHANGE UP (ref 15–37)
AST SERPL-CCNC: 34 U/L — SIGNIFICANT CHANGE UP (ref 15–37)
BACTERIA # UR AUTO: NEGATIVE /HPF — SIGNIFICANT CHANGE UP
BACTERIA # UR AUTO: NEGATIVE /HPF — SIGNIFICANT CHANGE UP
BASOPHILS # BLD AUTO: 0.03 K/UL — SIGNIFICANT CHANGE UP (ref 0–0.2)
BASOPHILS # BLD AUTO: 0.03 K/UL — SIGNIFICANT CHANGE UP (ref 0–0.2)
BASOPHILS NFR BLD AUTO: 0.5 % — SIGNIFICANT CHANGE UP (ref 0–2)
BASOPHILS NFR BLD AUTO: 0.5 % — SIGNIFICANT CHANGE UP (ref 0–2)
BILIRUB SERPL-MCNC: 0.4 MG/DL — SIGNIFICANT CHANGE UP (ref 0.2–1.2)
BILIRUB SERPL-MCNC: 0.4 MG/DL — SIGNIFICANT CHANGE UP (ref 0.2–1.2)
BILIRUB UR-MCNC: NEGATIVE — SIGNIFICANT CHANGE UP
BILIRUB UR-MCNC: NEGATIVE — SIGNIFICANT CHANGE UP
BUN SERPL-MCNC: 16 MG/DL — SIGNIFICANT CHANGE UP (ref 7–23)
BUN SERPL-MCNC: 16 MG/DL — SIGNIFICANT CHANGE UP (ref 7–23)
CALCIUM SERPL-MCNC: 9.1 MG/DL — SIGNIFICANT CHANGE UP (ref 8.5–10.5)
CALCIUM SERPL-MCNC: 9.1 MG/DL — SIGNIFICANT CHANGE UP (ref 8.5–10.5)
CHLORIDE SERPL-SCNC: 104 MMOL/L — SIGNIFICANT CHANGE UP (ref 96–108)
CHLORIDE SERPL-SCNC: 104 MMOL/L — SIGNIFICANT CHANGE UP (ref 96–108)
CO2 SERPL-SCNC: 29 MMOL/L — SIGNIFICANT CHANGE UP (ref 22–31)
CO2 SERPL-SCNC: 29 MMOL/L — SIGNIFICANT CHANGE UP (ref 22–31)
COLOR SPEC: YELLOW — SIGNIFICANT CHANGE UP
COLOR SPEC: YELLOW — SIGNIFICANT CHANGE UP
CREAT SERPL-MCNC: 0.81 MG/DL — SIGNIFICANT CHANGE UP (ref 0.5–1.3)
CREAT SERPL-MCNC: 0.81 MG/DL — SIGNIFICANT CHANGE UP (ref 0.5–1.3)
DIFF PNL FLD: ABNORMAL
DIFF PNL FLD: ABNORMAL
EGFR: 86 ML/MIN/1.73M2 — SIGNIFICANT CHANGE UP
EGFR: 86 ML/MIN/1.73M2 — SIGNIFICANT CHANGE UP
EOSINOPHIL # BLD AUTO: 0.16 K/UL — SIGNIFICANT CHANGE UP (ref 0–0.5)
EOSINOPHIL # BLD AUTO: 0.16 K/UL — SIGNIFICANT CHANGE UP (ref 0–0.5)
EOSINOPHIL NFR BLD AUTO: 2.5 % — SIGNIFICANT CHANGE UP (ref 0–6)
EOSINOPHIL NFR BLD AUTO: 2.5 % — SIGNIFICANT CHANGE UP (ref 0–6)
EPI CELLS # UR: PRESENT
EPI CELLS # UR: PRESENT
GLUCOSE SERPL-MCNC: 110 MG/DL — HIGH (ref 70–99)
GLUCOSE SERPL-MCNC: 110 MG/DL — HIGH (ref 70–99)
GLUCOSE UR QL: NEGATIVE MG/DL — SIGNIFICANT CHANGE UP
GLUCOSE UR QL: NEGATIVE MG/DL — SIGNIFICANT CHANGE UP
HCT VFR BLD CALC: 38.5 % — SIGNIFICANT CHANGE UP (ref 34.5–45)
HCT VFR BLD CALC: 38.5 % — SIGNIFICANT CHANGE UP (ref 34.5–45)
HGB BLD-MCNC: 12.5 G/DL — SIGNIFICANT CHANGE UP (ref 11.5–15.5)
HGB BLD-MCNC: 12.5 G/DL — SIGNIFICANT CHANGE UP (ref 11.5–15.5)
IMM GRANULOCYTES NFR BLD AUTO: 0.3 % — SIGNIFICANT CHANGE UP (ref 0–0.9)
IMM GRANULOCYTES NFR BLD AUTO: 0.3 % — SIGNIFICANT CHANGE UP (ref 0–0.9)
KETONES UR-MCNC: NEGATIVE MG/DL — SIGNIFICANT CHANGE UP
KETONES UR-MCNC: NEGATIVE MG/DL — SIGNIFICANT CHANGE UP
LEUKOCYTE ESTERASE UR-ACNC: NEGATIVE — SIGNIFICANT CHANGE UP
LEUKOCYTE ESTERASE UR-ACNC: NEGATIVE — SIGNIFICANT CHANGE UP
LIDOCAIN IGE QN: 44 U/L — SIGNIFICANT CHANGE UP (ref 16–77)
LIDOCAIN IGE QN: 44 U/L — SIGNIFICANT CHANGE UP (ref 16–77)
LYMPHOCYTES # BLD AUTO: 2.14 K/UL — SIGNIFICANT CHANGE UP (ref 1–3.3)
LYMPHOCYTES # BLD AUTO: 2.14 K/UL — SIGNIFICANT CHANGE UP (ref 1–3.3)
LYMPHOCYTES # BLD AUTO: 33.6 % — SIGNIFICANT CHANGE UP (ref 13–44)
LYMPHOCYTES # BLD AUTO: 33.6 % — SIGNIFICANT CHANGE UP (ref 13–44)
MCHC RBC-ENTMCNC: 29.3 PG — SIGNIFICANT CHANGE UP (ref 27–34)
MCHC RBC-ENTMCNC: 29.3 PG — SIGNIFICANT CHANGE UP (ref 27–34)
MCHC RBC-ENTMCNC: 32.5 GM/DL — SIGNIFICANT CHANGE UP (ref 32–36)
MCHC RBC-ENTMCNC: 32.5 GM/DL — SIGNIFICANT CHANGE UP (ref 32–36)
MCV RBC AUTO: 90.4 FL — SIGNIFICANT CHANGE UP (ref 80–100)
MCV RBC AUTO: 90.4 FL — SIGNIFICANT CHANGE UP (ref 80–100)
MONOCYTES # BLD AUTO: 0.63 K/UL — SIGNIFICANT CHANGE UP (ref 0–0.9)
MONOCYTES # BLD AUTO: 0.63 K/UL — SIGNIFICANT CHANGE UP (ref 0–0.9)
MONOCYTES NFR BLD AUTO: 9.9 % — SIGNIFICANT CHANGE UP (ref 2–14)
MONOCYTES NFR BLD AUTO: 9.9 % — SIGNIFICANT CHANGE UP (ref 2–14)
NEUTROPHILS # BLD AUTO: 3.38 K/UL — SIGNIFICANT CHANGE UP (ref 1.8–7.4)
NEUTROPHILS # BLD AUTO: 3.38 K/UL — SIGNIFICANT CHANGE UP (ref 1.8–7.4)
NEUTROPHILS NFR BLD AUTO: 53.2 % — SIGNIFICANT CHANGE UP (ref 43–77)
NEUTROPHILS NFR BLD AUTO: 53.2 % — SIGNIFICANT CHANGE UP (ref 43–77)
NITRITE UR-MCNC: NEGATIVE — SIGNIFICANT CHANGE UP
NITRITE UR-MCNC: NEGATIVE — SIGNIFICANT CHANGE UP
NRBC # BLD: 0 /100 WBCS — SIGNIFICANT CHANGE UP (ref 0–0)
NRBC # BLD: 0 /100 WBCS — SIGNIFICANT CHANGE UP (ref 0–0)
PH UR: 6 — SIGNIFICANT CHANGE UP (ref 5–8)
PH UR: 6 — SIGNIFICANT CHANGE UP (ref 5–8)
PLATELET # BLD AUTO: 264 K/UL — SIGNIFICANT CHANGE UP (ref 150–400)
PLATELET # BLD AUTO: 264 K/UL — SIGNIFICANT CHANGE UP (ref 150–400)
POTASSIUM SERPL-MCNC: 5.1 MMOL/L — SIGNIFICANT CHANGE UP (ref 3.5–5.3)
POTASSIUM SERPL-MCNC: 5.1 MMOL/L — SIGNIFICANT CHANGE UP (ref 3.5–5.3)
POTASSIUM SERPL-SCNC: 5.1 MMOL/L — SIGNIFICANT CHANGE UP (ref 3.5–5.3)
POTASSIUM SERPL-SCNC: 5.1 MMOL/L — SIGNIFICANT CHANGE UP (ref 3.5–5.3)
PROT SERPL-MCNC: 7.7 G/DL — SIGNIFICANT CHANGE UP (ref 6.4–8.2)
PROT SERPL-MCNC: 7.7 G/DL — SIGNIFICANT CHANGE UP (ref 6.4–8.2)
PROT UR-MCNC: NEGATIVE MG/DL — SIGNIFICANT CHANGE UP
PROT UR-MCNC: NEGATIVE MG/DL — SIGNIFICANT CHANGE UP
RBC # BLD: 4.26 M/UL — SIGNIFICANT CHANGE UP (ref 3.8–5.2)
RBC # BLD: 4.26 M/UL — SIGNIFICANT CHANGE UP (ref 3.8–5.2)
RBC # FLD: 13.2 % — SIGNIFICANT CHANGE UP (ref 10.3–14.5)
RBC # FLD: 13.2 % — SIGNIFICANT CHANGE UP (ref 10.3–14.5)
RBC CASTS # UR COMP ASSIST: 1 /HPF — SIGNIFICANT CHANGE UP (ref 0–4)
RBC CASTS # UR COMP ASSIST: 1 /HPF — SIGNIFICANT CHANGE UP (ref 0–4)
SODIUM SERPL-SCNC: 140 MMOL/L — SIGNIFICANT CHANGE UP (ref 132–145)
SODIUM SERPL-SCNC: 140 MMOL/L — SIGNIFICANT CHANGE UP (ref 132–145)
SP GR SPEC: 1.03 — SIGNIFICANT CHANGE UP (ref 1–1.03)
SP GR SPEC: 1.03 — SIGNIFICANT CHANGE UP (ref 1–1.03)
UROBILINOGEN FLD QL: 0.2 MG/DL — SIGNIFICANT CHANGE UP (ref 0.2–1)
UROBILINOGEN FLD QL: 0.2 MG/DL — SIGNIFICANT CHANGE UP (ref 0.2–1)
WBC # BLD: 6.36 K/UL — SIGNIFICANT CHANGE UP (ref 3.8–10.5)
WBC # BLD: 6.36 K/UL — SIGNIFICANT CHANGE UP (ref 3.8–10.5)
WBC # FLD AUTO: 6.36 K/UL — SIGNIFICANT CHANGE UP (ref 3.8–10.5)
WBC # FLD AUTO: 6.36 K/UL — SIGNIFICANT CHANGE UP (ref 3.8–10.5)
WBC UR QL: 0 /HPF — SIGNIFICANT CHANGE UP (ref 0–5)
WBC UR QL: 0 /HPF — SIGNIFICANT CHANGE UP (ref 0–5)

## 2023-11-26 PROCEDURE — 99284 EMERGENCY DEPT VISIT MOD MDM: CPT

## 2023-11-26 RX ORDER — ONDANSETRON 8 MG/1
4 TABLET, FILM COATED ORAL ONCE
Refills: 0 | Status: COMPLETED | OUTPATIENT
Start: 2023-11-26 | End: 2023-11-26

## 2023-11-26 RX ORDER — AMLODIPINE BESYLATE 2.5 MG/1
1 TABLET ORAL
Qty: 30 | Refills: 0
Start: 2023-11-26 | End: 2023-12-25

## 2023-11-26 RX ORDER — MECLIZINE HCL 12.5 MG
1 TABLET ORAL
Qty: 9 | Refills: 0
Start: 2023-11-26 | End: 2023-11-28

## 2023-11-26 RX ORDER — AMLODIPINE BESYLATE 2.5 MG/1
10 TABLET ORAL ONCE
Refills: 0 | Status: COMPLETED | OUTPATIENT
Start: 2023-11-26 | End: 2023-11-26

## 2023-11-26 RX ORDER — ACETAMINOPHEN 500 MG
650 TABLET ORAL ONCE
Refills: 0 | Status: COMPLETED | OUTPATIENT
Start: 2023-11-26 | End: 2023-11-26

## 2023-11-26 RX ORDER — MECLIZINE HCL 12.5 MG
25 TABLET ORAL ONCE
Refills: 0 | Status: COMPLETED | OUTPATIENT
Start: 2023-11-26 | End: 2023-11-26

## 2023-11-26 RX ADMIN — Medication 650 MILLIGRAM(S): at 15:44

## 2023-11-26 RX ADMIN — Medication 25 MILLIGRAM(S): at 17:34

## 2023-11-26 RX ADMIN — AMLODIPINE BESYLATE 10 MILLIGRAM(S): 2.5 TABLET ORAL at 15:44

## 2023-11-26 RX ADMIN — ONDANSETRON 4 MILLIGRAM(S): 8 TABLET, FILM COATED ORAL at 15:46

## 2023-11-26 NOTE — ED PROVIDER NOTE - OBJECTIVE STATEMENT
52 y/o F with pmhx of HTN (prescribed losartan/HCTZ 50mg/12.5mg, metoprolol 100mg, and amlodipine 10mg), DM (non-insulin dependent), PE x 1 and DVT x 5-6 (prescribed Lovenox, but pt has been noncompliant with it for the past year) who presents to the ED c/o dizziness, nausea and elevated blood pressure readings at home over the last week. Pt describes nonprogressive dizziness that she describes as a lightheadedness and nonprogressive nausea that is worse after eating for the last week. This prompted pt to check her blood pressure and pt reports readings of systolic blood pressure ranging from 159-198 and diastolic blood pressure ranging from 78-90. Regarding her blood pressure medication, pt states she has been compliant, but notes that she ran out of her amlodipine a month ago and never got it refilled. Pt did take her losartan/HCTZ and metoprolol this morning before coming to the ED.  Pt also reports daily generalized HA for over a month that is nonprogressive and persistent during the day. Pt also reports progressively worsening blurry vision for the past 2 months and dysuria for the past month. Denies recent fever/chills, neck pain or stiffness, CP, SOB, palpitations, worsening extremity numbness/tingling (reports baseline tingling in bilateral feet from neuropathy), abd pain, vomiting, recent syncope or near syncope episodes, recent trauma/fall, leg swelling or calf pain, hemoptysis. Pt reports an intolerance to lisinopril and verapamil with adverse reaction of increased cough. Pt PCP is Dr. Stringer. Pt ambulates with a cane at baseline. Former smoker. Denies alcohol or drug use. Pt states she is post-menopausal with LNMP over a year ago. 54 y/o F with pmhx of HTN (prescribed losartan/HCTZ 50mg/12.5mg, metoprolol 100mg, and amlodipine 10mg), DM (non-insulin dependent), PE x 1 and DVT x 5-6 (prescribed Lovenox, but pt has been noncompliant with it for the past year) who presents to the ED c/o dizziness, nausea and elevated blood pressure readings at home over the last week. Pt describes nonprogressive dizziness that she describes as a lightheadedness and worse with head movements and changes in position and nonprogressive nausea that is worse after eating for the last week. This prompted pt to check her blood pressure and pt reports readings of systolic blood pressure ranging from 159-198 and diastolic blood pressure ranging from 78-90. Regarding her blood pressure medication, pt states she has been compliant, but notes that she ran out of her amlodipine a month ago and never got it refilled. Pt did take her losartan/HCTZ and metoprolol this morning before coming to the ED.  Pt also reports daily generalized HA for over a month that is nonprogressive and persistent during the day. Pt also reports progressively worsening blurry vision for the past 2 months and dysuria for the past month. Denies recent fever/chills, neck pain or stiffness, CP, SOB, palpitations, worsening extremity numbness/tingling (reports baseline tingling in bilateral feet from neuropathy), abd pain, vomiting, recent syncope or near syncope episodes, recent trauma/fall, leg swelling or calf pain, hemoptysis. Pt reports an intolerance to lisinopril and verapamil with adverse reaction of increased cough. Pt PCP is Dr. Stringer. Pt ambulates with a cane at baseline. Former smoker. Denies alcohol or drug use. Pt states she is post-menopausal with LNMP over a year ago. 52 y/o F with pmhx of HTN (prescribed losartan/HCTZ 50mg/12.5mg, metoprolol 100mg, and amlodipine 10mg), DM (non-insulin dependent), PE x 1 and DVT x 5-6 (prescribed Lovenox, but pt has been noncompliant with it for the past year), depression who presents to the ED c/o dizziness, nausea and elevated blood pressure readings at home over the last week. Pt describes nonprogressive dizziness that she describes as a lightheadedness and worse with head movements and changes in position and nonprogressive nausea that is worse after eating for the last week. This prompted pt to check her blood pressure and pt reports readings of systolic blood pressure ranging from 159-198 and diastolic blood pressure ranging from 78-90. Of note, pt also mentions that she was started on Lexapro and Trazodone by her psychiatrist which she started taking a week ago. Regarding her blood pressure medication, pt states she has been compliant, but notes that she ran out of her amlodipine a month ago and never got it refilled. Pt did take her losartan/HCTZ and metoprolol this morning before coming to the ED.  Pt also reports daily generalized HA for over a month that is nonprogressive and persistent during the day. Pt also reports progressively worsening blurry vision for the past 2 months and dysuria for the past month. Denies recent fever/chills, neck pain or stiffness, CP, SOB, palpitations, worsening extremity numbness/tingling (reports baseline tingling in bilateral feet from neuropathy), abd pain, vomiting, recent syncope or near syncope episodes, recent trauma/fall, leg swelling or calf pain, hemoptysis. Pt reports an intolerance to lisinopril and verapamil with adverse reaction of increased cough. Pt PCP is Dr. Stringer. Pt ambulates with a cane at baseline. Former smoker. Denies alcohol or drug use. Pt states she is post-menopausal with LNMP over a year ago.

## 2023-11-26 NOTE — ED PROVIDER NOTE - NSFOLLOWUPINSTRUCTIONS_ED_ALL_ED_FT
You were seen in the emergency department for elevated blood pressure readings, dizziness and nausea.   A copy of the results from today's visit is provided.    Please follow-up with your primary care doctor within the week to discuss your medications and elevated blood pressure. Having untreated or uncontrolled hypertension for a long period of time can cause heart attack, stroke, kidney disease, and other problems.    Return to the ED if you develop new or concerning symptoms including severe headache, confusion, chest pain, abdominal pain, nausea or vomiting, changes in vision, worsening numbness or tingling in your arms or legs.       Hypertension    WHAT YOU NEED TO KNOW:    Hypertension is high blood pressure. Your blood pressure is the force of your blood moving against the walls of your arteries. Hypertension causes your blood pressure to get so high that your heart has to work much harder than normal. This can damage your heart. The cause of hypertension may not be known. This is called essential or primary hypertension. Hypertension caused by another medical condition, such as kidney disease, is called secondary hypertension.    DISCHARGE INSTRUCTIONS:    Call your local emergency number (911 in the ) or have someone call if:   •You have chest pain.  •You have any of the following signs of a heart attack: ?Squeezing, pressure, or pain in your chest    ?You may also have any of the following:   ?Discomfort or pain in your back, neck, jaw, stomach, or arm  ?Shortness of breath  ?Nausea or vomiting  ?Lightheadedness or a sudden cold sweat  •You become confused or have trouble speaking.  •You suddenly feel lightheaded or have trouble breathing.      Return to the emergency department if:   •You have a severe headache or vision loss.  •You have weakness in an arm or leg.      Call your doctor or cardiologist if:   •You feel faint, dizzy, confused, or drowsy.  •You have been taking your blood pressure medicine but your pressure is higher than your provider says it should be.  •You have questions or concerns about your condition or care.    Medicines: You may need any of the following:  •Antihypertensives may be used to help lower your blood pressure. Several kinds of medicines are available. Your healthcare provider will choose medicines based on the kind of hypertension you have. You may need more than one type of medicine. Take the medicine exactly as directed.  •Diuretics help decrease extra fluid that collects in your body. This will help lower your blood pressure. You may urinate more often while you take this medicine.  •Cholesterol medicine helps lower your cholesterol level. A low cholesterol level helps prevent heart disease and makes it easier to control your blood pressure.  •Take your medicine as directed. Contact your healthcare provider if you think your medicine is not helping or if you have side effects. Tell him or her if you are allergic to any medicine. Keep a list of the medicines, vitamins, and herbs you take. Include the amounts, and when and why you take them. Bring the list or the pill bottles to follow-up visits. Carry your medicine list with you in case of an emergency.    Follow up with your doctor or cardiologist as directed: You will need to return to have your blood pressure checked and to have other lab tests done. Write down your questions so you remember to ask them during your visits.    Blood Pressure Readings  •Normal blood pressure is 119/79 or lower. Your healthcare provider may only check your blood pressure each year if it stays at a normal level.  •Elevated blood pressure is 120/79 to 129/79. This is sometimes called prehypertension. Your healthcare provider may suggest lifestyle changes to help lower your blood pressure to a normal level. He or she may then check it again in 3 to 6 months.  •Stage 1 hypertension is 130/80 to 139/89. Your provider may recommend lifestyle changes, medication, and checks every 3 to 6 months until your blood pressure is controlled.  •Stage 2 hypertension is 140/90 or higher. Your provider will recommend lifestyle changes and have you take 2 kinds of hypertension medicines. You will also need to have your blood pressure checked monthly until it is controlled.      Manage hypertension:   •Check your blood pressure at home. Avoid smoking, caffeine, and exercise at least 30 minutes before checking your blood pressure. Sit and rest for 5 minutes before you take your blood pressure. Extend your arm and support it on a flat surface. Your arm should be at the same level as your heart. Follow the directions that came with your blood pressure monitor. Check your blood pressure 2 times, 1 minute apart, before you take your medicine in the morning. Also check your blood pressure before your evening meal. Keep a record of your readings and bring it to your follow-up visits. Ask your healthcare provider what your blood pressure should be.    •Manage any other health conditions you have. Health conditions such as diabetes can increase your risk for hypertension. Follow your healthcare provider's instructions and take all your medicines as directed.  •Ask about all medicines. Certain medicines can increase your blood pressure. Examples include oral birth control pills, decongestants, herbal supplements, and NSAIDs, such as ibuprofen. Your healthcare provider can tell you which medicines are safe for you to take. This includes prescription and over-the-counter medicines.      Lifestyle changes you can make to manage hypertension:   Your healthcare provider may recommend you work with a team to manage hypertension. The team may include medical experts such as a dietitian, an exercise or physical therapist, and a behavior therapist. Your family members may be included in helping you create lifestyle changes.    Ways to Lower Your Blood Pressure     •Limit sodium (salt) as directed. Too much sodium can affect your fluid balance. Check labels to find low-sodium or no-salt-added foods. Some low-sodium foods use potassium salts for flavor. Too much potassium can also cause health problems. Your healthcare provider will tell you how much sodium and potassium are safe for you to have in a day. He or she may recommend that you limit sodium to 2,300 mg a day.    •Follow the meal plan recommended by your healthcare provider. A dietitian or your provider can give you more information on low-sodium plans or the DASH (Dietary Approaches to Stop Hypertension) eating plan. The DASH plan is low in sodium, processed sugar, unhealthy fats, and total fat. It is high in potassium, calcium, and fiber. These can be found in vegetables, fruit, and whole-grain foods.    •Be physically active throughout the day. Physical activity, such as exercise, can help control your blood pressure and your weight. Be physically active for at least 30 minutes per day, on most days of the week. Include aerobic activity, such as walking or riding a bicycle. Also include strength training at least 2 times each week. Your healthcare providers can help you create a physical activity plan.     •Decrease stress. This may help lower your blood pressure. Learn ways to relax, such as deep breathing or listening to music.  •Limit alcohol as directed. Alcohol can increase your blood pressure. A drink of alcohol is 12 ounces of beer, 5 ounces of wine, or 1½ ounces of liquor.  •Do not smoke. Nicotine and other chemicals in cigarettes and cigars can increase your blood pressure and also cause lung damage. Ask your healthcare provider for information if you currently smoke and need help to quit. E-cigarettes or smokeless tobacco still contain nicotine. Talk to your healthcare provider before you use these products. You were seen in the emergency department for elevated blood pressure readings, dizziness and nausea.   A copy of the results from today's visit is provided.    Please follow-up with your primary care doctor within the next 2-3 days to discuss your medications and elevated blood pressure. Having untreated or uncontrolled hypertension for a long period of time can cause heart attack, stroke, kidney disease, and other problems.    Start taking amlodipine 10mg tablet once a day starting tomorrow. You received a dose in the emergency department today.     Take Tylenol 650mg (Two 325 mg pills) every 4-6 hours as needed for pain or fevers.     Take Meclizine 25 mg three times a day as needed for dizziness -- may cause drowsiness; DO NOT DRINK ALCOHOL, DRIVE, OR OPERATE HEAVY MACHINERY while taking this medication.    Return to the ED if you develop new or concerning symptoms including severe headache, confusion, chest pain, abdominal pain, nausea or vomiting, changes in vision, worsening numbness or tingling in your arms or legs.       Hypertension    WHAT YOU NEED TO KNOW:    Hypertension is high blood pressure. Your blood pressure is the force of your blood moving against the walls of your arteries. Hypertension causes your blood pressure to get so high that your heart has to work much harder than normal. This can damage your heart. The cause of hypertension may not be known. This is called essential or primary hypertension. Hypertension caused by another medical condition, such as kidney disease, is called secondary hypertension.    DISCHARGE INSTRUCTIONS:    Call your local emergency number (911 in the [place]) or have someone call if:   •You have chest pain.  •You have any of the following signs of a heart attack: ?Squeezing, pressure, or pain in your chest    ?You may also have any of the following:   ?Discomfort or pain in your back, neck, jaw, stomach, or arm  ?Shortness of breath  ?Nausea or vomiting  ?Lightheadedness or a sudden cold sweat  •You become confused or have trouble speaking.  •You suddenly feel lightheaded or have trouble breathing.      Return to the emergency department if:   •You have a severe headache or vision loss.  •You have weakness in an arm or leg.      Call your doctor or cardiologist if:   •You feel faint, dizzy, confused, or drowsy.  •You have been taking your blood pressure medicine but your pressure is higher than your provider says it should be.  •You have questions or concerns about your condition or care.    Medicines: You may need any of the following:  •Antihypertensives may be used to help lower your blood pressure. Several kinds of medicines are available. Your healthcare provider will choose medicines based on the kind of hypertension you have. You may need more than one type of medicine. Take the medicine exactly as directed.  •Diuretics help decrease extra fluid that collects in your body. This will help lower your blood pressure. You may urinate more often while you take this medicine.  •Cholesterol medicine helps lower your cholesterol level. A low cholesterol level helps prevent heart disease and makes it easier to control your blood pressure.  •Take your medicine as directed. Contact your healthcare provider if you think your medicine is not helping or if you have side effects. Tell him or her if you are allergic to any medicine. Keep a list of the medicines, vitamins, and herbs you take. Include the amounts, and when and why you take them. Bring the list or the pill bottles to follow-up visits. Carry your medicine list with you in case of an emergency.    Follow up with your doctor or cardiologist as directed: You will need to return to have your blood pressure checked and to have other lab tests done. Write down your questions so you remember to ask them during your visits.    Blood Pressure Readings  •Normal blood pressure is 119/79 or lower. Your healthcare provider may only check your blood pressure each year if it stays at a normal level.  •Elevated blood pressure is 120/79 to 129/79. This is sometimes called prehypertension. Your healthcare provider may suggest lifestyle changes to help lower your blood pressure to a normal level. He or she may then check it again in 3 to 6 months.  •Stage 1 hypertension is 130/80 to 139/89. Your provider may recommend lifestyle changes, medication, and checks every 3 to 6 months until your blood pressure is controlled.  •Stage 2 hypertension is 140/90 or higher. Your provider will recommend lifestyle changes and have you take 2 kinds of hypertension medicines. You will also need to have your blood pressure checked monthly until it is controlled.      Manage hypertension:   •Check your blood pressure at home. Avoid smoking, caffeine, and exercise at least 30 minutes before checking your blood pressure. Sit and rest for 5 minutes before you take your blood pressure. Extend your arm and support it on a flat surface. Your arm should be at the same level as your heart. Follow the directions that came with your blood pressure monitor. Check your blood pressure 2 times, 1 minute apart, before you take your medicine in the morning. Also check your blood pressure before your evening meal. Keep a record of your readings and bring it to your follow-up visits. Ask your healthcare provider what your blood pressure should be.    •Manage any other health conditions you have. Health conditions such as diabetes can increase your risk for hypertension. Follow your healthcare provider's instructions and take all your medicines as directed.  •Ask about all medicines. Certain medicines can increase your blood pressure. Examples include oral birth control pills, decongestants, herbal supplements, and NSAIDs, such as ibuprofen. Your healthcare provider can tell you which medicines are safe for you to take. This includes prescription and over-the-counter medicines.      Lifestyle changes you can make to manage hypertension:   Your healthcare provider may recommend you work with a team to manage hypertension. The team may include medical experts such as a dietitian, an exercise or physical therapist, and a behavior therapist. Your family members may be included in helping you create lifestyle changes.    Ways to Lower Your Blood Pressure     •Limit sodium (salt) as directed. Too much sodium can affect your fluid balance. Check labels to find low-sodium or no-salt-added foods. Some low-sodium foods use potassium salts for flavor. Too much potassium can also cause health problems. Your healthcare provider will tell you how much sodium and potassium are safe for you to have in a day. He or she may recommend that you limit sodium to 2,300 mg a day.    •Follow the meal plan recommended by your healthcare provider. A dietitian or your provider can give you more information on low-sodium plans or the DASH (Dietary Approaches to Stop Hypertension) eating plan. The DASH plan is low in sodium, processed sugar, unhealthy fats, and total fat. It is high in potassium, calcium, and fiber. These can be found in vegetables, fruit, and whole-grain foods.    •Be physically active throughout the day. Physical activity, such as exercise, can help control your blood pressure and your weight. Be physically active for at least 30 minutes per day, on most days of the week. Include aerobic activity, such as walking or riding a bicycle. Also include strength training at least 2 times each week. Your healthcare providers can help you create a physical activity plan.     •Decrease stress. This may help lower your blood pressure. Learn ways to relax, such as deep breathing or listening to music.  •Limit alcohol as directed. Alcohol can increase your blood pressure. A drink of alcohol is 12 ounces of beer, 5 ounces of wine, or 1½ ounces of liquor.  •Do not smoke. Nicotine and other chemicals in cigarettes and cigars can increase your blood pressure and also cause lung damage. Ask your healthcare provider for information if you currently smoke and need help to quit. E-cigarettes or smokeless tobacco still contain nicotine. Talk to your healthcare provider before you use these products.

## 2023-11-26 NOTE — ED PROVIDER NOTE - CLINICAL SUMMARY MEDICAL DECISION MAKING FREE TEXT BOX
54 y/o F with pmhx of HTN (prescribed losartan/HCTZ 50mg/12.5mg, metoprolol 100mg, and amlodipine 10mg), DM (non-insulin dependent), PE x 1 and DVT x 5-6 (prescribed Lovenox, but pt has been noncompliant with it for the past year) who presents to the ED c/o dizziness, nausea and elevated blood pressure readings at home over the last week. Pt describes nonprogressive dizziness that she describes as a lightheadedness and nonprogressive nausea that is worse after eating for the last week. This prompted pt to check her blood pressure and pt reports readings of systolic blood pressure ranging from 159-198 and diastolic blood pressure ranging from 78-90. Regarding her blood pressure medication, pt states she has been compliant, but notes that she ran out of her amlodipine a month ago and never got it refilled. Pt did take her losartan/HCTZ and metoprolol this morning before coming to the ED.  Pt also reports daily generalized HA for over a month that is nonprogressive and persistent during the day. Pt also reports progressively worsening blurry vision for the past 2 months and dysuria for the past month.    Patient currently afebrile, hypertensive to 191/98, heart rate 57, spO2 100%.   Based on history and physical, differentials include but are not limited to uncontrolled HTN vs UTI. Low suspicion for hypertensive encephalopathy or intracranial bleeding from ruptured aneurysm at this time. Plan to assess patient for acute pathology as listed above with labs. Will administer pt's prescribed dose of amlodipine to reduce blood pressure and give zofran and tylenol for sx relief, follow-up on results, and reassess. if sx improvement and workup negative, likely d/c home with PCP follow-up. 52 y/o F with pmhx of HTN (prescribed losartan/HCTZ 50mg/12.5mg, metoprolol 100mg, and amlodipine 10mg), DM (non-insulin dependent), PE x 1 and DVT x 5-6 (prescribed Lovenox, but pt has been noncompliant with it for the past year) who presents to the ED c/o dizziness, nausea and elevated blood pressure readings at home over the last week. Pt describes nonprogressive dizziness that she describes as a lightheadedness and nonprogressive nausea that is worse after eating for the last week. This prompted pt to check her blood pressure and pt reports readings of systolic blood pressure ranging from 159-198 and diastolic blood pressure ranging from 78-90. Regarding her blood pressure medication, pt states she has been compliant, but notes that she ran out of her amlodipine a month ago and never got it refilled. Pt did take her losartan/HCTZ and metoprolol this morning before coming to the ED.  Pt also reports daily generalized HA for over a month that is nonprogressive and persistent during the day. Pt also reports progressively worsening blurry vision for the past 2 months and dysuria for the past month.    Patient currently afebrile, hypertensive to 191/98, heart rate 57, spO2 100%. Physical exam unremarkable. no focal neuro deficits. Heart and lung sounds normal. No abd tenderness to palpation. EKG on arrival sinus patience to 57 with LVH, unchanged from prior EKG on 8/2/23.   Based on history and physical, differentials include but are not limited to uncontrolled HTN vs vs BPPV vs UTI. Low suspicion for hypertensive encephalopathy or intracranial bleeding or CVA at this time. Plan to assess patient for acute pathology as listed above with labs. Will administer pt's prescribed dose of amlodipine to reduce blood pressure and give zofran and tylenol for sx relief, follow-up on results, and reassess. if sx improvement and workup negative, likely d/c home with PCP follow-up.

## 2023-11-26 NOTE — ED PROVIDER NOTE - PHYSICAL EXAMINATION
General: Well appearing in no acute distress, alert and cooperative  Head: Normocephalic, atraumatic  Eyes: PERRLA, no conjunctival injection, no scleral icterus, EOMI. No nystagmus bilaterally.   Neck: Soft and supple, full ROM without pain, no midline tenderness.  Cardiac: Bradycardic rate and regular rhythm, no murmurs, peripheral pulses 2+ and symmetric in all extremities, no LE edema bilaterally. No calf tenderness or palpable cords bilaterally.   Resp: Unlabored respiratory effort, lungs CTAB, speaking in full sentences, no wheezes/rhonchi/rales  Abd: Soft, non-tender, non-distended, no guarding or rebound tenderness  MSK: Spine midline and non-tender, no CVA tenderness bilaterally  Skin: Warm and dry, no rashes/abrasions/lacerations  Neuro: AO x 3, moves all extremities symmetrically, Motor strength 5/5 bilaterally UE and LE, sensation grossly intact. CN 3-12 intact. Rapid alternating movement intact with finger to nose. Pt with normal gait, uses cane at baseline. Negative Romberg. Speech is fluent and appropriate.

## 2023-11-26 NOTE — ED PROVIDER NOTE - PATIENT PORTAL LINK FT
You can access the FollowMyHealth Patient Portal offered by [facility] by registering at the following website: [email or web address]. By joining [facility]’s FollowMyHealth portal, you will also be able to view your health information using other applications (apps) compatible with our system.

## 2023-11-26 NOTE — ED ADULT NURSE NOTE - NSFALLRISKINTERV_ED_ALL_ED

## 2023-11-26 NOTE — ED PROVIDER NOTE - CARE PLAN
Principal Discharge DX:	Uncontrolled hypertension   1 Xeljanz Counseling: I discussed with the patient the risks of Xeljanz therapy including increased risk of infection, liver issues, headache, diarrhea, or cold symptoms. Live vaccines should be avoided. They were instructed to call if they have any problems.

## 2023-11-26 NOTE — ED PROVIDER NOTE - ATTENDING APP SHARED VISIT CONTRIBUTION OF CARE
I have personally seen and examined this patient. I have fully participated in the care of this patient. I have reviewed all pertinent clinical information, including history, physical exam and plan.    appears well on dc home & without complaints.

## 2023-11-26 NOTE — ED ADULT TRIAGE NOTE - CHIEF COMPLAINT QUOTE
"my bp has been high and I feel like throwing up." c/o one week of dizziness, weak, generalized fatigue accompanied with intermittent nausea so she decided to check her bp at home max 198/70. additionally one month of blurring vision, and dysuria x2 months. currently nad. denies any chest pain, no resp issues. ambulatory with cane at baseline. adds compliant with rx bp medications. hx htn,bp, dvt, pe

## 2023-11-26 NOTE — ED ADULT NURSE NOTE - OBJECTIVE STATEMENT
pt aox4 with steady gait with cane. "my bp has been high and I feel like throwing up." c/o one week of dizziness, weak, generalized fatigue.  hx htn,bp, dvt, pe

## 2024-01-08 NOTE — PHYSICAL THERAPY INITIAL EVALUATION ADULT - DIAGNOSIS, PT EVAL
4H: impaired joint mobility, motor function, muscle performance, and range of motion associated with joint arthroplasty
spouse

## 2024-01-10 ENCOUNTER — OFFICE (OUTPATIENT)
Dept: URBAN - METROPOLITAN AREA CLINIC 92 | Facility: CLINIC | Age: 54
Setting detail: OPHTHALMOLOGY
End: 2024-01-10
Payer: MEDICAID

## 2024-01-10 DIAGNOSIS — H00.14: ICD-10-CM

## 2024-01-10 DIAGNOSIS — H01.001: ICD-10-CM

## 2024-01-10 DIAGNOSIS — H00.11: ICD-10-CM

## 2024-01-10 DIAGNOSIS — H01.004: ICD-10-CM

## 2024-01-10 PROCEDURE — 92012 INTRM OPH EXAM EST PATIENT: CPT | Performed by: OPHTHALMOLOGY

## 2024-01-10 ASSESSMENT — KERATOMETRY
OD_K2POWER_DIOPTERS: 42.00
OS_AXISANGLE_DEGREES: 066
OD_K1POWER_DIOPTERS: 40.00
OD_AXISANGLE_DEGREES: 108
OS_K1POWER_DIOPTERS: 39.50
OS_K2POWER_DIOPTERS: 41.50

## 2024-01-10 ASSESSMENT — AXIALLENGTH_DERIVED
OD_AL: 25.3084
OS_AL: 25.636
OS_AL: 25.7516
OD_AL: 25.3646

## 2024-01-10 ASSESSMENT — REFRACTION_MANIFEST
OD_VA1: 20/40
OD_ADD: +2.00
OD_VA2: 20/30(J2)
OD_SPHERE: -1.00
OS_CYLINDER: -1.50
OS_ADD: +2.00
OD_AXIS: 035
OD_AXIS: 035
OD_VA1: 20/40
OS_VA1: 20/30
OS_ADD: +2.00
OD_CYLINDER: -1.50
OS_CYLINDER: -1.50
OS_AXIS: 145
OS_SPHERE: -1.25
OD_ADD: +2.00
OD_VA2: 20/30(J2)
OS_AXIS: 145
OS_VA2: 20/30(J2)
OD_CYLINDER: -1.50
OS_VA1: 20/30
OS_VA2: 20/30(J2)
OD_SPHERE: -1.00
OS_SPHERE: -1.25

## 2024-01-10 ASSESSMENT — REFRACTION_CURRENTRX
OD_CYLINDER: -1.50
OS_ADD: +2.00
OS_AXIS: 147
OS_VPRISM_DIRECTION: BF
OS_OVR_VA: 20/
OS_SPHERE: -1.25
OS_AXIS: 147
OS_OVR_VA: 20/
OD_VPRISM_DIRECTION: BF
OD_ADD: +2.00
OD_VPRISM_DIRECTION: BF
OD_OVR_VA: 20/
OS_ADD: +2.00
OS_VPRISM_DIRECTION: BF
OD_SPHERE: -1.25
OD_AXIS: 130
OD_AXIS: 130
OS_CYLINDER: -1.25
OS_CYLINDER: -1.25
OD_CYLINDER: -1.50
OD_SPHERE: -1.25
OD_ADD: +2.00
OS_SPHERE: -1.25
OD_OVR_VA: 20/

## 2024-01-10 ASSESSMENT — SPHEQUIV_DERIVED
OS_SPHEQUIV: -1.875
OD_SPHEQUIV: -1.75
OS_SPHEQUIV: -2.25
OD_SPHEQUIV: -1.75
OD_SPHEQUIV: -1.5
OS_SPHEQUIV: -2
OD_SPHEQUIV: -1.875
OS_SPHEQUIV: -2

## 2024-01-10 ASSESSMENT — REFRACTION_AUTOREFRACTION
OS_SPHERE: -2.75
OD_CYLINDER: +1.50
OS_AXIS: 143
OD_SPHERE: -1.00
OD_SPHERE: -2.25
OS_AXIS: 055
OS_SPHERE: -1.50
OS_CYLINDER: +1.75
OD_AXIS: 122
OD_AXIS: 033
OD_CYLINDER: -1.75
OS_CYLINDER: -1.50

## 2024-01-10 ASSESSMENT — CORNEAL DYSTROPHY - POSTERIOR
OS_POSTERIORDYSTROPHY: FUCHS GUTTATA
OD_POSTERIORDYSTROPHY: FUCHS GUTTATA

## 2024-01-10 ASSESSMENT — CONFRONTATIONAL VISUAL FIELD TEST (CVF)
OS_FINDINGS: FULL
OD_FINDINGS: FULL

## 2024-01-10 ASSESSMENT — LID EXAM ASSESSMENTS
OS_BLEPHARITIS: LUL 2+
OD_BLEPHARITIS: RUL 2+

## 2024-02-01 ENCOUNTER — OFFICE (OUTPATIENT)
Dept: URBAN - METROPOLITAN AREA CLINIC 76 | Facility: CLINIC | Age: 54
Setting detail: OPHTHALMOLOGY
End: 2024-02-01
Payer: MEDICAID

## 2024-02-01 DIAGNOSIS — H52.4: ICD-10-CM

## 2024-02-01 DIAGNOSIS — H01.001: ICD-10-CM

## 2024-02-01 DIAGNOSIS — H01.004: ICD-10-CM

## 2024-02-01 DIAGNOSIS — H00.14: ICD-10-CM

## 2024-02-01 DIAGNOSIS — H25.13: ICD-10-CM

## 2024-02-01 DIAGNOSIS — H40.013: ICD-10-CM

## 2024-02-01 DIAGNOSIS — H20.813: ICD-10-CM

## 2024-02-01 DIAGNOSIS — E11.9: ICD-10-CM

## 2024-02-01 PROCEDURE — 92133 CPTRZD OPH DX IMG PST SGM ON: CPT | Performed by: OPHTHALMOLOGY

## 2024-02-01 PROCEDURE — 92015 DETERMINE REFRACTIVE STATE: CPT | Performed by: OPHTHALMOLOGY

## 2024-02-01 PROCEDURE — 92083 EXTENDED VISUAL FIELD XM: CPT | Performed by: OPHTHALMOLOGY

## 2024-02-01 PROCEDURE — 92014 COMPRE OPH EXAM EST PT 1/>: CPT | Performed by: OPHTHALMOLOGY

## 2024-02-01 ASSESSMENT — REFRACTION_MANIFEST
OS_VA2: 20/30(J2)
OD_AXIS: 033
OD_VA2: 20/30(J2)
OD_CYLINDER: -1.75
OS_SPHERE: -1.00
OS_CYLINDER: -1.50
OS_CYLINDER: -1.50
OD_SPHERE: -0.75
OS_SPHERE: -1.25
OD_SPHERE: -1.00
OD_AXIS: 035
OS_ADD: +2.25
OD_CYLINDER: -1.50
OS_VA1: 20/50
OD_ADD: +2.25
OD_VA1: 20/40
OS_AXIS: 145
OD_VA1: 20/40
OS_ADD: +2.00
OD_ADD: +2.00
OS_VA1: 20/30
OS_AXIS: 145

## 2024-02-01 ASSESSMENT — REFRACTION_CURRENTRX
OS_VPRISM_DIRECTION: BF
OD_SPHERE: -1.25
OD_SPHERE: -1.00
OD_AXIS: 130
OD_CYLINDER: -1.50
OS_ADD: +2.00
OS_SPHERE: -1.25
OD_OVR_VA: 20/
OS_CYLINDER: -1.25
OD_CYLINDER: -1.50
OD_VPRISM_DIRECTION: BF
OD_ADD: +2.00
OS_CYLINDER: -1.50
OS_OVR_VA: 20/
OD_OVR_VA: 20/
OS_SPHERE: -1.25
OS_OVR_VA: 20/
OS_AXIS: 141
OS_AXIS: 147
OD_AXIS: 033

## 2024-02-01 ASSESSMENT — REFRACTION_AUTOREFRACTION
OD_AXIS: 033
OS_AXIS: 145
OD_CYLINDER: -1.75
OS_CYLINDER: -1.50
OS_SPHERE: -1.00
OD_SPHERE: -0.75

## 2024-02-01 ASSESSMENT — SPHEQUIV_DERIVED
OD_SPHEQUIV: -1.625
OS_SPHEQUIV: -1.75
OS_SPHEQUIV: -2
OS_SPHEQUIV: -1.75
OD_SPHEQUIV: -1.75
OD_SPHEQUIV: -1.625

## 2024-02-01 ASSESSMENT — CONFRONTATIONAL VISUAL FIELD TEST (CVF)
OD_FINDINGS: FULL
OS_FINDINGS: FULL

## 2024-02-01 ASSESSMENT — CORNEAL DYSTROPHY - POSTERIOR
OD_POSTERIORDYSTROPHY: FUCHS GUTTATA
OS_POSTERIORDYSTROPHY: FUCHS GUTTATA

## 2024-02-01 ASSESSMENT — LID EXAM ASSESSMENTS
OS_BLEPHARITIS: LUL 2+
OD_BLEPHARITIS: RUL 2+

## 2024-02-19 NOTE — PROVIDER CONTACT NOTE (CRITICAL VALUE NOTIFICATION) - BACKGROUND
Laurence Mcarthur MD
pt s/p R SANDY.
Patient s/p revision R SANDY on 7/30.
Pt s/p R RHA complicated by anemia
Hematoma in Right hip. persistent low Hgb. pt refuse blood transfusion. Sabianism observation. pt aware of risks.
Pt had a revision of Right SANDY on 7/30.
S/p R SANDY
s/p R SANDY
s/p R SANDY revision

## 2024-04-17 NOTE — ED PROVIDER NOTE - PSYCHIATRIC NEGATIVE STATEMENT, MLM
Graves disease stable on Methimazole 10mg following with Dr Danielson    Currently denies any double vision, blurred vision, loss of vision. Reports improvement of proptosis and ptosis OD since her last visit. Recent labs TSH 3.18 and T4 0.98L    Exam 10/17/23: OD slightly more proptotic than OS although within range of normal. Normal resistance to retropulsion. No chemosis or injection. Afferent and efferent function normal. LEA 1-2    Exam 4/17/24: Improved hertels with 17mm OU. No periorbital edema, chemosis or injection on todays exam. Minimal inferior scleral show, no lagophthalmos. EOM full, alignment ortho. Optic nerves pink and healthy. Afferent visual exam normal. RNFL 91 OD and 92 OS. LEA 0     Plan:   -RTC in 6 months time given inferior scleral show  -Discussed adherence to regular use of artifical tears. Intermittent blurred vision while on phone or computer likely secondary to dry eye and decreased blink   -Discussed magnesium for migraine prevention. Discussed connections between dry eye, light sensitivity and migraine   -Continue thyroid management with Dr Danielson   no known mental health issues.

## 2024-06-05 ENCOUNTER — EMERGENCY (EMERGENCY)
Facility: HOSPITAL | Age: 54
LOS: 1 days | Discharge: ANOTHER HOSPITAL | End: 2024-06-05
Attending: EMERGENCY MEDICINE | Admitting: EMERGENCY MEDICINE
Payer: MEDICAID

## 2024-06-05 VITALS
RESPIRATION RATE: 16 BRPM | DIASTOLIC BLOOD PRESSURE: 71 MMHG | HEART RATE: 51 BPM | TEMPERATURE: 98 F | HEIGHT: 67 IN | WEIGHT: 179.9 LBS | OXYGEN SATURATION: 96 % | SYSTOLIC BLOOD PRESSURE: 112 MMHG

## 2024-06-05 VITALS
DIASTOLIC BLOOD PRESSURE: 85 MMHG | OXYGEN SATURATION: 97 % | SYSTOLIC BLOOD PRESSURE: 127 MMHG | HEART RATE: 52 BPM | RESPIRATION RATE: 16 BRPM

## 2024-06-05 VITALS
HEART RATE: 60 BPM | DIASTOLIC BLOOD PRESSURE: 70 MMHG | OXYGEN SATURATION: 98 % | TEMPERATURE: 98 F | RESPIRATION RATE: 16 BRPM | SYSTOLIC BLOOD PRESSURE: 130 MMHG

## 2024-06-05 DIAGNOSIS — Z88.8 ALLERGY STATUS TO OTHER DRUGS, MEDICAMENTS AND BIOLOGICAL SUBSTANCES: ICD-10-CM

## 2024-06-05 DIAGNOSIS — Z86.718 PERSONAL HISTORY OF OTHER VENOUS THROMBOSIS AND EMBOLISM: ICD-10-CM

## 2024-06-05 DIAGNOSIS — Z90.49 ACQUIRED ABSENCE OF OTHER SPECIFIED PARTS OF DIGESTIVE TRACT: Chronic | ICD-10-CM

## 2024-06-05 DIAGNOSIS — K46.9 UNSPECIFIED ABDOMINAL HERNIA WITHOUT OBSTRUCTION OR GANGRENE: Chronic | ICD-10-CM

## 2024-06-05 DIAGNOSIS — Z98.890 OTHER SPECIFIED POSTPROCEDURAL STATES: Chronic | ICD-10-CM

## 2024-06-05 DIAGNOSIS — R10.84 GENERALIZED ABDOMINAL PAIN: ICD-10-CM

## 2024-06-05 DIAGNOSIS — Z90.3 ACQUIRED ABSENCE OF STOMACH [PART OF]: Chronic | ICD-10-CM

## 2024-06-05 DIAGNOSIS — L02.211 CUTANEOUS ABSCESS OF ABDOMINAL WALL: ICD-10-CM

## 2024-06-05 DIAGNOSIS — E11.9 TYPE 2 DIABETES MELLITUS WITHOUT COMPLICATIONS: ICD-10-CM

## 2024-06-05 DIAGNOSIS — I10 ESSENTIAL (PRIMARY) HYPERTENSION: ICD-10-CM

## 2024-06-05 DIAGNOSIS — Z86.711 PERSONAL HISTORY OF PULMONARY EMBOLISM: ICD-10-CM

## 2024-06-05 LAB
ALBUMIN SERPL ELPH-MCNC: 3.5 G/DL — SIGNIFICANT CHANGE UP (ref 3.4–5)
ALP SERPL-CCNC: 99 U/L — SIGNIFICANT CHANGE UP (ref 40–120)
ALT FLD-CCNC: 18 U/L — SIGNIFICANT CHANGE UP (ref 12–42)
ANION GAP SERPL CALC-SCNC: 9 MMOL/L — SIGNIFICANT CHANGE UP (ref 9–16)
APPEARANCE UR: CLEAR — SIGNIFICANT CHANGE UP
AST SERPL-CCNC: 14 U/L — LOW (ref 15–37)
BASOPHILS # BLD AUTO: 0.02 K/UL — SIGNIFICANT CHANGE UP (ref 0–0.2)
BASOPHILS NFR BLD AUTO: 0.3 % — SIGNIFICANT CHANGE UP (ref 0–2)
BILIRUB SERPL-MCNC: 0.3 MG/DL — SIGNIFICANT CHANGE UP (ref 0.2–1.2)
BILIRUB UR-MCNC: NEGATIVE — SIGNIFICANT CHANGE UP
BUN SERPL-MCNC: 11 MG/DL — SIGNIFICANT CHANGE UP (ref 7–23)
CALCIUM SERPL-MCNC: 9.4 MG/DL — SIGNIFICANT CHANGE UP (ref 8.5–10.5)
CHLORIDE SERPL-SCNC: 105 MMOL/L — SIGNIFICANT CHANGE UP (ref 96–108)
CO2 SERPL-SCNC: 29 MMOL/L — SIGNIFICANT CHANGE UP (ref 22–31)
COLOR SPEC: YELLOW — SIGNIFICANT CHANGE UP
CREAT SERPL-MCNC: 0.9 MG/DL — SIGNIFICANT CHANGE UP (ref 0.5–1.3)
DIFF PNL FLD: NEGATIVE — SIGNIFICANT CHANGE UP
EGFR: 76 ML/MIN/1.73M2 — SIGNIFICANT CHANGE UP
EOSINOPHIL # BLD AUTO: 0.21 K/UL — SIGNIFICANT CHANGE UP (ref 0–0.5)
EOSINOPHIL NFR BLD AUTO: 3 % — SIGNIFICANT CHANGE UP (ref 0–6)
GLUCOSE BLDC GLUCOMTR-MCNC: 109 MG/DL — HIGH (ref 70–99)
GLUCOSE SERPL-MCNC: 101 MG/DL — HIGH (ref 70–99)
GLUCOSE UR QL: NEGATIVE MG/DL — SIGNIFICANT CHANGE UP
HCT VFR BLD CALC: 39.4 % — SIGNIFICANT CHANGE UP (ref 34.5–45)
HGB BLD-MCNC: 12.4 G/DL — SIGNIFICANT CHANGE UP (ref 11.5–15.5)
IMM GRANULOCYTES NFR BLD AUTO: 0.3 % — SIGNIFICANT CHANGE UP (ref 0–0.9)
KETONES UR-MCNC: NEGATIVE MG/DL — SIGNIFICANT CHANGE UP
LACTATE BLDV-MCNC: 1.2 MMOL/L — SIGNIFICANT CHANGE UP (ref 0.5–2)
LEUKOCYTE ESTERASE UR-ACNC: NEGATIVE — SIGNIFICANT CHANGE UP
LYMPHOCYTES # BLD AUTO: 2.69 K/UL — SIGNIFICANT CHANGE UP (ref 1–3.3)
LYMPHOCYTES # BLD AUTO: 38.7 % — SIGNIFICANT CHANGE UP (ref 13–44)
MAGNESIUM SERPL-MCNC: 2 MG/DL — SIGNIFICANT CHANGE UP (ref 1.6–2.6)
MCHC RBC-ENTMCNC: 28.1 PG — SIGNIFICANT CHANGE UP (ref 27–34)
MCHC RBC-ENTMCNC: 31.5 GM/DL — LOW (ref 32–36)
MCV RBC AUTO: 89.1 FL — SIGNIFICANT CHANGE UP (ref 80–100)
MONOCYTES # BLD AUTO: 0.63 K/UL — SIGNIFICANT CHANGE UP (ref 0–0.9)
MONOCYTES NFR BLD AUTO: 9.1 % — SIGNIFICANT CHANGE UP (ref 2–14)
NEUTROPHILS # BLD AUTO: 3.38 K/UL — SIGNIFICANT CHANGE UP (ref 1.8–7.4)
NEUTROPHILS NFR BLD AUTO: 48.6 % — SIGNIFICANT CHANGE UP (ref 43–77)
NITRITE UR-MCNC: NEGATIVE — SIGNIFICANT CHANGE UP
NRBC # BLD: 0 /100 WBCS — SIGNIFICANT CHANGE UP (ref 0–0)
NT-PROBNP SERPL-SCNC: 180 PG/ML — SIGNIFICANT CHANGE UP
PH UR: 7.5 — SIGNIFICANT CHANGE UP (ref 5–8)
PLATELET # BLD AUTO: 259 K/UL — SIGNIFICANT CHANGE UP (ref 150–400)
POTASSIUM SERPL-MCNC: 3.5 MMOL/L — SIGNIFICANT CHANGE UP (ref 3.5–5.3)
POTASSIUM SERPL-SCNC: 3.5 MMOL/L — SIGNIFICANT CHANGE UP (ref 3.5–5.3)
PROT SERPL-MCNC: 8 G/DL — SIGNIFICANT CHANGE UP (ref 6.4–8.2)
PROT UR-MCNC: NEGATIVE MG/DL — SIGNIFICANT CHANGE UP
RBC # BLD: 4.42 M/UL — SIGNIFICANT CHANGE UP (ref 3.8–5.2)
RBC # FLD: 14.3 % — SIGNIFICANT CHANGE UP (ref 10.3–14.5)
SODIUM SERPL-SCNC: 143 MMOL/L — SIGNIFICANT CHANGE UP (ref 132–145)
SP GR SPEC: 1.02 — SIGNIFICANT CHANGE UP (ref 1–1.03)
TROPONIN I, HIGH SENSITIVITY RESULT: 17.7 NG/L — SIGNIFICANT CHANGE UP
UROBILINOGEN FLD QL: 0.2 MG/DL — SIGNIFICANT CHANGE UP (ref 0.2–1)
WBC # BLD: 6.95 K/UL — SIGNIFICANT CHANGE UP (ref 3.8–10.5)
WBC # FLD AUTO: 6.95 K/UL — SIGNIFICANT CHANGE UP (ref 3.8–10.5)

## 2024-06-05 PROCEDURE — 74177 CT ABD & PELVIS W/CONTRAST: CPT | Mod: 26,MC

## 2024-06-05 PROCEDURE — 99281 EMR DPT VST MAYX REQ PHY/QHP: CPT

## 2024-06-05 PROCEDURE — 99285 EMERGENCY DEPT VISIT HI MDM: CPT

## 2024-06-05 PROCEDURE — 71275 CT ANGIOGRAPHY CHEST: CPT | Mod: 26,MC

## 2024-06-05 PROCEDURE — 70490 CT SOFT TISSUE NECK W/O DYE: CPT | Mod: 26,MC

## 2024-06-05 RX ORDER — ONDANSETRON 8 MG/1
4 TABLET, FILM COATED ORAL ONCE
Refills: 0 | Status: COMPLETED | OUTPATIENT
Start: 2024-06-05 | End: 2024-06-05

## 2024-06-05 RX ORDER — ACETAMINOPHEN 500 MG
1000 TABLET ORAL ONCE
Refills: 0 | Status: COMPLETED | OUTPATIENT
Start: 2024-06-05 | End: 2024-06-05

## 2024-06-05 RX ORDER — KETOROLAC TROMETHAMINE 30 MG/ML
15 SYRINGE (ML) INJECTION ONCE
Refills: 0 | Status: DISCONTINUED | OUTPATIENT
Start: 2024-06-05 | End: 2024-06-05

## 2024-06-05 RX ORDER — SODIUM CHLORIDE 9 MG/ML
1000 INJECTION INTRAMUSCULAR; INTRAVENOUS; SUBCUTANEOUS ONCE
Refills: 0 | Status: COMPLETED | OUTPATIENT
Start: 2024-06-05 | End: 2024-06-05

## 2024-06-05 RX ADMIN — SODIUM CHLORIDE 1000 MILLILITER(S): 9 INJECTION INTRAMUSCULAR; INTRAVENOUS; SUBCUTANEOUS at 15:40

## 2024-06-05 RX ADMIN — ONDANSETRON 4 MILLIGRAM(S): 8 TABLET, FILM COATED ORAL at 21:47

## 2024-06-05 RX ADMIN — Medication 15 MILLIGRAM(S): at 23:59

## 2024-06-05 RX ADMIN — Medication 400 MILLIGRAM(S): at 15:40

## 2024-06-05 NOTE — ED ADULT NURSE NOTE - OBJECTIVE STATEMENT
Pt presents to ed for general weakness. Pt states left body pain, dull aching worsening over the course of one month. Lower ab pain today. Pt states lightheadedness with pain.  Pt endorses that she has been experiencing left neck/head pain radiating to left arm x 1month  Hx HTN, DM, reports that she does not take antidiabetic medications as she just does not have any sugar intake. Allergy to verapamil/lisinopril, reporting angioedema and cough  Pt non diaphoretic and NIHSS negative for any defecits   Pt in bed, no cp/sob/bv/dv   Plan of care ongoing

## 2024-06-05 NOTE — ED PROVIDER NOTE - CPE EDP EYES NORM
You can access the FollowMyHealth Patient Portal offered by [facility] by registering at the following website: [email or web address]. By joining [employer]’s FollowMyHealth portal, you will also be able to view your health information using other applications (apps) compatible with our system. normal...

## 2024-06-05 NOTE — ED PROVIDER NOTE - NS ED ROS FT
+L sided body pain  +throat discomfort  +abdominal pain  +nausea  +diarrhea  Denies fevers, chills, constipation, abdominal pain, urinary symptoms, chest pain, palpitations, shortness of breath, dyspnea on exertion, syncope/near syncope, headache, weakness, numbness, focal deficits, visual changes, gait or balance changes, dizziness

## 2024-06-05 NOTE — ED ADULT TRIAGE NOTE - BP NONINVASIVE SYSTOLIC (MM HG)
130
Additional Notes: Total volume injected 1.25cc
Detail Level: Simple
Additional Notes: Total volume injected 1 syringe

## 2024-06-05 NOTE — ED ADULT NURSE NOTE - EXTENSIONS OF SELF_ADULT
Telephonic Anticoagulation Clinic ([facility])  Enrollment Note    Iban Gandara has been enrolled in the [facility] [place] Telephonic Anticoag Clinic ([facility]). For the duration of [facility] skilled nursing visits, this patient's warfarin will be managed by the [facility], which is available Monday through Friday from 8am to 5pm. INR results obtained outside of these hours should reported to the referring / responsible provider.    [facility] phone number: [phone]  [facility] ([facility]LEPHONIC [facility]ICOAG CLINIC) [[id number]]    A Service to [facility] order was entered and will be co-signed by referring provider.         **The information below was determined via chart review by the [facility] Nursing Team. It was prepared in advance to improve the quality of assessment and management when the first INR result is reported by the Home Nurse from the patient's home.**     None

## 2024-06-05 NOTE — ED ADULT NURSE REASSESSMENT NOTE - NS ED NURSE REASSESS COMMENT FT1
pt ambulated to restroom with own cane  20glac initiated and labs sent with urine  Pt medicated as ordered. All medication education given to patient and pt understands.   Fluids initiated on patient as ordered. Plan of care ongoing

## 2024-06-05 NOTE — ED PROVIDER NOTE - OBJECTIVE STATEMENT
54F hx htn, DVT/PE (no AC), RA, depression, dm, gerd, c/o left sided abd pain. pt states ongoing for past 4 days. +nausea, no vomiting. some diarrhea. no fevers. no recent travel. no sick contacts. pt seen at [facility] and was found to have on CT: thick-walled/rim-enhancing collection between loops of small bowel and a periumbilical hernia measuring approximately 1.2 x 2.4 x 3.1 cm, which is nonspecific though suspicious for a small abscess. pt transferred for surgery consult.

## 2024-06-05 NOTE — ED ADULT TRIAGE NOTE - CHIEF COMPLAINT QUOTE
Pt states left body pain, dull aching worsening over the course of one month. Pt states rash noted today on back. Pt states lightheadedness with pain. Pt states left body pain, dull aching worsening over the course of one month. Lower ab pain today. Pt states lightheadedness with pain.

## 2024-06-05 NOTE — ED ADULT NURSE REASSESSMENT NOTE - NS ED NURSE REASSESS COMMENT FT1
Received Pt from previous RN, awaiting transfer to [facility] ER for further care/treatment. Previous RN gave report.

## 2024-06-05 NOTE — ED ADULT TRIAGE NOTE - HEIGHT IN CM
HISTORY OF PRESENT ILLNESS  We had the pleasure of seeing Franca Joshi today, 9/10/2018, in followup of her chronic kidney disease, electrolytes status, blood pressure. Her hydrochlorothiazide was discontinued in the past due to her hypercalcemia. That time she's not had any new issues. She denies increased ankle or feet swelling.    PAST MEDICAL HISTORY  Past Medical History:   Diagnosis Date   • Abdominal aortic aneurysm (AAA) (CMS/[facility]) 05/2017    2.6cm, recommended follow-up in 2 years (2019)   • Adenocarcinoma in adenomatous polyp (CMS/[facility]) 3/12/12    Dr. Abreu-TC with polypectomy 1.0 cm invasive, well-differentiated of submucosa from serrated adenoma, 4/5,/12 Dr. Cramer, Dr. Ha -no additional onco/surgical intervention ,rec. followup with colonoscopy   • Anxiety    • Bell's palsy 2/26/2013   • Benign essential tremor syndrome 10/11/04    Especially head and neck, hands, followed by Dr. Jordan   • Cataract 2001    S/P cataract surgery, right 9/10/11, left 10/22/01, ECCE with IOL   • Chronic kidney disease, stage III (moderate) 11/21/05    Creatinine 1.39, GFR 39 on 6/9/11   • COPD (chronic obstructive pulmonary disease) (CMS/[facility]) 2007    Chronic cough, PFT, mild COPD.  No hospitalization/intubation history refused maintenanceRx.  Last steroid treatment 2007.  Continued to smoke   • Full dentures    • Gastritis without bleeding 3/12/12    Dr. Abreu, EGD, pangastritis and duodenal AV malformation   • GI AVM (gastrointestinal arteriovenous vascular malformation) 3/12/12    Dr. Abreu, EGD, pangastritis and duodenal AV malformation   • H/O anxiety disorder 2/5,/02    With adjustment disorder   • Hearing loss     Sensorineural hearing loss   • History of Bell's palsy 6/12    Seen at [facility]   • Hyperlipidemia 1956   • Hypertension 8/26/99   • Osteoporosis, unspecified 09/21/1999   • Overactive bladder 12/6/04    Chronic urinary frequency, and incontinence   • Personal history of allergy to latex    • PPD positive,  treated 12/05    INH therapy for 8 months   • Preoperative general physical examination 6/10/13   • Rheumatoid arthritis(714.0) 1960s    RA with deformities, elbows, and hands, status post surgeries of her hands and contractures, ulnar deviations and nodules on DMARDS, followed by    • Spasmodic torticollis     treated   • Syncope and collapse 4/28/2016   • Tobacco dependence     Refused to quit   • Vision, reduced     Followed by Dr. Us   • Wears glasses        SOCIAL HISTORY  Social History   Substance Use Topics   • Smoking status: Current Every Day Smoker     Packs/day: 0.25     Types: Cigarettes   • Smokeless tobacco: Never Used      Comment: Patient declined offer for tobacco cessation packet: 10/31/2017& 11/7/2017 & 4/10/2018   • Alcohol use No       FAMILY HISTORY  Family History   Problem Relation Age of Onset   • Other Mother         TB   • Cancer Child         Prostate   • Hypertension Sister    • Diabetes Father    • Diabetes Brother    • Arthritis Brother    • Hypertension Brother    • Heart Maternal Grandfather    • Ataxia Neg Hx    • Chorea Neg Hx    • Dementia/Alzheimers Neg Hx    • Mental retardation Neg Hx    • Migraines Neg Hx    • Multiple Sclerosis Neg Hx    • Neurofibromatosis Neg Hx    • Neuropathy Neg Hx    • Parkinsonism Neg Hx    • Stroke/TIA Neg Hx    • Stroke Neg Hx        MEDICATIONS  Current Outpatient Prescriptions   Medication Sig   • simvastatin (ZOCOR) 40 MG tablet TAKE 1/2 TABLET (20MG) BY MOUTH EVERY NIGHT AT BEDTIME. APPT NEEDED FOR FURTHER REFILLS   • predniSONE (DELTASONE) 20 MG tablet Take 1 tablet by mouth daily.   • clonazePAM (KLONOPIN) 0.5 MG tablet TAKE 1 TABLET ORALLY 2 TIMES DAILY AS NEEDED FOR ANXIETY   • methotrexate (RHEUMATREX) 2.5 MG tablet Take 2 tablets by mouth 1 day a week.   • oxybutynin (DITROPAN) 5 MG tablet Take 1 tablet by mouth 2 times daily.   • metoPROLOL tartrate (LOPRESSOR) 25 MG tablet Take 1 tablet by mouth 2 times daily.   • folic  acid (FOLATE) 1 MG tablet Take 1 tablet by mouth daily.   • Probiotic Product (ALIGN) 4 MG CAPS Take 1 capsule by mouth daily.   • CHOLECALCIFEROL PO Take 5,000 Units by mouth daily.    • Multiple Vitamins-Minerals (CENTRUM SILVER PO) Take 1 tablet by mouth daily.    • aspirin 325 MG tablet Take 325 mg by mouth daily. Indications: Inflammation, Rheumatoid Arthritis   • calcium carbonate-vitamin D (CALTRATE+D) 600-400 MG-UNIT per tablet Take 1 tablet by mouth 2 times daily.     • acetaminophen (TYLENOL) 500 MG tablet Take 500 mg by mouth every 6 hours as needed.       No current facility-administered medications for this visit.        ALLERGIES  Allergies as of 09/10/2018 - Reviewed 09/10/2018   Allergen Reaction Noted   • Gold RASH 04/11/2012   • Latex RASH 04/11/2012       REVIEW OF SYSTEMS  Please see history of present illness; otherwise rest of review of systems is negative.     PHYSICAL EXAMINATION  Vitals 3/12/2018 4/10/2018 6/28/2018 8/3/2018 8/7/2018 8/24/2018 9/10/2018   SYSTOLIC 148 110 160 138 140 116 112   DIASTOLIC 70 60 88 76 60 64 52   Pulse - 52 54 63 59 64 60   Temp - - 97.7 97.2 97.5 - -   Resp - 17 16 16 16 16 16   Weight kg - 50.8 kg 50.848 kg 50.44 kg - 49.9 kg 49.125 kg   Height - 4' 11.75\" 5' 0\" 5' 0\" - 4' 11\" 4' 11\"   BMI (Calculated) - 22.1 21.94 21.76 - 22.27 21.92   Some recent data might be hidden   ]    HEENT: Normocephalic.  Neck: No JVD.  Chest: Clear without crackles.   Heart: S1, S2.  No S3 or rub.   Abdomen: Soft, nontender.  Extremities: She has no pedal edema. Rheumatoid changes to the hands.  Skin: No rash.   Neurologic: Moves all extremities. Has resting tremor.    LABORATORY DATA  Sodium (mmol/L)   Date Value   09/04/2018 142   06/28/2018 143   03/08/2018 143     Potassium (mmol/L)   Date Value   09/04/2018 4.7   06/28/2018 4.7   03/08/2018 4.9     Chloride (mmol/L)   Date Value   09/04/2018 105   06/28/2018 105   03/08/2018 105     Carbon Dioxide (mmol/L)   Date Value    09/04/2018 26   06/28/2018 25   03/08/2018 25     Anion Gap (mmol/L)   Date Value   09/04/2018 16   06/28/2018 18   03/08/2018 18     BUN (mg/dL)   Date Value   09/04/2018 34 (H)   06/28/2018 30 (H)   03/08/2018 34 (H)     Creatinine (mg/dL)   Date Value   09/04/2018 1.83 (H)   06/28/2018 1.70 (H)   03/08/2018 1.64 (H)     GFR Estimate, Non  (no units)   Date Value   09/04/2018 25   06/28/2018 28   03/08/2018 29     GFR Estimate,  (no units)   Date Value   09/04/2018 29   06/28/2018 32   03/08/2018 33     Glucose (mg/dL)   Date Value   09/04/2018 75   06/28/2018 84   03/08/2018 74     CALCIUM (mg/dL)   Date Value   09/04/2018 9.8   06/28/2018 9.8   03/08/2018 9.7     Albumin (g/dL)   Date Value   06/28/2018 3.9   11/07/2017 4.0   08/02/2017 3.9     PHOSPHORUS (mg/dL)   Date Value   09/04/2018 4.1   03/08/2018 4.6   01/05/2017 4.2     AST/SGOT (Units/L)   Date Value   06/28/2018 29   11/07/2017 37   08/02/2017 29     ALT/SGPT (Units/L)   Date Value   06/28/2018 26   11/07/2017 32   08/02/2017 25     ALK PHOSPHATASE (Units/L)   Date Value   06/28/2018 55   11/07/2017 56   08/02/2017 60     TOTAL BILIRUBIN (mg/dL)   Date Value   06/28/2018 0.4   11/07/2017 0.5   08/02/2017 0.2       WBC (K/mcL)   Date Value   09/04/2018 9.0   06/28/2018 8.4   12/07/2017 6.3     Lab Results   Component Value Date    UCR 30.10 09/04/2018    UCR 22.60 03/08/2018    UCR 22.40 03/08/2018         IMPRESSION/PLAN  1. Chronic kidney disease (CKD), stage IV (severe) (CMS/HCC)    2. Hypertensive kidney disease    3. Proteinuria, unspecified type        Orders Placed This Encounter   • Basic Metabolic Panel [54949]   • Urine Microalbumin Random [63769.02]   • Parathyroid Hormone Intact without Calcium       Return in 6 months (on 3/10/2019).    Ms. Joshi has proteinuric but nonnephrotic stage IV chronic kidney disease which is not much changed. Her creatinines gone up from 1.64 on March 8, 2018 to it's more  current 1.83 year on September 4, 2018. Her microalbuminuria is similar to previous and has not increased. Her blood pressure appears well-controlled today we would not recommend any additional changes in her antihypertensive medications at this time. Her electrolytes are within normal limits and her calcium is 9.8 as of September 4, 2018. We would not recommend any additional changes at this time. We'll plan to see her back again in approximately 6 months, and we would like her to get the above labs done about a week prior to coming to see us the results all forwarded to us here. Continue to urge her to follow a 2 g per day oral sodium restriction. We have reminded her again of the potential nephrotoxicity of nonsteroidal anti-inflammatories and De Jesus 2 blockers, and we do urge her to try and avoid these as much as possible. She remains a very pleasant lady. We are grateful for the continued opportunity to be able to participate in Ms. Joshi care with you.  We very much look forward to working with you again in the future.        170.18

## 2024-06-05 NOTE — ED ADULT TRIAGE NOTE - CHIEF COMPLAINT QUOTE
transfer from [facility]- with abdominal pain  and nausea for 3 days;  sent for intra abdominal abscess as noted on CT

## 2024-06-05 NOTE — ED PROVIDER NOTE - OBJECTIVE STATEMENT
54-year-old female, past medical history hypertension, diabetes, PE, DVT not on AC, presents to the emergency department with multiple complaints.  She endorses generalized left-sided discomfort for the past 3 days.  She also reports the sensation like she has something stuck in her throat for the past 2 days and left lower quadrant abdominal pain that radiates to the right side for that long as well.  She has had episodes of nausea without vomiting.  Patient has also had diarrhea for the last day.  Patient states she does not have shortness of breath, however with deep breathing she will feel a pressure in her neck and upper chest.  She states it does not feel similar to her prior PE episodes.  With deep frequent breathing, patient does endorse some mild dizziness.  She otherwise denies 54-year-old female, past medical history hypertension, diabetes, PE, DVT not on AC, presents to the emergency department with multiple complaints.  She endorses generalized left-sided discomfort for the past 3 days.  She also reports the sensation like she has something stuck in her throat for the past 2 days and left lower quadrant abdominal pain that radiates to the right side for that long as well.  She has had episodes of nausea without vomiting.  Patient has also had diarrhea for the last day.  Patient states she does not have shortness of breath, however with deep breathing she will feel a pressure in her neck and upper chest.  She states it does not feel similar to her prior PE episodes.  With deep frequent breathing, patient does endorse some mild dizziness.  She otherwise denies Urinary symptoms, fevers, chills, vomiting, or falls.

## 2024-06-05 NOTE — ED PROVIDER NOTE - CLINICAL SUMMARY MEDICAL DECISION MAKING FREE TEXT BOX
left sided abd pain, found to have possible intraabd abscess on CT at [facility], transferred for surgery evaluation  -toradol  -surg consult

## 2024-06-05 NOTE — ED ADULT NURSE NOTE - NS ED NURSE RECORDS SENT
Online Visit    Date/Time: 5/4/2018 7:43:55 AM  To: GABINO MITCHELL  From: DALLAS HARP  Subject: Normal mammogram   Mrs. Mitchell,  Rodger mammogram is normal.   [place] May    Verified Results  MA FFDM SCREEN YAJAIRA W ALAN W CAD [id number] 04:33PM DALLAS HARP   Ordering Provider: DALLAS STOREY.    Reason For Study: YEARLY SCREENING,YEARLY SCREENING.     Test Name Result Flag Reference   MA FFDM SCREEN YAJAIRA W ALAN W CAD (Report)     Accession #    [id number]    #[id number] - MA FFDM SCREEN YAJAIRA W ALAN W CAD    BILATERAL DIGITAL SCREENING MAMMOGRAM 3D/2D WITH CAD: 5/3/2018    CLINICAL HISTORY:Routine annual screening mammogram.    COMPARISON:   Comparison is made to exams dated: 11/7/2014 mammogram - [facility], 6/3/2013   mammogram, 3/6/2012 mammogram - [facility], 2/24/2009 mammogram - [facility], 5/11/2007 mammogram, and 4/11/2003 mammogram - [facility].    FINDINGS:  There are scattered fibroglandular elements in both breasts.    No significant masses, calcifications, or other findings are seen in either breast.  Current study was also evaluated with a Computer Aided Detection (CAD) system. Digital Breast   Tomosynthesis (DBT) images were obtained and used to assist in the interpretation of this   examination.    IMPRESSION: NEGATIVE    There is no mammographic evidence of malignancy. A 1 year screening mammogram is recommended.    MAMMOGRAPHY BI-RADS: 1 NEGATIVE    Na Layton M.D.  nha/penrad:5/4/2018 06:49:19    Imaging Technologist: RT Alejandra(R)(M), [facility]  letter sent: Normal Single Exam     **** FINAL ****    Dictated By:   NA LEE MD    Electronically Reviewed and Approved By:  NA LEE MD        Medical Records sent

## 2024-06-05 NOTE — ED ADULT NURSE NOTE - CHIEF COMPLAINT QUOTE
Pt states left body pain, dull aching worsening over the course of one month. Lower ab pain today. Pt states lightheadedness with pain.

## 2024-06-05 NOTE — ED PROVIDER NOTE - CLINICAL SUMMARY MEDICAL DECISION MAKING FREE TEXT BOX
54-year-old female, past medical history hypertension, diabetes, PE, DVT not on AC, presents to the emergency department with multiple complaints.  Patient noted to have mild lower abdominal tenderness on exam.  Lungs however are clear and equal without any adventitious breath sounds.  She does report however with deep breathing, she feels an increased pressure sensation in her throat/upper chest.  No lower extremity pitting edema or calf tenderness on exam.  Will plan to obtain CT imaging of the neck, chest, and abdomen and pelvis.  I am suspicious for possible PE as patient has a known history of unprovoked with multiple episodes and she is not on anticoagulation. Will also obtain EKG, medical labs, give IV Tylenol for pain, and will give IV fluids.  Will reassess and dispo pending medical workup.

## 2024-06-05 NOTE — ED PROVIDER NOTE - PROGRESS NOTE DETAILS
Patient is noted to have a small intra-abdominal abscess noted on CT imaging.  No evidence of PE or any soft tissue neck findings.  Case discussed with on-call surgery who recommends transferring patient for evaluation in the ED and likely admission to the surgery service.  I discussed case with patient and she is agreeable to admission.  She further states she previously has had a colon resection due to multiple episodes of diverticulitis.  Patient will be evaluated by surgery when [facility].

## 2024-06-05 NOTE — ED PROVIDER NOTE - NS ED ATTENDING NAME FT
Looks like rheum wanted to send her to Dr. Brooke Feliz (? Physiatry at [facility]) otherwise we can send her to Dr. Galdamez at the [facility]

## 2024-06-05 NOTE — ED PROVIDER NOTE - PHYSICAL EXAMINATION
VITAL SIGNS: I have reviewed nursing notes and confirm.  CONSTITUTIONAL: Well-developed; well-nourished; in no acute distress.  SKIN: Skin is warm and dry, no acute rash.  HEAD: Normocephalic; atraumatic.  NECK: Supple; non tender.  ENT: Clear oropharynx.  CARD: S1, S2 normal; no murmurs, gallops, or rubs. Regular rate and rhythm.  RESP: No wheezes, rales or rhonchi.  ABD: Soft; +mild lower abdominal ttp, chico quadrants.  EXT: Normal ROM. No clubbing, cyanosis or edema.  NEURO: Alert, oriented. Grossly unremarkable. BATRES, normal tone, no gross motor or sensory changes. Fluent speech.   PSYCH: Cooperative, appropriate. Mood and affect wnl.

## 2024-06-05 NOTE — ED ADULT NURSE NOTE - CHIEF COMPLAINT QUOTE
transfer from [facility]- with abdominal pain  and nausea for 3 days;  sent for intra abdominal abscess as noted on CT

## 2024-06-06 ENCOUNTER — RESULT REVIEW (OUTPATIENT)
Age: 54
End: 2024-06-06

## 2024-06-06 ENCOUNTER — INPATIENT (INPATIENT)
Facility: HOSPITAL | Age: 54
LOS: 2 days | Discharge: HOME | End: 2024-06-09
Attending: SURGERY | Admitting: SURGERY
Payer: MEDICAID

## 2024-06-06 DIAGNOSIS — Z98.890 OTHER SPECIFIED POSTPROCEDURAL STATES: Chronic | ICD-10-CM

## 2024-06-06 DIAGNOSIS — Z90.3 ACQUIRED ABSENCE OF STOMACH [PART OF]: Chronic | ICD-10-CM

## 2024-06-06 DIAGNOSIS — K46.9 UNSPECIFIED ABDOMINAL HERNIA WITHOUT OBSTRUCTION OR GANGRENE: Chronic | ICD-10-CM

## 2024-06-06 DIAGNOSIS — Z90.49 ACQUIRED ABSENCE OF OTHER SPECIFIED PARTS OF DIGESTIVE TRACT: Chronic | ICD-10-CM

## 2024-06-06 LAB
A1C WITH ESTIMATED AVERAGE GLUCOSE RESULT: 6.3 % — HIGH (ref 4–5.6)
ANION GAP SERPL CALC-SCNC: 10 MMOL/L — SIGNIFICANT CHANGE UP (ref 5–17)
APTT BLD: 21.4 SEC — LOW (ref 24.5–35.6)
BLD GP AB SCN SERPL QL: NEGATIVE — SIGNIFICANT CHANGE UP
BUN SERPL-MCNC: 7 MG/DL — SIGNIFICANT CHANGE UP (ref 7–23)
C DIFF GDH STL QL: NEGATIVE — SIGNIFICANT CHANGE UP
C DIFF GDH STL QL: SIGNIFICANT CHANGE UP
CALCIUM SERPL-MCNC: 8.7 MG/DL — SIGNIFICANT CHANGE UP (ref 8.4–10.5)
CHLORIDE SERPL-SCNC: 102 MMOL/L — SIGNIFICANT CHANGE UP (ref 96–108)
CO2 SERPL-SCNC: 26 MMOL/L — SIGNIFICANT CHANGE UP (ref 22–31)
CREAT SERPL-MCNC: 0.76 MG/DL — SIGNIFICANT CHANGE UP (ref 0.5–1.3)
CULTURE RESULTS: SIGNIFICANT CHANGE UP
EGFR: 92 ML/MIN/1.73M2 — SIGNIFICANT CHANGE UP
ESTIMATED AVERAGE GLUCOSE: 134 MG/DL — HIGH (ref 68–114)
GI PCR PANEL: SIGNIFICANT CHANGE UP
GLUCOSE BLDC GLUCOMTR-MCNC: 101 MG/DL — HIGH (ref 70–99)
GLUCOSE BLDC GLUCOMTR-MCNC: 112 MG/DL — HIGH (ref 70–99)
GLUCOSE BLDC GLUCOMTR-MCNC: 117 MG/DL — HIGH (ref 70–99)
GLUCOSE SERPL-MCNC: 98 MG/DL — SIGNIFICANT CHANGE UP (ref 70–99)
HCT VFR BLD CALC: 36.6 % — SIGNIFICANT CHANGE UP (ref 34.5–45)
HGB BLD-MCNC: 11.9 G/DL — SIGNIFICANT CHANGE UP (ref 11.5–15.5)
INR BLD: 0.96 — SIGNIFICANT CHANGE UP (ref 0.85–1.18)
MAGNESIUM SERPL-MCNC: 1.7 MG/DL — SIGNIFICANT CHANGE UP (ref 1.6–2.6)
MCHC RBC-ENTMCNC: 28.8 PG — SIGNIFICANT CHANGE UP (ref 27–34)
MCHC RBC-ENTMCNC: 32.5 GM/DL — SIGNIFICANT CHANGE UP (ref 32–36)
MCV RBC AUTO: 88.6 FL — SIGNIFICANT CHANGE UP (ref 80–100)
NRBC # BLD: 0 /100 WBCS — SIGNIFICANT CHANGE UP (ref 0–0)
PHOSPHATE SERPL-MCNC: 3.2 MG/DL — SIGNIFICANT CHANGE UP (ref 2.5–4.5)
PLATELET # BLD AUTO: 215 K/UL — SIGNIFICANT CHANGE UP (ref 150–400)
POTASSIUM SERPL-MCNC: 3.3 MMOL/L — LOW (ref 3.5–5.3)
POTASSIUM SERPL-SCNC: 3.3 MMOL/L — LOW (ref 3.5–5.3)
PROTHROM AB SERPL-ACNC: 11 SEC — SIGNIFICANT CHANGE UP (ref 9.5–13)
RBC # BLD: 4.13 M/UL — SIGNIFICANT CHANGE UP (ref 3.8–5.2)
RBC # FLD: 14.1 % — SIGNIFICANT CHANGE UP (ref 10.3–14.5)
RH IG SCN BLD-IMP: POSITIVE — SIGNIFICANT CHANGE UP
SODIUM SERPL-SCNC: 138 MMOL/L — SIGNIFICANT CHANGE UP (ref 135–145)
SPECIMEN SOURCE: SIGNIFICANT CHANGE UP
WBC # BLD: 5.32 K/UL — SIGNIFICANT CHANGE UP (ref 3.8–10.5)
WBC # FLD AUTO: 5.32 K/UL — SIGNIFICANT CHANGE UP (ref 3.8–10.5)

## 2024-06-06 PROCEDURE — 93306 TTE W/DOPPLER COMPLETE: CPT | Mod: 26

## 2024-06-06 PROCEDURE — 71045 X-RAY EXAM CHEST 1 VIEW: CPT | Mod: 26

## 2024-06-06 PROCEDURE — 99255 IP/OBS CONSLTJ NEW/EST HI 80: CPT

## 2024-06-06 RX ORDER — DEXTROSE 50 % IN WATER 50 %
12.5 SYRINGE (ML) INTRAVENOUS ONCE
Refills: 0 | Status: DISCONTINUED | OUTPATIENT
Start: 2024-06-06 | End: 2024-06-09

## 2024-06-06 RX ORDER — HYDROMORPHONE HYDROCHLORIDE 2 MG/ML
0.5 INJECTION INTRAMUSCULAR; INTRAVENOUS; SUBCUTANEOUS EVERY 4 HOURS
Refills: 0 | Status: DISCONTINUED | OUTPATIENT
Start: 2024-06-06 | End: 2024-06-09

## 2024-06-06 RX ORDER — MELOXICAM 15 MG/1
1 TABLET ORAL
Refills: 0 | DISCHARGE

## 2024-06-06 RX ORDER — LOSARTAN/HYDROCHLOROTHIAZIDE 100MG-25MG
1 TABLET ORAL
Refills: 0 | DISCHARGE

## 2024-06-06 RX ORDER — GLUCAGON INJECTION, SOLUTION 0.5 MG/.1ML
1 INJECTION, SOLUTION SUBCUTANEOUS ONCE
Refills: 0 | Status: DISCONTINUED | OUTPATIENT
Start: 2024-06-06 | End: 2024-06-09

## 2024-06-06 RX ORDER — PANTOPRAZOLE SODIUM 20 MG/1
40 TABLET, DELAYED RELEASE ORAL
Refills: 0 | Status: DISCONTINUED | OUTPATIENT
Start: 2024-06-06 | End: 2024-06-06

## 2024-06-06 RX ORDER — DEXTROSE 50 % IN WATER 50 %
15 SYRINGE (ML) INTRAVENOUS ONCE
Refills: 0 | Status: DISCONTINUED | OUTPATIENT
Start: 2024-06-06 | End: 2024-06-09

## 2024-06-06 RX ORDER — QUETIAPINE FUMARATE 200 MG/1
1 TABLET, FILM COATED ORAL
Qty: 0 | Refills: 0 | DISCHARGE

## 2024-06-06 RX ORDER — PIPERACILLIN AND TAZOBACTAM 4; .5 G/20ML; G/20ML
3.38 INJECTION, POWDER, LYOPHILIZED, FOR SOLUTION INTRAVENOUS ONCE
Refills: 0 | Status: COMPLETED | OUTPATIENT
Start: 2024-06-06 | End: 2024-06-06

## 2024-06-06 RX ORDER — HEPARIN SODIUM 5000 [USP'U]/ML
5000 INJECTION INTRAVENOUS; SUBCUTANEOUS EVERY 8 HOURS
Refills: 0 | Status: DISCONTINUED | OUTPATIENT
Start: 2024-06-06 | End: 2024-06-06

## 2024-06-06 RX ORDER — GABAPENTIN 400 MG/1
1 CAPSULE ORAL
Refills: 0 | DISCHARGE

## 2024-06-06 RX ORDER — METOPROLOL TARTRATE 50 MG
1 TABLET ORAL
Refills: 0 | DISCHARGE

## 2024-06-06 RX ORDER — HYDROMORPHONE HYDROCHLORIDE 2 MG/ML
0.2 INJECTION INTRAMUSCULAR; INTRAVENOUS; SUBCUTANEOUS EVERY 4 HOURS
Refills: 0 | Status: DISCONTINUED | OUTPATIENT
Start: 2024-06-06 | End: 2024-06-09

## 2024-06-06 RX ORDER — SERTRALINE 25 MG/1
1 TABLET, FILM COATED ORAL
Refills: 0 | DISCHARGE

## 2024-06-06 RX ORDER — SODIUM CHLORIDE 9 MG/ML
1000 INJECTION, SOLUTION INTRAVENOUS
Refills: 0 | Status: DISCONTINUED | OUTPATIENT
Start: 2024-06-06 | End: 2024-06-09

## 2024-06-06 RX ORDER — METOPROLOL TARTRATE 50 MG
1 TABLET ORAL
Qty: 0 | Refills: 0 | DISCHARGE

## 2024-06-06 RX ORDER — PREGABALIN 225 MG/1
0 CAPSULE ORAL
Qty: 0 | Refills: 0 | DISCHARGE

## 2024-06-06 RX ORDER — ENOXAPARIN SODIUM 100 MG/ML
0 INJECTION SUBCUTANEOUS
Qty: 0 | Refills: 0 | DISCHARGE

## 2024-06-06 RX ORDER — GABAPENTIN 400 MG/1
1 CAPSULE ORAL
Qty: 0 | Refills: 0 | DISCHARGE

## 2024-06-06 RX ORDER — HEPARIN SODIUM 5000 [USP'U]/ML
5000 INJECTION INTRAVENOUS; SUBCUTANEOUS EVERY 8 HOURS
Refills: 0 | Status: COMPLETED | OUTPATIENT
Start: 2024-06-06 | End: 2024-06-06

## 2024-06-06 RX ORDER — ONDANSETRON 8 MG/1
4 TABLET, FILM COATED ORAL EVERY 6 HOURS
Refills: 0 | Status: DISCONTINUED | OUTPATIENT
Start: 2024-06-06 | End: 2024-06-09

## 2024-06-06 RX ORDER — SULFASALAZINE 500 MG
1 TABLET ORAL
Qty: 0 | Refills: 0 | DISCHARGE

## 2024-06-06 RX ORDER — DEXTROSE 10 % IN WATER 10 %
125 INTRAVENOUS SOLUTION INTRAVENOUS ONCE
Refills: 0 | Status: DISCONTINUED | OUTPATIENT
Start: 2024-06-06 | End: 2024-06-09

## 2024-06-06 RX ORDER — TRAZODONE HCL 50 MG
0 TABLET ORAL
Refills: 0 | DISCHARGE

## 2024-06-06 RX ORDER — DEXTROSE 50 % IN WATER 50 %
25 SYRINGE (ML) INTRAVENOUS ONCE
Refills: 0 | Status: DISCONTINUED | OUTPATIENT
Start: 2024-06-06 | End: 2024-06-09

## 2024-06-06 RX ORDER — MAGNESIUM SULFATE 500 MG/ML
2 VIAL (ML) INJECTION EVERY 4 HOURS
Refills: 0 | Status: COMPLETED | OUTPATIENT
Start: 2024-06-06 | End: 2024-06-06

## 2024-06-06 RX ORDER — LOSARTAN/HYDROCHLOROTHIAZIDE 100MG-25MG
1 TABLET ORAL
Qty: 0 | Refills: 0 | DISCHARGE

## 2024-06-06 RX ORDER — SERTRALINE 25 MG/1
100 TABLET, FILM COATED ORAL DAILY
Refills: 0 | Status: DISCONTINUED | OUTPATIENT
Start: 2024-06-06 | End: 2024-06-09

## 2024-06-06 RX ORDER — AMLODIPINE BESYLATE 2.5 MG/1
1 TABLET ORAL
Qty: 0 | Refills: 0 | DISCHARGE

## 2024-06-06 RX ORDER — FOLIC ACID 0.8 MG
1 TABLET ORAL
Qty: 0 | Refills: 0 | DISCHARGE

## 2024-06-06 RX ORDER — POTASSIUM CHLORIDE 20 MEQ
40 PACKET (EA) ORAL
Refills: 0 | Status: COMPLETED | OUTPATIENT
Start: 2024-06-06 | End: 2024-06-06

## 2024-06-06 RX ORDER — INSULIN LISPRO 100/ML
VIAL (ML) SUBCUTANEOUS EVERY 6 HOURS
Refills: 0 | Status: DISCONTINUED | OUTPATIENT
Start: 2024-06-06 | End: 2024-06-09

## 2024-06-06 RX ORDER — ROPINIROLE 8 MG/1
2 TABLET, FILM COATED, EXTENDED RELEASE ORAL
Qty: 0 | Refills: 0 | DISCHARGE

## 2024-06-06 RX ORDER — CETIRIZINE HYDROCHLORIDE 10 MG/1
1 TABLET ORAL
Refills: 0 | DISCHARGE

## 2024-06-06 RX ORDER — FAMOTIDINE 10 MG/ML
1 INJECTION INTRAVENOUS
Refills: 0 | DISCHARGE

## 2024-06-06 RX ORDER — FLUOXETINE HCL 10 MG
1 CAPSULE ORAL
Qty: 0 | Refills: 0 | DISCHARGE

## 2024-06-06 RX ORDER — IBUPROFEN 200 MG
1 TABLET ORAL
Refills: 0 | DISCHARGE

## 2024-06-06 RX ORDER — OMEPRAZOLE 10 MG/1
1 CAPSULE, DELAYED RELEASE ORAL
Qty: 0 | Refills: 0 | DISCHARGE

## 2024-06-06 RX ORDER — PIPERACILLIN AND TAZOBACTAM 4; .5 G/20ML; G/20ML
3.38 INJECTION, POWDER, LYOPHILIZED, FOR SOLUTION INTRAVENOUS EVERY 8 HOURS
Refills: 0 | Status: DISCONTINUED | OUTPATIENT
Start: 2024-06-06 | End: 2024-06-09

## 2024-06-06 RX ORDER — SODIUM CHLORIDE 9 MG/ML
1000 INJECTION, SOLUTION INTRAVENOUS
Refills: 0 | Status: DISCONTINUED | OUTPATIENT
Start: 2024-06-06 | End: 2024-06-08

## 2024-06-06 RX ORDER — PANTOPRAZOLE SODIUM 20 MG/1
40 TABLET, DELAYED RELEASE ORAL ONCE
Refills: 0 | Status: COMPLETED | OUTPATIENT
Start: 2024-06-06 | End: 2024-06-06

## 2024-06-06 RX ADMIN — HYDROMORPHONE HYDROCHLORIDE 0.5 MILLIGRAM(S): 2 INJECTION INTRAMUSCULAR; INTRAVENOUS; SUBCUTANEOUS at 16:00

## 2024-06-06 RX ADMIN — PIPERACILLIN AND TAZOBACTAM 200 GRAM(S): 4; .5 INJECTION, POWDER, LYOPHILIZED, FOR SOLUTION INTRAVENOUS at 02:16

## 2024-06-06 RX ADMIN — PANTOPRAZOLE SODIUM 40 MILLIGRAM(S): 20 TABLET, DELAYED RELEASE ORAL at 06:27

## 2024-06-06 RX ADMIN — HYDROMORPHONE HYDROCHLORIDE 0.5 MILLIGRAM(S): 2 INJECTION INTRAMUSCULAR; INTRAVENOUS; SUBCUTANEOUS at 21:44

## 2024-06-06 RX ADMIN — HYDROMORPHONE HYDROCHLORIDE 0.5 MILLIGRAM(S): 2 INJECTION INTRAMUSCULAR; INTRAVENOUS; SUBCUTANEOUS at 15:42

## 2024-06-06 RX ADMIN — PIPERACILLIN AND TAZOBACTAM 200 GRAM(S): 4; .5 INJECTION, POWDER, LYOPHILIZED, FOR SOLUTION INTRAVENOUS at 01:01

## 2024-06-06 RX ADMIN — Medication 40 MILLIEQUIVALENT(S): at 07:46

## 2024-06-06 RX ADMIN — HEPARIN SODIUM 5000 UNIT(S): 5000 INJECTION INTRAVENOUS; SUBCUTANEOUS at 23:35

## 2024-06-06 RX ADMIN — ONDANSETRON 4 MILLIGRAM(S): 8 TABLET, FILM COATED ORAL at 11:38

## 2024-06-06 RX ADMIN — PIPERACILLIN AND TAZOBACTAM 25 GRAM(S): 4; .5 INJECTION, POWDER, LYOPHILIZED, FOR SOLUTION INTRAVENOUS at 06:27

## 2024-06-06 RX ADMIN — SODIUM CHLORIDE 120 MILLILITER(S): 9 INJECTION, SOLUTION INTRAVENOUS at 01:53

## 2024-06-06 RX ADMIN — Medication 25 GRAM(S): at 11:09

## 2024-06-06 RX ADMIN — SERTRALINE 100 MILLIGRAM(S): 25 TABLET, FILM COATED ORAL at 11:09

## 2024-06-06 RX ADMIN — HEPARIN SODIUM 5000 UNIT(S): 5000 INJECTION INTRAVENOUS; SUBCUTANEOUS at 06:26

## 2024-06-06 RX ADMIN — PIPERACILLIN AND TAZOBACTAM 25 GRAM(S): 4; .5 INJECTION, POWDER, LYOPHILIZED, FOR SOLUTION INTRAVENOUS at 15:42

## 2024-06-06 RX ADMIN — HEPARIN SODIUM 5000 UNIT(S): 5000 INJECTION INTRAVENOUS; SUBCUTANEOUS at 15:42

## 2024-06-06 RX ADMIN — Medication 40 MILLIEQUIVALENT(S): at 09:36

## 2024-06-06 RX ADMIN — HYDROMORPHONE HYDROCHLORIDE 0.5 MILLIGRAM(S): 2 INJECTION INTRAMUSCULAR; INTRAVENOUS; SUBCUTANEOUS at 21:59

## 2024-06-06 RX ADMIN — HYDROMORPHONE HYDROCHLORIDE 0.5 MILLIGRAM(S): 2 INJECTION INTRAMUSCULAR; INTRAVENOUS; SUBCUTANEOUS at 07:08

## 2024-06-06 RX ADMIN — SODIUM CHLORIDE 120 MILLILITER(S): 9 INJECTION, SOLUTION INTRAVENOUS at 22:12

## 2024-06-06 RX ADMIN — HYDROMORPHONE HYDROCHLORIDE 0.5 MILLIGRAM(S): 2 INJECTION INTRAMUSCULAR; INTRAVENOUS; SUBCUTANEOUS at 06:38

## 2024-06-06 RX ADMIN — PIPERACILLIN AND TAZOBACTAM 25 GRAM(S): 4; .5 INJECTION, POWDER, LYOPHILIZED, FOR SOLUTION INTRAVENOUS at 23:35

## 2024-06-06 RX ADMIN — HYDROMORPHONE HYDROCHLORIDE 0.5 MILLIGRAM(S): 2 INJECTION INTRAMUSCULAR; INTRAVENOUS; SUBCUTANEOUS at 02:22

## 2024-06-06 RX ADMIN — ONDANSETRON 4 MILLIGRAM(S): 8 TABLET, FILM COATED ORAL at 23:35

## 2024-06-06 RX ADMIN — Medication 25 GRAM(S): at 07:46

## 2024-06-06 RX ADMIN — HYDROMORPHONE HYDROCHLORIDE 0.5 MILLIGRAM(S): 2 INJECTION INTRAMUSCULAR; INTRAVENOUS; SUBCUTANEOUS at 03:00

## 2024-06-06 NOTE — DIETITIAN NUTRITION RISK NOTIFICATION - TREATMENT: THE FOLLOWING DIET HAS BEEN RECOMMENDED
Diet, Clear Liquid:   Consistent Carbohydrate {Evening Snack} (CSTCHOSN) (06-06-24 @ 07:21) [Active]

## 2024-06-06 NOTE — DIETITIAN INITIAL EVALUATION ADULT - PERTINENT LABORATORY DATA
06-06    138  |  102  |  7   ----------------------------<  98  3.3<L>   |  26  |  0.76    Ca    8.7      06 Jun 2024 05:30  Phos  3.2     06-06  Mg     1.7     06-06    TPro  8.0  /  Alb  3.5  /  TBili  0.3  /  DBili  x   /  AST  14<L>  /  ALT  18  /  AlkPhos  99  06-05  POCT Blood Glucose.: 117 mg/dL (06-06-24 @ 12:08)  A1C with Estimated Average Glucose Result: 6.3 % (06-06-24 @ 05:30)

## 2024-06-06 NOTE — PROGRESS NOTE ADULT - SUBJECTIVE AND OBJECTIVE BOX
SUBJECTIVE: Patient seen and examined bedside by chief resident resting comfortable in bed. Patient endorses pain controlled with medication. Patient admits to nausea without vomiting. Admits to flatus and bowel movement. Offers any other acute complaints at this time. Denies fever, chills, HA, dizziness, chest pain, SOB.      heparin   Injectable 5000 Unit(s) SubCutaneous every 8 hours  piperacillin/tazobactam IVPB.. 3.375 Gram(s) IV Intermittent every 8 hours    MEDICATIONS  (PRN):  dextrose Oral Gel 15 Gram(s) Oral once PRN Blood Glucose LESS THAN 70 milliGRAM(s)/deciliter  HYDROmorphone  Injectable 0.2 milliGRAM(s) IV Push every 4 hours PRN Moderate Pain (4 - 6)  HYDROmorphone  Injectable 0.5 milliGRAM(s) IV Push every 4 hours PRN Severe Pain (7 - 10)  ondansetron Injectable 4 milliGRAM(s) IV Push every 6 hours PRN Nausea and/or Vomiting      I&O's Detail    05 Jun 2024 07:01  -  06 Jun 2024 07:00  --------------------------------------------------------  IN:    IV PiggyBack: 200 mL    Lactated Ringers: 720 mL  Total IN: 920 mL    OUT:    Voided (mL): 500 mL  Total OUT: 500 mL    Total NET: 420 mL      06 Jun 2024 07:01  -  06 Jun 2024 11:39  --------------------------------------------------------  IN:    Lactated Ringers: 360 mL    Oral Fluid: 230 mL  Total IN: 590 mL    OUT:    Voided (mL): 200 mL  Total OUT: 200 mL    Total NET: 390 mL          Vital Signs Last 24 Hrs  T(C): 36.6 (06 Jun 2024 08:33), Max: 37.1 (06 Jun 2024 01:40)  T(F): 97.9 (06 Jun 2024 08:33), Max: 98.7 (06 Jun 2024 01:40)  HR: 55 (06 Jun 2024 08:33) (49 - 67)  BP: 121/74 (06 Jun 2024 08:33) (112/70 - 134/78)  BP(mean): --  RR: 17 (06 Jun 2024 08:33) (16 - 18)  SpO2: 97% (06 Jun 2024 08:33) (96% - 98%)    Parameters below as of 06 Jun 2024 08:33  Patient On (Oxygen Delivery Method): room air        General: NAD, resting comfortably in bed  Pulm: Nonlabored breathing, no respiratory distress  Abd: soft, +ttp to umbilical area, non-distended. No rebound or gaurding  Extrem: WWP, no edema, SCDs in place    LABS:                        11.9   5.32  )-----------( 215      ( 06 Jun 2024 05:30 )             36.6     06-06    138  |  102  |  7   ----------------------------<  98  3.3<L>   |  26  |  0.76    Ca    8.7      06 Jun 2024 05:30  Phos  3.2     06-06  Mg     1.7     06-06    TPro  8.0  /  Alb  3.5  /  TBili  0.3  /  DBili  x   /  AST  14<L>  /  ALT  18  /  AlkPhos  99  06-05    PT/INR - ( 06 Jun 2024 00:24 )   PT: 11.0 sec;   INR: 0.96          PTT - ( 06 Jun 2024 00:24 )  PTT:21.4 sec  Urinalysis Basic - ( 06 Jun 2024 05:30 )    Color: x / Appearance: x / SG: x / pH: x  Gluc: 98 mg/dL / Ketone: x  / Bili: x / Urobili: x   Blood: x / Protein: x / Nitrite: x   Leuk Esterase: x / RBC: x / WBC x   Sq Epi: x / Non Sq Epi: x / Bacteria: x        RADIOLOGY & ADDITIONAL STUDIES:  CT Abdomen and Pelvis w/ IV Cont:   ACC: [id number] EXAM:  CT ABDOMEN AND PELVIS IC   ORDERED BY: LELAND PALACIOS     ACC: [id number] EXAM:  CT ANGIO CHEST PULM ART WAWIC   ORDERED BY: LELAND PALACIOS     PROCEDURE DATE:  06/05/2024          INTERPRETATION:  CLINICAL INFORMATION: Back pain and upper chest   pressure/lump sensation with breathing and history of pulmonary embolism.   Bilateral lower abdominal pain, starts (goes right.    COMPARISON: CT chest dated 7/9/2023, CT abdomen and pelvis dated   11/27/2019, and abdominal MRI dated 12/2/2018.    CONTRAST/COMPLICATIONS:  IV Contrast: Omnipaque 350 (accession [id number]), IV contrast documented   in unlinked concurrent exam (accession [id number])  99 cc administered   1   cc discarded  Oral Contrast: NONE  Complications: None reported at time of study completion    PROCEDURE:  CT Angiography of the Chest was performed followed by portal venous phase   imaging of the Abdomen and Pelvis.  Sagittal and coronal reformats were performed as well as 3D (MIP)   reconstructions.    FINDINGS:  CHEST:  LUNGS AND LARGE AIRWAYS: Patent central airways. No pulmonary nodules.  PLEURA: No pleural effusion.  VESSELS: No evidence of pulmonary embolism. The thoracic aorta is normal   in caliber without evidence of dissection.  HEART: Heart size is normal. No pericardial effusion.  MEDIASTINUM AND JOSH: No lymphadenopathy.  CHEST WALL AND LOWER NECK: Within normal limits.    ABDOMEN AND PELVIS:  LIVER: There is a tiny hemangioma within segment 7 of the liver measuring   up to 8.0 cmin size (3-21) and a 3.3 cm hemangioma within segment 2   (3-21). There is also a 2.0 cm benign cyst within segment 6 (3-57).  BILE DUCTS: Normal caliber.  GALLBLADDER: Cholecystectomy.  SPLEEN: Within normal limits.  PANCREAS: Within normal limits.  ADRENALS: The adrenal glands are somewhat thickened versus contain   subcentimeter nodules, which are too small to characterize.  KIDNEYS/URETERS: There is a duplicated right renal collecting system and   multifocal right-sided renal scarring. No renal calculi identified. The   kidneys are otherwise within normal limits in appearance.    BLADDER: Within normal limits.  REPRODUCTIVE ORGANS: Uterus and adnexa within normal limits.    BOWEL: Postsurgical changes from prior CT hysterectomy. The gastric   antrum is mildly thick-walled. The small bowel is unremarkable. There is   a rectosigmoid anastomosis and diverticulosis of the sigmoid colon.   Appendix is normal.  PERITONEUM: No ascites.  VESSELS: Within normal limits.  RETROPERITONEUM/LYMPH NODES: No lymphadenopathy.  ABDOMINAL WALL: There is a fat-containing umbilical hernia with a   thick-walled enhancing collection containing gas and debris just deep to   the hernia defect measuring approximately 1.2 x 2.4 x 3.1 cm (3-72 and   610-65) with abutting loops of adjacent small bowel.  BONES: There are degenerative changes of the spine. No acute fracture   identified. Bilateral hip arthroplasties are present with resultant   streak artifact within the pelvis.    IMPRESSION:  No evidence of pulmonary embolism.    The lungs are grossly clear.    There is a thick-walled/rim-enhancing collection between loops of small   bowel and a periumbilical hernia measuring approximately 1.2 x 2.4 x 3.1   cm, which is nonspecific though suspicious for a small abscess.    Otherwise no findings identified to explain the patient's lower abdominal   pain.        --- End of Report ---            AWILDA RICE MD; Attending Radiologist  This document has been electronically signed. Jun 5 2024  6:43PM (06-05-24 @ 17:32)

## 2024-06-06 NOTE — DIETITIAN INITIAL EVALUATION ADULT - OTHER INFO
- Ordered for lactated ringers   - ISS, POCT ranges 101-117 past 24 hours   - Nutritionally Pertinent Labs 6/6 K: 3.3 (L), A1C: 6.3 (H)

## 2024-06-06 NOTE — DIETITIAN INITIAL EVALUATION ADULT - PERTINENT MEDS FT
MEDICATIONS  (STANDING):  dextrose 10% Bolus 125 milliLiter(s) IV Bolus once  dextrose 5%. 1000 milliLiter(s) (50 mL/Hr) IV Continuous <Continuous>  dextrose 5%. 1000 milliLiter(s) (100 mL/Hr) IV Continuous <Continuous>  dextrose 50% Injectable 25 Gram(s) IV Push once  dextrose 50% Injectable 12.5 Gram(s) IV Push once  glucagon  Injectable 1 milliGRAM(s) IntraMuscular once  heparin   Injectable 5000 Unit(s) SubCutaneous every 8 hours  insulin lispro (ADMELOG) corrective regimen sliding scale   SubCutaneous every 6 hours  lactated ringers. 1000 milliLiter(s) (120 mL/Hr) IV Continuous <Continuous>  piperacillin/tazobactam IVPB.. 3.375 Gram(s) IV Intermittent every 8 hours  sertraline 100 milliGRAM(s) Oral daily    MEDICATIONS  (PRN):  dextrose Oral Gel 15 Gram(s) Oral once PRN Blood Glucose LESS THAN 70 milliGRAM(s)/deciliter  HYDROmorphone  Injectable 0.2 milliGRAM(s) IV Push every 4 hours PRN Moderate Pain (4 - 6)  HYDROmorphone  Injectable 0.5 milliGRAM(s) IV Push every 4 hours PRN Severe Pain (7 - 10)  ondansetron Injectable 4 milliGRAM(s) IV Push every 6 hours PRN Nausea and/or Vomiting

## 2024-06-06 NOTE — H&P ADULT - NSHPLABSRESULTS_GEN_ALL_CORE
12.4   6.95  )-----------( 259      ( 05 Jun 2024 15:12 )             39.4       06-05    143  |  105  |  11  ----------------------------<  101<H>  3.5   |  29  |  0.90    Ca    9.4      05 Jun 2024 15:12  Mg     2.0     06-05    TPro  8.0  /  Alb  3.5  /  TBili  0.3  /  DBili  x   /  AST  14<L>  /  ALT  18  /  AlkPhos  99  06-05      PT/INR - ( 06 Jun 2024 00:24 )   PT: 11.0 sec;   INR: 0.96       PTT - ( 06 Jun 2024 00:24 )  PTT:21.4 sec         PROCEDURE:  CT Angiography of the Chest was performed followed by portal venous phase imaging of the Abdomen and Pelvis.  Sagittal and coronal reformats were performed as well as 3D (MIP) reconstructions.    FINDINGS:  CHEST:  LUNGS AND LARGE AIRWAYS: Patent central airways. No pulmonary nodules.  PLEURA: No pleural effusion.  VESSELS: No evidence of pulmonary embolism. The thoracic aorta is normal in caliber without evidence of dissection.  HEART: Heart size is normal. No pericardial effusion.  MEDIASTINUM AND JOSH: No lymphadenopathy.  CHEST WALL AND LOWER NECK: Within normal limits.    ABDOMEN AND PELVIS:  LIVER: There is a tiny hemangioma within segment 7 of the liver measuring up to 8.0 cm in size (3-21) and a 3.3 cm hemangioma within segment 2 (3-21). There is also a 2.0 cm benign cyst within segment 6 (3-57).  BILE DUCTS: Normal caliber.  GALLBLADDER: Cholecystectomy.  SPLEEN: Within normal limits.  PANCREAS: Within normal limits.  ADRENALS: The adrenal glands are somewhat thickened versus contain subcentimeter nodules, which are too small to characterize.  KIDNEYS/URETERS: There is a duplicated right renal collecting system and multifocal right-sided renal scarring. No renal calculi identified. The kidneys are otherwise within normal limits in appearance.    BLADDER: Within normal limits.  REPRODUCTIVE ORGANS: Uterus and adnexa within normal limits.    BOWEL: Postsurgical changes from prior CT hysterectomy. The gastric antrum is mildly thick-walled. The small bowel is unremarkable. There is a rectosigmoid anastomosis and diverticulosis of the sigmoid colon. Appendix is normal.  PERITONEUM: No ascites.  VESSELS: Within normal limits.  RETROPERITONEUM/LYMPH NODES: No lymphadenopathy.  ABDOMINAL WALL: There is a fat-containing umbilical hernia with a thick-walled enhancing collection containing gas and debris just deep to the hernia defect measuring approximately 1.2 x 2.4 x 3.1 cm (3-72 and 610-65) with abutting loops of adjacent small bowel.  BONES: There are degenerative changes of the spine. No acute fracture identified. Bilateral hip arthroplasties are present with resultant streak artifact within the pelvis.    IMPRESSION:  No evidence of pulmonary embolism.    The lungs are grossly clear.    There is a thick-walled/rim-enhancing collection between loops of small bowel and a periumbilical hernia measuring approximately 1.2 x 2.4 x 3.1 cm, which is nonspecific though suspicious for a small abscess.    Otherwise no findings identified to explain the patient's lower abdominal pain.

## 2024-06-06 NOTE — H&P ADULT - NSICDXPASTMEDICALHX_GEN_ALL_CORE_FT
PAST MEDICAL HISTORY:  Depression     Diabetes mellitus, type 2     Diverticulitis     DVT (deep venous thrombosis) left leg    GERD (gastroesophageal reflux disease)     HTN (hypertension)     Hypercoagulable state     Neuropathy     DARIO (obstructive sleep apnea)     Osteoarthritis     Pancreatitis     Pulmonary embolism     Restless leg     Rheumatoid arthritis

## 2024-06-06 NOTE — DIETITIAN INITIAL EVALUATION ADULT - REASON FOR ADMISSION
ABSCESS  "54-year-old female, PMHx of HTN, HLD, PE, DVT not on AC, RA, depression, DM, GERD, presents to the emergency departmenta as transfer from [facility] for 4 days of abdominal pain with associated nausea, chills and diarrhea but no emesis. Abdomen soft and non-distended but mildly tender in left hemiabdomen with palpable umbilical hernia. Afebrile & hemodynamically normal. labs WNL. CT A/P noted Umbilical hernia with 1.2 x 2.4 x 3.1 cm intra-abdominal infraumbilical fluid collection. Intra-abdominal collection only identifiable cause of abdominal pain. Gastroenteritis considered given presentation. Appendix and gallbladder normal appearing. Mesenteric ischemia unlikely given normal lab findings and lack of severity of pain. Liver hemangiomas and cyst also noted on scan but not likely cause of pain. Other possible causes such as epiploid appendagitis unlikely. Working diagnosis at this time is umbilical hernia with adjacent intra-abdominal abscess. Will admit for pain control and IV Abx"

## 2024-06-06 NOTE — DIETITIAN NUTRITION RISK NOTIFICATION - ADDITIONAL COMMENTS/DIETITIAN RECOMMENDATIONS
Visited pt at bedside on 9UR. States that she has been having poor appetite for the past year, states that she consumes minimally. Endorses having sleeve in 2016. Does not follow any therapeutic diet at home, but states that she follows post bariatric surgery guidelines. Confirms No known food allergies.  Pt is currently tolerating Clear liquid, Consistent Carbohydrate. States that she was able to consume and tolerate the broths this morning.    Weights:   6/6 179 pounds (Dosing)   UBW: 188 pounds (per pt) x 1 month. Endorses weight loss   IBW: 135 pounds   %%     Weight loss noted. Suggests 4.7% weight loss x 1 month. RD to continue to monitor weights as available.    1. Clear Liquid Diet   **as medically feasible, advance diet as tolerated to Consistent Carbohydrate diet, provide nourishments and/or oral nutrition supplements per pt preference  2. When medically feasible encourage PO intake, protein rich foods and honor food preferences as able unless otherwise contraindicated.   3. Provide ongoing education as needed.   4. Monitor intake, diet tolerance, weight trends, labs, and skin integrity and bowel movement regularity.   5. RDN will continue to monitor, reassess, and intervene as appropriate.    Discussed about possible diet progression. Answered questions related to diet and nutrition. When medically feasible, educated/Discussed the importance to prioritize protein at meal times. Reviewed good sources of protein on the menu. Pt amenable to have oral nutrition supplement when on a regular consistency diet.

## 2024-06-06 NOTE — CONSULT NOTE ADULT - SUBJECTIVE AND OBJECTIVE BOX
Clinical History: ABSCESS    54-year-old female with history of HTN, HLD, PE, DVT not on AC, RA, depression, DM, GERD who presented as transfer from [facility] for 4 days of abdominal pain. CT abdomen/pelvis revealed a 3.1 cm abdominal abscess adjacent to periumbilical hernia. IR consulted for drainage of abdominal abscess.    PMHx: As above   PSHx: Colon resection (Dr. Jay at [place] in 2015), Ventral hernia repair (6 months after colon resection, also at [place] - 2015/2016). Sleeve Gastrectomy. R total hip arthroplasty (2020)  Colonoscopy: Last 2019 here in [facility] (polyps removed); due for another one   Endoscopy: 2 yrs ago at [facility] - GERD/Gastritis   Family Hx: Breast Ca, Lung Ca, Pancreatic Ca in maternal aunts. Lung Ca & uterine Ca in paternal grandparents   Home meds: Amlodipine 10, Losartan-HCTZ 100-25, Metoprolol , Buspirone 10, Sertraline 100, Trazodone 100, Omeprazole 40, Famotidine 40 PRN, Gabapentin 600 BID, Ibuprofen 600 PRN, Cetirizine 10        Handoff    MEWS Score    DVT (deep venous thrombosis)    Pulmonary embolism    HTN (hypertension)    Pancreatitis    Neuropathy    Hypercoagulable state    Arthritis    Chronic anticoagulation    Diabetes mellitus, type 2    Diverticulitis    GERD (gastroesophageal reflux disease)    DARIO (obstructive sleep apnea)    Restless leg    Osteoarthritis    Rheumatoid arthritis    Depression    DVT (deep venous thrombosis)    Pulmonary embolism    HTN (hypertension)    Abscess    History of sleeve gastrectomy    Abdominal hernia    History of colon resection    History of cholecystectomy    History of surgery    History of surgery    ABD PAIN    0    [email or web address]        Meds:dextrose 10% Bolus 125 milliLiter(s) IV Bolus once  dextrose 5%. 1000 milliLiter(s) IV Continuous <Continuous>  dextrose 5%. 1000 milliLiter(s) IV Continuous <Continuous>  dextrose 50% Injectable 12.5 Gram(s) IV Push once  dextrose 50% Injectable 25 Gram(s) IV Push once  dextrose Oral Gel 15 Gram(s) Oral once PRN  glucagon  Injectable 1 milliGRAM(s) IntraMuscular once  heparin   Injectable 5000 Unit(s) SubCutaneous every 8 hours  HYDROmorphone  Injectable 0.2 milliGRAM(s) IV Push every 4 hours PRN  HYDROmorphone  Injectable 0.5 milliGRAM(s) IV Push every 4 hours PRN  insulin lispro (ADMELOG) corrective regimen sliding scale   SubCutaneous every 6 hours  lactated ringers. 1000 milliLiter(s) IV Continuous <Continuous>  ondansetron Injectable 4 milliGRAM(s) IV Push every 6 hours PRN  piperacillin/tazobactam IVPB.. 3.375 Gram(s) IV Intermittent every 8 hours  sertraline 100 milliGRAM(s) Oral daily      Allergies:verapamil (Other)  lisinopril (Other)        Labs:                           11.9   5.32  )-----------( 215      ( 06 Jun 2024 05:30 )             36.6     PT/INR - ( 06 Jun 2024 00:24 )   PT: 11.0 sec;   INR: 0.96          PTT - ( 06 Jun 2024 00:24 )  PTT:21.4 sec  06-06    138  |  102  |  7   ----------------------------<  98  3.3<L>   |  26  |  0.76    Ca    8.7      06 Jun 2024 05:30  Phos  3.2     06-06  Mg     1.7     06-06    TPro  8.0  /  Alb  3.5  /  TBili  0.3  /  DBili  x   /  AST  14<L>  /  ALT  18  /  AlkPhos  99  06-05          Imaging Findings:   6/4 CT Chest/Abdomen/Pelvis:    IMPRESSION:  No evidence of pulmonary embolism.    The lungs are grossly clear.    There is a thick-walled/rim-enhancing collection between loops of small bowel and a periumbilical hernia measuring approximately 1.2 x 2.4 x 3.1 cm, which is nonspecific though suspicious for a small abscess.    Otherwise no findings identified to explain the patient's lower abdominal pain.

## 2024-06-06 NOTE — H&P ADULT - ASSESSMENT
54-year-old female, PMHx of HTN, HLD, PE, DVT not on AC, RA, depression, DM, GERD, presents to the emergency departmenta as transfer from [facility] for 4 days of abdominal pain with associated nausea, chills and diarrhea but no emesis. Abdomen soft and non-distended but mildly tender in left hemiabdomen with palpable umbilical hernia. Afebrile & hemodynamically normal. labs WNL. CT A/P noted Umbilical hernia with 1.2 x 2.4 x 3.1 cm intra-abdominal infraumbilical fluid collection. Intra-abdominal collection only identifiable cause of abdominal pain. Gastroenteritis considered given presentation. Appendix and gallbladder normal appearing. Mesenteric ischemia unlikely given normal lab findings and lack of severity of pain. Liver hemangiomas and cyst also noted on scan but not likely cause of pain. Other possible causes such as epiploid appendagitis unlikely. Working diagnosis at this time is umbilical hernia with adjacent intra-abdominal abscess. Will admit for pain control and IV Abx     Plan:   - Admit to regional   - Pain & PRN nausea control   - NPO/IVF   - IV Abx   - DVT ppx     D/w on-call chief resident. Attending aware and agrees with plan

## 2024-06-06 NOTE — CONSULT NOTE ADULT - ASSESSMENT
Assessment: 54F presented with abdominal pain and CT findings of a 3.1 cm abdominal abscess adjacent to periumbilical hernia. IR consulted for drainage of abdominal abscess. Case reviewed with Dr. Colby. Plan for IR CT-guided drainage of abdominal abscess on 6/7.    Recommendations: NPO at midnight prior to procedure with sedation/anesthesia. D/C blood thinners.     Communicated with: Celso Reveles, surgery PA
54-year-old female, PMHx of HTN, HLD, PE, DVT not on AC, RA, depression, DM, GERD, presents to the emergency departmenta as transfer from [facility] for 4 days of abdominal pain with associated nausea, chills and diarrhea but no emesis. CT noted to have Umbilical hernia with 1.2 x 2.4 x 3.1 cm intra-abdominal infraumbilical fluid collection. Pt admitted for umbilical hernia with adjacent intra-abdominal abscess      Umbilical hernia  intra-abdominal abscess  CLD   started on iv abx zosyn   rest of the management per primary team     SOB/CHOWDARY  EKG W twi in v1-v2  fu ECHO    diarrhea   no risk factors for c -diff ( denies recent abx use, no health care exposure prior to admission)   fu GI PCR and c diff     Hx of DVT/PE   provoked and unprovoked in the   should be on long term AC but pt is refusing to go back on subq lovenox ( failed xarelto )   pt will need outpt fu with heme onc and was advised/counseled on long term AC benefits and Risk   cw scd and dvt ppx     depression   restart sertraline 100 and trazodone qd    HTN   bp on the softer side   can resume amlodipine 10 mg qd w parameters   hold toprol for sinus bradycardia - can decrease the dose to lopressor 25 bid in am if HR improves   hold losartan and hctz

## 2024-06-06 NOTE — PATIENT PROFILE ADULT - FOOD INSECURITY
[FreeTextEntry1] : 50-year-old male, retired , status post Nissen fundoplication with new onset of GERD symptoms. Possibilities include esophageal ulcer, candidal esophagitis, or slipped Nissen fundoplication. He will continue with the pantoprazole in the morning and famotidine in the bedtime. Expedited endoscopy is advised.\par \par He  was advised to undergo endoscopy to which he agreed. The procedure will be performed in [facility] with the assistance of an anesthesiologist. The procedure was explained in detail and he understood the risks of the procedure not limited  to infection, bleeding, perforation, or non-diagnosis of esophageal or stomach cancer. He  was advised that he could not drive home alone, if the patient chooses to receive sedation. Further diagnostic and treatment recommendations will be based upon the procedure and any biopsies, if they are taken.\par 
no

## 2024-06-06 NOTE — DIETITIAN INITIAL EVALUATION ADULT - ORAL INTAKE PTA/DIET HISTORY
Visited pt at bedside on 9UR. States that she has been having poor appetite for the past year, states that she consumes minimally. Endorses having sleeve in 2016. Does not follow any therapeutic diet at home, but states that she follows post bariatric surgery guidelines. Confirms No known food allergies.

## 2024-06-06 NOTE — DIETITIAN INITIAL EVALUATION ADULT - OTHER CALCULATIONS
Ideal body weight (61.2kg) used to calculate energy needs due to pt's current body weight exceeds % (132%) ideal body weights. Needs adjusted for malnutrition, clinical course, and previously having bariatric surgery. Fluids deferred to team.

## 2024-06-06 NOTE — DIETITIAN INITIAL EVALUATION ADULT - ADD RECOMMEND
1. Clear Liquid Diet   **as medically feasible, advance diet as tolerated to Consistent Carbohydrate diet, provide nourishments and/or oral nutrition supplements per pt preference  2. When medically feasible encourage PO intake, protein rich foods and honor food preferences as able unless otherwise contraindicated.   3. Provide ongoing education as needed.   4. Monitor intake, diet tolerance, weight trends, labs, and skin integrity and bowel movement regularity.   5. RDN will continue to monitor, reassess, and intervene as appropriate.

## 2024-06-06 NOTE — H&P ADULT - HISTORY OF PRESENT ILLNESS
54-year-old female, PMHx of HTN, HLD, PE, DVT not on AC, RA, depression, DM, GERD, presents to the emergency departmenta as transfer from [facility] for 4 days of abdominal pain with associated nausea, chills and diarrhea but no emesis. Pain described as band-like from epigastrium to LLQ, no notable exacerbating or alleviating factors. She has been persistently nauseous since onset of symptoms with multiple loose bowel movements daily, but no emesis. She also endorsed mild chills. Denied sick contacts or recent travel. Patient was transferred for further management at [facility] after work-up showed a possible intraabdominal abscess     PMHx: As above   PSHx: Colon resection (Dr. Jay at [facility] in 2015), Ventral hernia repair (6 months after colon resection, also at [facility] - 2015/2016). Sleeve Gastrectomy. R total hip arthroplasty (2020)  Colonoscopy: Last 2019 here in [facility] (polyps removed); due for another one   Endoscopy: 2 yrs ago at [facility] - GERD/Gastritis   Family Hx: Breast Ca, Lung Ca, Pancreatic Ca in maternal aunts. Lung Ca & uterine Ca in paternal grandparents   Home meds: Amlodipine 10, Losartan-HCTZ 100-25, Metoprolol , Buspirone 10, Sertraline 100, Trazodone 100, Omeprazole 40, Famotidine 40 PRN, Gabapentin 600 BID, Ibuprofen 600 PRN, Cetirizine 10    In ED:   Vitals: T 98.4, HR 51, /71, RR 16, SpO2 96% RA   Labs: Unremarkable   CT A/P (@ [facility]): Umbilical hernia with 1.2 x 2.4 x 3.1 cm intra-abdominal infraumbilical fluid collection.

## 2024-06-06 NOTE — DIETITIAN INITIAL EVALUATION ADULT - EDUCATION DIETARY MODIFICATIONS
Discussed about possible diet progression. Answered questions related to diet and nutrition. When medically feasible, educated/Discussed the importance to prioritize protein at meal times. Reviewed good sources of protein on the menu. Pt amenable to have oral nutrition supplement when on a regular consistency diet./(2) meets goals/outcomes/verbalization

## 2024-06-06 NOTE — CONSULT NOTE ADULT - SUBJECTIVE AND OBJECTIVE BOX
Patient is a 54y old  Female who presents with a chief complaint of Intra-abdominal abscess (06 Jun 2024 11:39)      HPI:  54-year-old female, PMHx of HTN, HLD, PE, DVT not on AC, RA, depression, DM, GERD, presents to the emergency departmenta as transfer from [facility] for 4 days of abdominal pain with associated nausea, chills and diarrhea but no emesis. Pain described as band-like from epigastrium to LLQ, no notable exacerbating or alleviating factors. She has been persistently nauseous since onset of symptoms with multiple loose bowel movements daily, but no emesis. She also endorsed mild chills. Denied sick contacts or recent travel. Patient was transferred for further management at [facility] after work-up showed a possible intraabdominal abscess     PMHx: As above   PSHx: Colon resection (Dr. Jay at [facility] in 2015), Ventral hernia repair (6 months after colon resection, also at [facility] - 2015/2016). Sleeve Gastrectomy. R total hip arthroplasty (2020)  Colonoscopy: Last 2019 here in [facility] (polyps removed); due for another one   Endoscopy: 2 yrs ago at [facility] - GERD/Gastritis   Family Hx: Breast Ca, Lung Ca, Pancreatic Ca in maternal aunts. Lung Ca & uterine Ca in paternal grandparents   Home meds: Amlodipine 10, Losartan-HCTZ 100-25, Metoprolol , Buspirone 10, Sertraline 100, Trazodone 100, Omeprazole 40, Famotidine 40 PRN, Gabapentin 600 BID, Ibuprofen 600 PRN, Cetirizine 10    In ED:   Vitals: T 98.4, HR 51, /71, RR 16, SpO2 96% RA   Labs: Unremarkable   CT A/P (@ [facility]): Umbilical hernia with 1.2 x 2.4 x 3.1 cm intra-abdominal infraumbilical fluid collection.        (06 Jun 2024 01:45)      Allergies    verapamil (Other)  lisinopril (Other)    Intolerances        MEDICATIONS  (STANDING):  dextrose 10% Bolus 125 milliLiter(s) IV Bolus once  dextrose 5%. 1000 milliLiter(s) (100 mL/Hr) IV Continuous <Continuous>  dextrose 5%. 1000 milliLiter(s) (50 mL/Hr) IV Continuous <Continuous>  dextrose 50% Injectable 25 Gram(s) IV Push once  dextrose 50% Injectable 12.5 Gram(s) IV Push once  glucagon  Injectable 1 milliGRAM(s) IntraMuscular once  heparin   Injectable 5000 Unit(s) SubCutaneous every 8 hours  insulin lispro (ADMELOG) corrective regimen sliding scale   SubCutaneous every 6 hours  lactated ringers. 1000 milliLiter(s) (120 mL/Hr) IV Continuous <Continuous>  piperacillin/tazobactam IVPB.. 3.375 Gram(s) IV Intermittent every 8 hours  sertraline 100 milliGRAM(s) Oral daily    MEDICATIONS  (PRN):  dextrose Oral Gel 15 Gram(s) Oral once PRN Blood Glucose LESS THAN 70 milliGRAM(s)/deciliter  HYDROmorphone  Injectable 0.2 milliGRAM(s) IV Push every 4 hours PRN Moderate Pain (4 - 6)  HYDROmorphone  Injectable 0.5 milliGRAM(s) IV Push every 4 hours PRN Severe Pain (7 - 10)  ondansetron Injectable 4 milliGRAM(s) IV Push every 6 hours PRN Nausea and/or Vomiting      Daily Height in cm: 170.2 (06 Jun 2024 02:45)    Daily     Drug Dosing Weight  Height (cm): 170.2 (06 Jun 2024 02:45)  Weight (kg): 81.6 (06 Jun 2024 02:45)  BMI (kg/m2): 28.2 (06 Jun 2024 02:45)  BSA (m2): 1.93 (06 Jun 2024 02:45)    PAST MEDICAL & SURGICAL HISTORY:  DVT (deep venous thrombosis)  left leg      Pulmonary embolism      HTN (hypertension)      Pancreatitis      Neuropathy      Hypercoagulable state      Diabetes mellitus, type 2      Diverticulitis      GERD (gastroesophageal reflux disease)      DARIO (obstructive sleep apnea)      Restless leg      Osteoarthritis      Rheumatoid arthritis      Depression      History of sleeve gastrectomy      Abdominal hernia  umbilical      History of colon resection  diverticulitis      History of cholecystectomy      History of surgery  2019 L thr      History of surgery  right total hip replacement 9/12/2019          FAMILY HISTORY:        REVIEW OF SYSTEMS:    CONSTITUTIONAL: No fever, weight loss, or fatigue  EYES: No eye pain, visual disturbances, or discharge  ENMT:  No difficulty hearing, tinnitus, vertigo; No sinus or throat pain  NECK: No pain or stiffness  RESPIRATORY: No cough, wheezing, chills or hemoptysis; No shortness of breath  CARDIOVASCULAR: No chest pain, palpitations, dizziness, or leg swelling  GASTROINTESTINAL: +pain   GENITOURINARY: No dysuria, frequency, hematuria, or incontinence  ENDOCRINE: No heat or cold intolerance; No hair loss  MUSCULOSKELETAL: No joint pain or swelling; No muscle, back, or extremity pain  NEUROLOGICAL: No headaches, memory loss, loss of strength, numbness, or tremors  SKIN: No itching, burning, rashes, or lesion              Vital Signs Last 24 Hrs  T(C): 36.6 (06 Jun 2024 08:33), Max: 37.1 (06 Jun 2024 01:40)  T(F): 97.9 (06 Jun 2024 08:33), Max: 98.7 (06 Jun 2024 01:40)  HR: 55 (06 Jun 2024 08:33) (49 - 67)  BP: 121/74 (06 Jun 2024 08:33) (112/70 - 134/78)  BP(mean): --  ABP: --  ABP(mean): --  RR: 17 (06 Jun 2024 08:33) (16 - 18)  SpO2: 97% (06 Jun 2024 08:33) (96% - 98%)    O2 Parameters below as of 06 Jun 2024 08:33  Patient On (Oxygen Delivery Method): room air                I&O's Detail    05 Jun 2024 07:01  -  06 Jun 2024 07:00  --------------------------------------------------------  IN:    IV PiggyBack: 200 mL    Lactated Ringers: 720 mL  Total IN: 920 mL    OUT:    Voided (mL): 500 mL  Total OUT: 500 mL    Total NET: 420 mL      06 Jun 2024 07:01  -  06 Jun 2024 12:46  --------------------------------------------------------  IN:    Lactated Ringers: 360 mL    Oral Fluid: 230 mL  Total IN: 590 mL    OUT:    Voided (mL): 200 mL  Total OUT: 200 mL    Total NET: 390 mL          PHYSICAL EXAM:    GENERAL: NAD, well-groomed, well-developed  HEAD:  Atraumatic, Normocephalic  EYES: EOMI, PERRLA, conjunctiva and sclera clear  ENMT: No tonsillar erythema, exudates, or enlargement; Moist mucous membranes  NECK: Supple, No JVD, Normal thyroid  NERVOUS SYSTEM:  Alert & Oriented X3, Good concentration;   CHEST/LUNG: Clear to percussion bilaterally; No rales, rhonchi, wheezing, or rubs  HEART: Regular rate and rhythm; No murmurs, rubs, or gallops  ABDOMEN: Soft, ttp Paraumbilical, Nondistended; Bowel sounds present  EXTREMITIES:  2+ Peripheral Pulses, No clubbing, cyanosis, or edema  LYMPH: No lymphadenopathy noted  SKIN: No rashes or lesions    LABS:  CBC Full  -  ( 06 Jun 2024 05:30 )  WBC Count : 5.32 K/uL  RBC Count : 4.13 M/uL  Hemoglobin : 11.9 g/dL  Hematocrit : 36.6 %  Platelet Count - Automated : 215 K/uL  Mean Cell Volume : 88.6 fl  Mean Cell Hemoglobin : 28.8 pg  Mean Cell Hemoglobin Concentration : 32.5 gm/dL  Auto Neutrophil # : x  Auto Lymphocyte # : x  Auto Monocyte # : x  Auto Eosinophil # : x  Auto Basophil # : x  Auto Neutrophil % : x  Auto Lymphocyte % : x  Auto Monocyte % : x  Auto Eosinophil % : x  Auto Basophil % : x    06-06    138  |  102  |  7   ----------------------------<  98  3.3<L>   |  26  |  0.76    Ca    8.7      06 Jun 2024 05:30  Phos  3.2     06-06  Mg     1.7     06-06    TPro  8.0  /  Alb  3.5  /  TBili  0.3  /  DBili  x   /  AST  14<L>  /  ALT  18  /  AlkPhos  99  06-05    CAPILLARY BLOOD GLUCOSE      POCT Blood Glucose.: 117 mg/dL (06 Jun 2024 12:08)    PT/INR - ( 06 Jun 2024 00:24 )   PT: 11.0 sec;   INR: 0.96          PTT - ( 06 Jun 2024 00:24 )  PTT:21.4 sec  Urinalysis Basic - ( 06 Jun 2024 05:30 )    Color: x / Appearance: x / SG: x / pH: x  Gluc: 98 mg/dL / Ketone: x  / Bili: x / Urobili: x   Blood: x / Protein: x / Nitrite: x   Leuk Esterase: x / RBC: x / WBC x   Sq Epi: x / Non Sq Epi: x / Bacteria: x              EKG:    ECHO, US:    RADIOLOGY:

## 2024-06-06 NOTE — DIETITIAN INITIAL EVALUATION ADULT - PHYSCIAL ASSESSMENT
Weights:   6/6 179 pounds (Dosing)   UBW: 188 pounds (per pt) x 1 month. Endorses weight loss   IBW: 135 pounds   %%     Weight loss noted. Suggests 4.7% weight loss x 1 month. RD to continue to monitor weights as available.

## 2024-06-06 NOTE — H&P ADULT - NSHPPHYSICALEXAM_GEN_ALL_CORE
x  GENERAL: NAD, lying in bed comfortably  HEAD:  Atraumatic, normocephalic  EYES: EOMI, PERRLA, conjunctiva and sclera clear  NECK: Supple, trachea midline, no JVD  HEART: Regular rate and rhythm, no murmurs, rubs, or gallops  LUNGS: Unlabored respirations.  Clear to auscultation bilaterally, no crackles, wheezing, or rhonchi  ABDOMEN: soft, non-distended, mildly tender in periumbilical region and left mid-abdomen  EXTREMITIES: 2+ peripheral pulses bilaterally. No clubbing, cyanosis, or edema  NERVOUS SYSTEM:  A&Ox3, moving all extremities, no focal deficits   SKIN: No rashes or lesions

## 2024-06-06 NOTE — PATIENT PROFILE ADULT - FALL HARM RISK - HARM RISK INTERVENTIONS

## 2024-06-06 NOTE — DIETITIAN INITIAL EVALUATION ADULT - NSFNSGIASSESSMENTFT_GEN_A_CORE
No issues with vomiting, constipation reported at time of visit. States that she experiences nausea at times, and is experiencing diarrhea. Last BM noted on 6/6.

## 2024-06-06 NOTE — DIETITIAN INITIAL EVALUATION ADULT - ENERGY INTAKE
Pt is currently tolerating Clear liquid, Consistent Carbohydrate. States that she was able to consume and tolerate the broths this morning.

## 2024-06-07 ENCOUNTER — TRANSCRIPTION ENCOUNTER (OUTPATIENT)
Age: 54
End: 2024-06-07

## 2024-06-07 LAB
ANION GAP SERPL CALC-SCNC: 12 MMOL/L — SIGNIFICANT CHANGE UP (ref 5–17)
APTT BLD: 29.7 SEC — SIGNIFICANT CHANGE UP (ref 24.5–35.6)
BLD GP AB SCN SERPL QL: NEGATIVE — SIGNIFICANT CHANGE UP
BUN SERPL-MCNC: 4 MG/DL — LOW (ref 7–23)
CALCIUM SERPL-MCNC: 9.1 MG/DL — SIGNIFICANT CHANGE UP (ref 8.4–10.5)
CHLORIDE SERPL-SCNC: 103 MMOL/L — SIGNIFICANT CHANGE UP (ref 96–108)
CO2 SERPL-SCNC: 26 MMOL/L — SIGNIFICANT CHANGE UP (ref 22–31)
CREAT SERPL-MCNC: 0.82 MG/DL — SIGNIFICANT CHANGE UP (ref 0.5–1.3)
EGFR: 84 ML/MIN/1.73M2 — SIGNIFICANT CHANGE UP
GLUCOSE BLDC GLUCOMTR-MCNC: 104 MG/DL — HIGH (ref 70–99)
GLUCOSE BLDC GLUCOMTR-MCNC: 106 MG/DL — HIGH (ref 70–99)
GLUCOSE BLDC GLUCOMTR-MCNC: 108 MG/DL — HIGH (ref 70–99)
GLUCOSE BLDC GLUCOMTR-MCNC: 119 MG/DL — HIGH (ref 70–99)
GLUCOSE BLDC GLUCOMTR-MCNC: 124 MG/DL — HIGH (ref 70–99)
GLUCOSE SERPL-MCNC: 83 MG/DL — SIGNIFICANT CHANGE UP (ref 70–99)
GRAM STN FLD: ABNORMAL
HCT VFR BLD CALC: 38.1 % — SIGNIFICANT CHANGE UP (ref 34.5–45)
HGB BLD-MCNC: 12 G/DL — SIGNIFICANT CHANGE UP (ref 11.5–15.5)
INR BLD: 0.95 — SIGNIFICANT CHANGE UP (ref 0.85–1.18)
MAGNESIUM SERPL-MCNC: 2 MG/DL — SIGNIFICANT CHANGE UP (ref 1.6–2.6)
MCHC RBC-ENTMCNC: 28.6 PG — SIGNIFICANT CHANGE UP (ref 27–34)
MCHC RBC-ENTMCNC: 31.5 GM/DL — LOW (ref 32–36)
MCV RBC AUTO: 90.7 FL — SIGNIFICANT CHANGE UP (ref 80–100)
NRBC # BLD: 0 /100 WBCS — SIGNIFICANT CHANGE UP (ref 0–0)
PHOSPHATE SERPL-MCNC: 2.8 MG/DL — SIGNIFICANT CHANGE UP (ref 2.5–4.5)
PLATELET # BLD AUTO: 207 K/UL — SIGNIFICANT CHANGE UP (ref 150–400)
POTASSIUM SERPL-MCNC: 3.7 MMOL/L — SIGNIFICANT CHANGE UP (ref 3.5–5.3)
POTASSIUM SERPL-SCNC: 3.7 MMOL/L — SIGNIFICANT CHANGE UP (ref 3.5–5.3)
PROTHROM AB SERPL-ACNC: 10.8 SEC — SIGNIFICANT CHANGE UP (ref 9.5–13)
RBC # BLD: 4.2 M/UL — SIGNIFICANT CHANGE UP (ref 3.8–5.2)
RBC # FLD: 13.9 % — SIGNIFICANT CHANGE UP (ref 10.3–14.5)
RH IG SCN BLD-IMP: POSITIVE — SIGNIFICANT CHANGE UP
SODIUM SERPL-SCNC: 141 MMOL/L — SIGNIFICANT CHANGE UP (ref 135–145)
SPECIMEN SOURCE: SIGNIFICANT CHANGE UP
WBC # BLD: 4.58 K/UL — SIGNIFICANT CHANGE UP (ref 3.8–10.5)
WBC # FLD AUTO: 4.58 K/UL — SIGNIFICANT CHANGE UP (ref 3.8–10.5)

## 2024-06-07 PROCEDURE — 49406 IMAGE CATH FLUID PERI/RETRO: CPT

## 2024-06-07 PROCEDURE — 99233 SBSQ HOSP IP/OBS HIGH 50: CPT

## 2024-06-07 PROCEDURE — 99231 SBSQ HOSP IP/OBS SF/LOW 25: CPT

## 2024-06-07 RX ORDER — METOPROLOL TARTRATE 50 MG
50 TABLET ORAL DAILY
Refills: 0 | Status: DISCONTINUED | OUTPATIENT
Start: 2024-06-07 | End: 2024-06-07

## 2024-06-07 RX ORDER — PANTOPRAZOLE SODIUM 20 MG/1
40 TABLET, DELAYED RELEASE ORAL ONCE
Refills: 0 | Status: COMPLETED | OUTPATIENT
Start: 2024-06-07 | End: 2024-06-07

## 2024-06-07 RX ORDER — METOPROLOL TARTRATE 50 MG
100 TABLET ORAL DAILY
Refills: 0 | Status: DISCONTINUED | OUTPATIENT
Start: 2024-06-07 | End: 2024-06-09

## 2024-06-07 RX ORDER — METOPROLOL TARTRATE 50 MG
100 TABLET ORAL DAILY
Refills: 0 | Status: DISCONTINUED | OUTPATIENT
Start: 2024-06-07 | End: 2024-06-07

## 2024-06-07 RX ORDER — TRAZODONE HCL 50 MG
100 TABLET ORAL AT BEDTIME
Refills: 0 | Status: DISCONTINUED | OUTPATIENT
Start: 2024-06-07 | End: 2024-06-09

## 2024-06-07 RX ORDER — HEPARIN SODIUM 5000 [USP'U]/ML
5000 INJECTION INTRAVENOUS; SUBCUTANEOUS EVERY 8 HOURS
Refills: 0 | Status: DISCONTINUED | OUTPATIENT
Start: 2024-06-07 | End: 2024-06-09

## 2024-06-07 RX ORDER — PANTOPRAZOLE SODIUM 20 MG/1
40 TABLET, DELAYED RELEASE ORAL
Refills: 0 | Status: DISCONTINUED | OUTPATIENT
Start: 2024-06-07 | End: 2024-06-09

## 2024-06-07 RX ORDER — GABAPENTIN 400 MG/1
600 CAPSULE ORAL
Refills: 0 | Status: DISCONTINUED | OUTPATIENT
Start: 2024-06-07 | End: 2024-06-09

## 2024-06-07 RX ORDER — POTASSIUM PHOSPHATE, MONOBASIC POTASSIUM PHOSPHATE, DIBASIC 236; 224 MG/ML; MG/ML
15 INJECTION, SOLUTION INTRAVENOUS ONCE
Refills: 0 | Status: COMPLETED | OUTPATIENT
Start: 2024-06-07 | End: 2024-06-07

## 2024-06-07 RX ADMIN — GABAPENTIN 600 MILLIGRAM(S): 400 CAPSULE ORAL at 17:32

## 2024-06-07 RX ADMIN — HYDROMORPHONE HYDROCHLORIDE 0.5 MILLIGRAM(S): 2 INJECTION INTRAMUSCULAR; INTRAVENOUS; SUBCUTANEOUS at 11:36

## 2024-06-07 RX ADMIN — HYDROMORPHONE HYDROCHLORIDE 0.5 MILLIGRAM(S): 2 INJECTION INTRAMUSCULAR; INTRAVENOUS; SUBCUTANEOUS at 02:21

## 2024-06-07 RX ADMIN — HYDROMORPHONE HYDROCHLORIDE 0.5 MILLIGRAM(S): 2 INJECTION INTRAMUSCULAR; INTRAVENOUS; SUBCUTANEOUS at 06:37

## 2024-06-07 RX ADMIN — PIPERACILLIN AND TAZOBACTAM 25 GRAM(S): 4; .5 INJECTION, POWDER, LYOPHILIZED, FOR SOLUTION INTRAVENOUS at 15:08

## 2024-06-07 RX ADMIN — PIPERACILLIN AND TAZOBACTAM 25 GRAM(S): 4; .5 INJECTION, POWDER, LYOPHILIZED, FOR SOLUTION INTRAVENOUS at 07:54

## 2024-06-07 RX ADMIN — HYDROMORPHONE HYDROCHLORIDE 0.5 MILLIGRAM(S): 2 INJECTION INTRAMUSCULAR; INTRAVENOUS; SUBCUTANEOUS at 11:57

## 2024-06-07 RX ADMIN — SERTRALINE 100 MILLIGRAM(S): 25 TABLET, FILM COATED ORAL at 11:32

## 2024-06-07 RX ADMIN — SODIUM CHLORIDE 120 MILLILITER(S): 9 INJECTION, SOLUTION INTRAVENOUS at 07:54

## 2024-06-07 RX ADMIN — HEPARIN SODIUM 5000 UNIT(S): 5000 INJECTION INTRAVENOUS; SUBCUTANEOUS at 17:32

## 2024-06-07 RX ADMIN — PIPERACILLIN AND TAZOBACTAM 25 GRAM(S): 4; .5 INJECTION, POWDER, LYOPHILIZED, FOR SOLUTION INTRAVENOUS at 22:20

## 2024-06-07 RX ADMIN — PANTOPRAZOLE SODIUM 40 MILLIGRAM(S): 20 TABLET, DELAYED RELEASE ORAL at 07:41

## 2024-06-07 RX ADMIN — ONDANSETRON 4 MILLIGRAM(S): 8 TABLET, FILM COATED ORAL at 11:58

## 2024-06-07 RX ADMIN — HYDROMORPHONE HYDROCHLORIDE 0.5 MILLIGRAM(S): 2 INJECTION INTRAMUSCULAR; INTRAVENOUS; SUBCUTANEOUS at 06:22

## 2024-06-07 RX ADMIN — POTASSIUM PHOSPHATE, MONOBASIC POTASSIUM PHOSPHATE, DIBASIC 62.5 MILLIMOLE(S): 236; 224 INJECTION, SOLUTION INTRAVENOUS at 11:33

## 2024-06-07 RX ADMIN — Medication 100 MILLIGRAM(S): at 22:19

## 2024-06-07 RX ADMIN — HYDROMORPHONE HYDROCHLORIDE 0.5 MILLIGRAM(S): 2 INJECTION INTRAMUSCULAR; INTRAVENOUS; SUBCUTANEOUS at 02:36

## 2024-06-07 RX ADMIN — PANTOPRAZOLE SODIUM 40 MILLIGRAM(S): 20 TABLET, DELAYED RELEASE ORAL at 12:42

## 2024-06-07 RX ADMIN — ONDANSETRON 4 MILLIGRAM(S): 8 TABLET, FILM COATED ORAL at 18:17

## 2024-06-07 RX ADMIN — SODIUM CHLORIDE 120 MILLILITER(S): 9 INJECTION, SOLUTION INTRAVENOUS at 22:21

## 2024-06-07 NOTE — DISCHARGE NOTE PROVIDER - NSDCMRMEDTOKEN_GEN_ALL_CORE_FT
amLODIPine 10 mg oral tablet: 1 tab(s) orally once a day  busPIRone 10 mg oral tablet: 1 tab(s) orally once a day  cetirizine 10 mg oral tablet: 1 tab(s) orally once a day  famotidine 40 mg oral tablet: 1 tab(s) orally every 6 hours as needed for  indigestion  gabapentin 600 mg oral tablet: 1 tab(s) orally 2 times a day  ibuprofen 600 mg oral tablet: 1 tab(s) orally every 6 hours as needed for  moderate pain  losartan-hydroCHLOROthiazide 100 mg-25 mg oral tablet: 1 tab(s) orally once a day  meloxicam 7.5 mg oral tablet: 1 tab(s) orally every 6 hours  metoprolol succinate 100 mg oral capsule, extended release: 1 cap(s) orally once a day  omeprazole 40 mg oral delayed release capsule: 1 cap(s) orally once a day  sertraline 100 mg oral tablet: 1 tab(s) orally once a day  traZODone 100 mg oral tablet: orally once a day (at bedtime)   amLODIPine 10 mg oral tablet: 1 tab(s) orally once a day  Augmentin 500 mg-125 mg oral tablet: 500 milligram(s) orally 2 times a day MDD: 2 tabs  busPIRone 10 mg oral tablet: 1 tab(s) orally once a day  cetirizine 10 mg oral tablet: 1 tab(s) orally once a day  famotidine 40 mg oral tablet: 1 tab(s) orally every 6 hours as needed for  indigestion  gabapentin 600 mg oral tablet: 1 tab(s) orally 2 times a day  ibuprofen 600 mg oral tablet: 1 tab(s) orally every 6 hours as needed for  moderate pain  losartan-hydroCHLOROthiazide 100 mg-25 mg oral tablet: 1 tab(s) orally once a day  meloxicam 7.5 mg oral tablet: 1 tab(s) orally every 6 hours  metoprolol succinate 100 mg oral capsule, extended release: 1 cap(s) orally once a day  omeprazole 40 mg oral delayed release capsule: 1 cap(s) orally once a day  sertraline 100 mg oral tablet: 1 tab(s) orally once a day  traZODone 100 mg oral tablet: orally once a day (at bedtime)

## 2024-06-07 NOTE — DISCHARGE NOTE PROVIDER - CARE PROVIDERS DIRECT ADDRESSES
[email or web address]

## 2024-06-07 NOTE — DISCHARGE NOTE PROVIDER - DETAILS OF MALNUTRITION DIAGNOSIS/DIAGNOSES
This patient has been assessed with a concern for Malnutrition and was treated during this hospitalization for the following Nutrition diagnosis/diagnoses:     -  06/06/2024: Severe protein-calorie malnutrition

## 2024-06-07 NOTE — PROGRESS NOTE ADULT - SUBJECTIVE AND OBJECTIVE BOX
Patient is a 54y old  Female who presents with a chief complaint of Intra-abdominal abscess (07 Jun 2024 06:57)      INTERVAL HPI/OVERNIGHT EVENTS: offers no new complaints; current symptoms resolving    MEDICATIONS  (STANDING):  dextrose 10% Bolus 125 milliLiter(s) IV Bolus once  dextrose 5%. 1000 milliLiter(s) (50 mL/Hr) IV Continuous <Continuous>  dextrose 5%. 1000 milliLiter(s) (100 mL/Hr) IV Continuous <Continuous>  dextrose 50% Injectable 25 Gram(s) IV Push once  dextrose 50% Injectable 12.5 Gram(s) IV Push once  gabapentin 600 milliGRAM(s) Oral two times a day  glucagon  Injectable 1 milliGRAM(s) IntraMuscular once  heparin   Injectable 5000 Unit(s) SubCutaneous every 8 hours  insulin lispro (ADMELOG) corrective regimen sliding scale   SubCutaneous every 6 hours  lactated ringers. 1000 milliLiter(s) (120 mL/Hr) IV Continuous <Continuous>  metoprolol succinate ER 50 milliGRAM(s) Oral daily  pantoprazole    Tablet 40 milliGRAM(s) Oral before breakfast  piperacillin/tazobactam IVPB.. 3.375 Gram(s) IV Intermittent every 8 hours  potassium phosphate IVPB 15 milliMole(s) IV Intermittent once  sertraline 100 milliGRAM(s) Oral daily  traZODone 100 milliGRAM(s) Oral at bedtime    MEDICATIONS  (PRN):  dextrose Oral Gel 15 Gram(s) Oral once PRN Blood Glucose LESS THAN 70 milliGRAM(s)/deciliter  HYDROmorphone  Injectable 0.5 milliGRAM(s) IV Push every 4 hours PRN Severe Pain (7 - 10)  HYDROmorphone  Injectable 0.2 milliGRAM(s) IV Push every 4 hours PRN Moderate Pain (4 - 6)  ondansetron Injectable 4 milliGRAM(s) IV Push every 6 hours PRN Nausea and/or Vomiting      __________________________________________________  REVIEW OF SYSTEMS:    CONSTITUTIONAL: No fever,   EYES: no acute visual disturbances  NECK: No pain or stiffness  RESPIRATORY: No cough; No shortness of breath  CARDIOVASCULAR: No chest pain, no palpitations  GASTROINTESTINAL: No pain. No nausea or vomiting   NEUROLOGICAL: No headache or numbness, no tremors  MUSCULOSKELETAL: No joint pain, no muscle pain  GENITOURINARY: no dysuria, no frequency, no hesitancy  PSYCHIATRY: no depression , no anxiety  ALL OTHER  ROS negative        Vital Signs Last 24 Hrs  T(C): 36.9 (07 Jun 2024 08:28), Max: 37.2 (06 Jun 2024 21:03)  T(F): 98.5 (07 Jun 2024 08:28), Max: 98.9 (06 Jun 2024 21:03)  HR: 51 (07 Jun 2024 08:28) (51 - 65)  BP: 143/82 (07 Jun 2024 08:28) (131/81 - 143/82)  BP(mean): --  RR: 17 (07 Jun 2024 08:28) (17 - 18)  SpO2: 95% (07 Jun 2024 08:28) (94% - 97%)    Parameters below as of 07 Jun 2024 08:28  Patient On (Oxygen Delivery Method): room air        ________________________________________________  PHYSICAL EXAM:  GENERAL: NAD  HEENT: Normocephalic;  conjunctivae and sclerae clear; moist mucous membranes;   NECK : supple  CHEST/LUNG: Clear to auscultation bilaterally with good air entry   HEART: S1 S2  regular; no murmurs, gallops or rubs  ABDOMEN: Soft, mildly tender at the drainage site, Nondistended; Bowel sounds present  EXTREMITIES: no cyanosis; no edema; no calf tenderness  SKIN: warm and dry; no rash  NERVOUS SYSTEM:  Awake and alert; Oriented  to place, person and time ; no new deficits    _________________________________________________  LABS:                        12.0   4.58  )-----------( 207      ( 07 Jun 2024 05:30 )             38.1     06-07    141  |  103  |  4<L>  ----------------------------<  83  3.7   |  26  |  0.82    Ca    9.1      07 Jun 2024 05:30  Phos  2.8     06-07  Mg     2.0     06-07    TPro  8.0  /  Alb  3.5  /  TBili  0.3  /  DBili  x   /  AST  14<L>  /  ALT  18  /  AlkPhos  99  06-05    PT/INR - ( 07 Jun 2024 05:30 )   PT: 10.8 sec;   INR: 0.95          PTT - ( 07 Jun 2024 05:30 )  PTT:29.7 sec  Urinalysis Basic - ( 07 Jun 2024 05:30 )    Color: x / Appearance: x / SG: x / pH: x  Gluc: 83 mg/dL / Ketone: x  / Bili: x / Urobili: x   Blood: x / Protein: x / Nitrite: x   Leuk Esterase: x / RBC: x / WBC x   Sq Epi: x / Non Sq Epi: x / Bacteria: x      CAPILLARY BLOOD GLUCOSE      POCT Blood Glucose.: 104 mg/dL (07 Jun 2024 06:13)  POCT Blood Glucose.: 106 mg/dL (07 Jun 2024 02:14)  POCT Blood Glucose.: 112 mg/dL (06 Jun 2024 17:50)  POCT Blood Glucose.: 117 mg/dL (06 Jun 2024 12:08)        RADIOLOGY & ADDITIONAL TESTS:      Plan of care was discussed with patient and /or primary care giver; all questions and concerns were addressed and care was aligned with patient's wishes.

## 2024-06-07 NOTE — DISCHARGE NOTE PROVIDER - CARE PROVIDER_API CALL
Colin Toussaint  Colon/Rectal Surgery  [address], [address]  [place] [address]  Phone: [phone]  Fax: [phone]  Follow Up Time: 1 week   Colin Toussaint  Colon/Rectal Surgery  [address], [address]  [place] [address]  Phone: [phone]  Fax: [phone]  Follow Up Time:     Belem Hirsch   Surgery  [address], [address]  [place] [address]  Phone: [phone]  Fax: [phone]  Follow Up Time:

## 2024-06-07 NOTE — DISCHARGE NOTE PROVIDER - PROVIDER TOKENS
PROVIDER:[TOKEN:[[id number]],FOLLOWUP:[1 week]] PROVIDER:[TOKEN:[[id number]]],PROVIDER:[TOKEN:[[id number]]]

## 2024-06-07 NOTE — DISCHARGE NOTE PROVIDER - NSDCFUADDINST_GEN_ALL_CORE_FT
General Discharge Instructions:  Please resume all regular home medications unless specifically advised not to take a particular medication. Also, please take any new medications as prescribed.  Please get plenty of rest, continue to ambulate several times per day, and drink adequate amounts of fluids. Avoid lifting weights greater than 5-10 lbs until you follow-up with your surgeon, who will instruct you further regarding activity restrictions.  Avoid driving or operating heavy machinery while taking pain medications.  Please follow-up with your surgeon and Primary Care Provider (PCP) as advised.  Please follow up with Dr. Toussaint in 1-2 weeks by calling his office at [phone]    Incision Care:  *Please call your doctor or nurse practitioner if you have increased pain, swelling, redness, or drainage from the incision site.  *Avoid swimming and baths until your follow-up appointment.  *You may shower, and wash surgical incisions with a mild soap and warm water. Gently pat the area dry.    Warning Signs:  Please call your doctor or nurse practitioner if you experience the following:  *You experience new chest pain, pressure, squeezing or tightness.  *New or worsening cough, shortness of breath, or wheeze.  *If you are vomiting and cannot keep down fluids or your medications.  *You are getting dehydrated due to continued vomiting, diarrhea, or other reasons. Signs of dehydration include dry mouth, rapid heartbeat, or feeling dizzy or faint when standing.  *You see blood or dark/black material when you vomit or have a bowel movement.  *You experience burning when you urinate, have blood in your urine, or experience a discharge.  *Your pain is not improving within 8-12 hours or is not gone within 24 hours. Call or return immediately if your pain is getting worse, changes location, or moves to your chest or back.  *You have shaking chills, or fever greater than 101.5 degrees Fahrenheit or 38 degrees Celsius.  *Any change in your symptoms, or any new symptoms that concern you.  Please follow up with your original surgeon who placed this mesh as soon as possible.     General Discharge Instructions:  Please resume all regular home medications unless specifically advised not to take a particular medication. Also, please take any new medications as prescribed.  Please get plenty of rest, continue to ambulate several times per day, and drink adequate amounts of fluids. Avoid lifting weights greater than 5-10 lbs until you follow-up with your surgeon, who will instruct you further regarding activity restrictions.  Avoid driving or operating heavy machinery while taking pain medications.  Please follow-up with your surgeon and Primary Care Provider (PCP) as advised.  You may follow up with Dr. Hirsch as needed by calling his office at [phone]    Incision Care:  *Please call your doctor or nurse practitioner if you have increased pain, swelling, redness, or drainage from the incision site.  *Avoid swimming and baths until your follow-up appointment.  *You may shower, and wash surgical incisions with a mild soap and warm water. Gently pat the area dry.    Warning Signs:  Please call your doctor or nurse practitioner if you experience the following:  *You experience new chest pain, pressure, squeezing or tightness.  *New or worsening cough, shortness of breath, or wheeze.  *If you are vomiting and cannot keep down fluids or your medications.  *You are getting dehydrated due to continued vomiting, diarrhea, or other reasons. Signs of dehydration include dry mouth, rapid heartbeat, or feeling dizzy or faint when standing.  *You see blood or dark/black material when you vomit or have a bowel movement.  *You experience burning when you urinate, have blood in your urine, or experience a discharge.  *Your pain is not improving within 8-12 hours or is not gone within 24 hours. Call or return immediately if your pain is getting worse, changes location, or moves to your chest or back.  *You have shaking chills, or fever greater than 101.5 degrees Fahrenheit or 38 degrees Celsius.  *Any change in your symptoms, or any new symptoms that concern you.    Please follow up with your original surgeon who placed this mesh as soon as possible.     Please take Augmentin 500 mg-125 mg oral tablet: 500 milligram(s) orally 2 times a day MDD: 2 tabs. Take until you finish all pills    General Discharge Instructions:  Please resume all regular home medications unless specifically advised not to take a particular medication. Also, please take any new medications as prescribed.  Please get plenty of rest, continue to ambulate several times per day, and drink adequate amounts of fluids. Avoid lifting weights greater than 5-10 lbs until you follow-up with your surgeon, who will instruct you further regarding activity restrictions.  Avoid driving or operating heavy machinery while taking pain medications.  Please follow-up with your surgeon and Primary Care Provider (PCP) as advised.  You may follow up with Dr. Hirsch as needed by calling his office at [phone]    Incision Care:  *Please call your doctor or nurse practitioner if you have increased pain, swelling, redness, or drainage from the incision site.  *Avoid swimming and baths until your follow-up appointment.  *You may shower, and wash surgical incisions with a mild soap and warm water. Gently pat the area dry.    Warning Signs:  Please call your doctor or nurse practitioner if you experience the following:  *You experience new chest pain, pressure, squeezing or tightness.  *New or worsening cough, shortness of breath, or wheeze.  *If you are vomiting and cannot keep down fluids or your medications.  *You are getting dehydrated due to continued vomiting, diarrhea, or other reasons. Signs of dehydration include dry mouth, rapid heartbeat, or feeling dizzy or faint when standing.  *You see blood or dark/black material when you vomit or have a bowel movement.  *You experience burning when you urinate, have blood in your urine, or experience a discharge.  *Your pain is not improving within 8-12 hours or is not gone within 24 hours. Call or return immediately if your pain is getting worse, changes location, or moves to your chest or back.  *You have shaking chills, or fever greater than 101.5 degrees Fahrenheit or 38 degrees Celsius.  *Any change in your symptoms, or any new symptoms that concern you.

## 2024-06-07 NOTE — PROGRESS NOTE ADULT - SUBJECTIVE AND OBJECTIVE BOX
SUBJECTIVE:  Patient was evaluated at bedside this AM by general surgery team. Patient remains unchanged this morning and reports diarrhea over night. She states the abdominal pain has remained the same. Patient has been NPO since midnight in preparation for IR procedure today. She currently denies nausea, emesis, fever, or chills    MEDICATIONS  (STANDING):  dextrose 10% Bolus 125 milliLiter(s) IV Bolus once  dextrose 5%. 1000 milliLiter(s) (50 mL/Hr) IV Continuous <Continuous>  dextrose 5%. 1000 milliLiter(s) (100 mL/Hr) IV Continuous <Continuous>  dextrose 50% Injectable 25 Gram(s) IV Push once  dextrose 50% Injectable 12.5 Gram(s) IV Push once  gabapentin 600 milliGRAM(s) Oral two times a day  glucagon  Injectable 1 milliGRAM(s) IntraMuscular once  insulin lispro (ADMELOG) corrective regimen sliding scale   SubCutaneous every 6 hours  lactated ringers. 1000 milliLiter(s) (120 mL/Hr) IV Continuous <Continuous>  metoprolol succinate  milliGRAM(s) Oral daily  pantoprazole    Tablet 40 milliGRAM(s) Oral before breakfast  piperacillin/tazobactam IVPB.. 3.375 Gram(s) IV Intermittent every 8 hours  sertraline 100 milliGRAM(s) Oral daily    MEDICATIONS  (PRN):  dextrose Oral Gel 15 Gram(s) Oral once PRN Blood Glucose LESS THAN 70 milliGRAM(s)/deciliter  HYDROmorphone  Injectable 0.2 milliGRAM(s) IV Push every 4 hours PRN Moderate Pain (4 - 6)  HYDROmorphone  Injectable 0.5 milliGRAM(s) IV Push every 4 hours PRN Severe Pain (7 - 10)  ondansetron Injectable 4 milliGRAM(s) IV Push every 6 hours PRN Nausea and/or Vomiting      Vital Signs Last 24 Hrs  T(C): 36.8 (07 Jun 2024 04:55), Max: 37.2 (06 Jun 2024 21:03)  T(F): 98.3 (07 Jun 2024 04:55), Max: 98.9 (06 Jun 2024 21:03)  HR: 65 (07 Jun 2024 04:55) (54 - 65)  BP: 134/83 (07 Jun 2024 04:55) (121/74 - 137/87)  BP(mean): --  RR: 18 (07 Jun 2024 04:55) (17 - 18)  SpO2: 96% (07 Jun 2024 04:55) (94% - 97%)    Parameters below as of 07 Jun 2024 04:55  Patient On (Oxygen Delivery Method): room air        Physical Exam:  General: NAD, resting comfortably in bed  Pulmonary: Nonlabored breathing, no respiratory distress  Cardiovascular: NSR  Abdominal: soft, TTP at umbilicus, nondistended  Extremities: WWP, normal strength  Neuro: A/O x 3, CNs II-XII grossly intact, no focal deficits, normal motor/sensation  Pulses: palpable distal pulses    I&O's Summary    05 Jun 2024 07:01  -  06 Jun 2024 07:00  --------------------------------------------------------  IN: 920 mL / OUT: 500 mL / NET: 420 mL    06 Jun 2024 07:01  -  07 Jun 2024 06:57  --------------------------------------------------------  IN: 2565 mL / OUT: 2550 mL / NET: 15 mL        LABS:                        11.9   5.32  )-----------( 215      ( 06 Jun 2024 05:30 )             36.6     06-06    138  |  102  |  7   ----------------------------<  98  3.3<L>   |  26  |  0.76    Ca    8.7      06 Jun 2024 05:30  Phos  3.2     06-06  Mg     1.7     06-06    TPro  8.0  /  Alb  3.5  /  TBili  0.3  /  DBili  x   /  AST  14<L>  /  ALT  18  /  AlkPhos  99  06-05    PT/INR - ( 06 Jun 2024 00:24 )   PT: 11.0 sec;   INR: 0.96          PTT - ( 06 Jun 2024 00:24 )  PTT:21.4 sec  Urinalysis Basic - ( 06 Jun 2024 05:30 )    Color: x / Appearance: x / SG: x / pH: x  Gluc: 98 mg/dL / Ketone: x  / Bili: x / Urobili: x   Blood: x / Protein: x / Nitrite: x   Leuk Esterase: x / RBC: x / WBC x   Sq Epi: x / Non Sq Epi: x / Bacteria: x      CAPILLARY BLOOD GLUCOSE      POCT Blood Glucose.: 104 mg/dL (07 Jun 2024 06:13)  POCT Blood Glucose.: 106 mg/dL (07 Jun 2024 02:14)  POCT Blood Glucose.: 112 mg/dL (06 Jun 2024 17:50)  POCT Blood Glucose.: 117 mg/dL (06 Jun 2024 12:08)    LIVER FUNCTIONS - ( 05 Jun 2024 15:12 )  Alb: 3.5 g/dL / Pro: 8.0 g/dL / ALK PHOS: 99 U/L / ALT: 18 U/L / AST: 14 U/L / GGT: x             RADIOLOGY & ADDITIONAL STUDIES:

## 2024-06-07 NOTE — DISCHARGE NOTE PROVIDER - HOSPITAL COURSE
54-year-old female, PMHx of HTN, HLD, PE, DVT not on AC, RA, depression, DM, GERD, presents to the emergency department as transfer from [facility] with 4 days of abdominal pain with associated nausea, chills and diarrhea but no emesis. Pain described as band-like from epigastrium to LLQ, no notable exacerbating or alleviating factors.  CT A/P significant for thick-walled/rim-enhancing collection between loops of small bowel and a periumbilical hernia measuring approximately 1.2 x 2.4 x 3.1 cm, which is nonspecific though suspicious for a small abscess. Patient was transferred for further management at [facility] after work-up showed a possible intraabdominal abscess. Patient is now s/p IR aspiration (6/7). Patient tolerated the procedure well. Patient's hospital course was uncomplicated. Diet was advanced as tolerated without nausea or vomiting and pain was well controlled on oral medication. Patient is ambulating without difficulty and voiding spontaneously with bowel function. This patient is deemed ready for discharge and will follow up with the surgeon in the outpatient setting.

## 2024-06-07 NOTE — CHART NOTE - NSCHARTNOTEFT_GEN_A_CORE
Procedure: IR drainage of intaumbilical fluid collection  Subjective: Patient was evaluated at bedside post-procedural. Patient reports some heartburn and nausea, she also reports spitting up but no emesis. Patient denies pain and reports mild soreness.      Vital Signs Last 24 Hrs  T(C): 36.9 (07 Jun 2024 08:28), Max: 37.2 (06 Jun 2024 21:03)  T(F): 98.5 (07 Jun 2024 08:28), Max: 98.9 (06 Jun 2024 21:03)  HR: 51 (07 Jun 2024 08:28) (51 - 65)  BP: 143/82 (07 Jun 2024 08:28) (131/81 - 143/82)  BP(mean): --  RR: 17 (07 Jun 2024 08:28) (17 - 18)  SpO2: 95% (07 Jun 2024 08:28) (94% - 97%)    Parameters below as of 07 Jun 2024 08:28  Patient On (Oxygen Delivery Method): room air        Physical Exam:  General: NAD, resting comfortably in bed  Pulmonary: Nonlabored breathing, no respiratory distress  Cardiovascular: NSR  Abdominal: soft, ND, appropriate TTP at drainage site (no drains)  Extremities: WWP, normal strength  Neuro: A/O x 3, CNs II-XII grossly intact, no focal deficits, normal sensation  Pulses: palpable distal pulses      LABS:                        12.0   4.58  )-----------( 207      ( 07 Jun 2024 05:30 )             38.1     06-07    141  |  103  |  4<L>  ----------------------------<  83  3.7   |  26  |  0.82    Ca    9.1      07 Jun 2024 05:30  Phos  2.8     06-07  Mg     2.0     06-07    TPro  8.0  /  Alb  3.5  /  TBili  0.3  /  DBili  x   /  AST  14<L>  /  ALT  18  /  AlkPhos  99  06-05    PT/INR - ( 07 Jun 2024 05:30 )   PT: 10.8 sec;   INR: 0.95          PTT - ( 07 Jun 2024 05:30 )  PTT:29.7 sec  CAPILLARY BLOOD GLUCOSE      POCT Blood Glucose.: 119 mg/dL (07 Jun 2024 12:18)  POCT Blood Glucose.: 104 mg/dL (07 Jun 2024 06:13)  POCT Blood Glucose.: 106 mg/dL (07 Jun 2024 02:14)  POCT Blood Glucose.: 112 mg/dL (06 Jun 2024 17:50)    Urinalysis Basic - ( 07 Jun 2024 05:30 )    Color: x / Appearance: x / SG: x / pH: x  Gluc: 83 mg/dL / Ketone: x  / Bili: x / Urobili: x   Blood: x / Protein: x / Nitrite: x   Leuk Esterase: x / RBC: x / WBC x   Sq Epi: x / Non Sq Epi: x / Bacteria: x      LIVER FUNCTIONS - ( 05 Jun 2024 15:12 )  Alb: 3.5 g/dL / Pro: 8.0 g/dL / ALK PHOS: 99 U/L / ALT: 18 U/L / AST: 14 U/L / GGT: x           ABO Interpretation: B (06-07 @ 07:32)        Radiology and Additional Studies:    Assessment:54y Female s/p above procedure    Plan:  - CLD, /IVF   - Pain & PRN nausea control   - Zosyn   - DVT ppx   - AM labs

## 2024-06-08 LAB
ANION GAP SERPL CALC-SCNC: 15 MMOL/L — SIGNIFICANT CHANGE UP (ref 5–17)
BUN SERPL-MCNC: 6 MG/DL — LOW (ref 7–23)
CALCIUM SERPL-MCNC: 9.5 MG/DL — SIGNIFICANT CHANGE UP (ref 8.4–10.5)
CHLORIDE SERPL-SCNC: 106 MMOL/L — SIGNIFICANT CHANGE UP (ref 96–108)
CO2 SERPL-SCNC: 20 MMOL/L — LOW (ref 22–31)
CREAT SERPL-MCNC: 0.8 MG/DL — SIGNIFICANT CHANGE UP (ref 0.5–1.3)
EGFR: 87 ML/MIN/1.73M2 — SIGNIFICANT CHANGE UP
GLUCOSE BLDC GLUCOMTR-MCNC: 114 MG/DL — HIGH (ref 70–99)
GLUCOSE BLDC GLUCOMTR-MCNC: 120 MG/DL — HIGH (ref 70–99)
GLUCOSE BLDC GLUCOMTR-MCNC: 128 MG/DL — HIGH (ref 70–99)
GLUCOSE BLDC GLUCOMTR-MCNC: 137 MG/DL — HIGH (ref 70–99)
GLUCOSE SERPL-MCNC: 118 MG/DL — HIGH (ref 70–99)
HCT VFR BLD CALC: 37.5 % — SIGNIFICANT CHANGE UP (ref 34.5–45)
HGB BLD-MCNC: 11.9 G/DL — SIGNIFICANT CHANGE UP (ref 11.5–15.5)
MAGNESIUM SERPL-MCNC: 1.9 MG/DL — SIGNIFICANT CHANGE UP (ref 1.6–2.6)
MCHC RBC-ENTMCNC: 28.4 PG — SIGNIFICANT CHANGE UP (ref 27–34)
MCHC RBC-ENTMCNC: 31.7 GM/DL — LOW (ref 32–36)
MCV RBC AUTO: 89.5 FL — SIGNIFICANT CHANGE UP (ref 80–100)
NRBC # BLD: 0 /100 WBCS — SIGNIFICANT CHANGE UP (ref 0–0)
PHOSPHATE SERPL-MCNC: 3.8 MG/DL — SIGNIFICANT CHANGE UP (ref 2.5–4.5)
PLATELET # BLD AUTO: 224 K/UL — SIGNIFICANT CHANGE UP (ref 150–400)
POTASSIUM SERPL-MCNC: SIGNIFICANT CHANGE UP (ref 3.5–5.3)
POTASSIUM SERPL-SCNC: SIGNIFICANT CHANGE UP (ref 3.5–5.3)
RBC # BLD: 4.19 M/UL — SIGNIFICANT CHANGE UP (ref 3.8–5.2)
RBC # FLD: 13.9 % — SIGNIFICANT CHANGE UP (ref 10.3–14.5)
SODIUM SERPL-SCNC: 141 MMOL/L — SIGNIFICANT CHANGE UP (ref 135–145)
WBC # BLD: 4.99 K/UL — SIGNIFICANT CHANGE UP (ref 3.8–10.5)
WBC # FLD AUTO: 4.99 K/UL — SIGNIFICANT CHANGE UP (ref 3.8–10.5)

## 2024-06-08 PROCEDURE — 99233 SBSQ HOSP IP/OBS HIGH 50: CPT

## 2024-06-08 RX ORDER — MAGNESIUM SULFATE 500 MG/ML
1 VIAL (ML) INJECTION ONCE
Refills: 0 | Status: COMPLETED | OUTPATIENT
Start: 2024-06-08 | End: 2024-06-08

## 2024-06-08 RX ORDER — BACITRACIN ZINC 500 UNIT/G
1 OINTMENT IN PACKET (EA) TOPICAL ONCE
Refills: 0 | Status: COMPLETED | OUTPATIENT
Start: 2024-06-08 | End: 2024-06-08

## 2024-06-08 RX ADMIN — HYDROMORPHONE HYDROCHLORIDE 0.5 MILLIGRAM(S): 2 INJECTION INTRAMUSCULAR; INTRAVENOUS; SUBCUTANEOUS at 18:44

## 2024-06-08 RX ADMIN — HYDROMORPHONE HYDROCHLORIDE 0.5 MILLIGRAM(S): 2 INJECTION INTRAMUSCULAR; INTRAVENOUS; SUBCUTANEOUS at 23:26

## 2024-06-08 RX ADMIN — HEPARIN SODIUM 5000 UNIT(S): 5000 INJECTION INTRAVENOUS; SUBCUTANEOUS at 10:12

## 2024-06-08 RX ADMIN — SERTRALINE 100 MILLIGRAM(S): 25 TABLET, FILM COATED ORAL at 12:38

## 2024-06-08 RX ADMIN — Medication 100 MILLIGRAM(S): at 06:25

## 2024-06-08 RX ADMIN — HYDROMORPHONE HYDROCHLORIDE 0.5 MILLIGRAM(S): 2 INJECTION INTRAMUSCULAR; INTRAVENOUS; SUBCUTANEOUS at 06:24

## 2024-06-08 RX ADMIN — HEPARIN SODIUM 5000 UNIT(S): 5000 INJECTION INTRAVENOUS; SUBCUTANEOUS at 01:53

## 2024-06-08 RX ADMIN — GABAPENTIN 600 MILLIGRAM(S): 400 CAPSULE ORAL at 18:17

## 2024-06-08 RX ADMIN — HEPARIN SODIUM 5000 UNIT(S): 5000 INJECTION INTRAVENOUS; SUBCUTANEOUS at 18:17

## 2024-06-08 RX ADMIN — HYDROMORPHONE HYDROCHLORIDE 0.5 MILLIGRAM(S): 2 INJECTION INTRAMUSCULAR; INTRAVENOUS; SUBCUTANEOUS at 06:38

## 2024-06-08 RX ADMIN — HYDROMORPHONE HYDROCHLORIDE 0.5 MILLIGRAM(S): 2 INJECTION INTRAMUSCULAR; INTRAVENOUS; SUBCUTANEOUS at 19:00

## 2024-06-08 RX ADMIN — PIPERACILLIN AND TAZOBACTAM 25 GRAM(S): 4; .5 INJECTION, POWDER, LYOPHILIZED, FOR SOLUTION INTRAVENOUS at 06:25

## 2024-06-08 RX ADMIN — PANTOPRAZOLE SODIUM 40 MILLIGRAM(S): 20 TABLET, DELAYED RELEASE ORAL at 06:25

## 2024-06-08 RX ADMIN — PIPERACILLIN AND TAZOBACTAM 25 GRAM(S): 4; .5 INJECTION, POWDER, LYOPHILIZED, FOR SOLUTION INTRAVENOUS at 15:30

## 2024-06-08 RX ADMIN — Medication 1 APPLICATION(S): at 14:20

## 2024-06-08 RX ADMIN — Medication 30 MILLILITER(S): at 13:51

## 2024-06-08 RX ADMIN — PIPERACILLIN AND TAZOBACTAM 25 GRAM(S): 4; .5 INJECTION, POWDER, LYOPHILIZED, FOR SOLUTION INTRAVENOUS at 22:55

## 2024-06-08 RX ADMIN — Medication 100 GRAM(S): at 13:01

## 2024-06-08 RX ADMIN — ONDANSETRON 4 MILLIGRAM(S): 8 TABLET, FILM COATED ORAL at 13:06

## 2024-06-08 RX ADMIN — Medication 100 MILLIGRAM(S): at 22:55

## 2024-06-08 RX ADMIN — HYDROMORPHONE HYDROCHLORIDE 0.5 MILLIGRAM(S): 2 INJECTION INTRAMUSCULAR; INTRAVENOUS; SUBCUTANEOUS at 02:21

## 2024-06-08 RX ADMIN — GABAPENTIN 600 MILLIGRAM(S): 400 CAPSULE ORAL at 06:25

## 2024-06-08 RX ADMIN — HYDROMORPHONE HYDROCHLORIDE 0.5 MILLIGRAM(S): 2 INJECTION INTRAMUSCULAR; INTRAVENOUS; SUBCUTANEOUS at 23:41

## 2024-06-08 RX ADMIN — HYDROMORPHONE HYDROCHLORIDE 0.5 MILLIGRAM(S): 2 INJECTION INTRAMUSCULAR; INTRAVENOUS; SUBCUTANEOUS at 02:06

## 2024-06-08 NOTE — PROVIDER CONTACT NOTE (CRITICAL VALUE NOTIFICATION) - TEST AND RESULT REPORTED:
6/7 Body fluid culture result:  rare klebsiella pneumoniae, rare e. coli, rare proteus vulgaris group, rare aeromonas hydrophilia/caviae complex

## 2024-06-08 NOTE — PROGRESS NOTE ADULT - SUBJECTIVE AND OBJECTIVE BOX
Patient is a 54y old  Female who presents with a chief complaint of Intra-abdominal abscess (08 Jun 2024 06:36)      INTERVAL HPI/OVERNIGHT EVENTS: offers no new complaints; current symptoms resolving    MEDICATIONS  (STANDING):  dextrose 10% Bolus 125 milliLiter(s) IV Bolus once  dextrose 5%. 1000 milliLiter(s) (100 mL/Hr) IV Continuous <Continuous>  dextrose 5%. 1000 milliLiter(s) (50 mL/Hr) IV Continuous <Continuous>  dextrose 50% Injectable 25 Gram(s) IV Push once  dextrose 50% Injectable 12.5 Gram(s) IV Push once  gabapentin 600 milliGRAM(s) Oral two times a day  glucagon  Injectable 1 milliGRAM(s) IntraMuscular once  heparin   Injectable 5000 Unit(s) SubCutaneous every 8 hours  insulin lispro (ADMELOG) corrective regimen sliding scale   SubCutaneous every 6 hours  lactated ringers. 1000 milliLiter(s) (120 mL/Hr) IV Continuous <Continuous>  metoprolol succinate  milliGRAM(s) Oral daily  pantoprazole    Tablet 40 milliGRAM(s) Oral before breakfast  piperacillin/tazobactam IVPB.. 3.375 Gram(s) IV Intermittent every 8 hours  sertraline 100 milliGRAM(s) Oral daily  traZODone 100 milliGRAM(s) Oral at bedtime    MEDICATIONS  (PRN):  dextrose Oral Gel 15 Gram(s) Oral once PRN Blood Glucose LESS THAN 70 milliGRAM(s)/deciliter  HYDROmorphone  Injectable 0.2 milliGRAM(s) IV Push every 4 hours PRN Moderate Pain (4 - 6)  HYDROmorphone  Injectable 0.5 milliGRAM(s) IV Push every 4 hours PRN Severe Pain (7 - 10)  ondansetron Injectable 4 milliGRAM(s) IV Push every 6 hours PRN Nausea and/or Vomiting      __________________________________________________  REVIEW OF SYSTEMS:    CONSTITUTIONAL: No fever,   EYES: no acute visual disturbances  NECK: No pain or stiffness  RESPIRATORY: No cough; No shortness of breath  CARDIOVASCULAR: No chest pain, no palpitations  GASTROINTESTINAL: + pain. No nausea or vomiting; + diarrhea   NEUROLOGICAL: No headache or numbness, no tremors  MUSCULOSKELETAL: No joint pain, no muscle pain  GENITOURINARY: no dysuria, no frequency, no hesitancy  PSYCHIATRY: no depression , no anxiety  ALL OTHER  ROS negative        Vital Signs Last 24 Hrs  T(C): 36.9 (08 Jun 2024 08:40), Max: 37.3 (07 Jun 2024 17:12)  T(F): 98.4 (08 Jun 2024 08:40), Max: 99.1 (07 Jun 2024 17:12)  HR: 54 (08 Jun 2024 08:40) (54 - 85)  BP: 131/69 (08 Jun 2024 08:40) (128/78 - 157/91)  BP(mean): --  RR: 17 (08 Jun 2024 08:40) (16 - 18)  SpO2: 95% (08 Jun 2024 08:40) (95% - 98%)    Parameters below as of 08 Jun 2024 08:40  Patient On (Oxygen Delivery Method): room air        ________________________________________________  PHYSICAL EXAM:  GENERAL: NAD  HEENT: Normocephalic;  conjunctivae and sclerae clear; moist mucous membranes;   NECK : supple  CHEST/LUNG: Clear to auscultation bilaterally with good air entry   HEART: S1 S2  regular; no murmurs, gallops or rubs  ABDOMEN: Soft, ttp, Nondistended; Bowel sounds present  EXTREMITIES: no cyanosis; no edema; no calf tenderness  SKIN: warm and dry; no rash  NERVOUS SYSTEM:  Awake and alert; Oriented  to place, person and time ; no new deficits    _________________________________________________  LABS:                        11.9   4.99  )-----------( 224      ( 08 Jun 2024 06:30 )             37.5     06-08    141  |  106  |  6<L>  ----------------------------<  118<H>  See Note   |  20<L>  |  0.80    Ca    9.5      08 Jun 2024 06:30  Phos  3.8     06-08  Mg     1.9     06-08      PT/INR - ( 07 Jun 2024 05:30 )   PT: 10.8 sec;   INR: 0.95          PTT - ( 07 Jun 2024 05:30 )  PTT:29.7 sec  Urinalysis Basic - ( 08 Jun 2024 06:30 )    Color: x / Appearance: x / SG: x / pH: x  Gluc: 118 mg/dL / Ketone: x  / Bili: x / Urobili: x   Blood: x / Protein: x / Nitrite: x   Leuk Esterase: x / RBC: x / WBC x   Sq Epi: x / Non Sq Epi: x / Bacteria: x      CAPILLARY BLOOD GLUCOSE      POCT Blood Glucose.: 128 mg/dL (08 Jun 2024 06:05)  POCT Blood Glucose.: 137 mg/dL (08 Jun 2024 00:22)  POCT Blood Glucose.: 124 mg/dL (07 Jun 2024 21:56)  POCT Blood Glucose.: 108 mg/dL (07 Jun 2024 17:49)  POCT Blood Glucose.: 119 mg/dL (07 Jun 2024 12:18)        RADIOLOGY & ADDITIONAL TESTS:      Plan of care was discussed with patient and /or primary care giver; all questions and concerns were addressed and care was aligned with patient's wishes.

## 2024-06-08 NOTE — PROGRESS NOTE ADULT - SUBJECTIVE AND OBJECTIVE BOX
INTERVAL HPI/OVERNIGHT EVENTS:    STATUS POST:      POST OPERATIVE DAY #:     SUBJECTIVE:      heparin   Injectable 5000 Unit(s) SubCutaneous every 8 hours  metoprolol succinate  milliGRAM(s) Oral daily  piperacillin/tazobactam IVPB.. 3.375 Gram(s) IV Intermittent every 8 hours      Vital Signs Last 24 Hrs  T(C): 36.7 (08 Jun 2024 05:19), Max: 37.3 (07 Jun 2024 17:12)  T(F): 98.1 (08 Jun 2024 05:19), Max: 99.1 (07 Jun 2024 17:12)  HR: 83 (08 Jun 2024 05:19) (51 - 85)  BP: 147/83 (08 Jun 2024 05:19) (128/78 - 157/91)  BP(mean): --  RR: 17 (08 Jun 2024 05:19) (16 - 18)  SpO2: 98% (08 Jun 2024 05:19) (95% - 98%)    Parameters below as of 08 Jun 2024 05:19  Patient On (Oxygen Delivery Method): room air      I&O's Detail    06 Jun 2024 07:01  -  07 Jun 2024 07:00  --------------------------------------------------------  IN:    IV PiggyBack: 175 mL    Lactated Ringers: 2160 mL    Oral Fluid: 230 mL  Total IN: 2565 mL    OUT:    Voided (mL): 2550 mL  Total OUT: 2550 mL    Total NET: 15 mL      07 Jun 2024 07:01  -  08 Jun 2024 06:36  --------------------------------------------------------  IN:    Lactated Ringers: 1080 mL  Total IN: 1080 mL    OUT:    Voided (mL): 900 mL  Total OUT: 900 mL    Total NET: 180 mL          General: NAD, resting comfortably in bed  C/V: NSR  Pulm: Nonlabored breathing, no respiratory distress  Abd: soft, NT/ND.  Extrem: WWP, no edema, SCDs in place  Drains:  Tamayo:      LABS:                        12.0   4.58  )-----------( 207      ( 07 Jun 2024 05:30 )             38.1     06-07    141  |  103  |  4<L>  ----------------------------<  83  3.7   |  26  |  0.82    Ca    9.1      07 Jun 2024 05:30  Phos  2.8     06-07  Mg     2.0     06-07      PT/INR - ( 07 Jun 2024 05:30 )   PT: 10.8 sec;   INR: 0.95          PTT - ( 07 Jun 2024 05:30 )  PTT:29.7 sec  Urinalysis Basic - ( 07 Jun 2024 05:30 )    Color: x / Appearance: x / SG: x / pH: x  Gluc: 83 mg/dL / Ketone: x  / Bili: x / Urobili: x   Blood: x / Protein: x / Nitrite: x   Leuk Esterase: x / RBC: x / WBC x   Sq Epi: x / Non Sq Epi: x / Bacteria: x        RADIOLOGY & ADDITIONAL STUDIES:   INTERVAL HPI/OVERNIGHT EVENTS: gram neg robs on IR cx    STATUS POST: IR aspiration of infraumbilical fluid collection    POST OPERATIVE DAY #: 1    SUBJECTIVE: Patient seen and examined at bedside with chief resident. Patient states that her abdominal pain has improved. She endorses mild nausea but denies any episodes of emesis. She states that she has had an episode of dark brown stool and endorses regular flatus.      heparin   Injectable 5000 Unit(s) SubCutaneous every 8 hours  metoprolol succinate  milliGRAM(s) Oral daily  piperacillin/tazobactam IVPB.. 3.375 Gram(s) IV Intermittent every 8 hours      Vital Signs Last 24 Hrs  T(C): 36.7 (08 Jun 2024 05:19), Max: 37.3 (07 Jun 2024 17:12)  T(F): 98.1 (08 Jun 2024 05:19), Max: 99.1 (07 Jun 2024 17:12)  HR: 83 (08 Jun 2024 05:19) (51 - 85)  BP: 147/83 (08 Jun 2024 05:19) (128/78 - 157/91)  BP(mean): --  RR: 17 (08 Jun 2024 05:19) (16 - 18)  SpO2: 98% (08 Jun 2024 05:19) (95% - 98%)    Parameters below as of 08 Jun 2024 05:19  Patient On (Oxygen Delivery Method): room air      I&O's Detail    06 Jun 2024 07:01  -  07 Jun 2024 07:00  --------------------------------------------------------  IN:    IV PiggyBack: 175 mL    Lactated Ringers: 2160 mL    Oral Fluid: 230 mL  Total IN: 2565 mL    OUT:    Voided (mL): 2550 mL  Total OUT: 2550 mL    Total NET: 15 mL      07 Jun 2024 07:01  -  08 Jun 2024 06:36  --------------------------------------------------------  IN:    Lactated Ringers: 1080 mL  Total IN: 1080 mL    OUT:    Voided (mL): 900 mL  Total OUT: 900 mL    Total NET: 180 mL          General: NAD, resting comfortably in bed  C/V: NSR  Pulm: Nonlabored breathing, no respiratory distress  Abd: soft, mildly ttp in RLQ, nondistended, umbilical dressing clean, dry, and intact  Extrem: WWP, no edema, SCDs in place      LABS:                        12.0   4.58  )-----------( 207      ( 07 Jun 2024 05:30 )             38.1     06-07    141  |  103  |  4<L>  ----------------------------<  83  3.7   |  26  |  0.82    Ca    9.1      07 Jun 2024 05:30  Phos  2.8     06-07  Mg     2.0     06-07      PT/INR - ( 07 Jun 2024 05:30 )   PT: 10.8 sec;   INR: 0.95          PTT - ( 07 Jun 2024 05:30 )  PTT:29.7 sec  Urinalysis Basic - ( 07 Jun 2024 05:30 )    Color: x / Appearance: x / SG: x / pH: x  Gluc: 83 mg/dL / Ketone: x  / Bili: x / Urobili: x   Blood: x / Protein: x / Nitrite: x   Leuk Esterase: x / RBC: x / WBC x   Sq Epi: x / Non Sq Epi: x / Bacteria: x        RADIOLOGY & ADDITIONAL STUDIES:

## 2024-06-09 ENCOUNTER — TRANSCRIPTION ENCOUNTER (OUTPATIENT)
Age: 54
End: 2024-06-09

## 2024-06-09 VITALS
HEART RATE: 55 BPM | RESPIRATION RATE: 18 BRPM | DIASTOLIC BLOOD PRESSURE: 86 MMHG | TEMPERATURE: 99 F | OXYGEN SATURATION: 97 % | SYSTOLIC BLOOD PRESSURE: 143 MMHG

## 2024-06-09 LAB
-  AMOXICILLIN/CLAVULANIC ACID: SIGNIFICANT CHANGE UP
-  AMPICILLIN/SULBACTAM: SIGNIFICANT CHANGE UP
-  AMPICILLIN: SIGNIFICANT CHANGE UP
-  CEFAZOLIN: SIGNIFICANT CHANGE UP
-  CEFEPIME: SIGNIFICANT CHANGE UP
-  CEFOXITIN: SIGNIFICANT CHANGE UP
-  CEFTAZIDIME: SIGNIFICANT CHANGE UP
-  CEFTRIAXONE: SIGNIFICANT CHANGE UP
-  CIPROFLOXACIN: SIGNIFICANT CHANGE UP
-  GENTAMICIN: SIGNIFICANT CHANGE UP
-  LEVOFLOXACIN: SIGNIFICANT CHANGE UP
-  MEROPENEM: SIGNIFICANT CHANGE UP
-  PIPERACILLIN/TAZOBACTAM: SIGNIFICANT CHANGE UP
-  TOBRAMYCIN: SIGNIFICANT CHANGE UP
-  TRIMETHOPRIM/SULFAMETHOXAZOLE: SIGNIFICANT CHANGE UP
ANION GAP SERPL CALC-SCNC: 12 MMOL/L — SIGNIFICANT CHANGE UP (ref 5–17)
BUN SERPL-MCNC: 7 MG/DL — SIGNIFICANT CHANGE UP (ref 7–23)
CALCIUM SERPL-MCNC: 9.3 MG/DL — SIGNIFICANT CHANGE UP (ref 8.4–10.5)
CHLORIDE SERPL-SCNC: 107 MMOL/L — SIGNIFICANT CHANGE UP (ref 96–108)
CO2 SERPL-SCNC: 25 MMOL/L — SIGNIFICANT CHANGE UP (ref 22–31)
CREAT SERPL-MCNC: 0.84 MG/DL — SIGNIFICANT CHANGE UP (ref 0.5–1.3)
EGFR: 82 ML/MIN/1.73M2 — SIGNIFICANT CHANGE UP
GLUCOSE BLDC GLUCOMTR-MCNC: 100 MG/DL — HIGH (ref 70–99)
GLUCOSE BLDC GLUCOMTR-MCNC: 109 MG/DL — HIGH (ref 70–99)
GLUCOSE BLDC GLUCOMTR-MCNC: 131 MG/DL — HIGH (ref 70–99)
GLUCOSE SERPL-MCNC: 98 MG/DL — SIGNIFICANT CHANGE UP (ref 70–99)
HCT VFR BLD CALC: 38.2 % — SIGNIFICANT CHANGE UP (ref 34.5–45)
HGB BLD-MCNC: 12.4 G/DL — SIGNIFICANT CHANGE UP (ref 11.5–15.5)
MAGNESIUM SERPL-MCNC: 1.9 MG/DL — SIGNIFICANT CHANGE UP (ref 1.6–2.6)
MCHC RBC-ENTMCNC: 28.8 PG — SIGNIFICANT CHANGE UP (ref 27–34)
MCHC RBC-ENTMCNC: 32.5 GM/DL — SIGNIFICANT CHANGE UP (ref 32–36)
MCV RBC AUTO: 88.6 FL — SIGNIFICANT CHANGE UP (ref 80–100)
METHOD TYPE: SIGNIFICANT CHANGE UP
NRBC # BLD: 0 /100 WBCS — SIGNIFICANT CHANGE UP (ref 0–0)
PHOSPHATE SERPL-MCNC: 3.7 MG/DL — SIGNIFICANT CHANGE UP (ref 2.5–4.5)
PLATELET # BLD AUTO: 209 K/UL — SIGNIFICANT CHANGE UP (ref 150–400)
POTASSIUM SERPL-MCNC: 3.5 MMOL/L — SIGNIFICANT CHANGE UP (ref 3.5–5.3)
POTASSIUM SERPL-SCNC: 3.5 MMOL/L — SIGNIFICANT CHANGE UP (ref 3.5–5.3)
RBC # BLD: 4.31 M/UL — SIGNIFICANT CHANGE UP (ref 3.8–5.2)
RBC # FLD: 14.1 % — SIGNIFICANT CHANGE UP (ref 10.3–14.5)
SODIUM SERPL-SCNC: 144 MMOL/L — SIGNIFICANT CHANGE UP (ref 135–145)
WBC # BLD: 4.62 K/UL — SIGNIFICANT CHANGE UP (ref 3.8–10.5)
WBC # FLD AUTO: 4.62 K/UL — SIGNIFICANT CHANGE UP (ref 3.8–10.5)

## 2024-06-09 PROCEDURE — C1769: CPT

## 2024-06-09 PROCEDURE — C1729: CPT

## 2024-06-09 PROCEDURE — 87449 NOS EACH ORGANISM AG IA: CPT

## 2024-06-09 PROCEDURE — C8929: CPT

## 2024-06-09 PROCEDURE — 71045 X-RAY EXAM CHEST 1 VIEW: CPT

## 2024-06-09 PROCEDURE — 85027 COMPLETE CBC AUTOMATED: CPT

## 2024-06-09 PROCEDURE — 87077 CULTURE AEROBIC IDENTIFY: CPT

## 2024-06-09 PROCEDURE — 82962 GLUCOSE BLOOD TEST: CPT

## 2024-06-09 PROCEDURE — 83735 ASSAY OF MAGNESIUM: CPT

## 2024-06-09 PROCEDURE — 99285 EMERGENCY DEPT VISIT HI MDM: CPT | Mod: 25

## 2024-06-09 PROCEDURE — 87205 SMEAR GRAM STAIN: CPT

## 2024-06-09 PROCEDURE — 85730 THROMBOPLASTIN TIME PARTIAL: CPT

## 2024-06-09 PROCEDURE — 87075 CULTR BACTERIA EXCEPT BLOOD: CPT

## 2024-06-09 PROCEDURE — 87507 IADNA-DNA/RNA PROBE TQ 12-25: CPT

## 2024-06-09 PROCEDURE — 96374 THER/PROPH/DIAG INJ IV PUSH: CPT

## 2024-06-09 PROCEDURE — 86900 BLOOD TYPING SEROLOGIC ABO: CPT

## 2024-06-09 PROCEDURE — 87070 CULTURE OTHR SPECIMN AEROBIC: CPT

## 2024-06-09 PROCEDURE — 36415 COLL VENOUS BLD VENIPUNCTURE: CPT

## 2024-06-09 PROCEDURE — 80048 BASIC METABOLIC PNL TOTAL CA: CPT

## 2024-06-09 PROCEDURE — 83036 HEMOGLOBIN GLYCOSYLATED A1C: CPT

## 2024-06-09 PROCEDURE — 86850 RBC ANTIBODY SCREEN: CPT

## 2024-06-09 PROCEDURE — 49406 IMAGE CATH FLUID PERI/RETRO: CPT

## 2024-06-09 PROCEDURE — 87186 SC STD MICRODIL/AGAR DIL: CPT

## 2024-06-09 PROCEDURE — 84100 ASSAY OF PHOSPHORUS: CPT

## 2024-06-09 PROCEDURE — 85610 PROTHROMBIN TIME: CPT

## 2024-06-09 PROCEDURE — 87324 CLOSTRIDIUM AG IA: CPT

## 2024-06-09 PROCEDURE — 99233 SBSQ HOSP IP/OBS HIGH 50: CPT

## 2024-06-09 PROCEDURE — 86901 BLOOD TYPING SEROLOGIC RH(D): CPT

## 2024-06-09 RX ORDER — POTASSIUM CHLORIDE 20 MEQ
40 PACKET (EA) ORAL ONCE
Refills: 0 | Status: DISCONTINUED | OUTPATIENT
Start: 2024-06-09 | End: 2024-06-09

## 2024-06-09 RX ORDER — POTASSIUM CHLORIDE 20 MEQ
40 PACKET (EA) ORAL ONCE
Refills: 0 | Status: COMPLETED | OUTPATIENT
Start: 2024-06-09 | End: 2024-06-09

## 2024-06-09 RX ORDER — MAGNESIUM SULFATE 500 MG/ML
1 VIAL (ML) INJECTION ONCE
Refills: 0 | Status: COMPLETED | OUTPATIENT
Start: 2024-06-09 | End: 2024-06-09

## 2024-06-09 RX ADMIN — ONDANSETRON 4 MILLIGRAM(S): 8 TABLET, FILM COATED ORAL at 10:09

## 2024-06-09 RX ADMIN — Medication 40 MILLIEQUIVALENT(S): at 15:11

## 2024-06-09 RX ADMIN — HEPARIN SODIUM 5000 UNIT(S): 5000 INJECTION INTRAVENOUS; SUBCUTANEOUS at 02:31

## 2024-06-09 RX ADMIN — GABAPENTIN 600 MILLIGRAM(S): 400 CAPSULE ORAL at 06:12

## 2024-06-09 RX ADMIN — HEPARIN SODIUM 5000 UNIT(S): 5000 INJECTION INTRAVENOUS; SUBCUTANEOUS at 10:26

## 2024-06-09 RX ADMIN — HYDROMORPHONE HYDROCHLORIDE 0.5 MILLIGRAM(S): 2 INJECTION INTRAMUSCULAR; INTRAVENOUS; SUBCUTANEOUS at 07:19

## 2024-06-09 RX ADMIN — PIPERACILLIN AND TAZOBACTAM 25 GRAM(S): 4; .5 INJECTION, POWDER, LYOPHILIZED, FOR SOLUTION INTRAVENOUS at 06:13

## 2024-06-09 RX ADMIN — PIPERACILLIN AND TAZOBACTAM 25 GRAM(S): 4; .5 INJECTION, POWDER, LYOPHILIZED, FOR SOLUTION INTRAVENOUS at 15:11

## 2024-06-09 RX ADMIN — HYDROMORPHONE HYDROCHLORIDE 0.5 MILLIGRAM(S): 2 INJECTION INTRAMUSCULAR; INTRAVENOUS; SUBCUTANEOUS at 11:41

## 2024-06-09 RX ADMIN — SERTRALINE 100 MILLIGRAM(S): 25 TABLET, FILM COATED ORAL at 15:11

## 2024-06-09 RX ADMIN — HYDROMORPHONE HYDROCHLORIDE 0.5 MILLIGRAM(S): 2 INJECTION INTRAMUSCULAR; INTRAVENOUS; SUBCUTANEOUS at 00:00

## 2024-06-09 RX ADMIN — HYDROMORPHONE HYDROCHLORIDE 0.5 MILLIGRAM(S): 2 INJECTION INTRAMUSCULAR; INTRAVENOUS; SUBCUTANEOUS at 15:52

## 2024-06-09 RX ADMIN — Medication 100 GRAM(S): at 15:41

## 2024-06-09 RX ADMIN — HYDROMORPHONE HYDROCHLORIDE 0.5 MILLIGRAM(S): 2 INJECTION INTRAMUSCULAR; INTRAVENOUS; SUBCUTANEOUS at 11:26

## 2024-06-09 RX ADMIN — GABAPENTIN 600 MILLIGRAM(S): 400 CAPSULE ORAL at 19:32

## 2024-06-09 RX ADMIN — PANTOPRAZOLE SODIUM 40 MILLIGRAM(S): 20 TABLET, DELAYED RELEASE ORAL at 06:13

## 2024-06-09 NOTE — PROGRESS NOTE ADULT - REASON FOR ADMISSION
Intra-abdominal abscess

## 2024-06-09 NOTE — PROGRESS NOTE ADULT - NUTRITIONAL ASSESSMENT
This patient has been assessed with a concern for Malnutrition and has been determined to have a diagnosis/diagnoses of Severe protein-calorie malnutrition.    This patient is being managed with:   Diet Clear Liquid-  Entered: Jun 7 2024 11:13AM    Diet NPO after Midnight-     NPO Start Date: 06-Jun-2024   NPO Start Time: 23:59  Except Medications  Entered: Jun 7 2024  7:22AM  
This patient has been assessed with a concern for Malnutrition and has been determined to have a diagnosis/diagnoses of Severe protein-calorie malnutrition.    This patient is being managed with:   Diet Consistent Carbohydrate/No Snacks-  Entered: Jun 7 2024  3:52PM  
This patient has been assessed with a concern for Malnutrition and has been determined to have a diagnosis/diagnoses of Severe protein-calorie malnutrition.    This patient is being managed with:   Diet NPO after Midnight-     NPO Start Date: 06-Jun-2024   NPO Start Time: 23:59  Entered: Jun 6 2024  4:13PM    Diet Clear Liquid-  Consistent Carbohydrate {Evening Snack} (CSTCHOSN)  Entered: Jun 6 2024  7:20AM

## 2024-06-09 NOTE — PROGRESS NOTE ADULT - SUBJECTIVE AND OBJECTIVE BOX
Patient is a 54y old  Female who presents with a chief complaint of Intra-abdominal abscess (09 Jun 2024 07:35)      INTERVAL HPI/OVERNIGHT EVENTS: offers no new complaints; current symptoms resolving    MEDICATIONS  (STANDING):  dextrose 10% Bolus 125 milliLiter(s) IV Bolus once  dextrose 5%. 1000 milliLiter(s) (100 mL/Hr) IV Continuous <Continuous>  dextrose 5%. 1000 milliLiter(s) (50 mL/Hr) IV Continuous <Continuous>  dextrose 50% Injectable 25 Gram(s) IV Push once  dextrose 50% Injectable 12.5 Gram(s) IV Push once  gabapentin 600 milliGRAM(s) Oral two times a day  glucagon  Injectable 1 milliGRAM(s) IntraMuscular once  heparin   Injectable 5000 Unit(s) SubCutaneous every 8 hours  insulin lispro (ADMELOG) corrective regimen sliding scale   SubCutaneous every 6 hours  magnesium sulfate  IVPB 1 Gram(s) IV Intermittent once  metoprolol succinate  milliGRAM(s) Oral daily  pantoprazole    Tablet 40 milliGRAM(s) Oral before breakfast  piperacillin/tazobactam IVPB.. 3.375 Gram(s) IV Intermittent every 8 hours  potassium chloride   Powder 40 milliEquivalent(s) Oral once  sertraline 100 milliGRAM(s) Oral daily  traZODone 100 milliGRAM(s) Oral at bedtime    MEDICATIONS  (PRN):  dextrose Oral Gel 15 Gram(s) Oral once PRN Blood Glucose LESS THAN 70 milliGRAM(s)/deciliter  HYDROmorphone  Injectable 0.2 milliGRAM(s) IV Push every 4 hours PRN Moderate Pain (4 - 6)  HYDROmorphone  Injectable 0.5 milliGRAM(s) IV Push every 4 hours PRN Severe Pain (7 - 10)  ondansetron Injectable 4 milliGRAM(s) IV Push every 6 hours PRN Nausea and/or Vomiting      __________________________________________________  REVIEW OF SYSTEMS:    CONSTITUTIONAL: No fever,   EYES: no acute visual disturbances  NECK: No pain or stiffness  RESPIRATORY: No cough; No shortness of breath  CARDIOVASCULAR: No chest pain, no palpitations  GASTROINTESTINAL: +pain. No nausea or vomiting; +diarrhea   NEUROLOGICAL: No headache or numbness, no tremors  MUSCULOSKELETAL: No joint pain, no muscle pain  GENITOURINARY: no dysuria, no frequency, no hesitancy  PSYCHIATRY: no depression , no anxiety  ALL OTHER  ROS negative        Vital Signs Last 24 Hrs  T(C): 36.8 (09 Jun 2024 09:27), Max: 37.1 (08 Jun 2024 16:54)  T(F): 98.2 (09 Jun 2024 09:27), Max: 98.7 (08 Jun 2024 16:54)  HR: 55 (09 Jun 2024 09:27) (53 - 59)  BP: 144/77 (09 Jun 2024 09:27) (136/84 - 157/79)  BP(mean): --  RR: 18 (09 Jun 2024 09:27) (17 - 18)  SpO2: 99% (09 Jun 2024 09:27) (94% - 99%)    Parameters below as of 09 Jun 2024 09:27  Patient On (Oxygen Delivery Method): room air        ________________________________________________  PHYSICAL EXAM:  GENERAL: NAD  HEENT: Normocephalic;  conjunctivae and sclerae clear; moist mucous membranes;   NECK : supple  CHEST/LUNG: Clear to auscultation bilaterally with good air entry   HEART: S1 S2  regular; no murmurs, gallops or rubs  ABDOMEN: Soft, TTP in LQ, Nondistended; Bowel sounds present  EXTREMITIES: no cyanosis; no edema; no calf tenderness  SKIN: warm and dry; no rash  NERVOUS SYSTEM:  Awake and alert; Oriented  to place, person and time ; no new deficits    _________________________________________________  LABS:                        12.4   4.62  )-----------( 209      ( 09 Jun 2024 07:41 )             38.2     06-09    144  |  107  |  7   ----------------------------<  98  3.5   |  25  |  0.84    Ca    9.3      09 Jun 2024 07:41  Phos  3.7     06-09  Mg     1.9     06-09        Urinalysis Basic - ( 09 Jun 2024 07:41 )    Color: x / Appearance: x / SG: x / pH: x  Gluc: 98 mg/dL / Ketone: x  / Bili: x / Urobili: x   Blood: x / Protein: x / Nitrite: x   Leuk Esterase: x / RBC: x / WBC x   Sq Epi: x / Non Sq Epi: x / Bacteria: x      CAPILLARY BLOOD GLUCOSE      POCT Blood Glucose.: 131 mg/dL (09 Jun 2024 12:49)  POCT Blood Glucose.: 100 mg/dL (09 Jun 2024 06:38)  POCT Blood Glucose.: 109 mg/dL (09 Jun 2024 00:34)  POCT Blood Glucose.: 120 mg/dL (08 Jun 2024 18:01)        RADIOLOGY & ADDITIONAL TESTS:      Plan of care was discussed with patient and /or primary care giver; all questions and concerns were addressed and care was aligned with patient's wishes.

## 2024-06-09 NOTE — DISCHARGE NOTE NURSING/CASE MANAGEMENT/SOCIAL WORK - NSDCPEFALRISK_GEN_ALL_CORE
For information on Fall & Injury Prevention, visit: [email or web address] OR  [email or web address] OR  https://www.cdc.gov/steadi/patient.html

## 2024-06-09 NOTE — DISCHARGE NOTE NURSING/CASE MANAGEMENT/SOCIAL WORK - PATIENT PORTAL LINK FT
You can access the FollowMyHealth Patient Portal offered by [facility] by registering at the following website: [email or web address]. By joining [facility]’s FollowMyHealth portal, you will also be able to view your health information using other applications (apps) compatible with our system.

## 2024-06-09 NOTE — PROGRESS NOTE ADULT - SUBJECTIVE AND OBJECTIVE BOX
ON: IR Cx w Klebsiella pneumo, e coli, proteus, aeromonas hydrophila  6/8: IR dressing removed and bacitracin applied, IVHL.     POST-OP DAY: 6/7: IR aspiration of infraumbilical fluid collection     SUBJECTIVE: Patient seen and examined bedside by Surgical resident.    [ ] F / [ ] BM    ROS: Patient denies any fevers/chills, headache, nausea, vomiting SOB, chest pains, calf tenderness at this time.     heparin   Injectable 5000 Unit(s) SubCutaneous every 8 hours  metoprolol succinate  milliGRAM(s) Oral daily  piperacillin/tazobactam IVPB.. 3.375 Gram(s) IV Intermittent every 8 hours    MEDICATIONS  (PRN):  dextrose Oral Gel 15 Gram(s) Oral once PRN Blood Glucose LESS THAN 70 milliGRAM(s)/deciliter  HYDROmorphone  Injectable 0.2 milliGRAM(s) IV Push every 4 hours PRN Moderate Pain (4 - 6)  HYDROmorphone  Injectable 0.5 milliGRAM(s) IV Push every 4 hours PRN Severe Pain (7 - 10)  ondansetron Injectable 4 milliGRAM(s) IV Push every 6 hours PRN Nausea and/or Vomiting      I&O's Detail    08 Jun 2024 07:01  -  09 Jun 2024 07:00  --------------------------------------------------------  IN:    Oral Fluid: 300 mL  Total IN: 300 mL    OUT:    Voided (mL): 1300 mL  Total OUT: 1300 mL    Total NET: -1000 mL          Vital Signs Last 24 Hrs  T(C): 36.9 (09 Jun 2024 05:00), Max: 37.2 (08 Jun 2024 12:54)  T(F): 98.4 (09 Jun 2024 05:00), Max: 99 (08 Jun 2024 12:54)  HR: 55 (09 Jun 2024 06:09) (50 - 59)  BP: 136/84 (09 Jun 2024 06:09) (131/69 - 157/79)  BP(mean): --  RR: 17 (09 Jun 2024 05:00) (17 - 18)  SpO2: 94% (09 Jun 2024 05:00) (94% - 99%)    Parameters below as of 09 Jun 2024 05:00  Patient On (Oxygen Delivery Method): room air        General: NAD, resting comfortably in bed  C/V: NSR  Pulm: Nonlabored breathing, no respiratory distress  Abd: soft, NT/ND  Extrem: WWP, no edema, SCDs in place    LABS:                        11.9   4.99  )-----------( 224      ( 08 Jun 2024 06:30 )             37.5     06-08    141  |  106  |  6<L>  ----------------------------<  118<H>  See Note   |  20<L>  |  0.80    Ca    9.5      08 Jun 2024 06:30  Phos  3.8     06-08  Mg     1.9     06-08        Urinalysis Basic - ( 08 Jun 2024 06:30 )    Color: x / Appearance: x / SG: x / pH: x  Gluc: 118 mg/dL / Ketone: x  / Bili: x / Urobili: x   Blood: x / Protein: x / Nitrite: x   Leuk Esterase: x / RBC: x / WBC x   Sq Epi: x / Non Sq Epi: x / Bacteria: x        RADIOLOGY & ADDITIONAL STUDIES:     ON: IR Cx w Klebsiella pneumo, e coli, proteus, aeromonas hydrophila  6/8: IR dressing removed and bacitracin applied, IVHL.     POST-OP DAY: 6/7: IR aspiration of infraumbilical fluid collection     SUBJECTIVE: Patient seen and examined bedside by Surgical resident. resting comfortably in bed this am, no new complaints. states that pain is well controlled at this time.     [+ ] F / [+ ] BM    ROS: Patient denies any fevers/chills, headache, nausea, vomiting SOB, chest pains, calf tenderness at this time.     heparin   Injectable 5000 Unit(s) SubCutaneous every 8 hours  metoprolol succinate  milliGRAM(s) Oral daily  piperacillin/tazobactam IVPB.. 3.375 Gram(s) IV Intermittent every 8 hours    MEDICATIONS  (PRN):  dextrose Oral Gel 15 Gram(s) Oral once PRN Blood Glucose LESS THAN 70 milliGRAM(s)/deciliter  HYDROmorphone  Injectable 0.2 milliGRAM(s) IV Push every 4 hours PRN Moderate Pain (4 - 6)  HYDROmorphone  Injectable 0.5 milliGRAM(s) IV Push every 4 hours PRN Severe Pain (7 - 10)  ondansetron Injectable 4 milliGRAM(s) IV Push every 6 hours PRN Nausea and/or Vomiting      I&O's Detail    08 Jun 2024 07:01  -  09 Jun 2024 07:00  --------------------------------------------------------  IN:    Oral Fluid: 300 mL  Total IN: 300 mL    OUT:    Voided (mL): 1300 mL  Total OUT: 1300 mL    Total NET: -1000 mL          Vital Signs Last 24 Hrs  T(C): 36.9 (09 Jun 2024 05:00), Max: 37.2 (08 Jun 2024 12:54)  T(F): 98.4 (09 Jun 2024 05:00), Max: 99 (08 Jun 2024 12:54)  HR: 55 (09 Jun 2024 06:09) (50 - 59)  BP: 136/84 (09 Jun 2024 06:09) (131/69 - 157/79)  BP(mean): --  RR: 17 (09 Jun 2024 05:00) (17 - 18)  SpO2: 94% (09 Jun 2024 05:00) (94% - 99%)    Parameters below as of 09 Jun 2024 05:00  Patient On (Oxygen Delivery Method): room air      General: NAD, resting comfortably in bed  C/V: NSR  Pulm: Nonlabored breathing, no respiratory distress  Abd: soft, mildly ttp in RLQ, nondistended, umbilical dressing clean, dry, and intact  Extrem: WWP, no edema, SCDs in place      LABS:                        11.9   4.99  )-----------( 224      ( 08 Jun 2024 06:30 )             37.5     06-08    141  |  106  |  6<L>  ----------------------------<  118<H>  See Note   |  20<L>  |  0.80    Ca    9.5      08 Jun 2024 06:30  Phos  3.8     06-08  Mg     1.9     06-08        Urinalysis Basic - ( 08 Jun 2024 06:30 )    Color: x / Appearance: x / SG: x / pH: x  Gluc: 118 mg/dL / Ketone: x  / Bili: x / Urobili: x   Blood: x / Protein: x / Nitrite: x   Leuk Esterase: x / RBC: x / WBC x   Sq Epi: x / Non Sq Epi: x / Bacteria: x        RADIOLOGY & ADDITIONAL STUDIES:

## 2024-06-09 NOTE — PROGRESS NOTE ADULT - ASSESSMENT
54-year-old female, PMHx of HTN, HLD, PE, DVT not on AC, RA, depression, DM, GERD, presents to the emergency departmenta as transfer from [facility] for 4 days of abdominal pain with associated nausea, chills and diarrhea but no emesis. CT noted to have Umbilical hernia with 1.2 x 2.4 x 3.1 cm intra-abdominal infraumbilical fluid collection. Pt admitted for umbilical hernia with adjacent intra-abdominal abscess      Umbilical hernia  intra-abdominal abscess  CLD   started on iv abx zosyn   s/p IR drainage 6/7 - fu cultures   rest of the management per primary team     SOB/CHOWDARY  EKG W twi in v1-v2  fu ECHO    diarrhea   no risk factors for c -diff ( denies recent abx use, no health care exposure prior to admission)   negative GI PCR and c diff     Hx of DVT/PE   provoked and unprovoked in the   should be on long term AC but pt is refusing to go back on subq lovenox ( failed xarelto )   pt will need outpt fu with heme onc and was advised/counseled on long term AC benefits and Risk   cw scd and dvt ppx     depression   restart sertraline 100 and trazodone qd    HTN   bp on the softer side   can resume amlodipine 10 mg qd w parameters   hold toprol for sinus bradycardia - can decrease the dose to toprol 50 in am as HR is in 50's  hold losartan and hctz 
54-year-old female, PMHx of HTN, HLD, PE, DVT not on AC, RA, depression, DM, GERD, presents to the emergency departmenta as transfer from [facility] for 4 days of abdominal pain with associated nausea, chills and diarrhea but no emesis. CT noted to have Umbilical hernia with 1.2 x 2.4 x 3.1 cm intra-abdominal infraumbilical fluid collection. Pt admitted for umbilical hernia with adjacent intra-abdominal abscess      Umbilical hernia  intra-abdominal abscess  CLD   started on iv abx zosyn   s/p IR drainage 6/7 - fu cultures w GNR pending sens   rest of the management per primary team     SOB/CHOWDARY  EKG W twi in v1-v2  fu ECHO w LVH     diarrhea   no risk factors for c -diff ( denies recent abx use, no health care exposure prior to admission)   negative GI PCR and c diff     Hx of DVT/PE   provoked and unprovoked in the   should be on long term AC but pt is refusing to go back on subq lovenox ( failed xarelto )   pt will need outpt fu with heme onc and was advised/counseled on long term AC benefits and Risk   cw scd and dvt ppx     depression   restart sertraline 100 and trazodone qd    HTN   bp on the softer side   continue with home medications 
54-year-old female, PMHx of HTN, HLD, PE, DVT not on AC, RA, depression, DM, GERD, presents to the emergency departmenta as transfer from [facility] for 4 days of abdominal pain with associated nausea, chills and diarrhea but no emesis. CT noted to have Umbilical hernia with 1.2 x 2.4 x 3.1 cm intra-abdominal infraumbilical fluid collection. Pt admitted for umbilical hernia with adjacent intra-abdominal abscess      Umbilical hernia  intra-abdominal abscess  on iv abx zosyn   diet advanced   s/p IR drainage 6/7 - fu cultures w GNR - ecoli, klebs, proteus pending sens   rest of the management per primary team     SOB/CHOWDARY  EKG W twi in v1-v2  fu ECHO w LVH     diarrhea   no risk factors for c -diff ( denies recent abx use, no health care exposure prior to admission)   negative GI PCR and c diff     Hx of DVT/PE   provoked and unprovoked in the   should be on long term AC but pt is refusing to go back on subq lovenox ( failed xarelto )   pt will need outpt fu with heme onc and was advised/counseled on long term AC benefits and Risk   cw scd and dvt ppx     depression   restart sertraline 100 and trazodone qd    HTN   bp on the softer side   continue with home medications 
54-year-old female, PMHx of HTN, HLD, PE, DVT not on AC, RA, depression, DM, GERD, presents to the emergency departmenta as transfer from [facility] with umbilical hernia with 1.2 x 2.4 x 3.1 cm intra-abdominal infraumbilical fluid collection now s/p IR aspiration (6/7)    Regular  Pain & PRN nausea control   Zosyn   DVT ppx   AM labs  
54-year-old female, PMHx of HTN, HLD, PE, DVT not on AC, RA, depression, DM, GERD, presents to the emergency departmenta as transfer from [facility] for 4 days of abdominal pain with associated nausea, chills and diarrhea but no emesis. Abdomen soft and non-distended but mildly tender in left hemiabdomen with palpable umbilical hernia. Afebrile & hemodynamically normal. labs WNL. CT A/P noted Umbilical hernia with 1.2 x 2.4 x 3.1 cm intra-abdominal infraumbilical fluid collection. Intra-abdominal collection only identifiable cause of abdominal pain. Gastroenteritis considered given presentation. Appendix and gallbladder normal appearing. Mesenteric ischemia unlikely given normal lab findings and lack of severity of pain. Liver hemangiomas and cyst also noted on scan but not likely cause of pain. Other possible causes such as epiploid appendagitis unlikely. Working diagnosis at this time is umbilical hernia with adjacent intra-abdominal abscess. Will admit for pain control and IV Abx     - CLD/IVF   - Pain & PRN nausea control   -Continue with IV Abx   - DVT ppx   - f/u c.diff/PCR for watery diarrhea  - F/u TTE and CXR  
54-year-old female, PMHx of HTN, HLD, PE, DVT not on AC, RA, depression, DM, GERD, presents to the emergency departmenta as transfer from [facility] for 4 days of abdominal pain with associated nausea, chills and diarrhea but no emesis. Abdomen soft and non-distended but mildly tender in left hemiabdomen with palpable umbilical hernia. Afebrile & hemodynamically normal. labs WNL. CT A/P noted Umbilical hernia with 1.2 x 2.4 x 3.1 cm intra-abdominal infraumbilical fluid collection. Intra-abdominal collection only identifiable cause of abdominal pain. Gastroenteritis considered given presentation. Appendix and gallbladder normal appearing. Mesenteric ischemia unlikely given normal lab findings and lack of severity of pain. Liver hemangiomas and cyst also noted on scan but not likely cause of pain. Other possible causes such as epiploid appendagitis unlikely. Working diagnosis at this time is umbilical hernia with adjacent intra-abdominal abscess. Will admit for pain control and IV Abx     - CLD, NPO@MN/IVF   - Pain & PRN nausea control   - IV Abx   - DVT ppx   - AM labs  
54-year-old female, PMHx of HTN, HLD, PE, DVT not on AC, RA, depression, DM, GERD, presents to the emergency departmenta as transfer from [facility] with umbilical hernia with 1.2 x 2.4 x 3.1 cm intra-abdominal infraumbilical fluid collection now s/p IR aspiration (6/7)    - Regular  - Pain & PRN nausea control   - Zosyn   - DVT ppx   - AM labs

## 2024-06-12 LAB
CULTURE RESULTS: ABNORMAL
ORGANISM # SPEC MICROSCOPIC CNT: ABNORMAL
ORGANISM # SPEC MICROSCOPIC CNT: SIGNIFICANT CHANGE UP
SPECIMEN SOURCE: SIGNIFICANT CHANGE UP

## 2024-06-18 DIAGNOSIS — F32.9 MAJOR DEPRESSIVE DISORDER, SINGLE EPISODE, UNSPECIFIED: ICD-10-CM

## 2024-06-18 DIAGNOSIS — I10 ESSENTIAL (PRIMARY) HYPERTENSION: ICD-10-CM

## 2024-06-18 DIAGNOSIS — K42.9 UMBILICAL HERNIA WITHOUT OBSTRUCTION OR GANGRENE: ICD-10-CM

## 2024-06-18 DIAGNOSIS — E43 UNSPECIFIED SEVERE PROTEIN-CALORIE MALNUTRITION: ICD-10-CM

## 2024-06-18 DIAGNOSIS — Z90.3 ACQUIRED ABSENCE OF STOMACH [PART OF]: ICD-10-CM

## 2024-06-18 DIAGNOSIS — K65.1 PERITONEAL ABSCESS: ICD-10-CM

## 2024-06-18 DIAGNOSIS — K21.9 GASTRO-ESOPHAGEAL REFLUX DISEASE WITHOUT ESOPHAGITIS: ICD-10-CM

## 2024-06-18 DIAGNOSIS — D18.03 HEMANGIOMA OF INTRA-ABDOMINAL STRUCTURES: ICD-10-CM

## 2024-06-18 DIAGNOSIS — Z98.84 BARIATRIC SURGERY STATUS: ICD-10-CM

## 2024-06-18 DIAGNOSIS — E78.5 HYPERLIPIDEMIA, UNSPECIFIED: ICD-10-CM

## 2024-06-18 DIAGNOSIS — Z86.718 PERSONAL HISTORY OF OTHER VENOUS THROMBOSIS AND EMBOLISM: ICD-10-CM

## 2024-06-18 DIAGNOSIS — R10.9 UNSPECIFIED ABDOMINAL PAIN: ICD-10-CM

## 2024-06-18 DIAGNOSIS — E11.9 TYPE 2 DIABETES MELLITUS WITHOUT COMPLICATIONS: ICD-10-CM

## 2024-06-18 DIAGNOSIS — Z86.711 PERSONAL HISTORY OF PULMONARY EMBOLISM: ICD-10-CM

## 2024-06-18 DIAGNOSIS — Z90.49 ACQUIRED ABSENCE OF OTHER SPECIFIED PARTS OF DIGESTIVE TRACT: ICD-10-CM

## 2024-06-18 DIAGNOSIS — M06.9 RHEUMATOID ARTHRITIS, UNSPECIFIED: ICD-10-CM

## 2024-06-23 ENCOUNTER — NON-APPOINTMENT (OUTPATIENT)
Age: 54
End: 2024-06-23

## 2024-06-24 ENCOUNTER — APPOINTMENT (OUTPATIENT)
Dept: COLORECTAL SURGERY | Facility: CLINIC | Age: 54
End: 2024-06-24
Payer: MEDICAID

## 2024-06-24 VITALS
HEART RATE: 76 BPM | DIASTOLIC BLOOD PRESSURE: 82 MMHG | TEMPERATURE: 98.3 F | BODY MASS INDEX: 31.37 KG/M2 | SYSTOLIC BLOOD PRESSURE: 119 MMHG | WEIGHT: 207 LBS | HEIGHT: 68 IN

## 2024-06-24 DIAGNOSIS — Z82.49 FAMILY HISTORY OF ISCHEMIC HEART DISEASE AND OTHER DISEASES OF THE CIRCULATORY SYSTEM: ICD-10-CM

## 2024-06-24 DIAGNOSIS — Z83.3 FAMILY HISTORY OF DIABETES MELLITUS: ICD-10-CM

## 2024-06-24 DIAGNOSIS — Z83.438 FAMILY HISTORY OF OTHER DISORDER OF LIPOPROTEIN METABOLISM AND OTHER LIPIDEMIA: ICD-10-CM

## 2024-06-24 DIAGNOSIS — R10.9 UNSPECIFIED ABDOMINAL PAIN: ICD-10-CM

## 2024-06-24 PROCEDURE — 99203 OFFICE O/P NEW LOW 30 MIN: CPT

## 2024-08-14 ENCOUNTER — OFFICE (OUTPATIENT)
Dept: URBAN - METROPOLITAN AREA CLINIC 76 | Facility: CLINIC | Age: 54
Setting detail: OPHTHALMOLOGY
End: 2024-08-14
Payer: MEDICAID

## 2024-08-14 DIAGNOSIS — H01.001: ICD-10-CM

## 2024-08-14 DIAGNOSIS — H16.223: ICD-10-CM

## 2024-08-14 DIAGNOSIS — H25.13: ICD-10-CM

## 2024-08-14 DIAGNOSIS — H20.813: ICD-10-CM

## 2024-08-14 DIAGNOSIS — H35.033: ICD-10-CM

## 2024-08-14 DIAGNOSIS — H40.013: ICD-10-CM

## 2024-08-14 DIAGNOSIS — E11.9: ICD-10-CM

## 2024-08-14 DIAGNOSIS — H01.004: ICD-10-CM

## 2024-08-14 PROCEDURE — 99213 OFFICE O/P EST LOW 20 MIN: CPT | Performed by: OPHTHALMOLOGY

## 2024-08-14 PROCEDURE — 92250 FUNDUS PHOTOGRAPHY W/I&R: CPT | Performed by: OPHTHALMOLOGY

## 2024-08-14 ASSESSMENT — LID EXAM ASSESSMENTS
OS_BLEPHARITIS: LUL 2+
OD_BLEPHARITIS: RUL 2+

## 2024-08-14 ASSESSMENT — CONFRONTATIONAL VISUAL FIELD TEST (CVF)
OD_FINDINGS: FULL
OS_FINDINGS: FULL

## 2024-09-01 NOTE — ED ADULT NURSE NOTE - NS ED NURSE RECORD ANOTHER HT AND WT
[FreeTextEntry1] : ABI is a 48 year F w/MHx of HLD,BMI 30  who presents for follow up. Last OV 8/1/2024 as a new patient. 8/6/2024 CT Heart Calcium score Agatston 0. 8/21/2024  hepatic cyst seen advised US abdomen ( done)w/ 2.3 cm hepatic cyst.  pt has hx mild rt shoulder pain was given PT referral requested. The patient is here for follow-up of and ongoing management  elevated cholesterol. Patient is currently tolerating medication and denies muscle pain, joint pain, back pain,  urinary changes , nausea, vomiting, abdominal pain or diarrhea. The patient is trying to follow a low cholesterol diet. Yes

## 2024-10-13 NOTE — DISCUSSION/SUMMARY
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), hx of splenic vein thrombosis, former tobacco abuse who presents for dyspnea and CP.\par \par Dyspnea on exertion/chest tightness\par - Her symptoms developed shortly after hospitalization for recent hip surgery (8/2020)\par - Check 2D echocardiogram for structural and functional assessment\par - Even though she is currently on lovenox (lifelong), it is reasonable to obtain CTA chest to r/o PE (?CTEPH) given hx of recurrent DVT/PE in the past and hx of AC failure (splenic vein thrombosis while on Xarelto)\par - CTA to include assessment of coronaries as well to r/o obstructive lesions (noted normal pharmacologic nuclear and echocardiogram in 2018) \par - Her hematologist is Dr. Vigil, she has had a negative hypercoagulable work up in 1/2019 (negative lupus anticoagulant, cardiolipin Ab, beta-2-glycoprotein, factor V leiden mutation, PT gene mutation). She also has a strong family history of thrombotic events.\par \par ASCVD risk reduction\par - Her cardiac RF include HTN, RA, obesity\par - Her BP is well controlled today, A1c  5.4%\par - Will check lipid profile next visit (Lipid profile 2019: , , HDL 67, LDL 47)\par - Encouraged patient to continue healthy exercise and eating habits, focusing on a Mediterranean style of eating and aiming for the recommended 150 minutes per week of moderate physical activity.\par - Dietary and exercise habits reviewed and discussed with over 50% of the 60 minute visit spent discussing cardiovascular disease, the optimization of risk factors and lifestyle strategies that can be used to achieve this.\par \par 
Universal Safety Interventions

## 2024-10-23 ENCOUNTER — INPATIENT (INPATIENT)
Facility: HOSPITAL | Age: 54
LOS: 2 days | Discharge: HOME | DRG: 379 | End: 2024-10-26
Attending: STUDENT IN AN ORGANIZED HEALTH CARE EDUCATION/TRAINING PROGRAM | Admitting: SURGERY
Payer: MEDICAID

## 2024-10-23 VITALS
RESPIRATION RATE: 18 BRPM | DIASTOLIC BLOOD PRESSURE: 72 MMHG | HEART RATE: 64 BPM | WEIGHT: 205.91 LBS | SYSTOLIC BLOOD PRESSURE: 145 MMHG | OXYGEN SATURATION: 99 % | TEMPERATURE: 98 F

## 2024-10-23 DIAGNOSIS — Z98.890 OTHER SPECIFIED POSTPROCEDURAL STATES: Chronic | ICD-10-CM

## 2024-10-23 DIAGNOSIS — Z90.49 ACQUIRED ABSENCE OF OTHER SPECIFIED PARTS OF DIGESTIVE TRACT: Chronic | ICD-10-CM

## 2024-10-23 DIAGNOSIS — K46.9 UNSPECIFIED ABDOMINAL HERNIA WITHOUT OBSTRUCTION OR GANGRENE: Chronic | ICD-10-CM

## 2024-10-23 DIAGNOSIS — Z90.3 ACQUIRED ABSENCE OF STOMACH [PART OF]: Chronic | ICD-10-CM

## 2024-10-23 LAB
ALBUMIN SERPL ELPH-MCNC: 3.4 G/DL — SIGNIFICANT CHANGE UP (ref 3.4–5)
ALP SERPL-CCNC: 85 U/L — SIGNIFICANT CHANGE UP (ref 40–120)
ALT FLD-CCNC: 11 U/L — LOW (ref 12–42)
ANION GAP SERPL CALC-SCNC: 5 MMOL/L — LOW (ref 9–16)
APTT BLD: 30 SEC — SIGNIFICANT CHANGE UP (ref 24.5–35.6)
AST SERPL-CCNC: 17 U/L — SIGNIFICANT CHANGE UP (ref 15–37)
BASOPHILS # BLD AUTO: 0.02 K/UL — SIGNIFICANT CHANGE UP (ref 0–0.2)
BASOPHILS NFR BLD AUTO: 0.4 % — SIGNIFICANT CHANGE UP (ref 0–2)
BILIRUB SERPL-MCNC: 0.2 MG/DL — SIGNIFICANT CHANGE UP (ref 0.2–1.2)
BUN SERPL-MCNC: 16 MG/DL — SIGNIFICANT CHANGE UP (ref 7–23)
CALCIUM SERPL-MCNC: 8.8 MG/DL — SIGNIFICANT CHANGE UP (ref 8.5–10.5)
CHLORIDE SERPL-SCNC: 106 MMOL/L — SIGNIFICANT CHANGE UP (ref 96–108)
CO2 SERPL-SCNC: 31 MMOL/L — SIGNIFICANT CHANGE UP (ref 22–31)
CREAT SERPL-MCNC: 0.83 MG/DL — SIGNIFICANT CHANGE UP (ref 0.5–1.3)
EGFR: 83 ML/MIN/1.73M2 — SIGNIFICANT CHANGE UP
EGFR: 83 ML/MIN/1.73M2 — SIGNIFICANT CHANGE UP
EOSINOPHIL # BLD AUTO: 0.12 K/UL — SIGNIFICANT CHANGE UP (ref 0–0.5)
EOSINOPHIL NFR BLD AUTO: 2.1 % — SIGNIFICANT CHANGE UP (ref 0–6)
GLUCOSE SERPL-MCNC: 120 MG/DL — HIGH (ref 70–99)
HCT VFR BLD CALC: 31.1 % — LOW (ref 34.5–45)
HCT VFR BLD CALC: 36.5 % — SIGNIFICANT CHANGE UP (ref 34.5–45)
HGB BLD-MCNC: 10 G/DL — LOW (ref 11.5–15.5)
HGB BLD-MCNC: 11.5 G/DL — SIGNIFICANT CHANGE UP (ref 11.5–15.5)
IMM GRANULOCYTES NFR BLD AUTO: 0.4 % — SIGNIFICANT CHANGE UP (ref 0–0.9)
INR BLD: 0.94 — SIGNIFICANT CHANGE UP (ref 0.85–1.16)
LACTATE BLDV-MCNC: 1 MMOL/L — SIGNIFICANT CHANGE UP (ref 0.5–2)
LIDOCAIN IGE QN: 51 U/L — SIGNIFICANT CHANGE UP (ref 16–77)
LYMPHOCYTES # BLD AUTO: 1.99 K/UL — SIGNIFICANT CHANGE UP (ref 1–3.3)
LYMPHOCYTES # BLD AUTO: 34.9 % — SIGNIFICANT CHANGE UP (ref 13–44)
MCHC RBC-ENTMCNC: 28 PG — SIGNIFICANT CHANGE UP (ref 27–34)
MCHC RBC-ENTMCNC: 28.5 PG — SIGNIFICANT CHANGE UP (ref 27–34)
MCHC RBC-ENTMCNC: 31.5 GM/DL — LOW (ref 32–36)
MCHC RBC-ENTMCNC: 32.2 GM/DL — SIGNIFICANT CHANGE UP (ref 32–36)
MCV RBC AUTO: 88.6 FL — SIGNIFICANT CHANGE UP (ref 80–100)
MCV RBC AUTO: 89 FL — SIGNIFICANT CHANGE UP (ref 80–100)
MONOCYTES # BLD AUTO: 0.49 K/UL — SIGNIFICANT CHANGE UP (ref 0–0.9)
MONOCYTES NFR BLD AUTO: 8.6 % — SIGNIFICANT CHANGE UP (ref 2–14)
NEUTROPHILS # BLD AUTO: 3.07 K/UL — SIGNIFICANT CHANGE UP (ref 1.8–7.4)
NEUTROPHILS NFR BLD AUTO: 53.6 % — SIGNIFICANT CHANGE UP (ref 43–77)
NRBC # BLD: 0 /100 WBCS — SIGNIFICANT CHANGE UP (ref 0–0)
NRBC # BLD: 0 /100 WBCS — SIGNIFICANT CHANGE UP (ref 0–0)
NRBC BLD-RTO: 0 /100 WBCS — SIGNIFICANT CHANGE UP (ref 0–0)
NRBC BLD-RTO: 0 /100 WBCS — SIGNIFICANT CHANGE UP (ref 0–0)
PCO2 BLDV: 58 MMHG — HIGH (ref 39–42)
PH BLDV: 7.35 — SIGNIFICANT CHANGE UP (ref 7.32–7.43)
PLATELET # BLD AUTO: 192 K/UL — SIGNIFICANT CHANGE UP (ref 150–400)
PLATELET # BLD AUTO: 233 K/UL — SIGNIFICANT CHANGE UP (ref 150–400)
PO2 BLDV: <35 MMHG — SIGNIFICANT CHANGE UP (ref 25–45)
POTASSIUM SERPL-MCNC: 3.4 MMOL/L — LOW (ref 3.5–5.3)
POTASSIUM SERPL-SCNC: 3.4 MMOL/L — LOW (ref 3.5–5.3)
PROT SERPL-MCNC: 7.3 G/DL — SIGNIFICANT CHANGE UP (ref 6.4–8.2)
PROTHROM AB SERPL-ACNC: 11 SEC — SIGNIFICANT CHANGE UP (ref 9.9–13.4)
RBC # BLD: 3.51 M/UL — LOW (ref 3.8–5.2)
RBC # BLD: 4.1 M/UL — SIGNIFICANT CHANGE UP (ref 3.8–5.2)
RBC # FLD: 13.8 % — SIGNIFICANT CHANGE UP (ref 10.3–14.5)
RBC # FLD: 13.9 % — SIGNIFICANT CHANGE UP (ref 10.3–14.5)
SAO2 % BLDV: 46.1 % — LOW (ref 67–88)
SODIUM SERPL-SCNC: 142 MMOL/L — SIGNIFICANT CHANGE UP (ref 132–145)
WBC # BLD: 5.71 K/UL — SIGNIFICANT CHANGE UP (ref 3.8–10.5)
WBC # BLD: 6.2 K/UL — SIGNIFICANT CHANGE UP (ref 3.8–10.5)
WBC # FLD AUTO: 5.71 K/UL — SIGNIFICANT CHANGE UP (ref 3.8–10.5)
WBC # FLD AUTO: 6.2 K/UL — SIGNIFICANT CHANGE UP (ref 3.8–10.5)

## 2024-10-23 PROCEDURE — 99285 EMERGENCY DEPT VISIT HI MDM: CPT

## 2024-10-23 PROCEDURE — 74174 CTA ABD&PLVS W/CONTRAST: CPT | Mod: 26,MC

## 2024-10-23 RX ORDER — SODIUM CHLORIDE 9 G/1000ML
1000 INJECTION, SOLUTION INTRAVENOUS
Refills: 0 | Status: DISCONTINUED | OUTPATIENT
Start: 2024-10-23 | End: 2024-10-23

## 2024-10-23 RX ORDER — ONDANSETRON HCL/PF 4 MG/2 ML
4 VIAL (ML) INJECTION EVERY 6 HOURS
Refills: 0 | Status: DISCONTINUED | OUTPATIENT
Start: 2024-10-23 | End: 2024-10-26

## 2024-10-23 RX ORDER — ONDANSETRON HCL/PF 4 MG/2 ML
4 VIAL (ML) INJECTION ONCE
Refills: 0 | Status: COMPLETED | OUTPATIENT
Start: 2024-10-23 | End: 2024-10-23

## 2024-10-23 RX ORDER — SODIUM CHLORIDE 9 G/1000ML
1000 INJECTION, SOLUTION INTRAVENOUS ONCE
Refills: 0 | Status: COMPLETED | OUTPATIENT
Start: 2024-10-23 | End: 2024-10-23

## 2024-10-23 RX ORDER — ACETAMINOPHEN 500 MG/5ML
975 LIQUID (ML) ORAL ONCE
Refills: 0 | Status: COMPLETED | OUTPATIENT
Start: 2024-10-23 | End: 2024-10-23

## 2024-10-23 RX ORDER — POLYETHYLENE GLYCOL-3350 AND ELECTROLYTES 236; 6.74; 5.86; 2.97; 22.74 G/274.31G; G/274.31G; G/274.31G; G/274.31G; G/274.31G
4000 POWDER, FOR SOLUTION ORAL ONCE
Refills: 0 | Status: COMPLETED | OUTPATIENT
Start: 2024-10-23 | End: 2024-10-23

## 2024-10-23 RX ADMIN — Medication 4 MILLIGRAM(S): at 16:49

## 2024-10-23 RX ADMIN — Medication 40 MILLIEQUIVALENT(S): at 18:48

## 2024-10-23 RX ADMIN — SODIUM CHLORIDE 1000 MILLILITER(S): 9 INJECTION, SOLUTION INTRAVENOUS at 16:49

## 2024-10-23 RX ADMIN — Medication 80 MILLIGRAM(S): at 16:56

## 2024-10-23 RX ADMIN — Medication 975 MILLIGRAM(S): at 16:49

## 2024-10-23 RX ADMIN — POLYETHYLENE GLYCOL-3350 AND ELECTROLYTES 4000 MILLILITER(S): 236; 6.74; 5.86; 2.97; 22.74 POWDER, FOR SOLUTION ORAL at 23:53

## 2024-10-24 LAB
FERRITIN SERPL-MCNC: 38 NG/ML — SIGNIFICANT CHANGE UP (ref 13–330)
GLUCOSE BLDC GLUCOMTR-MCNC: 101 MG/DL — HIGH (ref 70–99)
HCT VFR BLD CALC: 33.1 % — LOW (ref 34.5–45)
HGB BLD-MCNC: 10.4 G/DL — LOW (ref 11.5–15.5)
IRON SATN MFR SERPL: 108 UG/DL — SIGNIFICANT CHANGE UP (ref 30–160)
MCHC RBC-ENTMCNC: 27.3 PG — SIGNIFICANT CHANGE UP (ref 27–34)
MCHC RBC-ENTMCNC: 31.4 GM/DL — LOW (ref 32–36)
MCV RBC AUTO: 86.9 FL — SIGNIFICANT CHANGE UP (ref 80–100)
NRBC # BLD: 0 /100 WBCS — SIGNIFICANT CHANGE UP (ref 0–0)
NRBC BLD-RTO: 0 /100 WBCS — SIGNIFICANT CHANGE UP (ref 0–0)
PLATELET # BLD AUTO: 217 K/UL — SIGNIFICANT CHANGE UP (ref 150–400)
RBC # BLD: 3.76 M/UL — LOW (ref 3.8–5.2)
RBC # BLD: 3.81 M/UL — SIGNIFICANT CHANGE UP (ref 3.8–5.2)
RBC # FLD: 13.7 % — SIGNIFICANT CHANGE UP (ref 10.3–14.5)
RETICS #: 61.3 K/UL — SIGNIFICANT CHANGE UP (ref 25–125)
RETICS/RBC NFR: 1.6 % — SIGNIFICANT CHANGE UP (ref 0.5–2.5)
WBC # BLD: 4.91 K/UL — SIGNIFICANT CHANGE UP (ref 3.8–10.5)
WBC # FLD AUTO: 4.91 K/UL — SIGNIFICANT CHANGE UP (ref 3.8–10.5)

## 2024-10-24 PROCEDURE — 99232 SBSQ HOSP IP/OBS MODERATE 35: CPT | Mod: GC

## 2024-10-24 PROCEDURE — 93970 EXTREMITY STUDY: CPT | Mod: 26

## 2024-10-24 PROCEDURE — 99223 1ST HOSP IP/OBS HIGH 75: CPT

## 2024-10-24 RX ORDER — INSULIN LISPRO 100 U/ML
INJECTION, SOLUTION INTRAVENOUS; SUBCUTANEOUS EVERY 6 HOURS
Refills: 0 | Status: DISCONTINUED | OUTPATIENT
Start: 2024-10-24 | End: 2024-10-26

## 2024-10-24 RX ORDER — IRON SUCROSE 20 MG/ML
300 INJECTION, SOLUTION INTRAVENOUS ONCE
Refills: 0 | Status: COMPLETED | OUTPATIENT
Start: 2024-10-25 | End: 2024-10-25

## 2024-10-24 RX ORDER — TRAZODONE HCL 100 MG
150 TABLET ORAL AT BEDTIME
Refills: 0 | Status: DISCONTINUED | OUTPATIENT
Start: 2024-10-24 | End: 2024-10-26

## 2024-10-24 RX ORDER — GLUCAGON 3 MG/1
1 POWDER NASAL ONCE
Refills: 0 | Status: DISCONTINUED | OUTPATIENT
Start: 2024-10-24 | End: 2024-10-26

## 2024-10-24 RX ORDER — DEXTROSE 50 % IN WATER 50 %
15 SYRINGE (ML) INTRAVENOUS ONCE
Refills: 0 | Status: DISCONTINUED | OUTPATIENT
Start: 2024-10-24 | End: 2024-10-26

## 2024-10-24 RX ORDER — ACETAMINOPHEN 500 MG/5ML
1000 LIQUID (ML) ORAL EVERY 6 HOURS
Refills: 0 | Status: COMPLETED | OUTPATIENT
Start: 2024-10-25 | End: 2024-10-26

## 2024-10-24 RX ORDER — DEXTROSE 50 % IN WATER 50 %
25 SYRINGE (ML) INTRAVENOUS ONCE
Refills: 0 | Status: DISCONTINUED | OUTPATIENT
Start: 2024-10-24 | End: 2024-10-26

## 2024-10-24 RX ORDER — FOLIC ACID 1 MG/1
1 TABLET ORAL ONCE
Refills: 0 | Status: COMPLETED | OUTPATIENT
Start: 2024-10-24 | End: 2024-10-24

## 2024-10-24 RX ORDER — DEXTROSE 50 % IN WATER 50 %
12.5 SYRINGE (ML) INTRAVENOUS ONCE
Refills: 0 | Status: DISCONTINUED | OUTPATIENT
Start: 2024-10-24 | End: 2024-10-26

## 2024-10-24 RX ORDER — ACETAMINOPHEN 500 MG/5ML
1000 LIQUID (ML) ORAL ONCE
Refills: 0 | Status: COMPLETED | OUTPATIENT
Start: 2024-10-24 | End: 2024-10-24

## 2024-10-24 RX ORDER — METOPROLOL SUCCINATE 50 MG/1
1 TABLET, EXTENDED RELEASE ORAL
Refills: 0 | DISCHARGE

## 2024-10-24 RX ORDER — CYANOCOBALAMIN 1000 UG/ML
1000 INJECTION INTRAMUSCULAR; SUBCUTANEOUS ONCE
Refills: 0 | Status: COMPLETED | OUTPATIENT
Start: 2024-10-24 | End: 2024-10-24

## 2024-10-24 RX ORDER — HYDROXYZINE HYDROCHLORIDE 25 MG/1
25 TABLET, FILM COATED ORAL DAILY
Refills: 0 | Status: DISCONTINUED | OUTPATIENT
Start: 2024-10-24 | End: 2024-10-26

## 2024-10-24 RX ORDER — IRON SUCROSE 20 MG/ML
300 INJECTION, SOLUTION INTRAVENOUS ONCE
Refills: 0 | Status: COMPLETED | OUTPATIENT
Start: 2024-10-24 | End: 2024-10-24

## 2024-10-24 RX ORDER — SODIUM CHLORIDE 9 G/1000ML
1000 INJECTION, SOLUTION INTRAVENOUS
Refills: 0 | Status: DISCONTINUED | OUTPATIENT
Start: 2024-10-24 | End: 2024-10-26

## 2024-10-24 RX ORDER — SERTRALINE 100 MG/1
100 TABLET, FILM COATED ORAL DAILY
Refills: 0 | Status: DISCONTINUED | OUTPATIENT
Start: 2024-10-24 | End: 2024-10-26

## 2024-10-24 RX ADMIN — Medication 400 MILLIGRAM(S): at 21:40

## 2024-10-24 RX ADMIN — FOLIC ACID 1 MILLIGRAM(S): 1 TABLET ORAL at 14:46

## 2024-10-24 RX ADMIN — SERTRALINE 100 MILLIGRAM(S): 100 TABLET, FILM COATED ORAL at 17:00

## 2024-10-24 RX ADMIN — Medication 40 MILLIGRAM(S): at 06:20

## 2024-10-24 RX ADMIN — IRON SUCROSE 176.67 MILLIGRAM(S): 20 INJECTION, SOLUTION INTRAVENOUS at 14:45

## 2024-10-24 RX ADMIN — Medication 1000 MILLIGRAM(S): at 22:30

## 2024-10-24 RX ADMIN — CYANOCOBALAMIN 1000 MICROGRAM(S): 1000 INJECTION INTRAMUSCULAR; SUBCUTANEOUS at 14:46

## 2024-10-24 RX ADMIN — HYDROXYZINE HYDROCHLORIDE 25 MILLIGRAM(S): 25 TABLET, FILM COATED ORAL at 17:15

## 2024-10-24 RX ADMIN — Medication 1000 MILLIGRAM(S): at 17:15

## 2024-10-24 RX ADMIN — Medication 150 MILLIGRAM(S): at 23:46

## 2024-10-25 ENCOUNTER — TRANSCRIPTION ENCOUNTER (OUTPATIENT)
Age: 54
End: 2024-10-25

## 2024-10-25 LAB
ADD ON TEST-SPECIMEN IN LAB: SIGNIFICANT CHANGE UP
ADD ON TEST-SPECIMEN IN LAB: SIGNIFICANT CHANGE UP
FOLATE SERPL-MCNC: 13.1 NG/ML — SIGNIFICANT CHANGE UP
GLUCOSE BLDC GLUCOMTR-MCNC: 107 MG/DL — HIGH (ref 70–99)
GLUCOSE BLDC GLUCOMTR-MCNC: 111 MG/DL — HIGH (ref 70–99)
GLUCOSE BLDC GLUCOMTR-MCNC: 134 MG/DL — HIGH (ref 70–99)
GLUCOSE BLDC GLUCOMTR-MCNC: 94 MG/DL — SIGNIFICANT CHANGE UP (ref 70–99)
VIT B12 SERPL-MCNC: 430 PG/ML — SIGNIFICANT CHANGE UP (ref 232–1245)

## 2024-10-25 PROCEDURE — 99232 SBSQ HOSP IP/OBS MODERATE 35: CPT | Mod: GC

## 2024-10-25 PROCEDURE — 99233 SBSQ HOSP IP/OBS HIGH 50: CPT

## 2024-10-25 RX ORDER — AMLODIPINE BESYLATE 10 MG/1
10 TABLET ORAL EVERY 24 HOURS
Refills: 0 | Status: DISCONTINUED | OUTPATIENT
Start: 2024-10-25 | End: 2024-10-26

## 2024-10-25 RX ADMIN — IRON SUCROSE 176.67 MILLIGRAM(S): 20 INJECTION, SOLUTION INTRAVENOUS at 12:09

## 2024-10-25 RX ADMIN — Medication 400 MILLIGRAM(S): at 13:46

## 2024-10-25 RX ADMIN — Medication 400 MILLIGRAM(S): at 07:20

## 2024-10-25 RX ADMIN — Medication 400 MILLIGRAM(S): at 22:21

## 2024-10-25 RX ADMIN — Medication 150 MILLIGRAM(S): at 22:21

## 2024-10-25 RX ADMIN — AMLODIPINE BESYLATE 10 MILLIGRAM(S): 10 TABLET ORAL at 17:16

## 2024-10-25 RX ADMIN — Medication 1000 MILLIGRAM(S): at 22:52

## 2024-10-25 RX ADMIN — Medication 40 MILLIGRAM(S): at 07:00

## 2024-10-25 RX ADMIN — Medication 4 MILLIGRAM(S): at 22:23

## 2024-10-25 RX ADMIN — SERTRALINE 100 MILLIGRAM(S): 100 TABLET, FILM COATED ORAL at 12:09

## 2024-10-25 RX ADMIN — HYDROXYZINE HYDROCHLORIDE 25 MILLIGRAM(S): 25 TABLET, FILM COATED ORAL at 13:45

## 2024-10-25 RX ADMIN — Medication 1000 MILLIGRAM(S): at 14:00

## 2024-10-25 NOTE — PROCEDURAL SAFETY CHECKLIST WITH OR WITHOUT SEDATION - NSPREEQUIPSUP_GEN_ALL_CORE
"Chief Complaint   Patient presents with    RECHECK       /70 (BP Location: Right arm, Patient Position: Sitting, Cuff Size: Adult Small)   Pulse 105   Ht 1.405 m (4' 7.32\")   Wt 32.6 kg (71 lb 14.4 oz)   BMI 16.52 kg/m      Jaye Steve, EMT  October 25, 2024    " n/a No

## 2024-10-26 ENCOUNTER — TRANSCRIPTION ENCOUNTER (OUTPATIENT)
Age: 54
End: 2024-10-26

## 2024-10-26 VITALS — TEMPERATURE: 98 F

## 2024-10-26 LAB
GLUCOSE BLDC GLUCOMTR-MCNC: 106 MG/DL — HIGH (ref 70–99)
GLUCOSE BLDC GLUCOMTR-MCNC: 123 MG/DL — HIGH (ref 70–99)
GLUCOSE BLDC GLUCOMTR-MCNC: 168 MG/DL — HIGH (ref 70–99)

## 2024-10-26 PROCEDURE — 82962 GLUCOSE BLOOD TEST: CPT

## 2024-10-26 PROCEDURE — 74174 CTA ABD&PLVS W/CONTRAST: CPT | Mod: MC

## 2024-10-26 PROCEDURE — 96375 TX/PRO/DX INJ NEW DRUG ADDON: CPT

## 2024-10-26 PROCEDURE — 99285 EMERGENCY DEPT VISIT HI MDM: CPT | Mod: 25

## 2024-10-26 PROCEDURE — 93970 EXTREMITY STUDY: CPT

## 2024-10-26 PROCEDURE — 85610 PROTHROMBIN TIME: CPT

## 2024-10-26 PROCEDURE — 83605 ASSAY OF LACTIC ACID: CPT

## 2024-10-26 PROCEDURE — 36415 COLL VENOUS BLD VENIPUNCTURE: CPT

## 2024-10-26 PROCEDURE — 82746 ASSAY OF FOLIC ACID SERUM: CPT

## 2024-10-26 PROCEDURE — 83690 ASSAY OF LIPASE: CPT

## 2024-10-26 PROCEDURE — 85025 COMPLETE CBC W/AUTO DIFF WBC: CPT

## 2024-10-26 PROCEDURE — 99231 SBSQ HOSP IP/OBS SF/LOW 25: CPT | Mod: GC

## 2024-10-26 PROCEDURE — 82607 VITAMIN B-12: CPT

## 2024-10-26 PROCEDURE — 82728 ASSAY OF FERRITIN: CPT

## 2024-10-26 PROCEDURE — 83540 ASSAY OF IRON: CPT

## 2024-10-26 PROCEDURE — 80053 COMPREHEN METABOLIC PANEL: CPT

## 2024-10-26 PROCEDURE — 96374 THER/PROPH/DIAG INJ IV PUSH: CPT

## 2024-10-26 PROCEDURE — 85027 COMPLETE CBC AUTOMATED: CPT

## 2024-10-26 PROCEDURE — 82803 BLOOD GASES ANY COMBINATION: CPT

## 2024-10-26 PROCEDURE — 85045 AUTOMATED RETICULOCYTE COUNT: CPT

## 2024-10-26 PROCEDURE — 85730 THROMBOPLASTIN TIME PARTIAL: CPT

## 2024-10-26 RX ORDER — FERROUS SULFATE 137(45) MG
1 TABLET, EXTENDED RELEASE ORAL
Qty: 30 | Refills: 0
Start: 2024-10-26 | End: 2024-11-24

## 2024-10-26 RX ORDER — HYDROXYZINE HYDROCHLORIDE 25 MG/1
1 TABLET, FILM COATED ORAL
Qty: 0 | Refills: 0 | DISCHARGE

## 2024-10-26 RX ADMIN — Medication 1000 MILLIGRAM(S): at 11:15

## 2024-10-26 RX ADMIN — HYDROXYZINE HYDROCHLORIDE 25 MILLIGRAM(S): 25 TABLET, FILM COATED ORAL at 11:20

## 2024-10-26 RX ADMIN — Medication 400 MILLIGRAM(S): at 09:59

## 2024-10-26 RX ADMIN — SERTRALINE 100 MILLIGRAM(S): 100 TABLET, FILM COATED ORAL at 11:20

## 2024-10-26 RX ADMIN — INSULIN LISPRO 1: 100 INJECTION, SOLUTION INTRAVENOUS; SUBCUTANEOUS at 11:34

## 2024-10-26 RX ADMIN — Medication 40 MILLIGRAM(S): at 05:59

## 2024-10-26 RX ADMIN — Medication 4 MILLIGRAM(S): at 10:10

## 2024-10-31 NOTE — HISTORY OF PRESENT ILLNESS
Recommendation Preamble: The following recommendations were made during the visit: hats, sunglasses and UV clothing. Detail Level: Zone Render Risk Assessment In Note?: no [de-identified] : 51 yo F PMH HTN, MICAELA (Druze), obesity s/p sleeve gastrectomy (2015), RA (on sulfasalazine), DARIO, recurrent DVT/PE (on lovenox), history of splenic vein thrombosis, former tobacco abuse (1/2 PPD ~35 yrs, quit 6 yrs ago) who presents with rectal bleeding and abdominal discomfort. \par \par Patient is a Druze\par jose Recently had a L and R hip revision at [facility] (July 30 2020). In the hospital she was noted by the nurse to have a lot of blood in the stool. \par Hgb dropped to 4.4 in the hospital. She received procrit and 5 bags of iron and folic acid. Hgb now improved to 11.2. \par Since going home she sees some blood mixed in with the stool. Sometimes a lot, sometimes a little. Happens 4-5x/week. \par Sometimes she sees blood by itself. Sometimes sees blood clots or tissue attached to it. Bright red in color. \par Not straining when this happens. \par At the time she is passing it, not straining. When she is finished, still feels sensation of incomplete evacuation, and strains to push stuff out, but nothing comes out. \dora Feels constipated sometimes. Happens twice a week. That has been happening since the resection. \par jose Last had a colonoscopy in 2018 with Dr. Buchanan. She had 3 colonoscopies in the past. She has a history of diverticulitis. \par She was found on the first colonoscopy to have polys. Then had a repeat because having pain and was found to have diverticulitis. \par The third time she had pain, had colon surgery. This was done at [facility]. Surgery was in 2017. \par jose Goes to the bathroom, since the surgery 5-6x/day. If she skips a day having a BM, when she does go a lot comes out. \par jose had an umbilical hernia repair in 2018. \par jose Has had 8 children, 7 living. All vaginal deliveries. \par Recently vaginal ultrasound because concerned about uterine prolapse and she was told there was no prolaspse. Feels like something is trying to escape. \par Found that one ovary was larger than the other. Found endometrial hyperplasia and she has a plan to repeat the ultrasound. \par \par Also has abdominal pain right below belly button. Knows when she gets that pain she needs to have a BM. Usually relieved after she has a BM. Feels like dull and sharp in nature. After BM gets relived. \par \par Also reports nausea, no vomiting. Most of the time feels nauseated after eating. \par Gained weight. \par \par Has very water-like loose diarrhea. Even with sensation of incomplete evacaution, bowel movements are watery. Has had this since bowel resection. \par She was tested for cdiff in the hospital and was treated (back in 2018). \par Tested for celiac and was negative. \par \par Also had headaches. Goes to sleep with one, wakes up with it. No NSAIDs, usually just tylenol for HA. \par \par Has a history of GERD, takes omeprazole and carafate. this seems to help. If doesn't take it can't keep anything down, feels like she will throw up. Gets humera pain in her chest. Has a hiatal hernia. Sometimes wakes up in the middle of the night coughing because she thinks she aspirates after having reflux. \par \par She has had a barium esophagram in the past, found a hiatal hernia. \par No dysphagia or issues swallowing when eating, drinking regularly and sitting upright. \par \par She takes lovenox because she had 3 DVTs, 1 PE, and then another DVT this year, splenic thrombus.Has seen heme/onc, don’t' know why she has this. \par \par PMH: \par Hearburn/GERD\par Anemia\par Colon polyps\par Hernia\par Diverticulitis\par Hemorhoids\par HTN\par RA\par \par \par \par PSH: \par Colon resection 2017 (diverticulitis on L side per patient) (surgery took 4 hours because inflammation and adhesions in that area)\par Hernia [id number]\par Cholecystectomy 1999\par Gastric sleeve 2015\par \par FH: \par Mother with colon polyps\par Mothers sisters had pancreatic cancer, liver cancer, uterine cancer\par No other CRC, stomach cancer\par \par SH: \par previous smoking, quit \par Previous EtOH, not smoking anymore\par No illicit drug use\par \par Med:\par Amlodipine\par Benzoyl peroxide on face \par Centrum \par Cetirizine (just started)\par Diclofenac 3x/week\par Lovenox \par Famotidine\par Fluoxetine\par Flonase PRN\par Folic acid\par Gabapentin every night\par hydroxyzine\par Khlor-con (potassium)\par Losartan \par Metoclopromide PRN nausea (takes 3x/week)\par Metorpolol\par Omeprazole\par Ropinirole\par Sucralfate\par Sulfasalazine (RA) \par \par ALL: Lisinopril, verapamil \par \par Records from Dr. Buchanan. \par \par \par

## 2024-11-01 DIAGNOSIS — K62.5 HEMORRHAGE OF ANUS AND RECTUM: ICD-10-CM

## 2024-11-01 DIAGNOSIS — F41.9 ANXIETY DISORDER, UNSPECIFIED: ICD-10-CM

## 2024-11-01 DIAGNOSIS — E78.5 HYPERLIPIDEMIA, UNSPECIFIED: ICD-10-CM

## 2024-11-01 DIAGNOSIS — Z98.84 BARIATRIC SURGERY STATUS: ICD-10-CM

## 2024-11-01 DIAGNOSIS — K44.9 DIAPHRAGMATIC HERNIA WITHOUT OBSTRUCTION OR GANGRENE: ICD-10-CM

## 2024-11-01 DIAGNOSIS — F32.A DEPRESSION, UNSPECIFIED: ICD-10-CM

## 2024-11-01 DIAGNOSIS — K92.2 GASTROINTESTINAL HEMORRHAGE, UNSPECIFIED: ICD-10-CM

## 2024-11-01 DIAGNOSIS — Z86.718 PERSONAL HISTORY OF OTHER VENOUS THROMBOSIS AND EMBOLISM: ICD-10-CM

## 2024-11-01 DIAGNOSIS — E11.40 TYPE 2 DIABETES MELLITUS WITH DIABETIC NEUROPATHY, UNSPECIFIED: ICD-10-CM

## 2024-11-01 DIAGNOSIS — K21.9 GASTRO-ESOPHAGEAL REFLUX DISEASE WITHOUT ESOPHAGITIS: ICD-10-CM

## 2024-11-01 DIAGNOSIS — K57.30 DIVERTICULOSIS OF LARGE INTESTINE WITHOUT PERFORATION OR ABSCESS WITHOUT BLEEDING: ICD-10-CM

## 2024-11-01 DIAGNOSIS — M06.9 RHEUMATOID ARTHRITIS, UNSPECIFIED: ICD-10-CM

## 2024-11-01 DIAGNOSIS — Z86.711 PERSONAL HISTORY OF PULMONARY EMBOLISM: ICD-10-CM

## 2024-11-01 DIAGNOSIS — I10 ESSENTIAL (PRIMARY) HYPERTENSION: ICD-10-CM

## 2024-11-01 DIAGNOSIS — Z90.49 ACQUIRED ABSENCE OF OTHER SPECIFIED PARTS OF DIGESTIVE TRACT: ICD-10-CM

## 2024-11-01 DIAGNOSIS — M19.90 UNSPECIFIED OSTEOARTHRITIS, UNSPECIFIED SITE: ICD-10-CM

## 2024-11-01 DIAGNOSIS — D64.9 ANEMIA, UNSPECIFIED: ICD-10-CM

## 2024-11-04 ENCOUNTER — APPOINTMENT (OUTPATIENT)
Dept: GASTROENTEROLOGY | Facility: CLINIC | Age: 54
End: 2024-11-04

## 2025-01-27 ENCOUNTER — APPOINTMENT (OUTPATIENT)
Dept: INTERNAL MEDICINE | Facility: CLINIC | Age: 55
End: 2025-01-27
Payer: MEDICAID

## 2025-01-27 VITALS
WEIGHT: 199 LBS | OXYGEN SATURATION: 96 % | HEART RATE: 87 BPM | SYSTOLIC BLOOD PRESSURE: 168 MMHG | BODY MASS INDEX: 30.16 KG/M2 | TEMPERATURE: 96.7 F | DIASTOLIC BLOOD PRESSURE: 93 MMHG | HEIGHT: 68 IN

## 2025-01-27 DIAGNOSIS — M06.9 RHEUMATOID ARTHRITIS, UNSPECIFIED: ICD-10-CM

## 2025-01-27 DIAGNOSIS — E11.9 TYPE 2 DIABETES MELLITUS W/OUT COMPLICATIONS: ICD-10-CM

## 2025-01-27 DIAGNOSIS — M25.559 PAIN IN UNSPECIFIED HIP: ICD-10-CM

## 2025-01-27 DIAGNOSIS — R91.1 SOLITARY PULMONARY NODULE: ICD-10-CM

## 2025-01-27 DIAGNOSIS — G89.29 PAIN IN UNSPECIFIED HIP: ICD-10-CM

## 2025-01-27 DIAGNOSIS — F32.9 MAJOR DEPRESSIVE DISORDER, SINGLE EPISODE, UNSPECIFIED: ICD-10-CM

## 2025-01-27 DIAGNOSIS — F51.04 PSYCHOPHYSIOLOGIC INSOMNIA: ICD-10-CM

## 2025-01-27 DIAGNOSIS — I10 ESSENTIAL (PRIMARY) HYPERTENSION: ICD-10-CM

## 2025-01-27 DIAGNOSIS — R51.9 HEADACHE, UNSPECIFIED: ICD-10-CM

## 2025-01-27 PROCEDURE — 99214 OFFICE O/P EST MOD 30 MIN: CPT | Mod: 25

## 2025-01-27 RX ORDER — SERTRALINE HYDROCHLORIDE 150 MG/1
150 CAPSULE ORAL DAILY
Qty: 90 | Refills: 0 | Status: ACTIVE | COMMUNITY
Start: 2025-01-27

## 2025-01-27 RX ORDER — TRAZODONE HYDROCHLORIDE 150 MG/1
150 TABLET ORAL
Qty: 90 | Refills: 0 | Status: ACTIVE | COMMUNITY
Start: 2025-01-27

## 2025-01-31 ENCOUNTER — NON-APPOINTMENT (OUTPATIENT)
Age: 55
End: 2025-01-31

## 2025-01-31 DIAGNOSIS — E55.9 VITAMIN D DEFICIENCY, UNSPECIFIED: ICD-10-CM

## 2025-01-31 LAB
25(OH)D3 SERPL-MCNC: 19.5 NG/ML
ALBUMIN SERPL ELPH-MCNC: 4.4 G/DL
ALP BLD-CCNC: 95 U/L
ALT SERPL-CCNC: 8 U/L
ANION GAP SERPL CALC-SCNC: 11 MMOL/L
APPEARANCE: ABNORMAL
AST SERPL-CCNC: 13 U/L
BACTERIA: NEGATIVE /HPF
BILIRUB SERPL-MCNC: 0.2 MG/DL
BILIRUBIN URINE: NEGATIVE
BLOOD URINE: NEGATIVE
BUN SERPL-MCNC: 7 MG/DL
CALCIUM OXALATE CRYSTALS: PRESENT
CALCIUM SERPL-MCNC: 9.9 MG/DL
CAST: 9 /LPF
CHLORIDE SERPL-SCNC: 105 MMOL/L
CHOLEST SERPL-MCNC: 198 MG/DL
CO2 SERPL-SCNC: 27 MMOL/L
COLOR: NORMAL
CREAT SERPL-MCNC: 0.81 MG/DL
EGFR: 86 ML/MIN/1.73M2
EPITHELIAL CELLS: 1 /HPF
ESTIMATED AVERAGE GLUCOSE: 126 MG/DL
GLUCOSE QUALITATIVE U: NEGATIVE MG/DL
GLUCOSE SERPL-MCNC: 104 MG/DL
HBA1C MFR BLD HPLC: 6 %
HCT VFR BLD CALC: 42.3 %
HDLC SERPL-MCNC: 51 MG/DL
HGB BLD-MCNC: 13.4 G/DL
HYALINE CASTS: PRESENT
KETONES URINE: ABNORMAL MG/DL
LDLC SERPL CALC-MCNC: 115 MG/DL
LEUKOCYTE ESTERASE URINE: ABNORMAL
MCHC RBC-ENTMCNC: 28 PG
MCHC RBC-ENTMCNC: 31.7 G/DL
MCV RBC AUTO: 88.5 FL
MICROSCOPIC-UA: NORMAL
NITRITE URINE: NEGATIVE
NONHDLC SERPL-MCNC: 147 MG/DL
PH URINE: 7.5
PLATELET # BLD AUTO: 254 K/UL
POTASSIUM SERPL-SCNC: 4.4 MMOL/L
PROT SERPL-MCNC: 7.4 G/DL
PROTEIN URINE: 30 MG/DL
RBC # BLD: 4.78 M/UL
RBC # FLD: 14.6 %
RED BLOOD CELLS URINE: 3 /HPF
REVIEW: NORMAL
SODIUM SERPL-SCNC: 143 MMOL/L
SPECIFIC GRAVITY URINE: 1.02
TRIGL SERPL-MCNC: 184 MG/DL
TSH SERPL-ACNC: 0.67 UIU/ML
UROBILINOGEN URINE: 1 MG/DL
WBC # FLD AUTO: 5.27 K/UL
WHITE BLOOD CELLS URINE: 0 /HPF

## 2025-01-31 RX ORDER — ROSUVASTATIN CALCIUM 20 MG/1
20 TABLET, FILM COATED ORAL DAILY
Qty: 90 | Refills: 0 | Status: ACTIVE | COMMUNITY
Start: 2025-01-31 | End: 1900-01-01

## 2025-01-31 RX ORDER — ERGOCALCIFEROL 1.25 MG/1
1.25 MG CAPSULE, LIQUID FILLED ORAL
Qty: 4 | Refills: 1 | Status: ACTIVE | COMMUNITY
Start: 2025-01-31 | End: 1900-01-01

## 2025-02-10 ENCOUNTER — APPOINTMENT (OUTPATIENT)
Dept: INTERNAL MEDICINE | Facility: CLINIC | Age: 55
End: 2025-02-10
Payer: MEDICAID

## 2025-02-10 ENCOUNTER — OUTPATIENT (OUTPATIENT)
Dept: OUTPATIENT SERVICES | Facility: HOSPITAL | Age: 55
LOS: 1 days | End: 2025-02-10

## 2025-02-10 ENCOUNTER — APPOINTMENT (OUTPATIENT)
Dept: CT IMAGING | Facility: CLINIC | Age: 55
End: 2025-02-10
Payer: MEDICAID

## 2025-02-10 VITALS
WEIGHT: 198 LBS | BODY MASS INDEX: 30.01 KG/M2 | HEART RATE: 61 BPM | HEIGHT: 68 IN | SYSTOLIC BLOOD PRESSURE: 132 MMHG | OXYGEN SATURATION: 95 % | DIASTOLIC BLOOD PRESSURE: 85 MMHG

## 2025-02-10 DIAGNOSIS — Z90.3 ACQUIRED ABSENCE OF STOMACH [PART OF]: Chronic | ICD-10-CM

## 2025-02-10 DIAGNOSIS — S03.40XA SPRAIN OF JAW, UNSPECIFIED SIDE, INITIAL ENCOUNTER: ICD-10-CM

## 2025-02-10 DIAGNOSIS — Z98.890 OTHER SPECIFIED POSTPROCEDURAL STATES: Chronic | ICD-10-CM

## 2025-02-10 DIAGNOSIS — I10 ESSENTIAL (PRIMARY) HYPERTENSION: ICD-10-CM

## 2025-02-10 DIAGNOSIS — Z90.49 ACQUIRED ABSENCE OF OTHER SPECIFIED PARTS OF DIGESTIVE TRACT: Chronic | ICD-10-CM

## 2025-02-10 DIAGNOSIS — K46.9 UNSPECIFIED ABDOMINAL HERNIA WITHOUT OBSTRUCTION OR GANGRENE: Chronic | ICD-10-CM

## 2025-02-10 PROCEDURE — 99214 OFFICE O/P EST MOD 30 MIN: CPT

## 2025-02-10 PROCEDURE — 71250 CT THORAX DX C-: CPT | Mod: 26

## 2025-02-10 RX ORDER — NAPROXEN 500 MG/1
500 TABLET ORAL
Qty: 20 | Refills: 2 | Status: ACTIVE | COMMUNITY
Start: 2025-02-10 | End: 1900-01-01

## 2025-02-14 ENCOUNTER — OUTPATIENT (OUTPATIENT)
Dept: OUTPATIENT SERVICES | Facility: HOSPITAL | Age: 55
LOS: 1 days | End: 2025-02-14
Payer: MEDICAID

## 2025-02-14 ENCOUNTER — APPOINTMENT (OUTPATIENT)
Dept: ORTHOPEDIC SURGERY | Facility: CLINIC | Age: 55
End: 2025-02-14
Payer: MEDICAID

## 2025-02-14 ENCOUNTER — RESULT REVIEW (OUTPATIENT)
Age: 55
End: 2025-02-14

## 2025-02-14 VITALS
SYSTOLIC BLOOD PRESSURE: 133 MMHG | BODY MASS INDEX: 30.01 KG/M2 | DIASTOLIC BLOOD PRESSURE: 79 MMHG | OXYGEN SATURATION: 97 % | HEART RATE: 67 BPM | WEIGHT: 198 LBS | HEIGHT: 68 IN

## 2025-02-14 DIAGNOSIS — Z90.49 ACQUIRED ABSENCE OF OTHER SPECIFIED PARTS OF DIGESTIVE TRACT: Chronic | ICD-10-CM

## 2025-02-14 DIAGNOSIS — K46.9 UNSPECIFIED ABDOMINAL HERNIA WITHOUT OBSTRUCTION OR GANGRENE: Chronic | ICD-10-CM

## 2025-02-14 DIAGNOSIS — Z96.643 AFTERCARE FOLLOWING JOINT REPLACEMENT SURGERY: ICD-10-CM

## 2025-02-14 DIAGNOSIS — Z47.1 AFTERCARE FOLLOWING JOINT REPLACEMENT SURGERY: ICD-10-CM

## 2025-02-14 DIAGNOSIS — Z98.890 OTHER SPECIFIED POSTPROCEDURAL STATES: Chronic | ICD-10-CM

## 2025-02-14 DIAGNOSIS — R91.1 SOLITARY PULMONARY NODULE: ICD-10-CM

## 2025-02-14 DIAGNOSIS — Z90.3 ACQUIRED ABSENCE OF STOMACH [PART OF]: Chronic | ICD-10-CM

## 2025-02-14 PROCEDURE — 99214 OFFICE O/P EST MOD 30 MIN: CPT

## 2025-02-14 PROCEDURE — 73521 X-RAY EXAM HIPS BI 2 VIEWS: CPT | Mod: 26

## 2025-02-14 PROCEDURE — 73521 X-RAY EXAM HIPS BI 2 VIEWS: CPT

## 2025-02-14 RX ORDER — CELECOXIB 200 MG/1
200 CAPSULE ORAL TWICE DAILY
Qty: 60 | Refills: 2 | Status: ACTIVE | COMMUNITY
Start: 2025-02-14 | End: 1900-01-01

## 2025-02-19 ENCOUNTER — NON-APPOINTMENT (OUTPATIENT)
Age: 55
End: 2025-02-19

## 2025-02-25 ENCOUNTER — APPOINTMENT (OUTPATIENT)
Facility: CLINIC | Age: 55
End: 2025-02-25

## 2025-03-03 ENCOUNTER — APPOINTMENT (OUTPATIENT)
Dept: GASTROENTEROLOGY | Facility: CLINIC | Age: 55
End: 2025-03-03

## 2025-03-03 VITALS
OXYGEN SATURATION: 97 % | WEIGHT: 205 LBS | SYSTOLIC BLOOD PRESSURE: 124 MMHG | HEART RATE: 57 BPM | BODY MASS INDEX: 31.07 KG/M2 | TEMPERATURE: 95.8 F | HEIGHT: 68 IN | DIASTOLIC BLOOD PRESSURE: 80 MMHG

## 2025-03-03 DIAGNOSIS — R11.0 NAUSEA: ICD-10-CM

## 2025-03-03 DIAGNOSIS — R10.9 UNSPECIFIED ABDOMINAL PAIN: ICD-10-CM

## 2025-03-03 DIAGNOSIS — K62.5 HEMORRHAGE OF ANUS AND RECTUM: ICD-10-CM

## 2025-03-03 DIAGNOSIS — R19.8 OTHER SPECIFIED SYMPTOMS AND SIGNS INVOLVING THE DIGESTIVE SYSTEM AND ABDOMEN: ICD-10-CM

## 2025-03-03 DIAGNOSIS — R10.13 EPIGASTRIC PAIN: ICD-10-CM

## 2025-03-03 DIAGNOSIS — K21.9 GASTRO-ESOPHAGEAL REFLUX DISEASE W/OUT ESOPHAGITIS: ICD-10-CM

## 2025-03-03 PROCEDURE — 99205 OFFICE O/P NEW HI 60 MIN: CPT

## 2025-03-03 RX ORDER — POLYETHYLENE GLYCOL 3350 AND ELECTROLYTES WITH LEMON FLAVOR 236; 22.74; 6.74; 5.86; 2.97 G/4L; G/4L; G/4L; G/4L; G/4L
236 POWDER, FOR SOLUTION ORAL
Qty: 1 | Refills: 1 | Status: ACTIVE | COMMUNITY
Start: 2025-03-03 | End: 1900-01-01

## 2025-03-03 RX ORDER — ONDANSETRON 4 MG/1
4 TABLET, ORALLY DISINTEGRATING ORAL
Qty: 10 | Refills: 0 | Status: ACTIVE | COMMUNITY
Start: 2025-03-03 | End: 1900-01-01

## 2025-03-06 ENCOUNTER — LABORATORY RESULT (OUTPATIENT)
Age: 55
End: 2025-03-06

## 2025-03-06 ENCOUNTER — APPOINTMENT (OUTPATIENT)
Facility: CLINIC | Age: 55
End: 2025-03-06
Payer: MEDICAID

## 2025-03-06 VITALS
WEIGHT: 205 LBS | OXYGEN SATURATION: 96 % | BODY MASS INDEX: 31.07 KG/M2 | RESPIRATION RATE: 16 BRPM | HEART RATE: 67 BPM | HEIGHT: 68 IN | TEMPERATURE: 97 F | SYSTOLIC BLOOD PRESSURE: 134 MMHG | DIASTOLIC BLOOD PRESSURE: 80 MMHG

## 2025-03-06 DIAGNOSIS — Z86.711 PERSONAL HISTORY OF PULMONARY EMBOLISM: ICD-10-CM

## 2025-03-06 DIAGNOSIS — Z86.718 PERSONAL HISTORY OF OTHER VENOUS THROMBOSIS AND EMBOLISM: ICD-10-CM

## 2025-03-06 DIAGNOSIS — R91.1 SOLITARY PULMONARY NODULE: ICD-10-CM

## 2025-03-06 DIAGNOSIS — M06.9 RHEUMATOID ARTHRITIS, UNSPECIFIED: ICD-10-CM

## 2025-03-06 DIAGNOSIS — Z87.891 PERSONAL HISTORY OF NICOTINE DEPENDENCE: ICD-10-CM

## 2025-03-06 DIAGNOSIS — R06.02 SHORTNESS OF BREATH: ICD-10-CM

## 2025-03-06 PROCEDURE — G2211 COMPLEX E/M VISIT ADD ON: CPT | Mod: NC

## 2025-03-06 PROCEDURE — 99204 OFFICE O/P NEW MOD 45 MIN: CPT

## 2025-03-07 ENCOUNTER — OUTPATIENT (OUTPATIENT)
Dept: OUTPATIENT SERVICES | Facility: HOSPITAL | Age: 55
LOS: 1 days | End: 2025-03-07
Payer: MEDICAID

## 2025-03-07 ENCOUNTER — EMERGENCY (EMERGENCY)
Facility: HOSPITAL | Age: 55
LOS: 1 days | Discharge: HOME | End: 2025-03-07
Attending: EMERGENCY MEDICINE | Admitting: EMERGENCY MEDICINE
Payer: MEDICAID

## 2025-03-07 ENCOUNTER — APPOINTMENT (OUTPATIENT)
Dept: MRI IMAGING | Facility: HOSPITAL | Age: 55
End: 2025-03-07

## 2025-03-07 ENCOUNTER — NON-APPOINTMENT (OUTPATIENT)
Age: 55
End: 2025-03-07

## 2025-03-07 VITALS
SYSTOLIC BLOOD PRESSURE: 114 MMHG | RESPIRATION RATE: 16 BRPM | TEMPERATURE: 98 F | OXYGEN SATURATION: 98 % | DIASTOLIC BLOOD PRESSURE: 75 MMHG | HEART RATE: 55 BPM

## 2025-03-07 VITALS
DIASTOLIC BLOOD PRESSURE: 87 MMHG | RESPIRATION RATE: 20 BRPM | OXYGEN SATURATION: 97 % | TEMPERATURE: 99 F | HEART RATE: 69 BPM | SYSTOLIC BLOOD PRESSURE: 117 MMHG

## 2025-03-07 DIAGNOSIS — Z90.49 ACQUIRED ABSENCE OF OTHER SPECIFIED PARTS OF DIGESTIVE TRACT: Chronic | ICD-10-CM

## 2025-03-07 DIAGNOSIS — K46.9 UNSPECIFIED ABDOMINAL HERNIA WITHOUT OBSTRUCTION OR GANGRENE: Chronic | ICD-10-CM

## 2025-03-07 DIAGNOSIS — Z90.3 ACQUIRED ABSENCE OF STOMACH [PART OF]: Chronic | ICD-10-CM

## 2025-03-07 DIAGNOSIS — Z98.890 OTHER SPECIFIED POSTPROCEDURAL STATES: Chronic | ICD-10-CM

## 2025-03-07 PROBLEM — Z86.711 HISTORY OF PULMONARY EMBOLISM: Status: RESOLVED | Noted: 2019-05-23 | Resolved: 2025-03-07

## 2025-03-07 LAB
ANION GAP SERPL CALC-SCNC: 9 MMOL/L — SIGNIFICANT CHANGE UP (ref 5–17)
BASOPHILS # BLD AUTO: 0.01 K/UL — SIGNIFICANT CHANGE UP (ref 0–0.2)
BASOPHILS NFR BLD AUTO: 0.2 % — SIGNIFICANT CHANGE UP (ref 0–2)
BUN SERPL-MCNC: 10 MG/DL — SIGNIFICANT CHANGE UP (ref 7–23)
CALCIUM SERPL-MCNC: 9.2 MG/DL — SIGNIFICANT CHANGE UP (ref 8.4–10.5)
CCP AB SER IA-ACNC: <8 U/ML
CHLORIDE SERPL-SCNC: 106 MMOL/L — SIGNIFICANT CHANGE UP (ref 96–108)
CO2 SERPL-SCNC: 27 MMOL/L — SIGNIFICANT CHANGE UP (ref 22–31)
CREAT SERPL-MCNC: 0.64 MG/DL — SIGNIFICANT CHANGE UP (ref 0.5–1.3)
CRP SERPL-MCNC: 3 MG/L
DEPRECATED D DIMER PPP IA-ACNC: 311 NG/ML DDU
EGFR: 104 ML/MIN/1.73M2 — SIGNIFICANT CHANGE UP
EOSINOPHIL # BLD AUTO: 0.09 K/UL — SIGNIFICANT CHANGE UP (ref 0–0.5)
EOSINOPHIL NFR BLD AUTO: 1.8 % — SIGNIFICANT CHANGE UP (ref 0–6)
ERYTHROCYTE [SEDIMENTATION RATE] IN BLOOD BY WESTERGREN METHOD: 32 MM/HR
GLUCOSE SERPL-MCNC: 89 MG/DL — SIGNIFICANT CHANGE UP (ref 70–99)
HCT VFR BLD CALC: 39.3 % — SIGNIFICANT CHANGE UP (ref 34.5–45)
HGB BLD-MCNC: 12.8 G/DL — SIGNIFICANT CHANGE UP (ref 11.5–15.5)
IMM GRANULOCYTES NFR BLD AUTO: 0.4 % — SIGNIFICANT CHANGE UP (ref 0–0.9)
LYMPHOCYTES # BLD AUTO: 1.68 K/UL — SIGNIFICANT CHANGE UP (ref 1–3.3)
LYMPHOCYTES # BLD AUTO: 33.3 % — SIGNIFICANT CHANGE UP (ref 13–44)
MCHC RBC-ENTMCNC: 29 PG — SIGNIFICANT CHANGE UP (ref 27–34)
MCHC RBC-ENTMCNC: 32.6 G/DL — SIGNIFICANT CHANGE UP (ref 32–36)
MCV RBC AUTO: 88.9 FL — SIGNIFICANT CHANGE UP (ref 80–100)
MONOCYTES # BLD AUTO: 0.42 K/UL — SIGNIFICANT CHANGE UP (ref 0–0.9)
MONOCYTES NFR BLD AUTO: 8.3 % — SIGNIFICANT CHANGE UP (ref 2–14)
NEUTROPHILS # BLD AUTO: 2.82 K/UL — SIGNIFICANT CHANGE UP (ref 1.8–7.4)
NEUTROPHILS NFR BLD AUTO: 56 % — SIGNIFICANT CHANGE UP (ref 43–77)
NRBC BLD AUTO-RTO: 0 /100 WBCS — SIGNIFICANT CHANGE UP (ref 0–0)
NT-PROBNP SERPL-MCNC: 249 PG/ML
NT-PROBNP SERPL-SCNC: 282 PG/ML — SIGNIFICANT CHANGE UP (ref 0–300)
PLATELET # BLD AUTO: 225 K/UL — SIGNIFICANT CHANGE UP (ref 150–400)
POTASSIUM SERPL-MCNC: 3.6 MMOL/L — SIGNIFICANT CHANGE UP (ref 3.5–5.3)
POTASSIUM SERPL-SCNC: 3.6 MMOL/L — SIGNIFICANT CHANGE UP (ref 3.5–5.3)
RBC # BLD: 4.42 M/UL — SIGNIFICANT CHANGE UP (ref 3.8–5.2)
RBC # FLD: 14.9 % — HIGH (ref 10.3–14.5)
RF+CCP IGG SER-IMP: NEGATIVE
RHEUMATOID FACT SER QL: <10 IU/ML
SODIUM SERPL-SCNC: 142 MMOL/L — SIGNIFICANT CHANGE UP (ref 135–145)
TROPONIN T, HIGH SENSITIVITY RESULT: <6 NG/L — SIGNIFICANT CHANGE UP (ref 0–51)
WBC # BLD: 5.04 K/UL — SIGNIFICANT CHANGE UP (ref 3.8–10.5)
WBC # FLD AUTO: 5.04 K/UL — SIGNIFICANT CHANGE UP (ref 3.8–10.5)

## 2025-03-07 PROCEDURE — 80048 BASIC METABOLIC PNL TOTAL CA: CPT

## 2025-03-07 PROCEDURE — 93005 ELECTROCARDIOGRAM TRACING: CPT

## 2025-03-07 PROCEDURE — 73721 MRI JNT OF LWR EXTRE W/O DYE: CPT | Mod: 26,RT

## 2025-03-07 PROCEDURE — 85025 COMPLETE CBC W/AUTO DIFF WBC: CPT

## 2025-03-07 PROCEDURE — 99285 EMERGENCY DEPT VISIT HI MDM: CPT

## 2025-03-07 PROCEDURE — 71275 CT ANGIOGRAPHY CHEST: CPT | Mod: MC

## 2025-03-07 PROCEDURE — 71275 CT ANGIOGRAPHY CHEST: CPT | Mod: 26

## 2025-03-07 PROCEDURE — 99285 EMERGENCY DEPT VISIT HI MDM: CPT | Mod: 25

## 2025-03-07 PROCEDURE — 36415 COLL VENOUS BLD VENIPUNCTURE: CPT

## 2025-03-07 PROCEDURE — 83880 ASSAY OF NATRIURETIC PEPTIDE: CPT

## 2025-03-07 PROCEDURE — 84484 ASSAY OF TROPONIN QUANT: CPT

## 2025-03-07 PROCEDURE — 73721 MRI JNT OF LWR EXTRE W/O DYE: CPT

## 2025-03-07 NOTE — ED PROVIDER NOTE - PROVIDER TOKENS
PROVIDER:[TOKEN:[[id number]]],PROVIDER:[TOKEN:[[id number]]],PROVIDER:[TOKEN:[[id number]]]

## 2025-03-07 NOTE — ED ADULT NURSE NOTE - OBJECTIVE STATEMENT
pt presents to the ED from pulmonologist for elevated d-dimer level. pt reports [id number] of shortness of breath with exertion and chest pressure. pt states hx of blood clots. pt states she does not take blood thinning medications currently. denies leg edema, SOB at rest, chest pain currently, N/V, abdominal pain, back pain, HA, vision changes. A&Ox4. Breathing spontaneously and unlabored on room air.

## 2025-03-07 NOTE — ED PROVIDER NOTE - CARE PROVIDER_API CALL
Hanna Ayala  Cardiology  [address] [address]  Phone: [phone]  Fax: [phone]  Follow Up Time:     Edwin Moreland  Cardiology  [address] [place] [address], [address]  [place] [address]  Phone: [phone]  Fax: [phone]  Follow Up Time:     Gilberto Cohn  Cardiovascular Disease  [address] [address]  Phone: [phone]  Fax: [phone]  Follow Up Time:

## 2025-03-07 NOTE — ED ADULT NURSE NOTE - NS ED NURSE LEVEL OF CONSCIOUSNESS ORIENTATION
Routine safety standards initiated.  Routine nursing standards initiated.   Oriented - self; Oriented - place; Oriented - time

## 2025-03-07 NOTE — ED ADULT NURSE NOTE - NS ED NURSE DC INFO COMPLEXITY
Simple: Patient demonstrates quick and easy understanding/Verbalized Understanding Statement Selected

## 2025-03-07 NOTE — ED PROVIDER NOTE - CLINICAL SUMMARY MEDICAL DECISION MAKING FREE TEXT BOX
here for eval after outpatient labs w/ +dimer  CT angio done and negative for PE  DC home in NAD with strict return precautions given.

## 2025-03-07 NOTE — ED PROVIDER NOTE - CARE PROVIDERS DIRECT ADDRESSES
,[email or web address]

## 2025-03-07 NOTE — ED ADULT NURSE NOTE - NSFALLUNIVINTERV_ED_ALL_ED
Bed/Stretcher in lowest position, wheels locked, appropriate side rails in place/Call bell, personal items and telephone in reach/Instruct patient to call for assistance before getting out of bed/chair/stretcher/Non-slip footwear applied when patient is off stretcher/[place] to call system/Physically safe environment - no spills, clutter or unnecessary equipment/Purposeful proactive rounding/Room/bathroom lighting operational, light cord in reach

## 2025-03-07 NOTE — ED PROVIDER NOTE - NSFOLLOWUPINSTRUCTIONS_ED_ALL_ED_FT
The patient is a 86y Female complaining of difficulty breathing. Chest Pain    Chest pain can be caused by many different conditions which may or may not be dangerous. Causes include heartburn, lung infections, heart attack, blood clot in lungs, skin infections, strain or damage to muscle, cartilage, or bones, etc. In addition to a history and physical examination, an electrocardiogram (ECG) or other lab tests may have been performed to determine the cause of your chest pain. Follow up with your primary care provider or with a cardiologist as instructed.     SEEK IMMEDIATE MEDICAL CARE IF YOU HAVE ANY OF THE FOLLOWING SYMPTOMS: worsening chest pain, coughing up blood, unexplained back/neck/jaw pain, severe abdominal pain, dizziness or lightheadedness, fainting, shortness of breath, sweaty or clammy skin, vomiting, or racing heart beat. These symptoms may represent a serious problem that is an emergency. Do not wait to see if the symptoms will go away. Get medical help right away. Call 911 and do not drive yourself to the hospital.

## 2025-03-07 NOTE — ED PROVIDER NOTE - PATIENT PORTAL LINK FT
You can access the FollowMyHealth Patient Portal offered by [facility] by registering at the following website: [email or web address]. By joining [facility]’s FollowMyHealth portal, you will also be able to view your health information using other applications (apps) compatible with our system.

## 2025-03-07 NOTE — ED PROVIDER NOTE - OBJECTIVE STATEMENT
55yo F PMH below here for +dimer outpatient and sent in for r/o PE via CT angio by her pulmonologist dr. lauren. +sob and cp x 1 month, decreased exercise capacity. No recent cardiac workup. Supposed to be on a/c lifelong but self discontinued, had clotted through DOACs and was last on lovenox 40 injections, pt states however that if she develops another clot she will be compliant w/ restarting a/c.     todays pulm note  "54-year-old with hx of DVT and PE currently off AC presents for evaluation of lung nodules and shortness of breath. Patient states that for the last month she has only been able to walk a short distance before she feels that she is going to pass out. She will sit down and catch her breath. She also admits to some chest tightness during that time. She has hx of DVT and PE which she had through Xarelto and was put on lovenox and has stopped for the last 2 years. She was also diagnosed with splenic vein thrombosis. She is a former smoker at half a pack per day for at least 40 years. She denies wheezing or coughing. She states that the last blood clot was unprovoked and a sharp R sided pain. After CTA in June she was found to have RUL nodule which was repeated 3 weeks ago and found to still be present leading to pulmonary evaluation. Patient also has hx of RA which she was diagnosed with many years ago but has not had treatment. "

## 2025-03-07 NOTE — ED PROVIDER NOTE - AVIAN FLU SYMPTOMS
No Pt received in  77y female AXo 4 is ambulatory 1 asst from home c/o wound check. PMH HLD, rheumatoid arthritis, TIA, lung cancer, COPD, essential HTN, stage 3 CKD, IBS, carotid artery stenosis. Pt states she has a persistent left leg wound. Upon assessment pt c/o left lower leg wound. Right 20 AC placed, labs drawn, blood cultures drawn from 2 different sites, EKG performed, X-ray performed, pt admitted. pending bed.

## 2025-03-07 NOTE — ED ADULT NURSE NOTE - CHIEF COMPLAINT QUOTE
Pt arrived to ED c/o elevated d-dimer results, pt endorses chest pressure and sob x 1 month. hx of PEs, htn, hld, pre-dm. Pt A&Ox4, ambulatory with steady gait, speaking in clear/full sentences,

## 2025-03-07 NOTE — ED ADULT TRIAGE NOTE - CHIEF COMPLAINT QUOTE
Pt arrived to ED c/o elevated d-dimer results, pt endorses chest pressure and sob x 1 month. hx of htn, hld, pre-dm. Pt A&Ox4, ambulatory with steady gait, speaking in clear/full sentences, Pt arrived to ED c/o elevated d-dimer results, pt endorses chest pressure and sob x 1 month. hx of PEs, htn, hld, pre-dm. Pt A&Ox4, ambulatory with steady gait, speaking in clear/full sentences,

## 2025-03-10 LAB
ALDOLASE SERPL-CCNC: 4.3 U/L
DSDNA AB SER-ACNC: 2 IU/ML
ENA JO1 AB SER IA-ACNC: <0.2 AL
ENA RNP AB SER IA-ACNC: <0.2 AL
ENA SCL70 IGG SER IA-ACNC: <0.2 AL
ENA SM AB SER IA-ACNC: <0.2 AL
ENA SS-A AB SER IA-ACNC: <0.2 AL
ENA SS-B AB SER IA-ACNC: <0.2 AL

## 2025-03-11 ENCOUNTER — APPOINTMENT (OUTPATIENT)
Dept: PULMONOLOGY | Facility: CLINIC | Age: 55
End: 2025-03-11
Payer: MEDICAID

## 2025-03-11 DIAGNOSIS — Z87.891 PERSONAL HISTORY OF NICOTINE DEPENDENCE: ICD-10-CM

## 2025-03-11 DIAGNOSIS — Z88.8 ALLERGY STATUS TO OTHER DRUGS, MEDICAMENTS AND BIOLOGICAL SUBSTANCES: ICD-10-CM

## 2025-03-11 DIAGNOSIS — Z86.718 PERSONAL HISTORY OF OTHER VENOUS THROMBOSIS AND EMBOLISM: ICD-10-CM

## 2025-03-11 DIAGNOSIS — R07.89 OTHER CHEST PAIN: ICD-10-CM

## 2025-03-11 DIAGNOSIS — R06.02 SHORTNESS OF BREATH: ICD-10-CM

## 2025-03-11 DIAGNOSIS — Z86.711 PERSONAL HISTORY OF PULMONARY EMBOLISM: ICD-10-CM

## 2025-03-11 LAB — ANA SER IF-ACNC: NEGATIVE

## 2025-03-11 PROCEDURE — 94618 PULMONARY STRESS TESTING: CPT

## 2025-03-11 PROCEDURE — 94060 EVALUATION OF WHEEZING: CPT

## 2025-03-11 PROCEDURE — 94729 DIFFUSING CAPACITY: CPT

## 2025-03-11 PROCEDURE — 94726 PLETHYSMOGRAPHY LUNG VOLUMES: CPT

## 2025-03-12 ENCOUNTER — NON-APPOINTMENT (OUTPATIENT)
Age: 55
End: 2025-03-12

## 2025-03-24 NOTE — DISCHARGE NOTE PROVIDER - NSDCQMSTROKE_NEU_ALL_CORE
"No

## 2025-04-08 ENCOUNTER — NON-APPOINTMENT (OUTPATIENT)
Age: 55
End: 2025-04-08

## 2025-04-08 ENCOUNTER — APPOINTMENT (OUTPATIENT)
Dept: HEART AND VASCULAR | Facility: CLINIC | Age: 55
End: 2025-04-08
Payer: MEDICAID

## 2025-04-08 VITALS
SYSTOLIC BLOOD PRESSURE: 130 MMHG | HEIGHT: 68 IN | TEMPERATURE: 98.1 F | BODY MASS INDEX: 31.52 KG/M2 | WEIGHT: 208 LBS | OXYGEN SATURATION: 98 % | HEART RATE: 96 BPM | DIASTOLIC BLOOD PRESSURE: 80 MMHG

## 2025-04-08 DIAGNOSIS — Z82.49 FAMILY HISTORY OF ISCHEMIC HEART DISEASE AND OTHER DISEASES OF THE CIRCULATORY SYSTEM: ICD-10-CM

## 2025-04-08 DIAGNOSIS — Z83.438 FAMILY HISTORY OF OTHER DISORDER OF LIPOPROTEIN METABOLISM AND OTHER LIPIDEMIA: ICD-10-CM

## 2025-04-08 DIAGNOSIS — Z83.3 FAMILY HISTORY OF DIABETES MELLITUS: ICD-10-CM

## 2025-04-08 DIAGNOSIS — R07.9 CHEST PAIN, UNSPECIFIED: ICD-10-CM

## 2025-04-08 PROCEDURE — 99204 OFFICE O/P NEW MOD 45 MIN: CPT

## 2025-04-08 PROCEDURE — 93000 ELECTROCARDIOGRAM COMPLETE: CPT

## 2025-04-08 RX ORDER — DULOXETINE HYDROCHLORIDE 30 MG/1
30 CAPSULE, DELAYED RELEASE ORAL
Refills: 0 | Status: ACTIVE | COMMUNITY
Start: 2025-04-08

## 2025-04-30 ENCOUNTER — APPOINTMENT (OUTPATIENT)
Dept: GASTROENTEROLOGY | Facility: HOSPITAL | Age: 55
End: 2025-04-30

## 2025-04-30 ENCOUNTER — OUTPATIENT (OUTPATIENT)
Dept: OUTPATIENT SERVICES | Facility: HOSPITAL | Age: 55
LOS: 1 days | Discharge: HOME | End: 2025-04-30
Payer: MEDICAID

## 2025-04-30 VITALS
HEART RATE: 52 BPM | RESPIRATION RATE: 19 BRPM | WEIGHT: 213.85 LBS | OXYGEN SATURATION: 96 % | DIASTOLIC BLOOD PRESSURE: 80 MMHG | SYSTOLIC BLOOD PRESSURE: 127 MMHG | TEMPERATURE: 97 F | HEIGHT: 68 IN

## 2025-04-30 DIAGNOSIS — Z98.890 OTHER SPECIFIED POSTPROCEDURAL STATES: Chronic | ICD-10-CM

## 2025-04-30 DIAGNOSIS — Z90.49 ACQUIRED ABSENCE OF OTHER SPECIFIED PARTS OF DIGESTIVE TRACT: Chronic | ICD-10-CM

## 2025-04-30 DIAGNOSIS — K46.9 UNSPECIFIED ABDOMINAL HERNIA WITHOUT OBSTRUCTION OR GANGRENE: Chronic | ICD-10-CM

## 2025-04-30 DIAGNOSIS — Z90.3 ACQUIRED ABSENCE OF STOMACH [PART OF]: Chronic | ICD-10-CM

## 2025-04-30 PROCEDURE — 45378 DIAGNOSTIC COLONOSCOPY: CPT

## 2025-04-30 NOTE — PRE-ANESTHESIA EVALUATION ADULT - NSANTHRISKNONERD_GEN_ALL_CORE
Problem: Altered Mood, Deterioration in Function  Goal: LTG-Able to verbalize reality based thinking  Outcome: Ongoing  Psychoeducation Group Note    Date: 12/1/2017  Start Time: 0900  End Time: 0920    Number Participants in Group:  8    Goal:  Patient will demonstrate increased interpersonal interaction. Topic:  Goal Setting / Community Meeting    Discipline Responsible:   OT  AT  [facility]. X RT  Other       Participation Level:     None  Minimal   x Active Listener x Interactive    Monopolizing         Participation Quality:  x Appropriate  Inappropriate   x       Attentive        Intrusive   x       Sharing        Resistant          Supportive        Lethargic       Affective:   x Congruent  Incongruent  Blunted  Flat    Constricted  Anxious  Elated  Angry    Labile  Depressed  Other  Bright       Cognitive:  x Alert x Oriented PPTP     Concentration x G  F  P   Attention Span x G  F  P   Short-Term Memory x G  F  P   Long-Term Memory  G  F  P   ProblemSolving/  Decision Making x G  F  P   Ability to Process  Information x G  F  P      Contributing Factors             Delusional             Hallucinating             Flight of Ideas             Other:       Modes of Intervention:  x Education x Support x Exploration   x Clarifying x Problem Solving x Confrontation   x Socialization x Limit Setting x Reality Testing   x Activity  Movement  Media    Other:            Response to Learning:  x Able to verbalize current knowledge/experience   x Able to verbalize/acknowledge new learning   x Able to retain information   x Capable of insight    Able to change behavior   x Progressing to goal    Other:        Goal for today: Attend groups. No risk alerts present

## 2025-05-12 ENCOUNTER — APPOINTMENT (OUTPATIENT)
Dept: INTERNAL MEDICINE | Facility: CLINIC | Age: 55
End: 2025-05-12

## 2025-05-14 ENCOUNTER — APPOINTMENT (OUTPATIENT)
Dept: CT IMAGING | Facility: CLINIC | Age: 55
End: 2025-05-14

## 2025-05-16 ENCOUNTER — LABORATORY RESULT (OUTPATIENT)
Age: 55
End: 2025-05-16

## 2025-05-16 ENCOUNTER — APPOINTMENT (OUTPATIENT)
Dept: INTERNAL MEDICINE | Facility: CLINIC | Age: 55
End: 2025-05-16
Payer: MEDICAID

## 2025-05-16 VITALS
HEIGHT: 68 IN | TEMPERATURE: 97.6 F | OXYGEN SATURATION: 96 % | DIASTOLIC BLOOD PRESSURE: 81 MMHG | SYSTOLIC BLOOD PRESSURE: 137 MMHG | HEART RATE: 79 BPM | BODY MASS INDEX: 32.13 KG/M2 | WEIGHT: 212 LBS

## 2025-05-16 DIAGNOSIS — R32 UNSPECIFIED URINARY INCONTINENCE: ICD-10-CM

## 2025-05-16 DIAGNOSIS — K59.09 OTHER CONSTIPATION: ICD-10-CM

## 2025-05-16 DIAGNOSIS — I10 ESSENTIAL (PRIMARY) HYPERTENSION: ICD-10-CM

## 2025-05-16 PROCEDURE — 99214 OFFICE O/P EST MOD 30 MIN: CPT

## 2025-05-16 RX ORDER — DOCUSATE SODIUM 100 MG/1
100 CAPSULE ORAL TWICE DAILY
Qty: 14 | Refills: 2 | Status: ACTIVE | COMMUNITY
Start: 2025-05-16 | End: 1900-01-01

## 2025-05-19 LAB
APPEARANCE: CLEAR
BILIRUBIN URINE: NEGATIVE
BLOOD URINE: NEGATIVE
COLOR: YELLOW
GLUCOSE QUALITATIVE U: NEGATIVE MG/DL
KETONES URINE: ABNORMAL MG/DL
LEUKOCYTE ESTERASE URINE: ABNORMAL
NITRITE URINE: NEGATIVE
PH URINE: 6.5
PROTEIN URINE: NORMAL MG/DL
SPECIFIC GRAVITY URINE: 1.02
UROBILINOGEN URINE: 0.2 MG/DL

## 2025-05-21 ENCOUNTER — TRANSCRIPTION ENCOUNTER (OUTPATIENT)
Age: 55
End: 2025-05-21

## 2025-05-28 ENCOUNTER — APPOINTMENT (OUTPATIENT)
Dept: HEART AND VASCULAR | Facility: CLINIC | Age: 55
End: 2025-05-28

## 2025-05-29 NOTE — PATIENT PROFILE ADULT - FALL HARM RISK CONCLUSION
Problem: Pain - Adult  Goal: Verbalizes/displays adequate comfort level or baseline comfort level  Outcome: Progressing     Problem: Safety - Adult  Goal: Free from fall injury  Outcome: Progressing     Problem: Discharge Planning  Goal: Discharge to home or other facility with appropriate resources  Outcome: Progressing     Problem: Chronic Conditions and Co-morbidities  Goal: Patient's chronic conditions and co-morbidity symptoms are monitored and maintained or improved  Outcome: Progressing     Problem: Nutrition  Goal: Nutrient intake appropriate for maintaining nutritional needs  Outcome: Progressing        Universal Safety Interventions

## 2025-05-30 ENCOUNTER — LABORATORY RESULT (OUTPATIENT)
Age: 55
End: 2025-05-30

## 2025-05-30 ENCOUNTER — APPOINTMENT (OUTPATIENT)
Dept: RHEUMATOLOGY | Facility: CLINIC | Age: 55
End: 2025-05-30
Payer: MEDICAID

## 2025-05-30 VITALS
BODY MASS INDEX: 31.83 KG/M2 | HEIGHT: 68 IN | HEART RATE: 65 BPM | TEMPERATURE: 97.8 F | WEIGHT: 210 LBS | DIASTOLIC BLOOD PRESSURE: 78 MMHG | SYSTOLIC BLOOD PRESSURE: 130 MMHG | OXYGEN SATURATION: 96 %

## 2025-05-30 DIAGNOSIS — M54.50 LOW BACK PAIN, UNSPECIFIED: ICD-10-CM

## 2025-05-30 DIAGNOSIS — F41.9 ANXIETY DISORDER, UNSPECIFIED: ICD-10-CM

## 2025-05-30 DIAGNOSIS — M19.049 PRIMARY OSTEOARTHRITIS, UNSPECIFIED HAND: ICD-10-CM

## 2025-05-30 DIAGNOSIS — D68.59 OTHER PRIMARY THROMBOPHILIA: ICD-10-CM

## 2025-05-30 DIAGNOSIS — G89.29 LOW BACK PAIN, UNSPECIFIED: ICD-10-CM

## 2025-05-30 DIAGNOSIS — E78.00 PURE HYPERCHOLESTEROLEMIA, UNSPECIFIED: ICD-10-CM

## 2025-05-30 PROCEDURE — 99205 OFFICE O/P NEW HI 60 MIN: CPT

## 2025-05-30 PROCEDURE — G2211 COMPLEX E/M VISIT ADD ON: CPT | Mod: NC

## 2025-05-31 LAB
CK SERPL-CCNC: 154 U/L
CRP SERPL-MCNC: 4 MG/L
HAPTOGLOB SERPL-MCNC: 188 MG/DL
LUPUS ANTICOAGULANT CASCADE REFLEX: NORMAL

## 2025-06-01 LAB — IGE SER-MCNC: 102 KU/L

## 2025-06-02 LAB
ACE BLD-CCNC: 49 U/L
C3 SERPL-MCNC: 145 MG/DL
C4 SERPL-MCNC: 34 MG/DL
DEPRECATED KAPPA LC FREE/LAMBDA SER: 1.14 RATIO
IGA SERPL-MCNC: 216 MG/DL
IGG SERPL-MCNC: 1139 MG/DL
IGG SERPL-MCNC: 1162 MG/DL
IGG1 SER-MCNC: 667 MG/DL
IGG2 SER-MCNC: 371 MG/DL
IGG3 SER-MCNC: 59.8 MG/DL
IGG4 SER-MCNC: 20.4 MG/DL
IGM SERPL-MCNC: 152 MG/DL
KAPPA LC CSF-MCNC: 1.71 MG/DL
KAPPA LC SERPL-MCNC: 1.95 MG/DL

## 2025-06-03 LAB — HLA-B27 QL NAA+PROBE: NORMAL

## 2025-06-04 LAB
ALBUMIN MFR SERPL ELPH: 55.2 %
ALBUMIN SERPL-MCNC: 3.8 G/DL
ALBUMIN/GLOB SERPL: 1.2 RATIO
ALPHA1 GLOB MFR SERPL ELPH: 4.4 %
ALPHA1 GLOB SERPL ELPH-MCNC: 0.3 G/DL
ALPHA2 GLOB MFR SERPL ELPH: 10.8 %
ALPHA2 GLOB SERPL ELPH-MCNC: 0.7 G/DL
B-GLOBULIN MFR SERPL ELPH: 12.6 %
B-GLOBULIN SERPL ELPH-MCNC: 0.9 G/DL
GAMMA GLOB FLD ELPH-MCNC: 1.2 G/DL
GAMMA GLOB MFR SERPL ELPH: 17 %
INTERPRETATION SERPL IEP-IMP: NORMAL
M PROTEIN SPEC IFE-MCNC: NORMAL
PROT SERPL-MCNC: 6.9 G/DL
PROT SERPL-MCNC: 6.9 G/DL

## 2025-06-12 ENCOUNTER — APPOINTMENT (OUTPATIENT)
Dept: PULMONOLOGY | Facility: CLINIC | Age: 55
End: 2025-06-12

## 2025-06-18 ENCOUNTER — APPOINTMENT (OUTPATIENT)
Dept: RHEUMATOLOGY | Facility: CLINIC | Age: 55
End: 2025-06-18

## 2025-06-18 PROCEDURE — 99214 OFFICE O/P EST MOD 30 MIN: CPT | Mod: 95

## 2025-06-18 RX ORDER — METHYLPREDNISOLONE 4 MG/1
4 TABLET ORAL
Qty: 1 | Refills: 0 | Status: ACTIVE | COMMUNITY
Start: 2025-06-18 | End: 1900-01-01

## 2025-06-23 ENCOUNTER — TRANSCRIPTION ENCOUNTER (OUTPATIENT)
Age: 55
End: 2025-06-23

## 2025-06-26 ENCOUNTER — NON-APPOINTMENT (OUTPATIENT)
Age: 55
End: 2025-06-26

## 2025-06-26 ENCOUNTER — APPOINTMENT (OUTPATIENT)
Dept: UROLOGY | Facility: CLINIC | Age: 55
End: 2025-06-26
Payer: MEDICAID

## 2025-06-26 VITALS
WEIGHT: 209 LBS | TEMPERATURE: 97.5 F | HEIGHT: 68 IN | RESPIRATION RATE: 18 BRPM | HEART RATE: 60 BPM | OXYGEN SATURATION: 97 % | DIASTOLIC BLOOD PRESSURE: 80 MMHG | BODY MASS INDEX: 31.67 KG/M2 | SYSTOLIC BLOOD PRESSURE: 137 MMHG

## 2025-06-26 PROBLEM — R35.1 NOCTURIA: Status: ACTIVE | Noted: 2025-06-26

## 2025-06-26 PROCEDURE — 51798 US URINE CAPACITY MEASURE: CPT

## 2025-06-26 PROCEDURE — 99204 OFFICE O/P NEW MOD 45 MIN: CPT

## 2025-06-26 PROCEDURE — G2211 COMPLEX E/M VISIT ADD ON: CPT | Mod: NC

## 2025-06-26 RX ORDER — VIBEGRON 75 MG/1
75 TABLET, FILM COATED ORAL
Qty: 90 | Refills: 1 | Status: ACTIVE | COMMUNITY
Start: 2025-06-26 | End: 1900-01-01

## 2025-06-27 LAB
APPEARANCE: CLEAR
BACTERIA: NEGATIVE /HPF
BILIRUBIN URINE: NEGATIVE
BLOOD URINE: NEGATIVE
CAST: 0 /LPF
COLOR: YELLOW
EPITHELIAL CELLS: 1 /HPF
GLUCOSE QUALITATIVE U: NEGATIVE MG/DL
KETONES URINE: NEGATIVE MG/DL
LEUKOCYTE ESTERASE URINE: ABNORMAL
MICROSCOPIC-UA: NORMAL
NITRITE URINE: NEGATIVE
PH URINE: 6
PROTEIN URINE: NEGATIVE MG/DL
RED BLOOD CELLS URINE: 1 /HPF
SPECIFIC GRAVITY URINE: 1.02
UROBILINOGEN URINE: 0.2 MG/DL
WHITE BLOOD CELLS URINE: 0 /HPF

## 2025-06-30 ENCOUNTER — TRANSCRIPTION ENCOUNTER (OUTPATIENT)
Age: 55
End: 2025-06-30

## 2025-06-30 LAB — BACTERIA UR CULT: NORMAL

## 2025-07-01 RX ORDER — MIRABEGRON 25 MG/1
25 TABLET, EXTENDED RELEASE ORAL
Qty: 90 | Refills: 1 | Status: ACTIVE | COMMUNITY
Start: 2025-07-01 | End: 1900-01-01

## 2025-07-03 ENCOUNTER — APPOINTMENT (OUTPATIENT)
Dept: RADIOLOGY | Facility: CLINIC | Age: 55
End: 2025-07-03
Payer: MEDICAID

## 2025-07-03 ENCOUNTER — OUTPATIENT (OUTPATIENT)
Dept: OUTPATIENT SERVICES | Facility: HOSPITAL | Age: 55
LOS: 1 days | End: 2025-07-03

## 2025-07-03 DIAGNOSIS — K46.9 UNSPECIFIED ABDOMINAL HERNIA WITHOUT OBSTRUCTION OR GANGRENE: Chronic | ICD-10-CM

## 2025-07-03 PROCEDURE — 73130 X-RAY EXAM OF HAND: CPT | Mod: 26,50

## 2025-07-03 PROCEDURE — 72190 X-RAY EXAM OF PELVIS: CPT | Mod: 26

## 2025-07-03 PROCEDURE — 72040 X-RAY EXAM NECK SPINE 2-3 VW: CPT | Mod: 26

## 2025-07-15 ENCOUNTER — APPOINTMENT (OUTPATIENT)
Dept: PULMONOLOGY | Facility: CLINIC | Age: 55
End: 2025-07-15

## 2025-07-16 ENCOUNTER — APPOINTMENT (OUTPATIENT)
Dept: RHEUMATOLOGY | Facility: CLINIC | Age: 55
End: 2025-07-16
Payer: MEDICAID

## 2025-07-16 PROCEDURE — 99215 OFFICE O/P EST HI 40 MIN: CPT | Mod: 95

## 2025-08-15 ENCOUNTER — APPOINTMENT (OUTPATIENT)
Dept: INTERNAL MEDICINE | Facility: CLINIC | Age: 55
End: 2025-08-15
Payer: MEDICAID

## 2025-08-15 VITALS
WEIGHT: 211 LBS | SYSTOLIC BLOOD PRESSURE: 145 MMHG | HEART RATE: 55 BPM | DIASTOLIC BLOOD PRESSURE: 75 MMHG | HEIGHT: 68 IN | BODY MASS INDEX: 31.98 KG/M2 | OXYGEN SATURATION: 96 % | TEMPERATURE: 97.2 F

## 2025-08-15 DIAGNOSIS — K21.9 GASTRO-ESOPHAGEAL REFLUX DISEASE W/OUT ESOPHAGITIS: ICD-10-CM

## 2025-08-15 DIAGNOSIS — E78.5 HYPERLIPIDEMIA, UNSPECIFIED: ICD-10-CM

## 2025-08-15 DIAGNOSIS — R10.9 UNSPECIFIED ABDOMINAL PAIN: ICD-10-CM

## 2025-08-15 PROCEDURE — 99214 OFFICE O/P EST MOD 30 MIN: CPT | Mod: 25

## 2025-08-19 DIAGNOSIS — I10 ESSENTIAL (PRIMARY) HYPERTENSION: ICD-10-CM

## 2025-08-19 LAB
25(OH)D3 SERPL-MCNC: 22 NG/ML
ALBUMIN SERPL ELPH-MCNC: 4.4 G/DL
ALP BLD-CCNC: 93 U/L
ALT SERPL-CCNC: 14 U/L
ANION GAP SERPL CALC-SCNC: 14 MMOL/L
AST SERPL-CCNC: 18 U/L
BILIRUB SERPL-MCNC: 0.3 MG/DL
BUN SERPL-MCNC: 11 MG/DL
CALCIUM SERPL-MCNC: 9.5 MG/DL
CHLORIDE SERPL-SCNC: 103 MMOL/L
CHOLEST SERPL-MCNC: 232 MG/DL
CO2 SERPL-SCNC: 25 MMOL/L
CREAT SERPL-MCNC: 0.77 MG/DL
EGFRCR SERPLBLD CKD-EPI 2021: 90 ML/MIN/1.73M2
ESTIMATED AVERAGE GLUCOSE: 140 MG/DL
GLUCOSE SERPL-MCNC: 94 MG/DL
HBA1C MFR BLD HPLC: 6.5 %
HCT VFR BLD CALC: 42.3 %
HDLC SERPL-MCNC: 54 MG/DL
HGB BLD-MCNC: 13.4 G/DL
LDLC SERPL-MCNC: 144 MG/DL
MCHC RBC-ENTMCNC: 28 PG
MCHC RBC-ENTMCNC: 31.7 G/DL
MCV RBC AUTO: 88.5 FL
NONHDLC SERPL-MCNC: 178 MG/DL
PLATELET # BLD AUTO: 250 K/UL
POTASSIUM SERPL-SCNC: 4.5 MMOL/L
PROT SERPL-MCNC: 7.6 G/DL
RBC # BLD: 4.78 M/UL
RBC # FLD: 14.5 %
SODIUM SERPL-SCNC: 142 MMOL/L
TRIGL SERPL-MCNC: 186 MG/DL
WBC # FLD AUTO: 4.81 K/UL

## 2025-08-19 RX ORDER — EMPAGLIFLOZIN 10 MG/1
10 TABLET, FILM COATED ORAL DAILY
Qty: 90 | Refills: 0 | Status: ACTIVE | COMMUNITY
Start: 2025-08-19 | End: 1900-01-01

## 2025-08-25 ENCOUNTER — RESULT REVIEW (OUTPATIENT)
Age: 55
End: 2025-08-25

## 2025-09-02 ENCOUNTER — APPOINTMENT (OUTPATIENT)
Dept: CT IMAGING | Facility: CLINIC | Age: 55
End: 2025-09-02
Payer: MEDICAID

## 2025-09-02 ENCOUNTER — OUTPATIENT (OUTPATIENT)
Dept: OUTPATIENT SERVICES | Facility: HOSPITAL | Age: 55
LOS: 1 days | End: 2025-09-02

## 2025-09-02 DIAGNOSIS — Z98.890 OTHER SPECIFIED POSTPROCEDURAL STATES: Chronic | ICD-10-CM

## 2025-09-02 DIAGNOSIS — Z90.49 ACQUIRED ABSENCE OF OTHER SPECIFIED PARTS OF DIGESTIVE TRACT: Chronic | ICD-10-CM

## 2025-09-02 DIAGNOSIS — Z90.3 ACQUIRED ABSENCE OF STOMACH [PART OF]: Chronic | ICD-10-CM

## 2025-09-02 DIAGNOSIS — K46.9 UNSPECIFIED ABDOMINAL HERNIA WITHOUT OBSTRUCTION OR GANGRENE: Chronic | ICD-10-CM

## 2025-09-02 PROCEDURE — 74176 CT ABD & PELVIS W/O CONTRAST: CPT | Mod: 26

## 2025-09-09 ENCOUNTER — NON-APPOINTMENT (OUTPATIENT)
Age: 55
End: 2025-09-09